# Patient Record
Sex: FEMALE | Race: WHITE | NOT HISPANIC OR LATINO | Employment: OTHER | URBAN - NONMETROPOLITAN AREA
[De-identification: names, ages, dates, MRNs, and addresses within clinical notes are randomized per-mention and may not be internally consistent; named-entity substitution may affect disease eponyms.]

---

## 2018-03-23 ENCOUNTER — OFFICE VISIT (OUTPATIENT)
Dept: FAMILY MEDICINE CLINIC | Facility: CLINIC | Age: 68
End: 2018-03-23

## 2018-03-23 ENCOUNTER — APPOINTMENT (OUTPATIENT)
Dept: LAB | Facility: HOSPITAL | Age: 68
End: 2018-03-23

## 2018-03-23 VITALS
HEIGHT: 64 IN | OXYGEN SATURATION: 96 % | HEART RATE: 104 BPM | DIASTOLIC BLOOD PRESSURE: 100 MMHG | WEIGHT: 98 LBS | BODY MASS INDEX: 16.73 KG/M2 | SYSTOLIC BLOOD PRESSURE: 160 MMHG

## 2018-03-23 DIAGNOSIS — I73.9 CLAUDICATION IN PERIPHERAL VASCULAR DISEASE (HCC): ICD-10-CM

## 2018-03-23 DIAGNOSIS — I73.9 PERIPHERAL ARTERIAL DISEASE (HCC): Primary | ICD-10-CM

## 2018-03-23 DIAGNOSIS — R64 CACHEXIA (HCC): ICD-10-CM

## 2018-03-23 DIAGNOSIS — Z13.220 ENCOUNTER FOR SCREENING FOR LIPID DISORDER: ICD-10-CM

## 2018-03-23 DIAGNOSIS — Z12.11 COLON CANCER SCREENING: ICD-10-CM

## 2018-03-23 DIAGNOSIS — Z71.6 ENCOUNTER FOR SMOKING CESSATION COUNSELING: ICD-10-CM

## 2018-03-23 DIAGNOSIS — G89.4 CHRONIC PAIN SYNDROME: ICD-10-CM

## 2018-03-23 DIAGNOSIS — F17.210 HEAVY TOBACCO SMOKER >10 CIGARETTES PER DAY: ICD-10-CM

## 2018-03-23 DIAGNOSIS — I10 ESSENTIAL HYPERTENSION: ICD-10-CM

## 2018-03-23 LAB
ALBUMIN SERPL-MCNC: 4.5 G/DL (ref 3.4–4.8)
ALBUMIN/GLOB SERPL: 1.5 G/DL (ref 1.1–1.8)
ALP SERPL-CCNC: 100 U/L (ref 38–126)
ALT SERPL W P-5'-P-CCNC: 23 U/L (ref 9–52)
ANION GAP SERPL CALCULATED.3IONS-SCNC: 13 MMOL/L (ref 5–15)
ARTICHOKE IGE QN: 72 MG/DL (ref 1–129)
AST SERPL-CCNC: 27 U/L (ref 14–36)
BILIRUB SERPL-MCNC: 0.3 MG/DL (ref 0.2–1.3)
BUN BLD-MCNC: 7 MG/DL (ref 7–21)
BUN/CREAT SERPL: 10.8 (ref 7–25)
CALCIUM SPEC-SCNC: 9.6 MG/DL (ref 8.4–10.2)
CHLORIDE SERPL-SCNC: 96 MMOL/L (ref 95–110)
CHOLEST SERPL-MCNC: 175 MG/DL (ref 0–199)
CO2 SERPL-SCNC: 27 MMOL/L (ref 22–31)
CREAT BLD-MCNC: 0.65 MG/DL (ref 0.5–1)
GFR SERPL CREATININE-BSD FRML MDRD: 91 ML/MIN/1.73 (ref 45–104)
GLOBULIN UR ELPH-MCNC: 3.1 GM/DL (ref 2.3–3.5)
GLUCOSE BLD-MCNC: 94 MG/DL (ref 60–100)
HDLC SERPL-MCNC: 63 MG/DL (ref 60–200)
LDLC/HDLC SERPL: 1.32 {RATIO} (ref 0–3.22)
POTASSIUM BLD-SCNC: 4.5 MMOL/L (ref 3.5–5.1)
PROT SERPL-MCNC: 7.6 G/DL (ref 6.3–8.6)
SODIUM BLD-SCNC: 136 MMOL/L (ref 137–145)
TRIGL SERPL-MCNC: 144 MG/DL (ref 20–199)

## 2018-03-23 PROCEDURE — 99203 OFFICE O/P NEW LOW 30 MIN: CPT | Performed by: FAMILY MEDICINE

## 2018-03-23 PROCEDURE — 80061 LIPID PANEL: CPT | Performed by: FAMILY MEDICINE

## 2018-03-23 PROCEDURE — 36415 COLL VENOUS BLD VENIPUNCTURE: CPT | Performed by: FAMILY MEDICINE

## 2018-03-23 PROCEDURE — 99406 BEHAV CHNG SMOKING 3-10 MIN: CPT | Performed by: FAMILY MEDICINE

## 2018-03-23 PROCEDURE — 80053 COMPREHEN METABOLIC PANEL: CPT | Performed by: FAMILY MEDICINE

## 2018-03-23 RX ORDER — FLUOXETINE HYDROCHLORIDE 20 MG/1
20 CAPSULE ORAL DAILY
Qty: 30 CAPSULE | Refills: 0 | Status: SHIPPED | OUTPATIENT
Start: 2018-03-23 | End: 2018-05-01 | Stop reason: SDUPTHER

## 2018-03-23 RX ORDER — CILOSTAZOL 100 MG/1
100 TABLET ORAL EVERY 12 HOURS
Qty: 60 TABLET | Refills: 0 | Status: SHIPPED | OUTPATIENT
Start: 2018-03-23 | End: 2018-04-20 | Stop reason: SDUPTHER

## 2018-03-23 RX ORDER — ATORVASTATIN CALCIUM 10 MG/1
10 TABLET, FILM COATED ORAL DAILY
Qty: 30 TABLET | Refills: 0 | Status: SHIPPED | OUTPATIENT
Start: 2018-03-23 | End: 2018-05-01 | Stop reason: SDUPTHER

## 2018-03-23 RX ORDER — NAPROXEN 500 MG/1
500 TABLET ORAL 2 TIMES DAILY WITH MEALS
Qty: 60 TABLET | Refills: 0 | Status: SHIPPED | OUTPATIENT
Start: 2018-03-23 | End: 2018-05-01 | Stop reason: SDUPTHER

## 2018-03-23 RX ORDER — AMLODIPINE BESYLATE 2.5 MG/1
2.5 TABLET ORAL 2 TIMES DAILY
Qty: 60 TABLET | Refills: 0 | Status: SHIPPED | OUTPATIENT
Start: 2018-03-23 | End: 2018-05-01 | Stop reason: SDUPTHER

## 2018-03-23 RX ORDER — GABAPENTIN 100 MG/1
100 CAPSULE ORAL 3 TIMES DAILY
Qty: 90 CAPSULE | Refills: 1 | Status: SHIPPED | OUTPATIENT
Start: 2018-03-23 | End: 2018-06-03 | Stop reason: SDUPTHER

## 2018-03-23 RX ORDER — LISINOPRIL 10 MG/1
10 TABLET ORAL DAILY
Qty: 30 TABLET | Refills: 0 | Status: SHIPPED | OUTPATIENT
Start: 2018-03-23 | End: 2018-04-04 | Stop reason: ALTCHOICE

## 2018-03-23 NOTE — PROGRESS NOTES
Subjective:     Jessie Jordan is a 67 y.o. female who presents to establish care for multiple medical issues.  She would like to talk about her leg pain & PAD today.      She has history of chronic pain from previous neck/shoulder surgeries as well as right knee pain.  She was previously in pain management and treated with Norco 7.5mg/325mg q6h PRN pain and Neurontin 100mg BID.   She has recently moved to [place] and needs a referral to a new pain clinic.  She reports she is almost out of her Norco and Neurontin and is requesting refills today.      She has history of CVA which occurred last year, she reports residual weakness of her hands and some right sided lower extremity neuropathy.    Patient reports that she underwent complete evaluation for her peripheral artery disease and was told she has almost complete calcium blockage of her bilateral femoral arteries.  She was scheduled to undergo bypass grafting but was told by her vascular surgeon that she should see someone here in [place], [place] because she was moving.   I have no record of her work up at this time.  She states they have filled out forms for medical records to be sent to our office.    She reports leg pain with minimal activity, states the pain subsides somewhat when she stops to rest.  She is on Lipitor 10mg nightly and takes a daily aspirin.    Patient also has history of HTN and was previously on lisinopril 10mg daily, she states this was discontinued at her last hospitalization and replace with Norvasc 2.5mg BID because her blood pressure was well controlled in the hospital.   She has only been taking Norvasc 2.5mg BID for blood pressure control at this time.    Preventative:  Over the past 2 weeks, have you felt down, depressed, or hopeless?Yes - diagnosis of depression, currently treated with Prozac  Over the past 2 weeks, have you felt little interest or pleasure in doing things?Yes  Clinical depression screening refused by  patient.No     On osteoporosis therapy?No     Past Medical Hx:  Past Medical History:   Diagnosis Date   • Cerebral vascular disease 2017    residual arm weakness   • Peripheral artery disease     pt states almost complete stenosis of bilateral femoral arteries   • Post-menopausal 1990       Past Surgical Hx:  Past Surgical History:   Procedure Laterality Date   • CARDIAC CATHETERIZATION  12/2017    done at outside hospital.  x2, pt reports no stenting   • CERVICAL FUSION  1985    C5-6   • ROTATOR CUFF REPAIR Right 2015       Health Maintenance:  Health Maintenance   Topic Date Due   • TDAP/TD VACCINES (1 - Tdap) 09/04/1969   • PNEUMOCOCCAL VACCINES (65+ LOW/MEDIUM RISK) (1 of 2 - PCV13) 09/04/2015   • INFLUENZA VACCINE  08/01/2017   • COLONOSCOPY  02/15/2018   • HEPATITIS C SCREENING  02/26/2018   • MEDICARE ANNUAL WELLNESS  02/26/2018   • MAMMOGRAM  02/26/2018   • ZOSTER VACCINE  02/26/2018       Current Meds:    Current Outpatient Prescriptions:   •  atorvastatin (LIPITOR) 10 MG tablet, Take 10 mg by mouth Daily., Disp: , Rfl:   •  benzonatate (TESSALON) 200 MG capsule, Take 1 capsule by mouth 3 (Three) Times a Day As Needed for Cough., Disp: 30 capsule, Rfl: 0  •  BuPROPion HCl (WELLBUTRIN PO), Take  by mouth., Disp: , Rfl:   •  FLUoxetine (PROZAC) 20 MG capsule, Take 20 mg by mouth Daily., Disp: , Rfl:   •  gabapentin (NEURONTIN) 100 MG capsule, Take 100 mg by mouth 3 (Three) Times a Day., Disp: , Rfl:   •  lisinopril (PRINIVIL,ZESTRIL) 10 MG tablet, Take 10 mg by mouth Daily., Disp: , Rfl:   •  naproxen (NAPROSYN) 500 MG tablet, Take 500 mg by mouth 2 (Two) Times a Day With Meals., Disp: , Rfl:   •  oseltamivir (TAMIFLU) 75 MG capsule, Take 1 capsule by mouth 2 (Two) Times a Day., Disp: 10 capsule, Rfl: 0  •  oxyCODONE-acetaminophen (PERCOCET) 7.5-325 MG per tablet, Take 1 tablet by mouth Every 6 (Six) Hours As Needed., Disp: , Rfl:     Allergies:  Review of patient's allergies indicates no known  "allergies.    Family Hx:  Family History   Problem Relation Age of Onset   • Lung cancer Mother    • Hypertension Mother    • Diabetes Maternal Grandmother    • Heart disease Maternal Grandmother    • Hypertension Maternal Grandfather    • Heart disease Maternal Grandfather         Social History:  Social History     Social History   • Marital status:      Spouse name: N/A   • Number of children: N/A   • Years of education: N/A     Occupational History   • Not on file.     Social History Main Topics   • Smoking status: Current Every Day Smoker     Packs/day: 1.00     Years: 50.00     Types: Cigarettes     Start date: 1968   • Smokeless tobacco: Not on file      Comment: 50+ pack years   • Alcohol use No      Comment: social, in the past   • Drug use: No   • Sexual activity: Defer     Other Topics Concern   • Not on file     Social History Narrative   • No narrative on file       Review of Systems  Review of Systems   Constitutional: Negative for activity change, appetite change, chills and fever.   HENT: Negative for congestion, sinus pressure and sore throat.    Eyes: Negative for photophobia and visual disturbance.   Respiratory: Positive for cough and wheezing. Negative for shortness of breath.    Cardiovascular: Negative for chest pain, palpitations and leg swelling.   Gastrointestinal: Negative for abdominal pain, constipation, diarrhea, nausea and vomiting.   Endocrine: Negative for polyphagia and polyuria.   Genitourinary: Negative for decreased urine volume and difficulty urinating.   Musculoskeletal: Positive for arthralgias and neck pain (chronic). Negative for myalgias.   Skin: Positive for pallor (feet). Negative for color change.   Neurological: Negative for dizziness and light-headedness.   Psychiatric/Behavioral: Negative for confusion and decreased concentration.       Objective:     /100   Pulse 104   Ht 162.6 cm (64\")   Wt 44.5 kg (98 lb)   SpO2 96%   BMI 16.82 kg/m²     Physical " Exam   Constitutional: She is oriented to person, place, and time. She appears cachectic. No distress.   HENT:   Head: Normocephalic and atraumatic.   Eyes: Conjunctivae and EOM are normal.   Neck: Normal range of motion. Neck supple.   Cardiovascular: Normal rate, regular rhythm and normal heart sounds.  Exam reveals no gallop and no friction rub.    No murmur heard.  Pulses:       Dorsalis pedis pulses are 0 on the right side, and 0 on the left side.        Posterior tibial pulses are 0 on the right side, and 0 on the left side.   Non-palpable DP/PT pulses bilaterally - pulses audible with doppler   Pulmonary/Chest: Effort normal. No respiratory distress. She has wheezes. She has no rales.   Abdominal: Soft. Bowel sounds are normal. She exhibits no distension. There is no tenderness.   Musculoskeletal: Normal range of motion. She exhibits no edema.     Vascular Status -  Her right foot exhibits abnormal right foot vasculature  (Non-palpable DP/PT pulses  - audible with doppler). Her right foot exhibits normal right foot edema. Her left foot exhibits abnormal left foot vasculature  (Non-palpable DP/PT pulses  - audible with doppler). Her left foot exhibits normal left foot edema.  Neurological: She is alert and oriented to person, place, and time.   Skin: Skin is warm and dry. No rash noted. She is not diaphoretic.   Psychiatric: She has a normal mood and affect.   Vitals reviewed.     Assessment/Plan:     Jessie was seen today for leg pain and establish care.    Diagnoses and all orders for this visit:    Peripheral arterial disease  -     atorvastatin (LIPITOR) 10 MG tablet; Take 1 tablet by mouth Daily.  -     cilostazol (PLETAL) 100 MG tablet; Take 1 tablet by mouth Every 12 (Twelve) Hours. 30 min before or 2hr after meals    Essential hypertension  -     amLODIPine (NORVASC) 2.5 MG tablet; Take 1 tablet by mouth 2 (Two) Times a Day.  -     lisinopril (PRINIVIL,ZESTRIL) 10 MG tablet; Take 1 tablet by mouth  Daily.  -     Comprehensive Metabolic Panel    Encounter for screening for lipid disorder  -     Lipid Panel    Claudication in peripheral vascular disease  -     cilostazol (PLETAL) 100 MG tablet; Take 1 tablet by mouth Every 12 (Twelve) Hours. 30 min before or 2hr after meals    Heavy tobacco smoker >10 cigarettes per day  -     Discontinue: nicotine (NICODERM CQ) 21 MG/24HR patch 1 patch; Place 1 patch on the skin Daily.  -     buPROPion (ZYBAN) 150 MG 12 hr tablet; Take 150 mg by mouth Every 12 (Twelve) Hours.  -     nicotine (NICODERM CQ) 21 MG/24HR patch; Place 1 patch on the skin Daily for 42 days. Remove at night time    Encounter for smoking cessation counseling  Comments:  5 minutes was spent in smoking cessation counseling, pt already on Zyban but not correct dosing. She would also like nicotine patches    Chronic pain syndrome  -     naproxen (NAPROSYN) 500 MG tablet; Take 1 tablet by mouth 2 (Two) Times a Day With Meals.  -     gabapentin (NEURONTIN) 100 MG capsule; Take 1 capsule by mouth 3 (Three) Times a Day. For chronic pain  -     Ambulatory Referral to Pain Management    Colon cancer screening  -     Ambulatory Referral For Screening Colonoscopy    BMI less than 19,adult  Comments:  BMI 16.82    Cachexia  Comments:  - likely due to COPD but pt has not had spirometry testing    Other orders  -     FLUoxetine (PROZAC) 20 MG capsule; Take 1 capsule by mouth Daily.      Follow-up:     Return in about 2 weeks (around 4/6/2018) for INITIAL MEDICARE WELLNESS.    Goals        Patient Stated    • Quit smoking / using tobacco (pt-stated)            Barriers: compliance with cessation medications         Other    • Pain control            Barriers: severe PAD, pt continues to smoke          Preventative:  Female Preventative: Mammogram is due, Colon cancer screening is due, last pap unknown. Recommended mammogram, pt refused.  Counseled pt that mammogram is used to screen for breast cancer.  Risks of not  undergoing imaging including but not limited to death discussed with pt.  Pt verbalized understanding and still does not wish to under go mammogram at this time.  There were no barriers to communication.     Recommended Vaccines:   Tetanus vaccine: unknown  Annual influenza vaccine: not up to date  Pneumococcal vaccine: not up to date - pt declined  Hep B vaccine: unknown   Zoster vaccine:unknown    Smoking cessation counseling was provided.  Pharmacotherapy was prescribed as ordered.  does not drink  increase water intake and have 3 meals a day    RISK SCORE: 5    Signature  Cathy Willard MD PGY3  [place] [address]  Office: [phone]    This document has been electronically signed by Cathy Willard MD on March 26, 2018 10:54 AM

## 2018-03-23 NOTE — PROGRESS NOTES
I discussed the case with the resident and I agree with their assessment and plan as outlined .  Hadley Lopez MD.

## 2018-03-26 PROBLEM — I10 ESSENTIAL HYPERTENSION: Status: ACTIVE | Noted: 2018-03-26

## 2018-03-26 PROBLEM — I73.9 PERIPHERAL ARTERIAL DISEASE: Status: ACTIVE | Noted: 2018-03-26

## 2018-03-26 PROBLEM — I67.9 CEREBROVASCULAR DISEASE: Chronic | Status: ACTIVE | Noted: 2018-03-26

## 2018-03-26 PROBLEM — F17.210 HEAVY TOBACCO SMOKER >10 CIGARETTES PER DAY: Status: ACTIVE | Noted: 2018-03-26

## 2018-03-26 PROBLEM — G89.4 CHRONIC PAIN SYNDROME: Status: ACTIVE | Noted: 2018-03-26

## 2018-03-26 RX ORDER — BUPROPION HYDROCHLORIDE 150 MG/1
150 TABLET, EXTENDED RELEASE ORAL EVERY 12 HOURS SCHEDULED
Qty: 60 TABLET | Refills: 0 | Status: SHIPPED | OUTPATIENT
Start: 2018-03-26 | End: 2018-05-01 | Stop reason: SDUPTHER

## 2018-03-26 RX ORDER — NICOTINE 21 MG/24HR
1 PATCH, TRANSDERMAL 24 HOURS TRANSDERMAL EVERY 24 HOURS
Qty: 21 PATCH | Refills: 1 | Status: SHIPPED | OUTPATIENT
Start: 2018-03-26 | End: 2018-04-04

## 2018-03-26 RX ORDER — NICOTINE 21 MG/24HR
1 PATCH, TRANSDERMAL 24 HOURS TRANSDERMAL EVERY 24 HOURS
Status: DISCONTINUED | OUTPATIENT
Start: 2018-03-26 | End: 2018-03-26

## 2018-03-26 NOTE — ASSESSMENT & PLAN NOTE
- non-palpable DP/PT pulses but audible by doppler  - based on patient history she will require bypass grafting, will await medical records as this information will need to be sent to Vascular Surgery along with her referral  - will try Pletal for her symptoms of claudication

## 2018-04-03 PROBLEM — I67.9 CEREBRAL VASCULAR DISEASE: Status: ACTIVE | Noted: 2017-01-01

## 2018-04-03 PROBLEM — I25.10 CORONARY ARTERY DISEASE INVOLVING NATIVE CORONARY ARTERY OF NATIVE HEART: Status: ACTIVE | Noted: 2017-12-06

## 2018-04-04 ENCOUNTER — OFFICE VISIT (OUTPATIENT)
Dept: FAMILY MEDICINE CLINIC | Facility: CLINIC | Age: 68
End: 2018-04-04

## 2018-04-04 VITALS
DIASTOLIC BLOOD PRESSURE: 68 MMHG | BODY MASS INDEX: 16.99 KG/M2 | OXYGEN SATURATION: 96 % | HEART RATE: 104 BPM | SYSTOLIC BLOOD PRESSURE: 142 MMHG | WEIGHT: 99.5 LBS | HEIGHT: 64 IN

## 2018-04-04 DIAGNOSIS — Z00.00 INITIAL MEDICARE ANNUAL WELLNESS VISIT: Primary | ICD-10-CM

## 2018-04-04 DIAGNOSIS — M89.9 DISORDER OF BONE: ICD-10-CM

## 2018-04-04 DIAGNOSIS — Z23 ENCOUNTER FOR IMMUNIZATION: ICD-10-CM

## 2018-04-04 DIAGNOSIS — Z12.2 ENCOUNTER FOR SCREENING FOR LUNG CANCER: ICD-10-CM

## 2018-04-04 DIAGNOSIS — Z13.820 ENCOUNTER FOR SCREENING FOR OSTEOPOROSIS: ICD-10-CM

## 2018-04-04 DIAGNOSIS — Z12.39 ENCOUNTER FOR SCREENING FOR MALIGNANT NEOPLASM OF BREAST: ICD-10-CM

## 2018-04-04 DIAGNOSIS — F17.210 HEAVY TOBACCO SMOKER >10 CIGARETTES PER DAY: ICD-10-CM

## 2018-04-04 PROCEDURE — G0402 INITIAL PREVENTIVE EXAM: HCPCS | Performed by: FAMILY MEDICINE

## 2018-04-04 RX ORDER — HYDROCODONE BITARTRATE AND ACETAMINOPHEN 7.5; 325 MG/1; MG/1
1 TABLET ORAL EVERY 8 HOURS PRN
COMMUNITY
End: 2018-12-21 | Stop reason: HOSPADM

## 2018-04-04 NOTE — PROGRESS NOTES
QUICK REFERENCE INFORMATION:  The ABCs of the Annual Wellness Visit    Initial Medicare Wellness Visit    HEALTH RISK ASSESSMENT    1950    Recent Hospitalizations:  Recently treated at the following:  Other: [place] - chest pain and CVA 12/2017.      Current Medical Providers:  Patient Care Team:  Cathy Willard MD as PCP - General (Family Medicine)  Ana Maria Quinn MD as Resident (Family Medicine)  Oz Harrell Jr., MD as Resident (Family Medicine)  Max Cunningham MD (Family Medicine)  Mony Miller MD as Resident (Family Medicine)  Santos Moran MD as Resident (Family Medicine)        Smoking Status:  History   Smoking Status   • Current Every Day Smoker   • Packs/day: 1.00   • Years: 50.00   • Types: Cigarettes   • Start date: 1968   Smokeless Tobacco   • Not on file     Comment: 50+ pack years       Alcohol Consumption:  History   Alcohol Use No     Comment: social, in the past       Depression Screen:   PHQ-2/PHQ-9 Depression Screening 4/5/2018   Little interest or pleasure in doing things 2   Feeling down, depressed, or hopeless 1   Trouble falling or staying asleep, or sleeping too much 1   Feeling tired or having little energy 3   Poor appetite or overeating 1   Feeling bad about yourself - or that you are a failure or have let yourself or your family down 1   Trouble concentrating on things, such as reading the newspaper or watching television 1   Moving or speaking so slowly that other people could have noticed. Or the opposite - being so fidgety or restless that you have been moving around a lot more than usual 2   Thoughts that you would be better off dead, or of hurting yourself in some way 0   Total Score 12   If you checked off any problems, how difficult have these problems made it for you to do your work, take care of things at home, or get along with other people? Somewhat difficult       Health Habits and Functional and Cognitive Screening:  Functional &  Cognitive Status 4/5/2018   Do you have difficulty preparing food and eating? No   Do you have difficulty bathing yourself, getting dressed or grooming yourself? No   Do you have difficulty using the toilet? No   Do you have difficulty moving around from place to place? No   Do you have trouble with steps or getting out of a bed or a chair? No   Do you need help using the phone?  No   Do you need help with transportation? No   Do you need help shopping? Yes   Do you need help preparing meals?  Yes   Do you need help with housework?  No   Do you need help with laundry? Yes   Do you need help taking your medications? No   Do you need help managing money? No   Do you ever drive or ride in a car without wearing a seat belt? No   Have you felt unusual stress, anger or loneliness in the last month? No   Who do you live with? Other   If you need help, do you have trouble finding someone available to you? No   Have you been bothered in the last four weeks by sexual problems? No   Do you have difficulty concentrating, remembering or making decisions? No           Does the patient have evidence of cognitive impairment? No    Asiprin use counseling: Not taking daily ASA      Recent Lab Results:    Lab Results   Component Value Date    CHOL 175 03/23/2018    TRIG 144 03/23/2018    HDL 63 03/23/2018    LDL 72 03/23/2018     Lab Results   Component Value Date    GLUCOSE 94 03/23/2018    BUN 7 03/23/2018    CREATININE 0.65 03/23/2018    EGFRIFNONA 91 03/23/2018    BCR 10.8 03/23/2018    K 4.5 03/23/2018    CO2 27.0 03/23/2018    CALCIUM 9.6 03/23/2018    ALBUMIN 4.50 03/23/2018    LABIL2 1.5 03/23/2018    AST 27 03/23/2018    ALT 23 03/23/2018     Visual Acuity:  No exam data present    Age-appropriate Screening Schedule:  Refer to the list below for future screening recommendations based on patient's age, sex and/or medical conditions. Orders for these recommended tests are listed in the plan section. The patient has been  provided with a written plan.    Health Maintenance   Topic Date Due   • TDAP/TD VACCINES (1 - Tdap) 09/04/1969   • PNEUMOCOCCAL VACCINES (65+ LOW/MEDIUM RISK) (1 of 2 - PCV13) 09/04/2015   • COLONOSCOPY  02/15/2018   • ZOSTER VACCINE  02/26/2018   • MAMMOGRAM  03/23/2020   • INFLUENZA VACCINE  Addressed        Subjective   History of Present Illness    Jessie Jordan is a 67 y.o. female who presents for an Annual Wellness Visit.    The following portions of the patient's history were reviewed and updated as appropriate: allergies, current medications, past family history, past medical history, past social history, past surgical history and problem list.    Outpatient Medications Prior to Visit   Medication Sig Dispense Refill   • amLODIPine (NORVASC) 2.5 MG tablet Take 1 tablet by mouth 2 (Two) Times a Day. 60 tablet 0   • atorvastatin (LIPITOR) 10 MG tablet Take 1 tablet by mouth Daily. 30 tablet 0   • buPROPion (ZYBAN) 150 MG 12 hr tablet Take 150 mg by mouth Every 12 (Twelve) Hours. 60 tablet 0   • cilostazol (PLETAL) 100 MG tablet Take 1 tablet by mouth Every 12 (Twelve) Hours. 30 min before or 2hr after meals 60 tablet 0   • FLUoxetine (PROZAC) 20 MG capsule Take 1 capsule by mouth Daily. 30 capsule 0   • gabapentin (NEURONTIN) 100 MG capsule Take 1 capsule by mouth 3 (Three) Times a Day. For chronic pain 90 capsule 1   • naproxen (NAPROSYN) 500 MG tablet Take 1 tablet by mouth 2 (Two) Times a Day With Meals. 60 tablet 0   • benzonatate (TESSALON) 200 MG capsule Take 1 capsule by mouth 3 (Three) Times a Day As Needed for Cough. 30 capsule 0   • lisinopril (PRINIVIL,ZESTRIL) 10 MG tablet Take 1 tablet by mouth Daily. 30 tablet 0   • nicotine (NICODERM CQ) 21 MG/24HR patch Place 1 patch on the skin Daily for 42 days. Remove at night time 21 patch 1   • oxyCODONE-acetaminophen (PERCOCET) 7.5-325 MG per tablet Take 1 tablet by mouth Every 6 (Six) Hours As Needed.       No facility-administered medications prior  "to visit.        Patient Active Problem List   Diagnosis   • Peripheral arterial disease   • Essential hypertension   • Cerebrovascular disease   • Heavy tobacco smoker >10 cigarettes per day   • Chronic pain syndrome   • BMI less than 19,adult   • Cerebral vascular disease   • Coronary artery disease involving native coronary artery of native heart       Advance Care Planning:  has NO advance directive - information provided to the patient today    Identification of Risk Factors:  Risk factors include: weight , inactivity and tobacco use.    Review of Systems    Compared to one year ago, the patient feels her physical health is the same.  Compared to one year ago, the patient feels her mental health is the same.    Objective     Physical Exam    Vitals:    04/04/18 1529   BP: 142/68   BP Location: Left arm   Patient Position: Sitting   Cuff Size: Adult   Pulse: 104   SpO2: 96%   Weight: 45.1 kg (99 lb 8 oz)   Height: 162.6 cm (64\")   PainSc:   6   PainLoc: Leg       Body mass index is 17.08 kg/m².  Discussed the patient's BMI with her. BMI is below normal parameters. Follow-up plan includes:  treating the underlying disease process.    Assessment/Plan   Patient Self-Management and Personalized Health Advice  The patient has been provided with information about: tobacco cessation and designing advance directives and preventive services including:   · Advance directive, Bone densitometry screening, Colorectal cancer screening, colonoscopy referral placed, Fall Risk assessment done, Pneumococcal vaccine , Screening mammography, referral placed, Smoking cessation counseling completed.    Visit Diagnoses:    ICD-10-CM ICD-9-CM   1. Initial Medicare annual wellness visit Z00.00 V70.0   2. Heavy tobacco smoker >10 cigarettes per day F17.210 305.1   3. Encounter for screening for malignant neoplasm of breast Z12.31 V76.10   4. Encounter for screening for lung cancer Z12.2 V76.0   5. Encounter for screening for osteoporosis " Z13.820 V82.81   6. Disorder of bone  M89.9 733.90   7. Encounter for immunization  Z23 V03.89   8. BMI less than 19,adult Z68.1 V85.0       Orders Placed This Encounter   Procedures   • CT chest low dose wo     50 pack year history     Standing Status:   Future     Standing Expiration Date:   4/4/2019   • Mammo Screening Digital Tomosynthesis Bilateral With CAD     Standing Status:   Future     Standing Expiration Date:   4/4/2019     Order Specific Question:   Reason for Exam:     Answer:   screening for breast cancer   • DEXA Bone Density Axial     Order Specific Question:   Reason for Exam:     Answer:   screening for osteoporosis   • Pneumococcal Conjugate Vaccine 13-Valent All       Outpatient Encounter Prescriptions as of 4/4/2018   Medication Sig Dispense Refill   • amLODIPine (NORVASC) 2.5 MG tablet Take 1 tablet by mouth 2 (Two) Times a Day. 60 tablet 0   • atorvastatin (LIPITOR) 10 MG tablet Take 1 tablet by mouth Daily. 30 tablet 0   • buPROPion (ZYBAN) 150 MG 12 hr tablet Take 150 mg by mouth Every 12 (Twelve) Hours. 60 tablet 0   • cilostazol (PLETAL) 100 MG tablet Take 1 tablet by mouth Every 12 (Twelve) Hours. 30 min before or 2hr after meals 60 tablet 0   • FLUoxetine (PROZAC) 20 MG capsule Take 1 capsule by mouth Daily. 30 capsule 0   • gabapentin (NEURONTIN) 100 MG capsule Take 1 capsule by mouth 3 (Three) Times a Day. For chronic pain 90 capsule 1   • HYDROcodone-acetaminophen (NORCO) 7.5-325 MG per tablet Take 1 tablet by mouth Every 6 (Six) Hours As Needed for Moderate Pain .     • naproxen (NAPROSYN) 500 MG tablet Take 1 tablet by mouth 2 (Two) Times a Day With Meals. 60 tablet 0   • [DISCONTINUED] benzonatate (TESSALON) 200 MG capsule Take 1 capsule by mouth 3 (Three) Times a Day As Needed for Cough. 30 capsule 0   • [DISCONTINUED] lisinopril (PRINIVIL,ZESTRIL) 10 MG tablet Take 1 tablet by mouth Daily. 30 tablet 0   • [DISCONTINUED] nicotine (NICODERM CQ) 21 MG/24HR patch Place 1 patch on  the skin Daily for 42 days. Remove at night time 21 patch 1   • [DISCONTINUED] oxyCODONE-acetaminophen (PERCOCET) 7.5-325 MG per tablet Take 1 tablet by mouth Every 6 (Six) Hours As Needed.       No facility-administered encounter medications on file as of 4/4/2018.        Reviewed use of high risk medication in the elderly: yes  Reviewed for potential of harmful drug interactions in the elderly: yes    Goals        Patient Stated    • Quit smoking / using tobacco (pt-stated)            Barriers: compliance with cessation medications         Other    • Pain control            Barriers: severe PAD, pt continues to smoke          Follow Up:  Follow up in 1 year for Subsequent Medicare Wellness Exam     An After Visit Summary and PPPS with all of these plans were given to the patient.  Please refer to scanned documents for details of visit.            Signature  Cathy Willard MD PGY3  [place] [address]  Office: [phone]    This document has been electronically signed by Cathy Willard MD on April 5, 2018 11:01 AM

## 2018-04-04 NOTE — PROGRESS NOTES
I have reviewed the notes, assessments, and/or procedures performed by Cathy Willard MD , I concur with her/his documentation and assessment and plan for Jessie Jordan.        This document has been electronically signed by Fernanda Funes MD on April 4, 2018 4:20 PM

## 2018-04-10 ENCOUNTER — APPOINTMENT (OUTPATIENT)
Dept: CT IMAGING | Facility: HOSPITAL | Age: 68
End: 2018-04-10
Attending: FAMILY MEDICINE

## 2018-04-11 ENCOUNTER — HOSPITAL ENCOUNTER (OUTPATIENT)
Dept: CT IMAGING | Facility: HOSPITAL | Age: 68
Discharge: HOME | End: 2018-04-11
Attending: FAMILY MEDICINE | Admitting: FAMILY MEDICINE

## 2018-04-11 DIAGNOSIS — F17.210 HEAVY TOBACCO SMOKER >10 CIGARETTES PER DAY: ICD-10-CM

## 2018-04-11 PROCEDURE — G0297 LDCT FOR LUNG CA SCREEN: HCPCS

## 2018-04-20 DIAGNOSIS — I73.9 PERIPHERAL ARTERIAL DISEASE (HCC): ICD-10-CM

## 2018-04-20 DIAGNOSIS — I73.9 CLAUDICATION IN PERIPHERAL VASCULAR DISEASE (HCC): ICD-10-CM

## 2018-04-20 RX ORDER — CILOSTAZOL 100 MG/1
TABLET ORAL
Qty: 60 TABLET | Refills: 0 | Status: SHIPPED | OUTPATIENT
Start: 2018-04-20 | End: 2018-05-01 | Stop reason: SDUPTHER

## 2018-04-30 ENCOUNTER — TELEPHONE (OUTPATIENT)
Dept: ONCOLOGY | Facility: HOSPITAL | Age: 68
End: 2018-04-30

## 2018-04-30 NOTE — TELEPHONE ENCOUNTER
Spoke with the patient's daughter in law. Reviewed the appointment date and time. She verbalized understanding.

## 2018-05-01 ENCOUNTER — OFFICE VISIT (OUTPATIENT)
Dept: FAMILY MEDICINE CLINIC | Facility: CLINIC | Age: 68
End: 2018-05-01

## 2018-05-01 VITALS
HEIGHT: 64 IN | SYSTOLIC BLOOD PRESSURE: 126 MMHG | WEIGHT: 98 LBS | HEART RATE: 95 BPM | OXYGEN SATURATION: 99 % | DIASTOLIC BLOOD PRESSURE: 80 MMHG | BODY MASS INDEX: 16.73 KG/M2

## 2018-05-01 DIAGNOSIS — F17.210 HEAVY TOBACCO SMOKER >10 CIGARETTES PER DAY: ICD-10-CM

## 2018-05-01 DIAGNOSIS — I10 ESSENTIAL HYPERTENSION: ICD-10-CM

## 2018-05-01 DIAGNOSIS — I73.9 CLAUDICATION IN PERIPHERAL VASCULAR DISEASE (HCC): ICD-10-CM

## 2018-05-01 DIAGNOSIS — I73.9 PERIPHERAL ARTERIAL DISEASE (HCC): ICD-10-CM

## 2018-05-01 DIAGNOSIS — G89.4 CHRONIC PAIN SYNDROME: ICD-10-CM

## 2018-05-01 DIAGNOSIS — H61.23 BILATERAL IMPACTED CERUMEN: Primary | ICD-10-CM

## 2018-05-01 PROCEDURE — 99213 OFFICE O/P EST LOW 20 MIN: CPT | Performed by: FAMILY MEDICINE

## 2018-05-01 PROCEDURE — 69210 REMOVE IMPACTED EAR WAX UNI: CPT | Performed by: FAMILY MEDICINE

## 2018-05-01 RX ORDER — CILOSTAZOL 100 MG/1
100 TABLET ORAL 2 TIMES DAILY
Qty: 60 TABLET | Refills: 2 | Status: SHIPPED | OUTPATIENT
Start: 2018-05-01 | End: 2018-07-24 | Stop reason: SDUPTHER

## 2018-05-01 RX ORDER — ATORVASTATIN CALCIUM 10 MG/1
10 TABLET, FILM COATED ORAL DAILY
Qty: 30 TABLET | Refills: 0 | Status: SHIPPED | OUTPATIENT
Start: 2018-05-01 | End: 2018-05-27 | Stop reason: SDUPTHER

## 2018-05-01 RX ORDER — BUPROPION HYDROCHLORIDE 150 MG/1
150 TABLET, EXTENDED RELEASE ORAL EVERY 12 HOURS SCHEDULED
Qty: 60 TABLET | Refills: 0 | Status: SHIPPED | OUTPATIENT
Start: 2018-05-01 | End: 2018-05-27 | Stop reason: SDUPTHER

## 2018-05-01 RX ORDER — ATORVASTATIN CALCIUM 10 MG/1
10 TABLET, FILM COATED ORAL DAILY
Qty: 30 TABLET | Refills: 0 | Status: CANCELLED | OUTPATIENT
Start: 2018-05-01

## 2018-05-01 RX ORDER — FLUOXETINE HYDROCHLORIDE 20 MG/1
20 CAPSULE ORAL DAILY
Qty: 30 CAPSULE | Refills: 0 | Status: CANCELLED | OUTPATIENT
Start: 2018-05-01

## 2018-05-01 RX ORDER — AMLODIPINE BESYLATE 2.5 MG/1
2.5 TABLET ORAL 2 TIMES DAILY
Qty: 60 TABLET | Refills: 0 | Status: SHIPPED | OUTPATIENT
Start: 2018-05-01 | End: 2018-06-08 | Stop reason: SDUPTHER

## 2018-05-01 RX ORDER — NAPROXEN 500 MG/1
500 TABLET ORAL 2 TIMES DAILY WITH MEALS
Qty: 60 TABLET | Refills: 0 | Status: SHIPPED | OUTPATIENT
Start: 2018-05-01 | End: 2018-06-08 | Stop reason: SDUPTHER

## 2018-05-01 RX ORDER — FLUOXETINE HYDROCHLORIDE 20 MG/1
20 CAPSULE ORAL DAILY
Qty: 30 CAPSULE | Refills: 0 | Status: SHIPPED | OUTPATIENT
Start: 2018-05-01 | End: 2018-06-08 | Stop reason: SDUPTHER

## 2018-05-01 RX ORDER — AMLODIPINE BESYLATE 2.5 MG/1
2.5 TABLET ORAL 2 TIMES DAILY
Qty: 60 TABLET | Refills: 0 | Status: CANCELLED | OUTPATIENT
Start: 2018-05-01

## 2018-05-01 RX ORDER — LISINOPRIL 10 MG/1
10 TABLET ORAL DAILY
Qty: 30 TABLET | Refills: 0 | Status: SHIPPED | OUTPATIENT
Start: 2018-05-01 | End: 2018-06-04 | Stop reason: SDUPTHER

## 2018-05-01 NOTE — PROGRESS NOTES
Subjective:     Jessie Jordan is a 67 y.o. female who presents for follow up of peripheral vascular disease and for cerumen removal.    Bilateral Cerumen Impaction:  Noted during medicare wellness exam, patient reports decreased hearing, she has used ear drops to soften the wax and requests irrigation today.    Peripheral Vascular Disease:     Patient states she has almost complete calcium blockage of her bilateral femoral arteries, work up for this was done at outside facility.  Records have been requested but still have not been received.  She states she was told she needed to undergo bypass grafting but was told by her vascular surgeon that she should see someone here in [place], [place] because she was moving.  She continues to have leg pain with minimal activity; that pain subsides somewhat when she stops to rest.  She is now on pletal for symptoms.  She is on Lipitor 10mg nightly and takes a daily aspirin.    Preventative:  Over the past 2 weeks, have you felt down, depressed, or hopeless?Yes - diagnosis of depression, currently treated with Prozac  Over the past 2 weeks, have you felt little interest or pleasure in doing things?Yes  Clinical depression screening refused by patient.No     On osteoporosis therapy?No     Past Medical Hx:  Past Medical History:   Diagnosis Date   • Cerebral vascular disease 2017    residual arm weakness   • Coronary artery disease involving native coronary artery of native heart 12/06/2017    seen on Cardiac Cath - Left main 50-70% stenosis   • Peripheral artery disease     pt states almost complete stenosis of bilateral femoral arteries   • Post-menopausal 1990       Past Surgical Hx:  Past Surgical History:   Procedure Laterality Date   • CARDIAC CATHETERIZATION  12/06/2017    Done at [facility] - Left Main 50-70% Stenosis - no stenting done, recommendation was urgent CABG.  EF 50-60%   • CERVICAL FUSION  1985    C5-6   • ROTATOR CUFF REPAIR Left  06/30/2011    done in [place]       Health Maintenance:  Health Maintenance   Topic Date Due   • TDAP/TD VACCINES (1 - Tdap) 09/04/1969   • COLONOSCOPY  02/15/2018   • ZOSTER VACCINE  02/26/2018   • INFLUENZA VACCINE  08/01/2018   • MEDICARE ANNUAL WELLNESS  04/04/2019   • PNEUMOCOCCAL VACCINES (65+ LOW/MEDIUM RISK) (2 of 2 - PPSV23) 04/05/2019   • LUNG CANCER SCREENING  04/11/2019   • MAMMOGRAM  04/25/2020   • HEPATITIS C SCREENING  Completed       Current Meds:    Current Outpatient Prescriptions:   •  amLODIPine (NORVASC) 2.5 MG tablet, Take 1 tablet by mouth 2 (Two) Times a Day., Disp: 60 tablet, Rfl: 0  •  atorvastatin (LIPITOR) 10 MG tablet, Take 1 tablet by mouth Daily., Disp: 30 tablet, Rfl: 0  •  buPROPion (ZYBAN) 150 MG 12 hr tablet, Take 150 mg by mouth Every 12 (Twelve) Hours., Disp: 60 tablet, Rfl: 0  •  cilostazol (PLETAL) 100 MG tablet, TAKE 1 TABLET BY MOUTH EVERY 12 HOURS FOR 30 MIN BEFORE OR 2HR AFTER MEALS, Disp: 60 tablet, Rfl: 0  •  diclofenac (VOLTAREN) 1 % gel gel, APPLY TOPICALLY TO RIGHT KNEE AND LEFT SHOULDER TWICE DAILY, Disp: , Rfl: 1  •  FLUoxetine (PROZAC) 20 MG capsule, Take 1 capsule by mouth Daily., Disp: 30 capsule, Rfl: 0  •  gabapentin (NEURONTIN) 100 MG capsule, Take 1 capsule by mouth 3 (Three) Times a Day. For chronic pain, Disp: 90 capsule, Rfl: 1  •  HYDROcodone-acetaminophen (NORCO) 7.5-325 MG per tablet, Take 1 tablet by mouth Every 6 (Six) Hours As Needed for Moderate Pain ., Disp: , Rfl:   •  naproxen (NAPROSYN) 500 MG tablet, Take 1 tablet by mouth 2 (Two) Times a Day With Meals., Disp: 60 tablet, Rfl: 0    Allergies:  Review of patient's allergies indicates no known allergies.    Family Hx:  Family History   Problem Relation Age of Onset   • Lung cancer Mother    • Hypertension Mother    • Diabetes Maternal Grandmother    • Heart disease Maternal Grandmother    • Hypertension Maternal Grandfather    • Heart disease Maternal Grandfather         Social History:  Social History  "    Social History   • Marital status:      Spouse name: N/A   • Number of children: N/A   • Years of education: N/A     Occupational History   • Not on file.     Social History Main Topics   • Smoking status: Current Every Day Smoker     Packs/day: 1.00     Years: 50.00     Types: Cigarettes     Start date: 1968   • Smokeless tobacco: Not on file      Comment: 50+ pack years   • Alcohol use No      Comment: social, in the past   • Drug use: No   • Sexual activity: Defer     Other Topics Concern   • Not on file     Social History Narrative   • No narrative on file       Review of Systems  Review of Systems   Constitutional: Negative for activity change, appetite change, chills and fever.   HENT: Positive for ear pain. Negative for congestion, sinus pressure and sore throat.    Eyes: Negative for photophobia and visual disturbance.   Respiratory: Negative for shortness of breath and stridor.    Cardiovascular: Negative for chest pain, palpitations and leg swelling.   Gastrointestinal: Negative for abdominal pain, constipation, diarrhea, nausea and vomiting.   Endocrine: Negative for polyphagia and polyuria.   Genitourinary: Negative for decreased urine volume and difficulty urinating.   Musculoskeletal: Positive for arthralgias and neck pain (chronic). Negative for myalgias.   Skin: Positive for pallor (feet). Negative for color change.   Neurological: Negative for dizziness and light-headedness.   Psychiatric/Behavioral: Negative for confusion and decreased concentration.       Objective:     /80   Pulse 95   Ht 162.6 cm (64\")   Wt 44.5 kg (98 lb)   SpO2 99%   BMI 16.82 kg/m²     Physical Exam   Constitutional: She is oriented to person, place, and time. She appears cachectic. No distress.   HENT:   Head: Normocephalic and atraumatic.   Bilateral cerumen impaction   Eyes: Conjunctivae and EOM are normal.   Neck: Normal range of motion. Neck supple.   Cardiovascular: Normal rate, regular rhythm and " normal heart sounds.  Exam reveals no gallop and no friction rub.    No murmur heard.  Pulses:       Dorsalis pedis pulses are 0 on the right side, and 0 on the left side.        Posterior tibial pulses are 0 on the right side, and 0 on the left side.   Non-palpable DP/PT pulses bilaterally - pulses audible with doppler   Pulmonary/Chest: Effort normal. No respiratory distress. She has no rales.   Abdominal: Soft. Bowel sounds are normal. She exhibits no distension. There is no tenderness.   Musculoskeletal: Normal range of motion. She exhibits no edema.     Vascular Status -  Her right foot exhibits abnormal foot vasculature  (Non-palpable DP/PT pulses  - audible with doppler). Her right foot exhibits no edema. Her left foot exhibits abnormal foot vasculature  (Non-palpable DP/PT pulses  - audible with doppler). Her left foot exhibits no edema.  Neurological: She is alert and oriented to person, place, and time.   Skin: Skin is warm and dry. No rash noted. She is not diaphoretic.   Psychiatric: She has a normal mood and affect.   Vitals reviewed.     Ear Cerumen Removal Instrumentation  Date/Time: 5/1/2018 11:00 AM  Performed by: ADAM GONZALEZ  Authorized by: ADAM GONZALEZ   Consent: Verbal consent obtained. Written consent not obtained.  Risks and benefits: risks, benefits and alternatives were discussed  Consent given by: patient  Patient understanding: patient states understanding of the procedure being performed  Patient identity confirmed: verbally with patient    Anesthesia:  Local Anesthetic: none  Location details: left ear and right ear  Patient tolerance: Patient tolerated the procedure well with no immediate complications  Comments: Complete removal accomplished on right; unable to completely disimpact left ear canal - will give debrox & repeat in 1 week.  Procedure type: irrigation   Sedation:  Patient sedated: no        Assessment/Plan:     Jessie was seen today for cerumen impaction  and results.    Diagnoses and all orders for this visit:    Bilateral impacted cerumen  -     carbamide peroxide (DEBROX) 6.5 % otic solution; Administer 5 drops into the left ear 2 (Two) Times a Day for 4 days.  -     Ear Cerumen Removal    Essential hypertension  -     amLODIPine (NORVASC) 2.5 MG tablet; Take 1 tablet by mouth 2 (Two) Times a Day.    Peripheral arterial disease  -     atorvastatin (LIPITOR) 10 MG tablet; Take 1 tablet by mouth Daily.  -     cilostazol (PLETAL) 100 MG tablet; Take 1 tablet by mouth 2 (Two) Times a Day.  -     Doppler Ankle Brachial Index Multi Level CAR    Heavy tobacco smoker >10 cigarettes per day  -     buPROPion (ZYBAN) 150 MG 12 hr tablet; Take 150 mg by mouth Every 12 (Twelve) Hours.    Claudication in peripheral vascular disease  -     cilostazol (PLETAL) 100 MG tablet; Take 1 tablet by mouth 2 (Two) Times a Day.  -     Doppler Ankle Brachial Index Multi Level CAR    Chronic pain syndrome  -     naproxen (NAPROSYN) 500 MG tablet; Take 1 tablet by mouth 2 (Two) Times a Day With Meals.    Other orders  -     FLUoxetine (PROZAC) 20 MG capsule; Take 1 capsule by mouth Daily.      Follow-up:     Return in about 7 days (around 5/8/2018).    Goals        Patient Stated    • Quit smoking / using tobacco (pt-stated)            Barriers: compliance with cessation medications         Other    • Pain control            Barriers: severe PAD, pt continues to smoke          Preventative:  Female Preventative: Mammogram is up to date - patient to go back for spot views of right breast 5/4/18.  Colon cancer screening is due - patient was referred to GI for screening colonoscopy.      Recommended Vaccines:   Pneumococcal vaccine: given 4/5/18      Smoking cessation counseling was provided.  Pharmacotherapy was prescribed as ordered.  does not drink  increase water intake and have 3 meals a day     Patient's Body mass index is 16.82 kg/m². BMI is below normal parameters. Follow-up plan  includes:  treating the underlying disease process.      RISK SCORE: 5    Signature  Cathy Willard MD PGY3  [place] [address]  Office: [phone]    This document has been electronically signed by Cathy Willard MD on May 1, 2018 10:39 AM

## 2018-05-01 NOTE — PROGRESS NOTES
I have reviewed the notes, assessments, and/or procedures performed by Cathy Willard MD , I concur with her/his documentation and assessment and plan for Jessie Jordan.        This document has been electronically signed by Fernanda Funes MD on May 1, 2018 11:40 AM

## 2018-05-08 ENCOUNTER — OFFICE VISIT (OUTPATIENT)
Dept: FAMILY MEDICINE CLINIC | Facility: CLINIC | Age: 68
End: 2018-05-08

## 2018-05-08 VITALS
HEIGHT: 64 IN | SYSTOLIC BLOOD PRESSURE: 128 MMHG | BODY MASS INDEX: 17.09 KG/M2 | OXYGEN SATURATION: 98 % | WEIGHT: 100.1 LBS | DIASTOLIC BLOOD PRESSURE: 72 MMHG | HEART RATE: 71 BPM

## 2018-05-08 DIAGNOSIS — J30.89 ALLERGIC RHINITIS DUE TO OTHER ALLERGIC TRIGGER, UNSPECIFIED CHRONICITY, UNSPECIFIED SEASONALITY: Primary | ICD-10-CM

## 2018-05-08 DIAGNOSIS — I73.9 PERIPHERAL ARTERIAL DISEASE (HCC): ICD-10-CM

## 2018-05-08 DIAGNOSIS — R68.89 ABNORMAL ANKLE BRACHIAL INDEX (ABI): ICD-10-CM

## 2018-05-08 DIAGNOSIS — M81.0 AGE-RELATED OSTEOPOROSIS WITHOUT CURRENT PATHOLOGICAL FRACTURE: Primary | ICD-10-CM

## 2018-05-08 DIAGNOSIS — H61.21 IMPACTED CERUMEN OF RIGHT EAR: ICD-10-CM

## 2018-05-08 PROCEDURE — 99213 OFFICE O/P EST LOW 20 MIN: CPT | Performed by: FAMILY MEDICINE

## 2018-05-08 PROCEDURE — 69210 REMOVE IMPACTED EAR WAX UNI: CPT | Performed by: FAMILY MEDICINE

## 2018-05-08 RX ORDER — CETIRIZINE HYDROCHLORIDE 10 MG/1
10 TABLET ORAL DAILY
Qty: 30 TABLET | Refills: 0 | Status: SHIPPED | OUTPATIENT
Start: 2018-05-08 | End: 2018-06-04 | Stop reason: SDUPTHER

## 2018-05-08 RX ORDER — FLUTICASONE PROPIONATE 50 MCG
2 SPRAY, SUSPENSION (ML) NASAL DAILY
Qty: 1 BOTTLE | Refills: 0 | Status: SHIPPED | OUTPATIENT
Start: 2018-05-08 | End: 2018-06-03 | Stop reason: SDUPTHER

## 2018-05-08 NOTE — PROGRESS NOTES
Subjective:     Jessie Jordan is a 67 y.o. female who presents for follow up of peripheral vascular disease and for cerumen removal.    Cerumen Impaction Left Ear:  Noted during previous exam, initial attempt to remove impaction via irrigation was unsuccessful, she was sent home with debrox for 1 week and asked to return for cerumen removal.     Peripheral Vascular Disease:     Patient underwent MONTY last Friday as her records from previous work up at outside facility are still unavailable. She continues to have leg pain with minimal activity; that pain subsides somewhat when she stops to rest.  She is now on pletal for symptoms.  She is on Lipitor 10mg nightly and takes a daily aspirin.    5/4/18 MONTY results:  Interpretation Summary     · Right Conclusion: The right MONTY is critically reduced.  · Left Conclusion: The left MONTY is moderate to severely reduced.  · VPRs obtained     Lower Arterial Doppler Pressures      Right Pressure Left Pressure   Brachial 161 mmHg       165 mmHg         High Thigh           Low Thigh 80 mmHg       108 mmHg         Popliteal 70 mmHg       87 mmHg         DP 49 mmHg       77 mmHg         PT 52 mmHg       73 mmHg         Peroneal           Great Toe           2nd Digit           3rd Digit           4th Digit           5th Digit              Lower Arterial Doppler Ratios      Right Ratio Left Ratio   Calf           MONTY 0.32        0.47          TBI           Post Ex MONTY          Preventative:  Over the past 2 weeks, have you felt down, depressed, or hopeless?Yes - diagnosis of depression, currently treated with Prozac  Over the past 2 weeks, have you felt little interest or pleasure in doing things?Yes  Clinical depression screening refused by patient.No     On osteoporosis therapy?No     Past Medical Hx:  Past Medical History:   Diagnosis Date   • Cerebral vascular disease 2017    residual arm weakness   • Coronary artery disease involving native coronary artery of native heart  12/06/2017    seen on Cardiac Cath - Left main 50-70% stenosis   • Peripheral artery disease     pt states almost complete stenosis of bilateral femoral arteries   • Post-menopausal 1990       Past Surgical Hx:  Past Surgical History:   Procedure Laterality Date   • CARDIAC CATHETERIZATION  12/06/2017    Done at [facility] - Left Main 50-70% Stenosis - no stenting done, recommendation was urgent CABG.  EF 50-60%   • CERVICAL FUSION  1985    C5-6   • ROTATOR CUFF REPAIR Left 06/30/2011    done in [place]       Health Maintenance:  Health Maintenance   Topic Date Due   • TDAP/TD VACCINES (1 - Tdap) 09/04/1969   • COLONOSCOPY  02/15/2018   • ZOSTER VACCINE  02/26/2018   • INFLUENZA VACCINE  08/01/2018   • MEDICARE ANNUAL WELLNESS  04/04/2019   • PNEUMOCOCCAL VACCINES (65+ LOW/MEDIUM RISK) (2 of 2 - PPSV23) 04/05/2019   • LUNG CANCER SCREENING  04/11/2019   • MAMMOGRAM  04/25/2020   • HEPATITIS C SCREENING  Completed       Current Meds:    Current Outpatient Prescriptions:   •  amLODIPine (NORVASC) 2.5 MG tablet, Take 1 tablet by mouth 2 (Two) Times a Day., Disp: 60 tablet, Rfl: 0  •  atorvastatin (LIPITOR) 10 MG tablet, Take 1 tablet by mouth Daily., Disp: 30 tablet, Rfl: 0  •  buPROPion (ZYBAN) 150 MG 12 hr tablet, Take 150 mg by mouth Every 12 (Twelve) Hours., Disp: 60 tablet, Rfl: 0  •  cilostazol (PLETAL) 100 MG tablet, Take 1 tablet by mouth 2 (Two) Times a Day., Disp: 60 tablet, Rfl: 2  •  diclofenac (VOLTAREN) 1 % gel gel, APPLY TOPICALLY TO RIGHT KNEE AND LEFT SHOULDER TWICE DAILY, Disp: , Rfl: 1  •  FLUoxetine (PROZAC) 20 MG capsule, Take 1 capsule by mouth Daily., Disp: 30 capsule, Rfl: 0  •  gabapentin (NEURONTIN) 100 MG capsule, Take 1 capsule by mouth 3 (Three) Times a Day. For chronic pain, Disp: 90 capsule, Rfl: 1  •  HYDROcodone-acetaminophen (NORCO) 7.5-325 MG per tablet, Take 1 tablet by mouth Every 6 (Six) Hours As Needed for Moderate Pain ., Disp: , Rfl:   •  lisinopril (PRINIVIL,ZESTRIL)  10 MG tablet, TAKE 1 TABLET BY MOUTH DAILY., Disp: 30 tablet, Rfl: 0  •  naproxen (NAPROSYN) 500 MG tablet, Take 1 tablet by mouth 2 (Two) Times a Day With Meals., Disp: 60 tablet, Rfl: 0    Allergies:  Review of patient's allergies indicates no known allergies.    Family Hx:  Family History   Problem Relation Age of Onset   • Lung cancer Mother    • Hypertension Mother    • Diabetes Maternal Grandmother    • Heart disease Maternal Grandmother    • Hypertension Maternal Grandfather    • Heart disease Maternal Grandfather         Social History:  Social History     Social History   • Marital status:      Spouse name: N/A   • Number of children: N/A   • Years of education: N/A     Occupational History   • Not on file.     Social History Main Topics   • Smoking status: Current Every Day Smoker     Packs/day: 1.00     Years: 50.00     Types: Cigarettes     Start date: 1968   • Smokeless tobacco: Never Used      Comment: 50+ pack years   • Alcohol use No      Comment: social, in the past   • Drug use: No   • Sexual activity: Defer     Other Topics Concern   • Not on file     Social History Narrative   • No narrative on file       Review of Systems  Review of Systems   Constitutional: Negative for activity change, appetite change, chills and fever.   HENT: Positive for ear pain and hearing loss. Negative for congestion, sinus pressure and sore throat.    Eyes: Negative for photophobia and visual disturbance.   Respiratory: Negative for shortness of breath and stridor.    Cardiovascular: Negative for chest pain, palpitations and leg swelling.   Gastrointestinal: Negative for abdominal pain, constipation, diarrhea, nausea and vomiting.   Endocrine: Negative for polyphagia and polyuria.   Genitourinary: Negative for decreased urine volume and difficulty urinating.   Musculoskeletal: Positive for arthralgias and neck pain (chronic). Negative for myalgias.   Skin: Positive for pallor (feet). Negative for color change.  "  Neurological: Negative for dizziness and light-headedness.   Psychiatric/Behavioral: Negative for confusion and decreased concentration.       Objective:     /72 (BP Location: Left arm, Patient Position: Sitting)   Pulse 71   Ht 162.6 cm (64\")   Wt 45.4 kg (100 lb 1.6 oz)   SpO2 98%   BMI 17.18 kg/m²     Physical Exam   Constitutional: She is oriented to person, place, and time. She appears cachectic. No distress.   HENT:   Head: Normocephalic and atraumatic.   Right Ear: A middle ear effusion (serous) is present.   left cerumen impaction   Eyes: Conjunctivae and EOM are normal.   Neck: Normal range of motion. Neck supple.   Cardiovascular: Normal rate, regular rhythm and normal heart sounds.  Exam reveals no gallop and no friction rub.    No murmur heard.  Pulses:       Dorsalis pedis pulses are 0 on the right side, and 0 on the left side.        Posterior tibial pulses are 0 on the right side, and 0 on the left side.   Non-palpable DP/PT pulses bilaterally - pulses audible with doppler   Pulmonary/Chest: Effort normal. No respiratory distress. She has no rales.   Abdominal: Soft. Bowel sounds are normal. She exhibits no distension. There is no tenderness.   Musculoskeletal: Normal range of motion. She exhibits no edema.     Vascular Status -  Her right foot exhibits abnormal foot vasculature  (Non-palpable DP/PT pulses  - audible with doppler). Her right foot exhibits no edema. Her left foot exhibits abnormal foot vasculature  (Non-palpable DP/PT pulses  - audible with doppler). Her left foot exhibits no edema.  Neurological: She is alert and oriented to person, place, and time.   Skin: Skin is warm and dry. No rash noted. She is not diaphoretic.   Psychiatric: She has a normal mood and affect.   Vitals reviewed.     Ear Cerumen Removal Instrumentation  Date/Time: 5/8/2018 9:00 AM  Performed by: ADAM GONZALEZ  Authorized by: ADAM GONZALEZ   Consent: Verbal consent obtained. Written " consent not obtained.  Risks and benefits: risks, benefits and alternatives were discussed  Consent given by: patient  Patient understanding: patient states understanding of the procedure being performed  Procedure consent: procedure consent matches procedure scheduled  Patient identity confirmed: verbally with patient    Anesthesia:  Local Anesthetic: none  Location details: right ear  Patient tolerance: Patient tolerated the procedure well with no immediate complications  Comments: Visualization after irrigation and instrumentation revealed no trauma to the ear canal, TM intact.  Procedure type: irrigation (And curettage)      Sedation:  Patient sedated: no        Assessment/Plan:     Jessie was seen today for cerumen impaction.    Diagnoses and all orders for this visit:    Allergic rhinitis due to other allergic trigger, unspecified chronicity, unspecified seasonality  -     fluticasone (FLONASE) 50 MCG/ACT nasal spray; 2 sprays into each nostril Daily.  -     cetirizine (zyrTEC) 10 MG tablet; Take 1 tablet by mouth Daily.    Impacted cerumen of right ear  -     Ear Cerumen Removal    Peripheral arterial disease  -     Ambulatory Referral to Cardiothoracic Surgery    Abnormal ankle brachial index (MONTY)  -     Ambulatory Referral to Cardiothoracic Surgery      Follow-up:     Return in about 1 month (around 6/8/2018).    Goals        Patient Stated    • Quit smoking / using tobacco (pt-stated)            Barriers: compliance with cessation medications         Other    • Pain control            Barriers: severe PAD, pt continues to smoke          Preventative:  Female Preventative: Mammogram is up to date.  Colon cancer screening is due - patient was referred to GI for screening colonoscopy.      Recommended Vaccines:   Pneumococcal vaccine: given 4/5/18      Smoking cessation counseling was provided.  Pharmacotherapy was prescribed as ordered.  does not drink  increase water intake and have 3 meals a day      Patient's Body mass index is 17.18 kg/m². BMI is below normal parameters. Follow-up plan includes:  treating the underlying disease process.      RISK SCORE: 5    Signature  Cathy Willard MD PGY3  [place] [address]  Office: [phone]    This document has been electronically signed by Cathy Willard MD on May 8, 2018 8:51 AM

## 2018-05-08 NOTE — PROGRESS NOTES
I discussed the case with the resident and I agree with their assessment and plan as outlined by Dr. Willard .  Hadley Lopez MD.

## 2018-05-12 LAB
BH CV LOWER ARTERIAL LEFT ABI RATIO: 0.47
BH CV LOWER ARTERIAL LEFT DORSALIS PEDIS SYS MAX: 77 MMHG
BH CV LOWER ARTERIAL LEFT LOW THIGH SYS MAX: 108 MMHG
BH CV LOWER ARTERIAL LEFT POPLITEAL SYS MAX: 87 MMHG
BH CV LOWER ARTERIAL LEFT POST TIBIAL SYS MAX: 73 MMHG
BH CV LOWER ARTERIAL RIGHT ABI RATIO: 0.32
BH CV LOWER ARTERIAL RIGHT DORSALIS PEDIS SYS MAX: 49 MMHG
BH CV LOWER ARTERIAL RIGHT LOW THIGH SYS MAX: 80 MMHG
BH CV LOWER ARTERIAL RIGHT POPLITEAL SYS MAX: 70 MMHG
BH CV LOWER ARTERIAL RIGHT POST TIBIAL SYS MAX: 52 MMHG
UPPER ARTERIAL LEFT ARM BRACHIAL SYS MAX: 165 MMHG
UPPER ARTERIAL RIGHT ARM BRACHIAL SYS MAX: 161 MMHG

## 2018-05-27 DIAGNOSIS — F17.210 HEAVY TOBACCO SMOKER >10 CIGARETTES PER DAY: ICD-10-CM

## 2018-05-27 DIAGNOSIS — I73.9 PERIPHERAL ARTERIAL DISEASE (HCC): ICD-10-CM

## 2018-05-29 RX ORDER — BUPROPION HYDROCHLORIDE 150 MG/1
TABLET, EXTENDED RELEASE ORAL
Qty: 60 TABLET | Refills: 0 | Status: SHIPPED | OUTPATIENT
Start: 2018-05-29 | End: 2018-06-27 | Stop reason: SDUPTHER

## 2018-05-29 RX ORDER — ATORVASTATIN CALCIUM 10 MG/1
TABLET, FILM COATED ORAL
Qty: 30 TABLET | Refills: 0 | Status: SHIPPED | OUTPATIENT
Start: 2018-05-29 | End: 2018-06-27 | Stop reason: SDUPTHER

## 2018-06-03 DIAGNOSIS — J30.89 ALLERGIC RHINITIS DUE TO OTHER ALLERGIC TRIGGER, UNSPECIFIED CHRONICITY, UNSPECIFIED SEASONALITY: ICD-10-CM

## 2018-06-03 DIAGNOSIS — G89.4 CHRONIC PAIN SYNDROME: ICD-10-CM

## 2018-06-04 DIAGNOSIS — I10 ESSENTIAL HYPERTENSION: ICD-10-CM

## 2018-06-04 DIAGNOSIS — J30.89 ALLERGIC RHINITIS DUE TO OTHER ALLERGIC TRIGGER, UNSPECIFIED CHRONICITY, UNSPECIFIED SEASONALITY: ICD-10-CM

## 2018-06-04 RX ORDER — CETIRIZINE HYDROCHLORIDE 10 MG/1
TABLET ORAL
Qty: 30 TABLET | Refills: 0 | Status: SHIPPED | OUTPATIENT
Start: 2018-06-04 | End: 2018-06-08 | Stop reason: SDUPTHER

## 2018-06-04 RX ORDER — LISINOPRIL 10 MG/1
TABLET ORAL
Qty: 30 TABLET | Refills: 0 | Status: SHIPPED | OUTPATIENT
Start: 2018-06-04 | End: 2018-07-24 | Stop reason: SDUPTHER

## 2018-06-05 RX ORDER — FLUTICASONE PROPIONATE 50 MCG
SPRAY, SUSPENSION (ML) NASAL
Qty: 16 ML | Refills: 0 | Status: SHIPPED | OUTPATIENT
Start: 2018-06-05 | End: 2018-07-24 | Stop reason: SDUPTHER

## 2018-06-05 RX ORDER — GABAPENTIN 100 MG/1
CAPSULE ORAL
Qty: 90 CAPSULE | Refills: 2 | Status: SHIPPED | OUTPATIENT
Start: 2018-06-05 | End: 2018-06-08 | Stop reason: SDUPTHER

## 2018-06-06 ENCOUNTER — OFFICE VISIT (OUTPATIENT)
Dept: CARDIAC SURGERY | Facility: CLINIC | Age: 68
End: 2018-06-06

## 2018-06-06 ENCOUNTER — LAB (OUTPATIENT)
Dept: LAB | Facility: HOSPITAL | Age: 68
End: 2018-06-06

## 2018-06-06 VITALS
HEART RATE: 102 BPM | TEMPERATURE: 97 F | HEIGHT: 64 IN | SYSTOLIC BLOOD PRESSURE: 130 MMHG | WEIGHT: 102.3 LBS | OXYGEN SATURATION: 97 % | BODY MASS INDEX: 17.46 KG/M2 | DIASTOLIC BLOOD PRESSURE: 72 MMHG

## 2018-06-06 DIAGNOSIS — R09.89 CAROTID BRUIT, UNSPECIFIED LATERALITY: Primary | ICD-10-CM

## 2018-06-06 DIAGNOSIS — I67.9 CEREBRAL VASCULAR DISEASE: ICD-10-CM

## 2018-06-06 DIAGNOSIS — R09.89 DECREASED PULSES IN FEET: ICD-10-CM

## 2018-06-06 DIAGNOSIS — I73.9 PVD (PERIPHERAL VASCULAR DISEASE) (HCC): ICD-10-CM

## 2018-06-06 DIAGNOSIS — R09.89 CAROTID BRUIT, UNSPECIFIED LATERALITY: ICD-10-CM

## 2018-06-06 DIAGNOSIS — I10 ESSENTIAL HYPERTENSION: ICD-10-CM

## 2018-06-06 LAB
ALBUMIN SERPL-MCNC: 4.6 G/DL (ref 3.4–4.8)
ALBUMIN/GLOB SERPL: 1.6 G/DL (ref 1.1–1.8)
ALP SERPL-CCNC: 85 U/L (ref 38–126)
ALT SERPL W P-5'-P-CCNC: 27 U/L (ref 9–52)
ANION GAP SERPL CALCULATED.3IONS-SCNC: 9 MMOL/L (ref 5–15)
AST SERPL-CCNC: 21 U/L (ref 14–36)
BASOPHILS # BLD AUTO: 0.05 10*3/MM3 (ref 0–0.2)
BASOPHILS NFR BLD AUTO: 0.4 % (ref 0–2)
BILIRUB SERPL-MCNC: 0.2 MG/DL (ref 0.2–1.3)
BUN BLD-MCNC: 7 MG/DL (ref 7–21)
BUN/CREAT SERPL: 12.5 (ref 7–25)
CALCIUM SPEC-SCNC: 9.4 MG/DL (ref 8.4–10.2)
CHLORIDE SERPL-SCNC: 104 MMOL/L (ref 95–110)
CO2 SERPL-SCNC: 25 MMOL/L (ref 22–31)
CREAT BLD-MCNC: 0.56 MG/DL (ref 0.5–1)
DEPRECATED RDW RBC AUTO: 47.1 FL (ref 36.4–46.3)
EOSINOPHIL # BLD AUTO: 0.17 10*3/MM3 (ref 0–0.7)
EOSINOPHIL NFR BLD AUTO: 1.5 % (ref 0–7)
ERYTHROCYTE [DISTWIDTH] IN BLOOD BY AUTOMATED COUNT: 15.3 % (ref 11.5–14.5)
GFR SERPL CREATININE-BSD FRML MDRD: 108 ML/MIN/1.73 (ref 45–104)
GLOBULIN UR ELPH-MCNC: 2.9 GM/DL (ref 2.3–3.5)
GLUCOSE BLD-MCNC: 102 MG/DL (ref 60–100)
HCT VFR BLD AUTO: 33.1 % (ref 35–45)
HGB BLD-MCNC: 11.2 G/DL (ref 12–15.5)
IMM GRANULOCYTES # BLD: 0.02 10*3/MM3 (ref 0–0.02)
IMM GRANULOCYTES NFR BLD: 0.2 % (ref 0–0.5)
LYMPHOCYTES # BLD AUTO: 2.32 10*3/MM3 (ref 0.6–4.2)
LYMPHOCYTES NFR BLD AUTO: 20.8 % (ref 10–50)
MCH RBC QN AUTO: 28.3 PG (ref 26.5–34)
MCHC RBC AUTO-ENTMCNC: 33.8 G/DL (ref 31.4–36)
MCV RBC AUTO: 83.6 FL (ref 80–98)
MONOCYTES # BLD AUTO: 1.05 10*3/MM3 (ref 0–0.9)
MONOCYTES NFR BLD AUTO: 9.4 % (ref 0–12)
NEUTROPHILS # BLD AUTO: 7.55 10*3/MM3 (ref 2–8.6)
NEUTROPHILS NFR BLD AUTO: 67.7 % (ref 37–80)
PLATELET # BLD AUTO: 398 10*3/MM3 (ref 150–450)
PMV BLD AUTO: 9.6 FL (ref 8–12)
POTASSIUM BLD-SCNC: 4.6 MMOL/L (ref 3.5–5.1)
PROT SERPL-MCNC: 7.5 G/DL (ref 6.3–8.6)
RBC # BLD AUTO: 3.96 10*6/MM3 (ref 3.77–5.16)
SODIUM BLD-SCNC: 138 MMOL/L (ref 137–145)
WBC NRBC COR # BLD: 11.16 10*3/MM3 (ref 3.2–9.8)

## 2018-06-06 PROCEDURE — 85025 COMPLETE CBC W/AUTO DIFF WBC: CPT

## 2018-06-06 PROCEDURE — 80053 COMPREHEN METABOLIC PANEL: CPT

## 2018-06-06 PROCEDURE — 99201 PR OFFICE OUTPATIENT NEW 10 MINUTES: CPT | Performed by: THORACIC SURGERY (CARDIOTHORACIC VASCULAR SURGERY)

## 2018-06-06 PROCEDURE — 36415 COLL VENOUS BLD VENIPUNCTURE: CPT

## 2018-06-06 NOTE — PROGRESS NOTES
Jessie Jordan  1950            67 y.o.      6/6/2018    Chief Complaint   Patient presents with   • Peripheral Vascular Disease   • Leg Pain   PCP ADAM GONZALEZ MD  67 year old woman referred for diminished arterial perfusion both lower extremities,more symptomatic right.MONTY findings will be recorded vascular studies.      HPI  RISK FACTORS:  Adult lifetime cigarette smoker pck/day  Dyslipidemia  HTN  FHx CAD    PAST HISTORY:   Previous stroke with right sided weakness  Significant coronary artery disease    Current Outpatient Prescriptions:   •  amLODIPine (NORVASC) 2.5 MG tablet, Take 1 tablet by mouth 2 (Two) Times a Day., Disp: 60 tablet, Rfl: 0  •  atorvastatin (LIPITOR) 10 MG tablet, TAKE 1 TABLET BY MOUTH EVERY DAY, Disp: 30 tablet, Rfl: 0  •  buPROPion SR (WELLBUTRIN SR) 150 MG 12 hr tablet, TAKE 1 TABLET BY MOUTH EVERY 12 HOURS EVERY DAY, Disp: 60 tablet, Rfl: 0  •  cetirizine (zyrTEC) 10 MG tablet, TAKE 1 TABLET BY MOUTH EVERY DAY, Disp: 30 tablet, Rfl: 0  •  cilostazol (PLETAL) 100 MG tablet, Take 1 tablet by mouth 2 (Two) Times a Day., Disp: 60 tablet, Rfl: 2  •  diclofenac (VOLTAREN) 1 % gel gel, APPLY TOPICALLY TO RIGHT KNEE AND LEFT SHOULDER TWICE DAILY, Disp: , Rfl: 1  •  FLUoxetine (PROZAC) 20 MG capsule, Take 1 capsule by mouth Daily., Disp: 30 capsule, Rfl: 0  •  fluticasone (FLONASE) 50 MCG/ACT nasal spray, ADMINISTER 2 SPRAYS INTO EACH NOSTRIL DAILY, Disp: 16 mL, Rfl: 0  •  gabapentin (NEURONTIN) 100 MG capsule, TAKE ONE CAPSULE BY MOUTH 3 TIMES A DAY AS NEEDED FOR CHRONIC PAIN, Disp: 90 capsule, Rfl: 2  •  HYDROcodone-acetaminophen (NORCO) 7.5-325 MG per tablet, Take 1 tablet by mouth Every 6 (Six) Hours As Needed for Moderate Pain ., Disp: , Rfl:   •  lisinopril (PRINIVIL,ZESTRIL) 10 MG tablet, TAKE 1 TABLET BY MOUTH EVERY DAY, Disp: 30 tablet, Rfl: 0  •  naproxen (NAPROSYN) 500 MG tablet, Take 1 tablet by mouth 2 (Two) Times a Day With Meals., Disp: 60 tablet, Rfl: 0  •   "calcium-vitamin D (OSCAL 500/200 D-3) 500-200 MG-UNIT per tablet, Take 1 tablet by mouth Daily., Disp: 30 tablet, Rfl: 11    The following portions of the patient's history were reviewed and updated as appropriate: allergies, current medications, past family history, past medical history, past social history, past surgical history and problem list.        Past Surgical History:   Procedure Laterality Date   • CARDIAC CATHETERIZATION  12/06/2017    Done at [facility] - Left Main 50-70% Stenosis - no stenting done, recommendation was urgent CABG.  EF 50-60%   • CERVICAL FUSION  1985    C5-6   • ROTATOR CUFF REPAIR Left 06/30/2011    done in [place]           Physical Exam  /72 (BP Location: Left arm)   Pulse 102   Temp 97 °F (36.1 °C) (Oral)   Ht 162.6 cm (64\")   Wt 46.4 kg (102 lb 4.8 oz)   SpO2 97%   BMI 17.56 kg/m²     HEENT:No masses soft bruits over carotid arteries    CHEST: Clear to auscultation    HEART: Regular rate and rhythm    ABDOMEN: Nontender    EXTREMITIES: Ulcers at ankles are absent bilaterally femoral pulses are markedly diminished    VASCULAR LAB STUDIES:MONTY (5-4-18)     Right 0.32        Left 0.47  WAVE FORMS                                       CF Biphasic          Biphasic                                                              POP  Biphasic          Biphasic                                                                PT   Monophasic    Biphasic                                                                DP  Monophasic    Biphasic    Severely diminished arterial perfusion right lower extremity at iliac as well as infrapopliteal level.  Severely diminished arterial perfusion left lower extremity at the iliac level    CAROTID DUP;ANTONIO SCAN (6-6-18)  RIGHT 0-49%    LEFT 0-49%           VERTEBRAL FLOW                  Antegrade          Antegrade          RADIOLOGY:      Carotid bruit, unspecified laterality [R09.89]    IMPRESSION:  1.  Severely diminished arterial " perfusion right lower extremity at the aortoiliac level as well as infrapopliteal level  2.  Severely diminished arterial perfusion left lower extremity likely at the aortoiliac level  3.  Carotid duplex scan bilaterally minimal carotid stenosis, antegrade flow both vertebral arteries (History of stroke, RIGHT  Hemiparesis very little residual.  4.  History cardiac catheterization significant left main lesion untreated       LABORATORY   eGFR 108                               CBC  H/H 11.2/33.1          Assessment/Plan  Jessie was seen today for peripheral vascular disease and leg pain.    Diagnoses and all orders for this visit:    Carotid bruit, unspecified laterality  -     Duplex Carotid Ultrasound CAR; Future  -     Comprehensive Metabolic Panel; Future    Essential hypertension  -     CBC & Differential; Future  -     Comprehensive Metabolic Panel; Future    Decreased pulses in feet  -     Comprehensive Metabolic Panel; Future  -     CT Angio Abdominal Aorta Bilateral Iliofem Runoff With & Without Contrast; Future    PVD (peripheral vascular disease)  -     Comprehensive Metabolic Panel; Future  -     CT Angio Abdominal Aorta Bilateral Iliofem Runoff With & Without Contrast; Future    Cerebral vascular disease        CTA ABDOMINAL AORTA/RUNOFF 6-20-18 rETURN WHEN THOSE FILMS AVAILABLE 6-20-18          Return 6-20-18 cta AORTA/RUNOFF RETURN, for Next scheduled follow up.            Jack L. Hamman, MD  6/6/2018

## 2018-06-08 ENCOUNTER — OFFICE VISIT (OUTPATIENT)
Dept: FAMILY MEDICINE CLINIC | Facility: CLINIC | Age: 68
End: 2018-06-08

## 2018-06-08 VITALS
SYSTOLIC BLOOD PRESSURE: 120 MMHG | OXYGEN SATURATION: 97 % | WEIGHT: 101 LBS | BODY MASS INDEX: 17.24 KG/M2 | DIASTOLIC BLOOD PRESSURE: 80 MMHG | HEART RATE: 79 BPM | HEIGHT: 64 IN

## 2018-06-08 DIAGNOSIS — F17.210 CIGARETTE NICOTINE DEPENDENCE WITHOUT COMPLICATION: ICD-10-CM

## 2018-06-08 DIAGNOSIS — J30.89 ALLERGIC RHINITIS DUE TO OTHER ALLERGIC TRIGGER, UNSPECIFIED CHRONICITY, UNSPECIFIED SEASONALITY: ICD-10-CM

## 2018-06-08 DIAGNOSIS — I10 ESSENTIAL HYPERTENSION: Primary | ICD-10-CM

## 2018-06-08 DIAGNOSIS — G89.4 CHRONIC PAIN SYNDROME: ICD-10-CM

## 2018-06-08 PROCEDURE — 99213 OFFICE O/P EST LOW 20 MIN: CPT | Performed by: FAMILY MEDICINE

## 2018-06-08 RX ORDER — NAPROXEN 500 MG/1
500 TABLET ORAL 2 TIMES DAILY WITH MEALS
Qty: 60 TABLET | Refills: 3 | Status: SHIPPED | OUTPATIENT
Start: 2018-06-08 | End: 2018-08-16 | Stop reason: SDUPTHER

## 2018-06-08 RX ORDER — CETIRIZINE HYDROCHLORIDE 10 MG/1
10 TABLET ORAL DAILY
Qty: 30 TABLET | Refills: 3 | Status: SHIPPED | OUTPATIENT
Start: 2018-06-08 | End: 2018-08-16 | Stop reason: SDUPTHER

## 2018-06-08 RX ORDER — AMLODIPINE BESYLATE 2.5 MG/1
2.5 TABLET ORAL 2 TIMES DAILY
Qty: 60 TABLET | Refills: 3 | Status: SHIPPED | OUTPATIENT
Start: 2018-06-08 | End: 2018-09-26 | Stop reason: SDUPTHER

## 2018-06-08 RX ORDER — GABAPENTIN 100 MG/1
100 CAPSULE ORAL 3 TIMES DAILY
Qty: 90 CAPSULE | Refills: 2 | Status: SHIPPED | OUTPATIENT
Start: 2018-06-08 | End: 2018-08-16 | Stop reason: SDUPTHER

## 2018-06-08 RX ORDER — FLUOXETINE HYDROCHLORIDE 20 MG/1
20 CAPSULE ORAL DAILY
Qty: 30 CAPSULE | Refills: 3 | Status: SHIPPED | OUTPATIENT
Start: 2018-06-08 | End: 2018-08-16 | Stop reason: SDUPTHER

## 2018-06-08 NOTE — PROGRESS NOTES
Subjective:     Jessie Jordan is a 67 y.o. female who presents to follow up for HTN, Chronic pain syndrome, PVD and smoking cessation.    Chronic pain: history of chronic pain from previous neck/shoulder surgeries as well as right knee pain.  Currently awaiting an appointment with pain management.  Pain is somewhat controlled by Neurontin 100mg BID, she is needing a new prescription today.       PVD:  Patient has severely diminished blood flow at the level of the aortoiliac vessels, she has been evaluated by vascular surgery and is scheduled for a CTA ABD Aorta with run off 6/20/18 to determine treatment course. She will be seeing Dr. Hamman immediately after her imaging study. She continues to take pletal and lipitor.  Patient reports extreme leg pain with minimal ambulation.   She has been referred to Cardiology for CAD.     HTN:   Well controlled on Lisinopril and Norvasc.  Denies lower extremity edema.  She has had hypertension for many years.     Tobacco use:  Patient continues to smoke, states she is smoking between 3-5 cigarettes per day now, she does not feel that the buproprion decreases her cravings however she is smoking less than she was 1 month ago.  She states her insurance would not cover the nicotine patches.    Preventative:  Over the past 2 weeks, have you felt down, depressed, or hopeless?Yes - diagnosis of depression, currently treated with Prozac  Over the past 2 weeks, have you felt little interest or pleasure in doing things?Yes  Clinical depression screening refused by patient.No     On osteoporosis therapy?No     Past Medical Hx:  Past Medical History:   Diagnosis Date   • Cerebral vascular disease 2017    residual arm weakness   • Coronary artery disease involving native coronary artery of native heart 12/06/2017    seen on Cardiac Cath - Left main 50-70% stenosis   • Peripheral artery disease     pt states almost complete stenosis of bilateral femoral arteries   • Post-menopausal  1990       Past Surgical Hx:  Past Surgical History:   Procedure Laterality Date   • CARDIAC CATHETERIZATION  12/06/2017    Done at [facility] - Left Main 50-70% Stenosis - no stenting done, recommendation was urgent CABG.  EF 50-60%   • CERVICAL FUSION  1985    C5-6   • ROTATOR CUFF REPAIR Left 06/30/2011    done in [place]       Health Maintenance:  Health Maintenance   Topic Date Due   • TDAP/TD VACCINES (1 - Tdap) 09/04/1969   • ZOSTER VACCINE (1 of 2) 09/04/2000   • COLONOSCOPY  02/15/2018   • INFLUENZA VACCINE  08/01/2018   • MEDICARE ANNUAL WELLNESS  04/04/2019   • PNEUMOCOCCAL VACCINES (65+ LOW/MEDIUM RISK) (2 of 2 - PPSV23) 04/05/2019   • LUNG CANCER SCREENING  04/11/2019   • MAMMOGRAM  04/25/2020   • DXA SCAN  04/25/2020   • HEPATITIS C SCREENING  Completed       Current Meds:    Current Outpatient Prescriptions:   •  amLODIPine (NORVASC) 2.5 MG tablet, Take 1 tablet by mouth 2 (Two) Times a Day., Disp: 60 tablet, Rfl: 0  •  atorvastatin (LIPITOR) 10 MG tablet, TAKE 1 TABLET BY MOUTH EVERY DAY, Disp: 30 tablet, Rfl: 0  •  buPROPion SR (WELLBUTRIN SR) 150 MG 12 hr tablet, TAKE 1 TABLET BY MOUTH EVERY 12 HOURS EVERY DAY, Disp: 60 tablet, Rfl: 0  •  calcium-vitamin D (OSCAL 500/200 D-3) 500-200 MG-UNIT per tablet, Take 1 tablet by mouth Daily., Disp: 30 tablet, Rfl: 11  •  cetirizine (zyrTEC) 10 MG tablet, TAKE 1 TABLET BY MOUTH EVERY DAY, Disp: 30 tablet, Rfl: 0  •  cilostazol (PLETAL) 100 MG tablet, Take 1 tablet by mouth 2 (Two) Times a Day., Disp: 60 tablet, Rfl: 2  •  diclofenac (VOLTAREN) 1 % gel gel, APPLY TOPICALLY TO RIGHT KNEE AND LEFT SHOULDER TWICE DAILY, Disp: , Rfl: 1  •  FLUoxetine (PROZAC) 20 MG capsule, Take 1 capsule by mouth Daily., Disp: 30 capsule, Rfl: 0  •  fluticasone (FLONASE) 50 MCG/ACT nasal spray, ADMINISTER 2 SPRAYS INTO EACH NOSTRIL DAILY, Disp: 16 mL, Rfl: 0  •  gabapentin (NEURONTIN) 100 MG capsule, TAKE ONE CAPSULE BY MOUTH 3 TIMES A DAY AS NEEDED FOR CHRONIC PAIN,  Disp: 90 capsule, Rfl: 2  •  HYDROcodone-acetaminophen (NORCO) 7.5-325 MG per tablet, Take 1 tablet by mouth Every 6 (Six) Hours As Needed for Moderate Pain ., Disp: , Rfl:   •  lisinopril (PRINIVIL,ZESTRIL) 10 MG tablet, TAKE 1 TABLET BY MOUTH EVERY DAY, Disp: 30 tablet, Rfl: 0  •  naproxen (NAPROSYN) 500 MG tablet, Take 1 tablet by mouth 2 (Two) Times a Day With Meals., Disp: 60 tablet, Rfl: 0    Allergies:  Patient has no known allergies.    Family Hx:  Family History   Problem Relation Age of Onset   • Lung cancer Mother    • Hypertension Mother    • Diabetes Maternal Grandmother    • Heart disease Maternal Grandmother    • Hypertension Maternal Grandfather    • Heart disease Maternal Grandfather         Social History:  Social History     Social History   • Marital status:      Spouse name: N/A   • Number of children: N/A   • Years of education: N/A     Occupational History   • Not on file.     Social History Main Topics   • Smoking status: Current Every Day Smoker     Packs/day: 1.00     Years: 50.00     Types: Cigarettes     Start date: 1968   • Smokeless tobacco: Never Used      Comment: 50+ pack years   • Alcohol use No      Comment: social, in the past   • Drug use: No   • Sexual activity: Defer     Other Topics Concern   • Not on file     Social History Narrative   • No narrative on file       Review of Systems  Review of Systems   Constitutional: Negative for activity change, appetite change, chills and fever.   HENT: Negative for congestion, sinus pressure and sore throat.    Eyes: Negative for photophobia and visual disturbance.   Respiratory: Negative for cough, shortness of breath and wheezing.    Cardiovascular: Negative for chest pain, palpitations and leg swelling.   Gastrointestinal: Negative for abdominal pain, constipation, diarrhea, nausea and vomiting.   Endocrine: Negative for polyphagia and polyuria.   Genitourinary: Negative for decreased urine volume and difficulty urinating.  "  Musculoskeletal: Positive for arthralgias and neck pain (chronic). Negative for myalgias.   Skin: Positive for pallor (feet). Negative for color change.   Neurological: Negative for dizziness and light-headedness.   Psychiatric/Behavioral: Negative for confusion and decreased concentration.       Objective:     /80   Pulse 79   Ht 162.6 cm (64\")   Wt 45.8 kg (101 lb)   SpO2 97%   BMI 17.34 kg/m²     Physical Exam   Constitutional: She is oriented to person, place, and time. She appears cachectic. No distress.   HENT:   Head: Normocephalic and atraumatic.   Eyes: Conjunctivae and EOM are normal.   Neck: Normal range of motion. Neck supple.   Cardiovascular: Normal rate, regular rhythm and normal heart sounds.  Exam reveals no gallop and no friction rub.    No murmur heard.  Pulses:       Dorsalis pedis pulses are 0 on the right side, and 0 on the left side.        Posterior tibial pulses are 0 on the right side, and 0 on the left side.   Non-palpable DP/PT pulses bilaterally - pulses audible with doppler   Pulmonary/Chest: Effort normal. No respiratory distress. She has no wheezes. She has no rales.   Abdominal: Soft. Bowel sounds are normal. She exhibits no distension. There is no tenderness.   Musculoskeletal: Normal range of motion. She exhibits no edema.     Vascular Status -  Her right foot exhibits abnormal foot vasculature  (Non-palpable DP/PT pulses  - audible with doppler). Her right foot exhibits no edema. Her left foot exhibits abnormal foot vasculature  (Non-palpable DP/PT pulses  - audible with doppler). Her left foot exhibits no edema.  Neurological: She is alert and oriented to person, place, and time.   Skin: Skin is warm and dry. No rash noted. She is not diaphoretic.   Psychiatric: She has a normal mood and affect. Her behavior is normal.   Vitals reviewed.     Assessment/Plan:     Diagnoses and all orders for this visit:    Essential hypertension  -     amLODIPine (NORVASC) 2.5 MG " tablet; Take 1 tablet by mouth 2 (Two) Times a Day.    Allergic rhinitis due to other allergic trigger, unspecified chronicity, unspecified seasonality  -     cetirizine (zyrTEC) 10 MG tablet; Take 1 tablet by mouth Daily.    Chronic pain syndrome  -     gabapentin (NEURONTIN) 100 MG capsule; Take 1 capsule by mouth 3 (Three) Times a Day.  -     naproxen (NAPROSYN) 500 MG tablet; Take 1 tablet by mouth 2 (Two) Times a Day With Meals.    BMI less than 19,adult    Cigarette nicotine dependence without complication    Other orders  -     FLUoxetine (PROZAC) 20 MG capsule; Take 1 capsule by mouth Daily.      Follow-up:     Return in about 3 months (around 9/8/2018) for Recheck with Julianna Gloria MD to [facility].    Goals        Patient Stated    • Quit smoking / using tobacco (pt-stated)            Barriers: compliance with cessation medications         Other    • Pain control            Barriers: severe PAD, pt continues to smoke          Preventative:  Female Preventative: Mammogram is due, Colon cancer screening is due, last pap unknown. Recommended mammogram, pt refused.  Counseled pt that mammogram is used to screen for breast cancer.  Risks of not undergoing imaging including but not limited to death discussed with pt.  Pt verbalized understanding and still does not wish to under go mammogram at this time.  There were no barriers to communication.     Recommended Vaccines: none    Smoking cessation counseling was provided.  Pharmacotherapy was prescribed as ordered.  does not drink  increase water intake and have 3 meals a day    Patient's Body mass index is 17.34 kg/m². BMI is below normal parameters. Recommendations include: treating the underlying disease process.      RISK SCORE: 5    Signature  Cathy Willard MD PGY3  [place] [address]  Office: [phone]    This document has been electronically signed by Cathy  MD Sudheer on June 8, 2018 2:26 PM

## 2018-06-20 ENCOUNTER — HOSPITAL ENCOUNTER (OUTPATIENT)
Dept: CT IMAGING | Facility: HOSPITAL | Age: 68
Discharge: HOME | End: 2018-06-20
Admitting: THORACIC SURGERY (CARDIOTHORACIC VASCULAR SURGERY)

## 2018-06-20 ENCOUNTER — OFFICE VISIT (OUTPATIENT)
Dept: CARDIAC SURGERY | Facility: CLINIC | Age: 68
End: 2018-06-20

## 2018-06-20 VITALS
DIASTOLIC BLOOD PRESSURE: 75 MMHG | BODY MASS INDEX: 17.42 KG/M2 | HEIGHT: 64 IN | WEIGHT: 102 LBS | SYSTOLIC BLOOD PRESSURE: 130 MMHG | HEART RATE: 83 BPM | TEMPERATURE: 97.6 F | OXYGEN SATURATION: 96 %

## 2018-06-20 DIAGNOSIS — I73.9 PERIPHERAL ARTERIAL DISEASE (HCC): ICD-10-CM

## 2018-06-20 DIAGNOSIS — I73.9 PVD (PERIPHERAL VASCULAR DISEASE) (HCC): ICD-10-CM

## 2018-06-20 DIAGNOSIS — R09.89 DECREASED PULSES IN FEET: Primary | ICD-10-CM

## 2018-06-20 DIAGNOSIS — R09.89 DECREASED PULSES IN FEET: ICD-10-CM

## 2018-06-20 PROCEDURE — 0 IOPAMIDOL PER 1 ML: Performed by: THORACIC SURGERY (CARDIOTHORACIC VASCULAR SURGERY)

## 2018-06-20 PROCEDURE — 99212 OFFICE O/P EST SF 10 MIN: CPT | Performed by: THORACIC SURGERY (CARDIOTHORACIC VASCULAR SURGERY)

## 2018-06-20 PROCEDURE — 75635 CT ANGIO ABDOMINAL ARTERIES: CPT

## 2018-06-20 RX ADMIN — IOPAMIDOL 95 ML: 755 INJECTION, SOLUTION INTRAVENOUS at 09:16

## 2018-06-21 NOTE — PROGRESS NOTES
"Jessie Jordan  1950            67 y.o.      6/20/2018    Chief Complaint   Patient presents with   • Leg Pain     CT results   • Peripheral Vascular Disease     Chief Complaint   Patient presents with   • Peripheral Vascular Disease   • Leg Pain   PCP ADAM GONZALEZ MD  67 year old woman referred for diminished arterial perfusion both lower extremities,more symptomatic right.MONTY findings will be recorded under \"vascular studies.\"        HPI  RISK FACTORS:  Adult lifetime cigarette smoker pck/daygnifi  Dyslipidemia  HTN  FHx CAD     PAST HISTORY:   Previous stroke with right sided weakness  Significant (prhaps significant) coronary disease    6-20-18    HPI  A PERHAPS MORE KNOWLEDGEABLE RELATIVE ACCOMPANIED PATIENT TODAY. Cardiac Cath. [facility]. \"left main lesion\" Cardiac cath [facility] reported to show only \"spasm\" and no significant left main coronary artery disease.    We have requested report and disc of cardiac catheterization [facility].  ROS       Current Outpatient Prescriptions:   •  amLODIPine (NORVASC) 2.5 MG tablet, Take 1 tablet by mouth 2 (Two) Times a Day., Disp: 60 tablet, Rfl: 3  •  atorvastatin (LIPITOR) 10 MG tablet, TAKE 1 TABLET BY MOUTH EVERY DAY, Disp: 30 tablet, Rfl: 0  •  buPROPion SR (WELLBUTRIN SR) 150 MG 12 hr tablet, TAKE 1 TABLET BY MOUTH EVERY 12 HOURS EVERY DAY, Disp: 60 tablet, Rfl: 0  •  calcium-vitamin D (OSCAL 500/200 D-3) 500-200 MG-UNIT per tablet, Take 1 tablet by mouth Daily., Disp: 30 tablet, Rfl: 11  •  cetirizine (zyrTEC) 10 MG tablet, Take 1 tablet by mouth Daily., Disp: 30 tablet, Rfl: 3  •  cilostazol (PLETAL) 100 MG tablet, Take 1 tablet by mouth 2 (Two) Times a Day., Disp: 60 tablet, Rfl: 2  •  diclofenac (VOLTAREN) 1 % gel gel, APPLY TOPICALLY TO RIGHT KNEE AND LEFT SHOULDER TWICE DAILY, Disp: , Rfl: 1  •  FLUoxetine (PROZAC) 20 MG capsule, Take 1 capsule by mouth Daily., Disp: 30 capsule, Rfl: 3  •  " "fluticasone (FLONASE) 50 MCG/ACT nasal spray, ADMINISTER 2 SPRAYS INTO EACH NOSTRIL DAILY, Disp: 16 mL, Rfl: 0  •  gabapentin (NEURONTIN) 100 MG capsule, Take 1 capsule by mouth 3 (Three) Times a Day., Disp: 90 capsule, Rfl: 2  •  HYDROcodone-acetaminophen (NORCO) 7.5-325 MG per tablet, Take 1 tablet by mouth Every 6 (Six) Hours As Needed for Moderate Pain ., Disp: , Rfl:   •  lisinopril (PRINIVIL,ZESTRIL) 10 MG tablet, TAKE 1 TABLET BY MOUTH EVERY DAY, Disp: 30 tablet, Rfl: 0  •  naproxen (NAPROSYN) 500 MG tablet, Take 1 tablet by mouth 2 (Two) Times a Day With Meals., Disp: 60 tablet, Rfl: 3    The following portions of the patient's history were reviewed and updated as appropriate: allergies, current medications, past family history, past medical history, past social history, past surgical history and problem list.        Past Surgical History:   Procedure Laterality Date   • CARDIAC CATHETERIZATION  12/06/2017    Done at [facility] - Left Main 50-70% Stenosis - no stenting done, recommendation was urgent CABG.  EF 50-60%   • CERVICAL FUSION  1985    C5-6   • ROTATOR CUFF REPAIR Left 06/30/2011    done in [place]           Physical Exam  /75 (BP Location: Left arm)   Pulse 83   Temp 97.6 °F (36.4 °C) (Oral)   Ht 162.6 cm (64\")   Wt 46.3 kg (102 lb)   SpO2 96%   BMI 17.51 kg/m²     HEENT:    CHEST:    HEART:    ABDOMEN:    EXTREMITIES:Ankle Pulses not palpable in either lower extremity.  Markedly diminished right femoral pulse    VASCULAR LAB STUDIES:    RADIOLOGY:CTA  IMPRESSION:  1.  Irregularity of the ascending colon with two segments which  appear to be circumferentially narrowed concerning for colonic  neoplasm. Recommend direct visualization. A double contrast  barium enema could also be helpful for further evaluation.  2.  Dilated, thin-walled, 7.6 cm, septated, fluid-filled  structure, exerting mass effect on the right side of the urinary  bladder. No gas bubbles seen within this " structure. This  structure may communicate with bowel, however evaluation is  limited without intraluminal intestinal contrast. Other  differential possibilities include a mucocele (although a normal  appendix is visualized), dilated distal small bowel loops, right  ovarian cystic neoplasm or a septated abnormal bladder  diverticulum. Further evaluation with a dedicated  abdominal/pelvic CT with oral and IV contrast may be helpful.  3.  There is an enhancing focus in the dome of the liver right  hepatic lobe, likely a flash filling hemangioma.  4.  Left main renal artery appears severely stenotic at the  origin due to atherosclerotic calcification. A smaller left  accessory renal artery appears normal.  5.  Severe stenosis left common iliac artery.  6.  Severe stenosis versus complete occlusion of the right common  iliac artery.  7.  Severe stenosis left internal iliac artery.  8.  Severe stenosis versus complete occlusion of the right  external and internal iliac arteries.  9.  Severe stenosis of the right common iliac artery.  10.  Severe stenosis of the right superficial femoral artery.  11.  Complete occlusion of the left superficial femoral artery.            Decreased pulses in feet [R09.89]    IMPRESSION:  Severe aortoiliac occlusive arterial disease more significant right than left  Complete occlusion left superficial femoral artery  Severe stenosis of the right superficial femoral artery  Severe stenosis left main renal artery  Possible neoplastic stenosis ascending colon                  Assessment/Plan  Jessie was seen today for leg pain and peripheral vascular disease.    Diagnoses and all orders for this visit:    Decreased pulses in feet    Peripheral arterial disease     1.   1. Appointment Dr. Rosa 6-26-18 10:00 a.m. Consider colonoscopy ascending colonic lesion  2. Disc, report cardiac cath  requested from [facility]  3. Return to vascular surgery 3 weeks      Cardiac Cath [facility]  "[facility] (12-8-17)   Patent coronary arteries History of left man \"spasm\"  Report to be \"scanned in\"        Return Dr. Way 6-26-18, for , Vascular surgery 3 weeks, Next scheduled follow up.            Jack L. Hamman, MD  6/21/2018  "

## 2018-06-26 ENCOUNTER — OFFICE VISIT (OUTPATIENT)
Dept: GASTROENTEROLOGY | Facility: CLINIC | Age: 68
End: 2018-06-26

## 2018-06-26 VITALS
WEIGHT: 102.8 LBS | HEART RATE: 88 BPM | SYSTOLIC BLOOD PRESSURE: 142 MMHG | HEIGHT: 64 IN | DIASTOLIC BLOOD PRESSURE: 70 MMHG | OXYGEN SATURATION: 97 % | BODY MASS INDEX: 17.55 KG/M2

## 2018-06-26 DIAGNOSIS — R93.89 ABNORMAL FINDING ON RADIOLOGY EXAM: Primary | ICD-10-CM

## 2018-06-26 PROCEDURE — 99202 OFFICE O/P NEW SF 15 MIN: CPT | Performed by: INTERNAL MEDICINE

## 2018-06-26 RX ORDER — DEXTROSE AND SODIUM CHLORIDE 5; .45 G/100ML; G/100ML
30 INJECTION, SOLUTION INTRAVENOUS CONTINUOUS PRN
Status: CANCELLED | OUTPATIENT
Start: 2018-06-29

## 2018-06-26 RX ORDER — SODIUM, POTASSIUM,MAG SULFATES 17.5-3.13G
1 SOLUTION, RECONSTITUTED, ORAL ORAL EVERY 12 HOURS
Qty: 1 BOTTLE | Refills: 0 | Status: SHIPPED | OUTPATIENT
Start: 2018-06-26 | End: 2018-08-08

## 2018-06-26 NOTE — PROGRESS NOTES
[facility]      Chief Complaint:   Chief Complaint   Patient presents with   • Possible Colon Lesion       Subjective     HPI:   Patient for evaluation for abnormal CT scan.  Patient has a lesion possibly in the ascending colon.  Patient's last colonoscopy was many years ago in [place].  Patient does have some changes in bowel habits with some diarrhea.  Patient denies seeing any blood in the stool.  Patient denies any abdominal pain.    Plan; schedule patient for colonoscopy to be done this week to evaluate for possible colon cancer.    Past Medical History:   Past Medical History:   Diagnosis Date   • Cerebral vascular disease 2017    residual arm weakness   • Coronary artery disease involving native coronary artery of native heart 12/06/2017    seen on Cardiac Cath - Left main 50-70% stenosis   • Peripheral artery disease     pt states almost complete stenosis of bilateral femoral arteries   • Post-menopausal 1990       Family History:  Family History   Problem Relation Age of Onset   • Lung cancer Mother    • Hypertension Mother    • Diabetes Maternal Grandmother    • Heart disease Maternal Grandmother    • Hypertension Maternal Grandfather    • Heart disease Maternal Grandfather    • Heart disease Other    • Hypertension Other    • Cancer Other        Social History:   reports that she has been smoking Cigarettes.  She has smoked for the past 48.00 years. She has never used smokeless tobacco. She reports that she drinks alcohol. She reports that she does not use drugs.    Medications:   Prior to Admission medications    Medication Sig Start Date End Date Taking? Authorizing Provider   amLODIPine (NORVASC) 2.5 MG tablet Take 1 tablet by mouth 2 (Two) Times a Day. 6/8/18  Yes Cathy Willard MD   atorvastatin (LIPITOR) 10 MG tablet TAKE 1 TABLET BY MOUTH EVERY DAY 5/29/18  Yes Cathy Willard MD   buPROPion SR (WELLBUTRIN SR) 150 MG 12 hr tablet TAKE 1 TABLET BY MOUTH EVERY  12 HOURS EVERY DAY 5/29/18  Yes Cathy Willard MD   calcium-vitamin D (OSCAL 500/200 D-3) 500-200 MG-UNIT per tablet Take 1 tablet by mouth Daily. 5/8/18 5/8/19 Yes Cathy Willard MD   cetirizine (zyrTEC) 10 MG tablet Take 1 tablet by mouth Daily. 6/8/18  Yes Cathy Willard MD   cilostazol (PLETAL) 100 MG tablet Take 1 tablet by mouth 2 (Two) Times a Day. 5/1/18  Yes Cathy Willard MD   diclofenac (VOLTAREN) 1 % gel gel APPLY TOPICALLY TO RIGHT KNEE AND LEFT SHOULDER TWICE DAILY 4/23/18  Yes Historical Provider, MD   FLUoxetine (PROZAC) 20 MG capsule Take 1 capsule by mouth Daily. 6/8/18  Yes Cathy Willard MD   fluticasone (FLONASE) 50 MCG/ACT nasal spray ADMINISTER 2 SPRAYS INTO EACH NOSTRIL DAILY 6/5/18  Yes Cathy Willard MD   gabapentin (NEURONTIN) 100 MG capsule Take 1 capsule by mouth 3 (Three) Times a Day. 6/8/18  Yes Cathy Willard MD   HYDROcodone-acetaminophen (NORCO) 7.5-325 MG per tablet Take 1 tablet by mouth Every 6 (Six) Hours As Needed for Moderate Pain .   Yes Historical Provider, MD   lisinopril (PRINIVIL,ZESTRIL) 10 MG tablet TAKE 1 TABLET BY MOUTH EVERY DAY 6/4/18  Yes Cathy Willard MD   naproxen (NAPROSYN) 500 MG tablet Take 1 tablet by mouth 2 (Two) Times a Day With Meals. 6/8/18  Yes Cathy Willard MD   sodium-potassium-magnesium sulfates (SUPREP) 17.5-3.13-1.6 GM/180ML solution oral solution Take 1 bottle by mouth Every 12 (Twelve) Hours. 6/26/18   Oz Way MD       Allergies:  Patient has no known allergies.    ROS:    Review of Systems   Constitutional: Negative for activity change, appetite change, chills, diaphoresis, fatigue, fever and unexpected weight change.   HENT: Negative for sore throat and trouble swallowing.    Respiratory: Negative for shortness of breath.    Gastrointestinal: Positive for diarrhea. Negative for abdominal distention, abdominal pain, anal bleeding, blood in stool, constipation, nausea,  "rectal pain and vomiting.   Musculoskeletal: Negative for arthralgias.   Skin: Negative for pallor.   Neurological: Negative for light-headedness.     Objective     Blood pressure 142/70, pulse 88, height 162.6 cm (64\"), weight 46.6 kg (102 lb 12.8 oz), SpO2 97 %.    Physical Exam   Constitutional: She is oriented to person, place, and time. She appears well-developed and well-nourished. No distress.   HENT:   Head: Normocephalic and atraumatic.   Cardiovascular: Normal rate, regular rhythm, normal heart sounds and intact distal pulses.  Exam reveals no gallop and no friction rub.    No murmur heard.  Pulmonary/Chest: Breath sounds normal. No respiratory distress. She has no wheezes. She has no rales. She exhibits no tenderness.   Abdominal: Soft. Bowel sounds are normal. She exhibits no distension and no mass. There is no tenderness. There is no rebound and no guarding. No hernia.   Musculoskeletal: Normal range of motion. She exhibits no edema.   Neurological: She is alert and oriented to person, place, and time.   Skin: Skin is warm and dry. No rash noted. She is not diaphoretic. No erythema. No pallor.   Psychiatric: She has a normal mood and affect. Her behavior is normal. Judgment and thought content normal.        Assessment/Plan   Jessie was seen today for possible colon lesion.    Diagnoses and all orders for this visit:    Abnormal finding on radiology exam  -     Case Request; Standing  -     dextrose 5 % and sodium chloride 0.45 % infusion; Infuse 30 mL/hr into a venous catheter Continuous As Needed (Start Prior to Procedure).  -     Case Request    Other orders  -     Instruct patient to be NPO.  -     Implement Anesthesia Orders Day of Procedure; Standing  -     Obtain Informed Consent; Standing  -     POC Glucose Once; Standing  -     sodium-potassium-magnesium sulfates (SUPREP) 17.5-3.13-1.6 GM/180ML solution oral solution; Take 1 bottle by mouth Every 12 (Twelve) Hours.        COLONOSCOPY (N/A)     " Diagnosis Plan   1. Abnormal finding on radiology exam  Case Request    dextrose 5 % and sodium chloride 0.45 % infusion    Case Request       Anticipated Surgical Procedure:  Orders Placed This Encounter   Procedures   • Instruct patient to be NPO.       The risks, benefits, and alternatives of this procedure have been discussed with the patient or the responsible party- the patient understands and agrees to proceed.

## 2018-06-26 NOTE — PATIENT INSTRUCTIONS
MyPlate from [employer]  The general, healthful diet is based on the 2010 Dietary Guidelines for Americans. The amount of food you need to eat from each food group depends on your age, sex, and level of physical activity and can be individualized by a dietitian. Go to ChooseMyPlate.gov for more information.  What do I need to know about the MyPlate plan?  · Enjoy your food, but eat less.  · Avoid oversized portions.  ? ½ of your plate should include fruits and vegetables.  ? ¼ of your plate should be grains.  ? ¼ of your plate should be protein.  Grains  · Make at least half of your grains whole grains.  · For a 2,000 calorie daily food plan, eat 6 oz every day.  · 1 oz is about 1 slice bread, 1 cup cereal, or ½ cup cooked rice, cereal, or pasta.  Vegetables  · Make half your plate fruits and vegetables.  · For a 2,000 calorie daily food plan, eat 2½ cups every day.  · 1 cup is about 1 cup raw or cooked vegetables or vegetable juice or 2 cups raw leafy greens.  Fruits  · Make half your plate fruits and vegetables.  · For a 2,000 calorie daily food plan, eat 2 cups every day.  · 1 cup is about 1 cup fruit or 100% fruit juice or ½ cup dried fruit.  Protein  · For a 2,000 calorie daily food plan, eat 5½ oz every day.  · 1 oz is about 1 oz meat, poultry, or fish, ¼ cup cooked beans, 1 egg, 1 Tbsp peanut butter, or ½ oz nuts or seeds.  Dairy  · Switch to fat-free or low-fat (1%) milk.  · For a 2,000 calorie daily food plan, eat 3 cups every day.  · 1 cup is about 1 cup milk or yogurt or soy milk (soy beverage), 1½ oz natural cheese, or 2 oz processed cheese.  Fats, Oils, and Empty Calories  · Only small amounts of oils are recommended.  · Empty calories are calories from solid fats or added sugars.  · Compare sodium in foods like soup, bread, and frozen meals. Choose the foods with lower numbers.  · Drink water instead of sugary drinks.  What foods can I eat?  Grains  Whole grains such as whole wheat, quinoa, millet, and  bulgur. Bread, rolls, and pasta made from whole grains. Brown or wild rice. Hot or cold cereals made from whole grains and without added sugar.  Vegetables  All fresh vegetables, especially fresh red, dark green, or orange vegetables. Peas and beans. Low-sodium frozen or canned vegetables prepared without added salt. Low-sodium vegetable juices.  Fruits  All fresh, frozen, and dried fruits. Canned fruit packed in water or fruit juice without added sugar. Fruit juices without added sugar.  Meats and Other Protein Sources  Boiled, baked, or grilled lean meat trimmed of fat. Skinless poultry. Fresh seafood and shellfish. Canned seafood packed in water. Unsalted nuts and unsalted nut butters. Tofu. Dried beans and pea. Eggs.  Dairy  Low-fat or fat-free milk, yogurt, and cheeses.  Sweets and Desserts  Frozen desserts made from low-fat milk.  Fats and Oils  Olive, peanut, and canola oils and margarine. Salad dressing and mayonnaise made from these oils.  Other  Soups and casseroles made from allowed ingredients and without added fat or salt.  The items listed above may not be a complete list of recommended foods or beverages. Contact your dietitian for more options.  What foods are not recommended?  Grains  Sweetened, low-fiber cereals. Packaged baked goods. Snack crackers and chips. Cheese crackers, butter crackers, and biscuits. Frozen waffles, sweet breads, doughnuts, pastries, packaged baking mixes, pancakes, cakes, and cookies.  Vegetables  Regular canned or frozen vegetables or vegetables prepared with salt. Canned tomatoes. Canned tomato sauce. Fried vegetables. Vegetables in cream sauce or cheese sauce.  Fruits  Fruits packed in syrup or made with added sugar.  Meats and Other Protein Sources  Marbled or fatty meats such as ribs. Poultry with skin. Fried meats, poultry, eggs, or fish. Sausages, hot dogs, and deli meats such as pastrami, bologna, or salami.  Dairy  Whole milk, cream, cheeses made from whole milk,  sour cream. Ice cream or yogurt made from whole milk or with added sugar.  Beverages  For adults, no more than one alcoholic drink per day. Regular soft drinks or other sugary beverages. Juice drinks.  Sweets and Desserts  Sugary or fatty desserts, candy, and other sweets.  Fats and Oils  Solid shortening or partially hydrogenated oils. Solid margarine. Margarine that contains trans fats. Butter.  The items listed above may not be a complete list of foods and beverages to avoid. Contact your dietitian for more information.  This information is not intended to replace advice given to you by your health care provider. Make sure you discuss any questions you have with your health care provider.  Document Released: 01/06/2009 Document Revised: 05/25/2017 Document Reviewed: 11/26/2014  [employer] Interactive Patient Education © 2018 [employer]  BMI for Adults  Body mass index (BMI) is a number that is calculated from a person's weight and height. In most adults, the number is used to find how much of an adult's weight is made up of fat. BMI is not as accurate as a direct measure of body fat.  How is BMI calculated?  BMI is calculated by dividing weight in kilograms by height in meters squared. It can also be calculated by dividing weight in pounds by height in inches squared, then multiplying the resulting number by 703. Charts are available to help you find your BMI quickly and easily without doing this calculation.  How is BMI interpreted?  Health care professionals use BMI charts to identify whether an adult is underweight, at a normal weight, or overweight based on the following guidelines:  · Underweight: BMI less than 18.5.  · Normal weight: BMI between 18.5 and 24.9.  · Overweight: BMI between 25 and 29.9.  · Obese: BMI of 30 and above.    BMI is usually interpreted the same for males and females.  Weight includes both fat and muscle, so someone with a muscular build, such as an athlete, may have a BMI that is  higher than 24.9. In cases like these, BMI may not accurately depict body fat. To determine if excess body fat is the cause of a BMI of 25 or higher, further assessments may need to be done by a health care provider.  Why is BMI a useful tool?  BMI is used to identify a possible weight problem that may be related to a medical problem or may increase the risk for medical problems. BMI can also be used to promote changes to reach a healthy weight.  This information is not intended to replace advice given to you by your health care provider. Make sure you discuss any questions you have with your health care provider.  Document Released: 08/29/2005 Document Revised: 04/27/2017 Document Reviewed: 05/15/2015  [facility] Interactive Patient Education © 2018 [employer]

## 2018-06-27 DIAGNOSIS — F17.210 HEAVY TOBACCO SMOKER >10 CIGARETTES PER DAY: ICD-10-CM

## 2018-06-27 DIAGNOSIS — I73.9 PERIPHERAL ARTERIAL DISEASE (HCC): ICD-10-CM

## 2018-06-27 RX ORDER — BUPROPION HYDROCHLORIDE 150 MG/1
TABLET, EXTENDED RELEASE ORAL
Qty: 60 TABLET | Refills: 0 | Status: SHIPPED | OUTPATIENT
Start: 2018-06-27 | End: 2018-08-16 | Stop reason: SDUPTHER

## 2018-06-27 RX ORDER — ATORVASTATIN CALCIUM 10 MG/1
TABLET, FILM COATED ORAL
Qty: 30 TABLET | Refills: 0 | Status: SHIPPED | OUTPATIENT
Start: 2018-06-27 | End: 2018-08-16 | Stop reason: SDUPTHER

## 2018-06-29 ENCOUNTER — ANESTHESIA EVENT (OUTPATIENT)
Dept: GASTROENTEROLOGY | Facility: HOSPITAL | Age: 68
End: 2018-06-29

## 2018-06-29 ENCOUNTER — ANESTHESIA (OUTPATIENT)
Dept: GASTROENTEROLOGY | Facility: HOSPITAL | Age: 68
End: 2018-06-29

## 2018-06-29 ENCOUNTER — HOSPITAL ENCOUNTER (OUTPATIENT)
Facility: HOSPITAL | Age: 68
Setting detail: HOSPITAL OUTPATIENT SURGERY
Discharge: HOME | End: 2018-06-29
Attending: INTERNAL MEDICINE | Admitting: INTERNAL MEDICINE

## 2018-06-29 VITALS
SYSTOLIC BLOOD PRESSURE: 142 MMHG | OXYGEN SATURATION: 99 % | TEMPERATURE: 97.1 F | HEART RATE: 64 BPM | RESPIRATION RATE: 18 BRPM | BODY MASS INDEX: 17.24 KG/M2 | WEIGHT: 100.97 LBS | DIASTOLIC BLOOD PRESSURE: 72 MMHG | HEIGHT: 64 IN

## 2018-06-29 DIAGNOSIS — R93.89 ABNORMAL FINDING ON RADIOLOGY EXAM: ICD-10-CM

## 2018-06-29 PROCEDURE — 25010000002 PROPOFOL 10 MG/ML EMULSION: Performed by: NURSE ANESTHETIST, CERTIFIED REGISTERED

## 2018-06-29 PROCEDURE — 45380 COLONOSCOPY AND BIOPSY: CPT | Performed by: INTERNAL MEDICINE

## 2018-06-29 PROCEDURE — 88305 TISSUE EXAM BY PATHOLOGIST: CPT | Performed by: PATHOLOGY

## 2018-06-29 PROCEDURE — 88305 TISSUE EXAM BY PATHOLOGIST: CPT | Performed by: INTERNAL MEDICINE

## 2018-06-29 RX ORDER — LIDOCAINE HYDROCHLORIDE 10 MG/ML
INJECTION, SOLUTION INFILTRATION; PERINEURAL AS NEEDED
Status: DISCONTINUED | OUTPATIENT
Start: 2018-06-29 | End: 2018-06-29 | Stop reason: SURG

## 2018-06-29 RX ORDER — DEXTROSE AND SODIUM CHLORIDE 5; .45 G/100ML; G/100ML
30 INJECTION, SOLUTION INTRAVENOUS CONTINUOUS PRN
Status: DISCONTINUED | OUTPATIENT
Start: 2018-06-29 | End: 2018-06-29 | Stop reason: HOSPADM

## 2018-06-29 RX ORDER — PROPOFOL 10 MG/ML
VIAL (ML) INTRAVENOUS AS NEEDED
Status: DISCONTINUED | OUTPATIENT
Start: 2018-06-29 | End: 2018-06-29 | Stop reason: SURG

## 2018-06-29 RX ADMIN — PROPOFOL 50 MG: 10 INJECTION, EMULSION INTRAVENOUS at 14:55

## 2018-06-29 RX ADMIN — PROPOFOL 50 MG: 10 INJECTION, EMULSION INTRAVENOUS at 15:01

## 2018-06-29 RX ADMIN — DEXTROSE AND SODIUM CHLORIDE 30 ML/HR: 5; 450 INJECTION, SOLUTION INTRAVENOUS at 14:36

## 2018-06-29 RX ADMIN — LIDOCAINE HYDROCHLORIDE 50 MG: 10 INJECTION, SOLUTION INFILTRATION; PERINEURAL at 14:55

## 2018-06-29 RX ADMIN — DEXTROSE AND SODIUM CHLORIDE: 5; 450 INJECTION, SOLUTION INTRAVENOUS at 14:41

## 2018-06-29 NOTE — ANESTHESIA POSTPROCEDURE EVALUATION
Patient: Jessie Jordan    Procedure Summary     Date:  06/29/18 Room / Location:  [facility] ENDOSCOPY 1 / [facility] ENDOSCOPY    Anesthesia Start:  1447 Anesthesia Stop:  1508    Procedure:  COLONOSCOPY (N/A ) Diagnosis:       Abnormal finding on radiology exam      (Abnormal finding on radiology exam [R93.8])    Surgeon:  Oz Way MD Provider:  Lilliana Murphy CRNA    Anesthesia Type:  MAC ASA Status:  3          Anesthesia Type: MAC  Last vitals  BP   149/81 (06/29/18 1407)   Temp   97.5 °F (36.4 °C) (06/29/18 1407)   Pulse   74 (06/29/18 1407)   Resp   18 (06/29/18 1407)     SpO2   97 % (06/29/18 1407)     Post Anesthesia Care and Evaluation    Patient location during evaluation: bedside  Patient participation: complete - patient participated  Level of consciousness: awake  Pain score: 0  Pain management: adequate  Airway patency: patent  Anesthetic complications: No anesthetic complications  PONV Status: none  Cardiovascular status: acceptable  Respiratory status: acceptable  Hydration status: acceptable

## 2018-06-29 NOTE — ANESTHESIA PREPROCEDURE EVALUATION
Anesthesia Evaluation     NPO Solid Status: > 8 hours  NPO Liquid Status: > 6 hours           Airway   Mallampati: I  TM distance: >3 FB  Neck ROM: full  No difficulty expected  Dental    (+) edentulous    Pulmonary    Cardiovascular - normal exam    (+) hypertension poorly controlled 2 medications or greater, CAD, PVD, hyperlipidemia,       Neuro/Psych  (+) CVA residual symptoms,       ROS Comment: Right weakness  GI/Hepatic/Renal/Endo      Musculoskeletal     Abdominal  - normal exam   Substance History      OB/GYN          Other                      Anesthesia Plan    ASA 3     MAC     intravenous induction   Anesthetic plan and risks discussed with patient.

## 2018-07-02 LAB
LAB AP CASE REPORT: NORMAL
PATH REPORT.FINAL DX SPEC: NORMAL
PATH REPORT.GROSS SPEC: NORMAL

## 2018-07-10 DIAGNOSIS — I10 ESSENTIAL HYPERTENSION: ICD-10-CM

## 2018-07-10 RX ORDER — LISINOPRIL 10 MG/1
TABLET ORAL
Qty: 30 TABLET | Refills: 0 | Status: SHIPPED | OUTPATIENT
Start: 2018-07-10 | End: 2018-08-16 | Stop reason: SDUPTHER

## 2018-07-24 DIAGNOSIS — J30.89 ALLERGIC RHINITIS DUE TO OTHER ALLERGIC TRIGGER, UNSPECIFIED CHRONICITY, UNSPECIFIED SEASONALITY: ICD-10-CM

## 2018-07-24 DIAGNOSIS — I73.9 PERIPHERAL ARTERIAL DISEASE (HCC): ICD-10-CM

## 2018-07-24 DIAGNOSIS — I10 ESSENTIAL HYPERTENSION: ICD-10-CM

## 2018-07-24 DIAGNOSIS — I73.9 CLAUDICATION IN PERIPHERAL VASCULAR DISEASE (HCC): ICD-10-CM

## 2018-07-24 RX ORDER — CILOSTAZOL 100 MG/1
TABLET ORAL
Qty: 60 TABLET | Refills: 2 | Status: SHIPPED | OUTPATIENT
Start: 2018-07-24 | End: 2018-08-16 | Stop reason: SDUPTHER

## 2018-07-24 RX ORDER — FLUTICASONE PROPIONATE 50 MCG
SPRAY, SUSPENSION (ML) NASAL
Qty: 16 ML | Refills: 0 | OUTPATIENT
Start: 2018-07-24 | End: 2018-08-16 | Stop reason: SDUPTHER

## 2018-07-24 RX ORDER — LISINOPRIL 10 MG/1
TABLET ORAL
Qty: 30 TABLET | Refills: 0 | OUTPATIENT
Start: 2018-07-24 | End: 2018-07-30 | Stop reason: SDUPTHER

## 2018-07-25 DIAGNOSIS — I10 ESSENTIAL HYPERTENSION: ICD-10-CM

## 2018-07-26 RX ORDER — LISINOPRIL 10 MG/1
TABLET ORAL
Qty: 30 TABLET | Refills: 0 | OUTPATIENT
Start: 2018-07-26

## 2018-07-30 DIAGNOSIS — I10 ESSENTIAL HYPERTENSION: ICD-10-CM

## 2018-07-30 RX ORDER — LISINOPRIL 10 MG/1
10 TABLET ORAL DAILY
Qty: 30 TABLET | Refills: 0 | OUTPATIENT
Start: 2018-07-30 | End: 2018-08-08 | Stop reason: SDUPTHER

## 2018-08-08 ENCOUNTER — OFFICE VISIT (OUTPATIENT)
Dept: CARDIAC SURGERY | Facility: CLINIC | Age: 68
End: 2018-08-08

## 2018-08-08 VITALS
OXYGEN SATURATION: 98 % | DIASTOLIC BLOOD PRESSURE: 65 MMHG | WEIGHT: 102 LBS | HEIGHT: 64 IN | HEART RATE: 88 BPM | SYSTOLIC BLOOD PRESSURE: 120 MMHG | TEMPERATURE: 98.4 F | BODY MASS INDEX: 17.42 KG/M2

## 2018-08-08 DIAGNOSIS — I73.9 PVD (PERIPHERAL VASCULAR DISEASE) (HCC): Primary | ICD-10-CM

## 2018-08-08 PROCEDURE — 99213 OFFICE O/P EST LOW 20 MIN: CPT | Performed by: THORACIC SURGERY (CARDIOTHORACIC VASCULAR SURGERY)

## 2018-08-13 ENCOUNTER — APPOINTMENT (OUTPATIENT)
Dept: CT IMAGING | Facility: HOSPITAL | Age: 68
End: 2018-08-13

## 2018-08-13 NOTE — PROGRESS NOTES
Jessie Jordan  1950            67 y.o.      8/8/2018    Chief Complaint   Patient presents with   • Peripheral Vascular Disease     3 week follow up               Chief Complaint   Patient presents with   • Peripheral Vascular Disease   • Leg Pain   PCP ADAM Dave GONZALEZ seen initially 6-6-18  67 year old woman referred for diminished arterial perfusion both lower extremities,more symptomatic right.   VASCULAR LAB STUDIES:[facility] (5-4-18)     Right 0.32        Left 0.47  WAVE FORMS                                       CF Biphasic          Biphasic                                                              POP  Biphasic          Biphasic                                                                PT   Monophasic    Biphasic                                                                DP  Monophasic    Biphasic     Severely diminished arterial perfusion right lower extremity at iliac as well as infrapopliteal level.  Severely diminished arterial perfusion left lower extremity at the iliac level     CAROTID DUP;ANTONIO SCAN (6-6-18)  RIGHT 0-49%    LEFT 0-49%           VERTEBRAL FLOW                  Antegrade          Antegrade                    HPI  RISK FACTORS:  Adult lifetime cigarette smoker pck/day  Dyslipidemia  HTN  FHx CAD     PAST HISTORY:   Previous stroke with right sided weakness  Significant coronary artery disease (perhaps,  Perhaps not)     HPI     IMPRESSION:  1.  Severely diminished arterial perfusion right lower extremity at the aortoiliac level as well as infrapopliteal level  2.  Severely diminished arterial perfusion left lower extremity likely at the aortoiliac level  3.  Carotid duplex scan bilaterally minimal carotid stenosis, antegrade flow both vertebral arteries (History of stroke, RIGHT  Hemiparesis very little residual.  4.  History cardiac catheterization significant left main lesion untreated  refuted by repeat Cath [facility]        LABORATORY    "eGFR 108                               CBC  H/H 11.2/33.1    RADIOLOGY CTA    MPRESSION:  1.  Irregularity of the ascending colon with two segments which  appear to be circumferentially narrowed concerning for colonic  neoplasm. Recommend direct visualization. A double contrast  barium enema could also be helpful for further evaluation.  2.  Dilated, thin-walled, 7.6 cm, septated, fluid-filled  structure, exerting mass effect on the right side of the urinary  bladder. No gas bubbles seen within this structure. This  structure may communicate with bowel, however evaluation is  limited without intraluminal intestinal contrast. Other  differential possibilities include a mucocele (although a normal  appendix is visualized), dilated distal small bowel loops, right  ovarian cystic neoplasm or a septated abnormal bladder  diverticulum. Further evaluation with a dedicated  abdominal/pelvic CT with oral and IV contrast may be helpful.  3.  There is an enhancing focus in the dome of the liver right  hepatic lobe, likely a flash filling hemangioma.  4.  Left main renal artery appears severely stenotic at the  origin due to atherosclerotic calcification. A smaller left  accessory renal artery appears normal.  5.  Severe stenosis left common iliac artery.  6.  Severe stenosis versus complete occlusion of the right common  iliac artery.  7.  Severe stenosis left internal iliac artery.  8.  Severe stenosis versus complete occlusion of the right  external and internal iliac arteries.  9.  Severe stenosis of the right common iliac artery.  10.  Severe stenosis of the right superficial femoral artery.      Colonoscopy 6/29/18 by Dr. Way was negative for neoplasm biopsies confirmed  I further reviewed the CTA findings with Dr. Singh and he felt no further CT evaluation was needed    ROS     HPI  A PERHAPS MORE KNOWLEDGEABLE RELATIVE ACCOMPANIED PATIENT TODAY. Cardiac Cath. Olga Rg. \"left main lesion\" Cardiac cath " "[facility] reported to show only \"spasm\" and no significant left main coronary artery disease.     We have requested report and disc of cardiac catheterization [facility].      The above requested report is now available for review.                 Current Outpatient Prescriptions:   •  amLODIPine (NORVASC) 2.5 MG tablet, Take 1 tablet by mouth 2 (Two) Times a Day., Disp: 60 tablet, Rfl: 3  •  atorvastatin (LIPITOR) 10 MG tablet, TAKE 1 TABLET BY MOUTH EVERY DAY, Disp: 30 tablet, Rfl: 0  •  buPROPion SR (WELLBUTRIN SR) 150 MG 12 hr tablet, TAKE 1 TABLET BY MOUTH EVERY 12 HOURS EVERY DAY, Disp: 60 tablet, Rfl: 0  •  calcium-vitamin D (OSCAL 500/200 D-3) 500-200 MG-UNIT per tablet, Take 1 tablet by mouth Daily., Disp: 30 tablet, Rfl: 11  •  cetirizine (zyrTEC) 10 MG tablet, Take 1 tablet by mouth Daily., Disp: 30 tablet, Rfl: 3  •  cilostazol (PLETAL) 100 MG tablet, TAKE 1 TABLET BY MOUTH TWICE A DAY, Disp: 60 tablet, Rfl: 2  •  diclofenac (VOLTAREN) 1 % gel gel, APPLY TOPICALLY TO RIGHT KNEE AND LEFT SHOULDER TWICE DAILY, Disp: , Rfl: 1  •  FLUoxetine (PROZAC) 20 MG capsule, Take 1 capsule by mouth Daily., Disp: 30 capsule, Rfl: 3  •  fluticasone (FLONASE) 50 MCG/ACT nasal spray, SPRAY 2 SPRAYS INTO EACH NOSTRIL EVERY DAY, Disp: 16 mL, Rfl: 0  •  gabapentin (NEURONTIN) 100 MG capsule, Take 1 capsule by mouth 3 (Three) Times a Day., Disp: 90 capsule, Rfl: 2  •  HYDROcodone-acetaminophen (NORCO) 7.5-325 MG per tablet, Take 1 tablet by mouth Every 6 (Six) Hours As Needed for Moderate Pain ., Disp: , Rfl:   •  lisinopril (PRINIVIL,ZESTRIL) 10 MG tablet, TAKE 1 TABLET BY MOUTH EVERY DAY, Disp: 30 tablet, Rfl: 0  •  naproxen (NAPROSYN) 500 MG tablet, Take 1 tablet by mouth 2 (Two) Times a Day With Meals., Disp: 60 tablet, Rfl: 3    The following portions of the patient's history were reviewed and updated as appropriate: allergies, current medications, past family history, past " "medical history, past social history, past surgical history and problem list.        Past Surgical History:   Procedure Laterality Date   • CARDIAC CATHETERIZATION  12/06/2017    Done at [facility] - Left Main 50-70% Stenosis - no stenting done, recommendation was urgent CABG.  EF 50-60%   • CERVICAL FUSION  1985    C5-6   • COLONOSCOPY N/A 6/29/2018    Procedure: COLONOSCOPY;  Surgeon: Oz Rosa MD;  Location: [facility] ENDOSCOPY;  Service: Gastroenterology   • ROTATOR CUFF REPAIR Left 06/30/2011    done in [place]           Physical Exam  /65 (BP Location: Left arm)   Pulse 88   Temp 98.4 °F (36.9 °C) (Temporal Artery )   Ht 162.6 cm (64\")   Wt 46.3 kg (102 lb)   SpO2 98%   BMI 17.51 kg/m²     HEENT:    CHEST:    HEART:    ABDOMEN:    EXTREMITIES: Diminished arterial perfusion both lower extremities    VASCULAR LAB STUDIES:            RADIOLOGY:      PVD (peripheral vascular disease) (CMS/[facility]) [I73.9]    IMPRESSION:  IMPRESSION:  Severe aortoiliac occlusive arterial disease more significant right than left  Complete occlusion left superficial femoral artery  Severe stenosis of the right superficial femoral artery  Severe stenosis left main renal artery  Possible neoplastic stenosis ascending colon  Neoplasm colon eliminated by colonoscopy 6-29-18 Dr. Rosa                   Assessment/Plan  Jessie was seen today for peripheral vascular disease.    Diagnoses and all orders for this visit:    PVD (peripheral vascular disease) (CMS/[facility])      Refer to Dr. Ruff for review of cardiac cath, review of CTA and probable angiography            No Follow-up on file.            Jack L. Hamman, MD  8/13/2018  "

## 2018-08-16 ENCOUNTER — APPOINTMENT (OUTPATIENT)
Dept: LAB | Facility: HOSPITAL | Age: 68
End: 2018-08-16

## 2018-08-16 ENCOUNTER — TELEPHONE (OUTPATIENT)
Dept: FAMILY MEDICINE CLINIC | Facility: CLINIC | Age: 68
End: 2018-08-16

## 2018-08-16 ENCOUNTER — OFFICE VISIT (OUTPATIENT)
Dept: FAMILY MEDICINE CLINIC | Facility: CLINIC | Age: 68
End: 2018-08-16

## 2018-08-16 VITALS
OXYGEN SATURATION: 97 % | SYSTOLIC BLOOD PRESSURE: 138 MMHG | DIASTOLIC BLOOD PRESSURE: 64 MMHG | BODY MASS INDEX: 18.12 KG/M2 | HEIGHT: 64 IN | WEIGHT: 106.1 LBS | HEART RATE: 89 BPM

## 2018-08-16 DIAGNOSIS — K64.4 INTERNAL AND EXTERNAL HEMORRHOIDS WITHOUT COMPLICATION: Primary | ICD-10-CM

## 2018-08-16 DIAGNOSIS — I73.9 CLAUDICATION IN PERIPHERAL VASCULAR DISEASE (HCC): ICD-10-CM

## 2018-08-16 DIAGNOSIS — F17.210 HEAVY TOBACCO SMOKER >10 CIGARETTES PER DAY: ICD-10-CM

## 2018-08-16 DIAGNOSIS — G89.4 CHRONIC PAIN SYNDROME: ICD-10-CM

## 2018-08-16 DIAGNOSIS — I73.9 PERIPHERAL ARTERIAL DISEASE (HCC): ICD-10-CM

## 2018-08-16 DIAGNOSIS — I10 ESSENTIAL HYPERTENSION: ICD-10-CM

## 2018-08-16 DIAGNOSIS — M81.0 AGE-RELATED OSTEOPOROSIS WITHOUT CURRENT PATHOLOGICAL FRACTURE: ICD-10-CM

## 2018-08-16 DIAGNOSIS — J30.89 ALLERGIC RHINITIS DUE TO OTHER ALLERGIC TRIGGER, UNSPECIFIED CHRONICITY, UNSPECIFIED SEASONALITY: ICD-10-CM

## 2018-08-16 DIAGNOSIS — K64.8 INTERNAL AND EXTERNAL HEMORRHOIDS WITHOUT COMPLICATION: Primary | ICD-10-CM

## 2018-08-16 PROCEDURE — 99213 OFFICE O/P EST LOW 20 MIN: CPT | Performed by: STUDENT IN AN ORGANIZED HEALTH CARE EDUCATION/TRAINING PROGRAM

## 2018-08-16 PROCEDURE — G0481 DRUG TEST DEF 8-14 CLASSES: HCPCS | Performed by: STUDENT IN AN ORGANIZED HEALTH CARE EDUCATION/TRAINING PROGRAM

## 2018-08-16 PROCEDURE — 80307 DRUG TEST PRSMV CHEM ANLYZR: CPT | Performed by: STUDENT IN AN ORGANIZED HEALTH CARE EDUCATION/TRAINING PROGRAM

## 2018-08-16 RX ORDER — AMLODIPINE BESYLATE 2.5 MG/1
2.5 TABLET ORAL 2 TIMES DAILY
Qty: 60 TABLET | Refills: 3 | Status: SHIPPED | OUTPATIENT
Start: 2018-08-16 | End: 2019-02-22 | Stop reason: SDUPTHER

## 2018-08-16 RX ORDER — FLUOXETINE HYDROCHLORIDE 20 MG/1
20 CAPSULE ORAL DAILY
Qty: 30 CAPSULE | Refills: 3 | Status: SHIPPED | OUTPATIENT
Start: 2018-08-16 | End: 2018-09-24 | Stop reason: SDUPTHER

## 2018-08-16 RX ORDER — CILOSTAZOL 100 MG/1
100 TABLET ORAL 2 TIMES DAILY
Qty: 60 TABLET | Refills: 3 | Status: SHIPPED | OUTPATIENT
Start: 2018-08-16 | End: 2018-09-15

## 2018-08-16 RX ORDER — BUPROPION HYDROCHLORIDE 150 MG/1
150 TABLET, EXTENDED RELEASE ORAL EVERY 12 HOURS SCHEDULED
Qty: 60 TABLET | Refills: 3 | Status: SHIPPED | OUTPATIENT
Start: 2018-08-16 | End: 2018-12-03 | Stop reason: SDUPTHER

## 2018-08-16 RX ORDER — GABAPENTIN 100 MG/1
100 CAPSULE ORAL 3 TIMES DAILY
Qty: 90 CAPSULE | Refills: 2 | Status: SHIPPED | OUTPATIENT
Start: 2018-08-16 | End: 2018-09-10 | Stop reason: SDUPTHER

## 2018-08-16 RX ORDER — NAPROXEN 500 MG/1
500 TABLET ORAL 2 TIMES DAILY WITH MEALS
Qty: 60 TABLET | Refills: 3 | Status: SHIPPED | OUTPATIENT
Start: 2018-08-16 | End: 2018-09-28 | Stop reason: HOSPADM

## 2018-08-16 RX ORDER — LISINOPRIL 10 MG/1
10 TABLET ORAL DAILY
Qty: 30 TABLET | Refills: 3 | Status: SHIPPED | OUTPATIENT
Start: 2018-08-16 | End: 2018-10-11 | Stop reason: SDUPTHER

## 2018-08-16 RX ORDER — ATORVASTATIN CALCIUM 10 MG/1
10 TABLET, FILM COATED ORAL DAILY
Qty: 30 TABLET | Refills: 3 | Status: SHIPPED | OUTPATIENT
Start: 2018-08-16 | End: 2018-10-11 | Stop reason: SDUPTHER

## 2018-08-16 RX ORDER — GABAPENTIN 100 MG/1
100 CAPSULE ORAL 3 TIMES DAILY
Qty: 90 CAPSULE | Refills: 2 | Status: SHIPPED | OUTPATIENT
Start: 2018-08-16 | End: 2018-08-16 | Stop reason: SDUPTHER

## 2018-08-16 RX ORDER — FLUTICASONE PROPIONATE 50 MCG
1 SPRAY, SUSPENSION (ML) NASAL DAILY
Qty: 16 ML | Refills: 3 | OUTPATIENT
Start: 2018-08-16 | End: 2018-10-11 | Stop reason: SDUPTHER

## 2018-08-16 RX ORDER — CETIRIZINE HYDROCHLORIDE 10 MG/1
10 TABLET ORAL DAILY
Qty: 30 TABLET | Refills: 3 | Status: SHIPPED | OUTPATIENT
Start: 2018-08-16 | End: 2018-10-11

## 2018-08-16 NOTE — TELEPHONE ENCOUNTER
PLEASE CHANGE PRESCRIPTION PSYLLIUM 58.6 %  %.  % IS ALL THEY HAVE IN STOCK.    PLEASE CALL PHARMACY WITH ANY QUESTIONS.  THANK YOU

## 2018-08-16 NOTE — PROGRESS NOTES
ID: Jessie Jordan    CC:   Chief Complaint   Patient presents with   • GI Problem     Colonoscopy follow-up        Subjective:     HPI     Jessie Jordan is a 70 y.o. female who presents for follow up on her colonoscopy done on 6/29/18. Colonoscopy showed external and internal hemorrhoids. She denies blood in stool, straining, or blood while wiping. She states she two bowel movements a day and describes them as salt. Pt states she exercises and does not have a diet rich in fiber.      Preventative:  Over the past 2 weeks, have you felt down, depressed, or hopeless? no  Over the past 2 weeks, have you felt little interest or pleasure in doing things? no  Clinical depression screening refused by patient no    On osteoporosis therapy? no    Past Medical Hx:  Past Medical History:   Diagnosis Date   • Anxiety and depression    • Coronary artery disease involving native coronary artery of native heart 12/06/2017    seen on Cardiac Cath - Left main 50-70% stenosis, 12/27/2017 repeat cath showed coronary spasm with no stenosis   • Hyperlipidemia    • Hypertension    • Kidney cysts    • Post-menopausal 1990   • Skin cancer    • Stroke (CMS/[facility]) 2015   • Vascular disease        Past Surgical Hx:  Past Surgical History:   Procedure Laterality Date   • ARTERIOGRAM N/A 9/28/2018    Procedure: aortoiliac arteriogram possible angioplasty stent atherectomy thrombolysis bilateral iliac stents;  Surgeon: Luan Ruff MD;  Location: [facility] ANGIO INVASIVE LOCATION;  Service: Interventional Radiology   • CARDIAC CATHETERIZATION  12/06/2017    Done at [facility] - Left Main 50-70% Stenosis - no stenting done, recommendation was urgent CABG.  EF 50-60%   • CERVICAL FUSION  1985    C5-6   • COLONOSCOPY N/A 6/29/2018    Procedure: COLONOSCOPY;  Surgeon: Oz Way MD;  Location: [facility] ENDOSCOPY;  Service: Gastroenterology   • COLONOSCOPY N/A 7/6/2020    Procedure: COLONOSCOPY;  Surgeon: Oz Way  MD BRANDEE;  Location: [facility] ENDOSCOPY;  Service: Gastroenterology;  Laterality: N/A;   • ENDOSCOPY N/A 7/6/2020    Procedure: ESOPHAGOGASTRODUODENOSCOPY ASAP;  Surgeon: Oz Way MD;  Location: [facility] ENDOSCOPY;  Service: Gastroenterology;  Laterality: N/A;   • FEMORAL POPLITEAL BYPASS Right 12/19/2018    Procedure: femoral to femoral artery bypass, RIGHT FEMORAL POPLITEAL BYPASS right lower extremity arteriogram          (C-ARM# AND C-ARM TABLE);  Surgeon: Luan Ruff MD;  Location: [facility] OR;  Service: Vascular   • FINGER SURGERY Left     third finger   • ROTATOR CUFF REPAIR Left 06/30/2011    done in [place]       Health Maintenance:  Health Maintenance   Topic Date Due   • TDAP/TD VACCINES (1 - Tdap) 09/04/1961   • ZOSTER VACCINE (1 of 2) 09/04/2000   • Pneumococcal Vaccine Once at 65 Years Old  09/04/2015   • MEDICARE ANNUAL WELLNESS  04/04/2019   • DXA SCAN  04/25/2020   • MAMMOGRAM  05/04/2020   • INFLUENZA VACCINE  08/01/2020   • LIPID PANEL  06/22/2021   • LUNG CANCER SCREENING  06/29/2021   • COLONOSCOPY  07/06/2025   • HEPATITIS C SCREENING  Completed       Current Meds:    Current Outpatient Medications:   •  diclofenac (VOLTAREN) 1 % gel gel, Apply  topically to the appropriate area as directed 2 (Two) Times a Day., Disp: 30 g, Rfl: 1  •  amLODIPine (NORVASC) 2.5 MG tablet, Take 1 tablet by mouth 2 (Two) Times a Day., Disp: 180 tablet, Rfl: 0  •  atorvastatin (LIPITOR) 10 MG tablet, Take 1 tablet by mouth Daily., Disp: 90 tablet, Rfl: 1  •  carbamide peroxide (Debrox) 6.5 % otic solution, Administer 5 drops into both ears 2 (Two) Times a Day., Disp: 15 mL, Rfl: 3  •  clopidogrel (PLAVIX) 75 MG tablet, Take 1 tablet by mouth Daily., Disp: 90 tablet, Rfl: 3  •  [employer] MELATONIN 3 MG tablet, TAKE 1 TABLET BY MOUTH EVERY NIGHT AT BEDTIME FOR 90 DAYS., Disp: 90 tablet, Rfl: 0  •  ferrous sulfate 325 (65 FE) MG tablet, Take 1 tablet by mouth Daily With Breakfast., Disp: 90 tablet, Rfl: 1  •   FLUoxetine (PROZAC) 20 MG capsule, Take 1 capsule by mouth Daily., Disp: 90 capsule, Rfl: 3  •  fluticasone (FLONASE) 50 MCG/ACT nasal spray, 2 sprays by Each Nare route Daily., Disp: 3 bottle, Rfl: 3  •  folic acid (FOLVITE) 1 MG tablet, Take 1 tablet by mouth Daily., Disp: 30 tablet, Rfl: 3  •  gabapentin (NEURONTIN) 400 MG capsule, Take 1 capsule by mouth 3 (Three) Times a Day., Disp: 90 capsule, Rfl: 2  •  HYDROcodone-acetaminophen (NORCO) 7.5-325 MG per tablet, Take 1 tablet by mouth Every 6 (Six) Hours As Needed for Moderate Pain ., Disp: 30 tablet, Rfl: 0  •  lisinopril (PRINIVIL,ZESTRIL) 10 MG tablet, Take 1 tablet by mouth Daily., Disp: 90 tablet, Rfl: 3  •  naproxen (NAPROSYN) 500 MG tablet, Take 1 tablet by mouth 2 (Two) Times a Day With Meals., Disp: 30 tablet, Rfl: 2  •  OS-LORENE CALCIUM + D3 500-200 MG-UNIT tablet, Take 1 tablet by mouth Daily., Disp: , Rfl: 2  •  pantoprazole (PROTONIX) 40 MG EC tablet, Take 1 tablet by mouth Daily., Disp: 30 tablet, Rfl: 11  •  Prenatal Vit-Fe Fumarate-FA (PRENATAL VITAMIN 27-0.8) 27-0.8 MG tablet tablet, Take 1 tablet by mouth Daily., Disp: , Rfl:   •  vitamin D (ERGOCALCIFEROL) 1.25 MG (13457 UT) capsule capsule, Take 1 capsule by mouth 1 (One) Time Per Week., Disp: 5 capsule, Rfl: 3    Allergies:  Patient has no known allergies.    Family Hx:  Family History   Problem Relation Age of Onset   • Lung cancer Mother    • Hypertension Mother    • Diabetes Maternal Grandmother    • Heart disease Maternal Grandmother    • Hypertension Maternal Grandfather    • Heart disease Maternal Grandfather    • Heart disease Other    • Hypertension Other    • Cancer Other         Social History:  Social History     Socioeconomic History   • Marital status: Single     Spouse name: Not on file   • Number of children: 1   • Years of education: Not on file   • Highest education level: Not on file   Occupational History   • Occupation: Retired     Comment: Patient resides alone (daughter  resides across street).   Tobacco Use   • Smoking status: Former Smoker     Packs/day: 1.00     Years: 48.00     Pack years: 48.00     Types: Cigarettes     Last attempt to quit: 2018     Years since quittin.7   • Smokeless tobacco: Never Used   Substance and Sexual Activity   • Alcohol use: Yes     Frequency: Monthly or less     Comment: occasional beer    • Drug use: No   • Sexual activity: Defer     Comment: .       Review of Systems   Constitutional: Negative for activity change, appetite change, chills, diaphoresis, fatigue and fever.   HENT: Negative for drooling, ear discharge, ear pain, facial swelling, hearing loss, mouth sores, rhinorrhea, sinus pain, sore throat and tinnitus.    Eyes: Negative for pain, redness and itching.   Respiratory: Negative for cough, choking, chest tightness, shortness of breath and stridor.    Cardiovascular: Negative for chest pain, palpitations and leg swelling.   Gastrointestinal: Negative for abdominal distention, abdominal pain, anal bleeding, blood in stool, constipation, diarrhea, nausea and vomiting.   Endocrine: Negative for heat intolerance, polydipsia and polyphagia.   Genitourinary: Negative for dyspareunia, dysuria, flank pain, frequency, genital sores and hematuria.   Musculoskeletal: Negative for back pain, gait problem, joint swelling and myalgias.        Right leg pain.    Skin: Negative for pallor and rash.   Neurological: Negative for seizures, facial asymmetry, speech difficulty, light-headedness, numbness and headaches.   Hematological: Negative for adenopathy. Does not bruise/bleed easily.   Psychiatric/Behavioral: Negative for agitation, confusion, decreased concentration, dysphoric mood and hallucinations. The patient is not nervous/anxious and is not hyperactive.    All other systems reviewed and are negative.          Objective:     /64 (BP Location: Left arm, Patient Position: Sitting, Cuff Size: Adult)   Pulse 89   Ht 162.6 cm  "(64\")   Wt 48.1 kg (106 lb 1.6 oz)   SpO2 97%   BMI 18.21 kg/m²     Physical Exam   Constitutional: She is oriented to person, place, and time. She appears well-developed and well-nourished.   HENT:   Head: Normocephalic and atraumatic.   Right Ear: External ear normal.   Left Ear: External ear normal.   Nose: Nose normal.   Mouth/Throat: Oropharynx is clear and moist.   Eyes: Pupils are equal, round, and reactive to light. Conjunctivae and EOM are normal.   Neck: Normal range of motion. Neck supple.   Cardiovascular: Normal rate, regular rhythm, normal heart sounds and intact distal pulses.    Pulmonary/Chest: Effort normal and breath sounds normal.   Abdominal: Soft. Bowel sounds are normal.   Musculoskeletal: Normal range of motion.   Tenderness BL lower extremities.    Neurological: She is alert and oriented to person, place, and time.   Skin: Skin is warm and dry.          Assessment/Plan:     Jessie was seen today for gi problem.    Diagnoses and all orders for this visit:    Abnormal colonscopy   - impression showed internal and external hemorrhoids    - pt denies straining, blood in stool or tissue   - counseled on increasing fiber in diet   - ordered metamucil, advised once a day but if straining on defecation then can increase to 2-3 times a day     Chronic pain syndrome  -     Discontinue: gabapentin (NEURONTIN) 100 MG capsule; Take 1 capsule by mouth 3 (Three) Times a Day.  -     ToxASSURE Select 13 (MW) - Urine, Clean Catch  -     naproxen (NAPROSYN) 500 MG tablet; Take 1 tablet by mouth 2 (Two) Times a Day With Meals.   - will refill gabapentin closer to September 8th, since picked up on August 8th.   - will leave script at    - [facility] Request #[id number]    Essential hypertension  -     amLODIPine (NORVASC) 2.5 MG tablet; Take 1 tablet by mouth 2 (Two) Times a Day.  -     lisinopril (PRINIVIL,ZESTRIL) 10 MG tablet; Take 1 tablet by mouth Daily for 30 days.    Peripheral arterial disease " (CMS/[facility])  -     atorvastatin (LIPITOR) 10 MG tablet; Take 1 tablet by mouth Daily for 30 days.  -     cilostazol (PLETAL) 100 MG tablet; Take 1 tablet by mouth 2 (Two) Times a Day for 30 days.    Heavy tobacco smoker >10 cigarettes per day  -     buPROPion SR (WELLBUTRIN SR) 150 MG 12 hr tablet; Take 1 tablet by mouth Every 12 (Twelve) Hours for 30 days.   - counseled on smoking cessation      Age-related osteoporosis without current pathological fracture  -     calcium-vitamin D (OSCAL 500/200 D-3) 500-200 MG-UNIT per tablet; Take 1 tablet by mouth Daily.    Allergic rhinitis due to other allergic trigger, unspecified chronicity, unspecified seasonality  -     cetirizine (zyrTEC) 10 MG tablet; Take 1 tablet by mouth Daily.  -     fluticasone (FLONASE) 50 MCG/ACT nasal spray; 1 spray into the nostril(s) as directed by provider Daily.    Claudication in peripheral vascular disease (CMS/[facility])  -     cilostazol (PLETAL) 100 MG tablet; Take 1 tablet by mouth 2 (Two) Times a Day for 30 days.    Other orders  -     diclofenac (VOLTAREN) 1 % gel gel; Apply  topically to the appropriate area as directed 2 (Two) Times a Day.  -     FLUoxetine (PROZAC) 20 MG capsule; Take 1 capsule by mouth Daily.        Follow-up:     Follow up in three months.     Goals:   Goals     • Pain control      Barriers: severe PAD, pt continues to smoke      • Quit smoking / using tobacco (pt-stated)      Barriers: compliance with cessation medications          Barriers to goals: none     Health Maintenance   Topic Date Due   • TDAP/TD VACCINES (1 - Tdap) 09/04/1961   • ZOSTER VACCINE (1 of 2) 09/04/2000   • Pneumococcal Vaccine Once at 65 Years Old  09/04/2015   • MEDICARE ANNUAL WELLNESS  04/04/2019   • DXA SCAN  04/25/2020   • MAMMOGRAM  05/04/2020   • INFLUENZA VACCINE  08/01/2020   • LIPID PANEL  06/22/2021   • LUNG CANCER SCREENING  06/29/2021   • COLONOSCOPY  07/06/2025   • HEPATITIS C SCREENING  Completed       Tobacco: Smoking cessation  counseling was provided.  Alcohol: occasional/rare  Lifestyle: Body mass index is 18.21 kg/m². eat more fruits and vegetables, decrease soda or juice intake, increase water intake, increase physical activity, reduce screen time, reduce portion size and cut out extra servings    RISK SCORE: 4         Julianna Gloria M.D. PGY1  [facility]  [address] [place] [address]  Office: [phone]    This document has been electronically signed by Julianna Gloria MD on September 8, 2020 09:33          This document has been electronically signed by Julianna Gloria MD on September 8, 2020 09:33

## 2018-08-21 LAB — CONV REPORT SUMMARY: NORMAL

## 2018-09-07 ENCOUNTER — OFFICE VISIT (OUTPATIENT)
Dept: CARDIOLOGY | Facility: CLINIC | Age: 68
End: 2018-09-07

## 2018-09-07 VITALS
DIASTOLIC BLOOD PRESSURE: 88 MMHG | BODY MASS INDEX: 17.86 KG/M2 | HEART RATE: 79 BPM | SYSTOLIC BLOOD PRESSURE: 138 MMHG | WEIGHT: 104.6 LBS | HEIGHT: 64 IN

## 2018-09-07 DIAGNOSIS — I67.9 CEREBROVASCULAR DISEASE: Chronic | ICD-10-CM

## 2018-09-07 DIAGNOSIS — R06.02 SOB (SHORTNESS OF BREATH): ICD-10-CM

## 2018-09-07 DIAGNOSIS — R00.2 PALPITATIONS: ICD-10-CM

## 2018-09-07 DIAGNOSIS — I20.1 CORONARY ARTERY SPASM (HCC): ICD-10-CM

## 2018-09-07 DIAGNOSIS — I73.9 PVD (PERIPHERAL VASCULAR DISEASE) (HCC): Primary | ICD-10-CM

## 2018-09-07 DIAGNOSIS — G89.4 CHRONIC PAIN SYNDROME: ICD-10-CM

## 2018-09-07 DIAGNOSIS — Z72.0 NICOTINE ABUSE: ICD-10-CM

## 2018-09-07 DIAGNOSIS — I10 ESSENTIAL HYPERTENSION: ICD-10-CM

## 2018-09-07 PROCEDURE — 93000 ELECTROCARDIOGRAM COMPLETE: CPT | Performed by: INTERNAL MEDICINE

## 2018-09-07 PROCEDURE — 99203 OFFICE O/P NEW LOW 30 MIN: CPT | Performed by: INTERNAL MEDICINE

## 2018-09-07 RX ORDER — GABAPENTIN 100 MG/1
CAPSULE ORAL
Qty: 90 CAPSULE | Refills: 2 | Status: CANCELLED | OUTPATIENT
Start: 2018-09-07

## 2018-09-07 NOTE — PROGRESS NOTES
[facility] Cardiology  OFFICE NOTE    Jessie Jordan  68 y.o. female    09/07/2018  1. PVD (peripheral vascular disease) (CMS/HCC)    2. Essential hypertension    3. Cerebrovascular disease    4. Nicotine abuse    5. SOB (shortness of breath)    6. Palpitations    7. Coronary artery spasm (CMS/HCC)        Chief complaint -cardiac evaluation with history of peripheral vascular disease    History of present Illness- 68-year-old lady who was evaluated in [place] initially with a cardiac catheterization which showed 60-70% left main stenosis and repeat cardiac catheterization at [facility] in [place] showed vasospasm and there was no significant stenosis of the left main after giving vasodilators.  She has history of peripheral vascular disease with occlusion of the right SFA and left SFA and she is a chronic smoker on multiple medicines for anxiety and depression and also pain medicines.  She takes and April therapy with Pletal and aspirin.  After reviewing all the records from [facility] and also from [facility] she has normal LV systolic function and no significant coronary artery disease but has coronary vasospasm.  She needs to quit smoking as it increases the risk of vasospasm.  She is on  calcium channel blockers.        No Known Allergies      Past Medical History:   Diagnosis Date   • Cerebral vascular disease 2017    residual arm weakness   • Coronary artery disease involving native coronary artery of native heart 12/06/2017    seen on Cardiac Cath - Left main 50-70% stenosis, 12/27/2017 repeat cath showed coronary spasm with no stenosis   • Peripheral artery disease (CMS/HCC)     pt states almost complete stenosis of bilateral femoral arteries   • Post-menopausal 1990         Past Surgical History:   Procedure Laterality Date   • CARDIAC CATHETERIZATION  12/06/2017    Done at [facility] - Left Main 50-70% Stenosis - no stenting done,  recommendation was urgent CABG.  EF 50-60%   • CERVICAL FUSION  1985    C5-6   • COLONOSCOPY N/A 6/29/2018    Procedure: COLONOSCOPY;  Surgeon: Oz Way MD;  Location: [facility] ENDOSCOPY;  Service: Gastroenterology   • ROTATOR CUFF REPAIR Left 06/30/2011    done in [place]         Family History   Problem Relation Age of Onset   • Lung cancer Mother    • Hypertension Mother    • Diabetes Maternal Grandmother    • Heart disease Maternal Grandmother    • Hypertension Maternal Grandfather    • Heart disease Maternal Grandfather    • Heart disease Other    • Hypertension Other    • Cancer Other          Social History     Social History   • Marital status:      Spouse name: N/A   • Number of children: 1   • Years of education: N/A     Occupational History   • Retired      Patient resides alone (daughter resides across street).     Social History Main Topics   • Smoking status: Current Every Day Smoker     Packs/day: 4.00     Years: 48.00     Types: Cigarettes   • Smokeless tobacco: Never Used      Comment: 06/26/2018 - Patient states she utilizes 4 - 5 Cigarettes per day.  Patient states she previously utilized 1 - 1 1/2 pack of Cigarettes per day.   • Alcohol use Yes      Comment: 06/26/2018 - Patient states she consumes approximately 1 - 2 Beer per day.   • Drug use: No   • Sexual activity: Defer      Comment: .     Other Topics Concern   • Not on file     Social History Narrative   • No narrative on file         Current Outpatient Prescriptions   Medication Sig Dispense Refill   • amLODIPine (NORVASC) 2.5 MG tablet Take 1 tablet by mouth 2 (Two) Times a Day. 60 tablet 3   • atorvastatin (LIPITOR) 10 MG tablet Take 1 tablet by mouth Daily for 30 days. 30 tablet 3   • buPROPion SR (WELLBUTRIN SR) 150 MG 12 hr tablet Take 1 tablet by mouth Every 12 (Twelve) Hours for 30 days. 60 tablet 3   • calcium-vitamin D (OSCAL 500/200 D-3) 500-200 MG-UNIT per tablet Take 1 tablet by mouth Daily. 30 tablet 11   •  cetirizine (zyrTEC) 10 MG tablet Take 1 tablet by mouth Daily. 30 tablet 3   • cilostazol (PLETAL) 100 MG tablet Take 1 tablet by mouth 2 (Two) Times a Day for 30 days. 60 tablet 3   • diclofenac (VOLTAREN) 1 % gel gel Apply  topically to the appropriate area as directed 2 (Two) Times a Day. 30 g 1   • FLUoxetine (PROZAC) 20 MG capsule Take 1 capsule by mouth Daily. 30 capsule 3   • fluticasone (FLONASE) 50 MCG/ACT nasal spray 1 spray into the nostril(s) as directed by provider Daily. 16 mL 3   • gabapentin (NEURONTIN) 100 MG capsule Take 1 capsule by mouth 3 (Three) Times a Day. 90 capsule 2   • HYDROcodone-acetaminophen (NORCO) 7.5-325 MG per tablet Take 1 tablet by mouth Every 6 (Six) Hours As Needed for Moderate Pain .     • lisinopril (PRINIVIL,ZESTRIL) 10 MG tablet Take 1 tablet by mouth Daily for 30 days. 30 tablet 3   • naproxen (NAPROSYN) 500 MG tablet Take 1 tablet by mouth 2 (Two) Times a Day With Meals. 60 tablet 3   • Psyllium 100 % powder Take 7.5 g by mouth Daily for 30 days. 226 g 3     No current facility-administered medications for this visit.          Review of Systems     Constitution: Denies any fatigue, fever or chills    HENT: Denies any headache, hearing impairment,     Eyes: Denies any blurring of vision, or photophobia     Cardivascular - she has coronary artery spasm with no obstructive coronary disease normal LV systolic function by cardiac catheterization in December 2017 at [facility]    Respiratory system- COPD     Endocrine:  history of hyperlipidemia                       Musculoskeletal:   history of arthritis with musculoskeletal problems    Gastrointestinal: No nausea, vomiting, or melena    Genitourinary: No dysuria or hematuria    Neurological:   No history of seizure disorder, stroke, memory problems    Psychiatric/Behavioral:        No history of depression    Hematological- no history of easy bruising or any bleeding  "diathesis            OBJECTIVE    /88   Pulse 79   Ht 162.6 cm (64.02\")   Wt 47.4 kg (104 lb 9.6 oz)   BMI 17.95 kg/m²       Physical Exam     Constitutional: is oriented to person, place, and time.     Skin-warm and dry    Well developed and nourished in no acute distress      Head: Normocephalic and atraumatic.     Eyes: Pupils are equal    Neck: Neck supple. No bruit in the carotids    Cardiovascular: Harrison in the fifth intercostal space   Regular rate, and  Rhythm,    S1 greater than S2, no S3 or S4, no gallop     Pulmonary/Chest:   Air  Entry is equal on both sides  No wheezing or crackles,      Abdominal: Soft.  No hepatosplenomegaly, bowel sounds are present    Musculoskeletal: No kyphoscoliosis, no significant thickening of the joints    Neurological: is alert and oriented to person, place, and time.    cranial nerve are intact .   No motor or sensory deficit    Extremities-no edema, pulses are weak below the common femoral artery      Psychiatric: He has a normal mood and affect.                  His behavior is normal.             ECG 12 Lead  Date/Time: 9/7/2018 2:22 PM  Performed by: LIOR HARRISON  Authorized by: LIOR HARRISON   Comparison: not compared with previous ECG   Rhythm: sinus rhythm  Clinical impression: non-specific ECG              Lab Results   Component Value Date    WBC 11.16 (H) 06/06/2018    HGB 11.2 (L) 06/06/2018    HCT 33.1 (L) 06/06/2018    MCV 83.6 06/06/2018     06/06/2018     Lab Results   Component Value Date    GLUCOSE 102 (H) 06/06/2018    BUN 7 06/06/2018    CREATININE 0.56 06/06/2018    EGFRIFNONA 108 (H) 06/06/2018    BCR 12.5 06/06/2018    CO2 25.0 06/06/2018    CALCIUM 9.4 06/06/2018    ALBUMIN 4.60 06/06/2018    AST 21 06/06/2018    ALT 27 06/06/2018     Lab Results   Component Value Date    CHOL 175 03/23/2018     Lab Results   Component Value Date    TRIG 144 03/23/2018     Lab Results   Component Value Date    HDL 63 03/23/2018     No " components found for: LDLCALC  Lab Results   Component Value Date    LDL 72 03/23/2018                  A/P    Coronary artery spasm by cardiac catheterization at [facility] in December 2017.  She has peripheral vascular disease on Pletal.    Hypertension controlled with amlodipine and lisinopril.    Hyperlipidemia on atorvastatin.    Arthritis and neuropathy on gabapentin and diclofenac gel.    Tobacco abuse-reemphasize the need to quit tobacco abuse as it increases the risk of vasospasm and heart attack      Follow up With me as needed          This document has been electronically signed by Rojelio Palacio MD on September 7, 2018 2:19 PM       EMR Dragon/Transcription disclaimer:   Some of this note may be an electronic transcription/translation of spoken language to printed text. The electronic translation of spoken language may permit erroneous, or at times, nonsensical words or phrases to be inadvertently transcribed; Although I have reviewed the note for such errors, some may still exist.

## 2018-09-10 DIAGNOSIS — G89.4 CHRONIC PAIN SYNDROME: ICD-10-CM

## 2018-09-10 RX ORDER — GABAPENTIN 100 MG/1
100 CAPSULE ORAL 3 TIMES DAILY
Qty: 90 CAPSULE | Refills: 2 | Status: SHIPPED | OUTPATIENT
Start: 2018-09-10 | End: 2018-10-19 | Stop reason: SDUPTHER

## 2018-09-11 ENCOUNTER — OFFICE VISIT (OUTPATIENT)
Dept: GASTROENTEROLOGY | Facility: CLINIC | Age: 68
End: 2018-09-11

## 2018-09-11 VITALS
WEIGHT: 104.6 LBS | OXYGEN SATURATION: 98 % | HEART RATE: 90 BPM | SYSTOLIC BLOOD PRESSURE: 170 MMHG | BODY MASS INDEX: 17.43 KG/M2 | DIASTOLIC BLOOD PRESSURE: 84 MMHG | HEIGHT: 65 IN

## 2018-09-11 DIAGNOSIS — R93.89 ABNORMAL FINDING ON RADIOLOGY EXAM: Primary | ICD-10-CM

## 2018-09-11 PROCEDURE — 99213 OFFICE O/P EST LOW 20 MIN: CPT | Performed by: INTERNAL MEDICINE

## 2018-09-11 NOTE — PROGRESS NOTES
[facility]      Chief Complaint:   Chief Complaint   Patient presents with   • Abnormal Finding On Radiology Examination       Subjective     HPI:   Patient with an abnormal finding on her CAT scan of the abdomen showing questionable lesion in the ascending colon.  Colonoscopy shows no evidence of a lesion in the ascending colon biopsies are normal.  We'll have patient repeat this CAT scan in 4 weeks and return to the office if this is normal no further workup will be needed.  If there is something seen in the ascending colon may consider either barium enema repeat colonoscopy.    Plan; while patient follow up in 4 weeks after CT scan of the abdomen.    Past Medical History:   Past Medical History:   Diagnosis Date   • Cerebral vascular disease 2017    residual arm weakness   • Coronary artery disease involving native coronary artery of native heart 12/06/2017    seen on Cardiac Cath - Left main 50-70% stenosis, 12/27/2017 repeat cath showed coronary spasm with no stenosis   • Peripheral artery disease (CMS/HCC)     pt states almost complete stenosis of bilateral femoral arteries   • Post-menopausal 1990       Family History:  Family History   Problem Relation Age of Onset   • Lung cancer Mother    • Hypertension Mother    • Diabetes Maternal Grandmother    • Heart disease Maternal Grandmother    • Hypertension Maternal Grandfather    • Heart disease Maternal Grandfather    • Heart disease Other    • Hypertension Other    • Cancer Other        Social History:   reports that she has been smoking Cigarettes.  She has smoked for the past 48.00 years. She has never used smokeless tobacco. She reports that she drinks alcohol. She reports that she does not use drugs.    Medications:   Prior to Admission medications    Medication Sig Start Date End Date Taking? Authorizing Provider   amLODIPine (NORVASC) 2.5 MG tablet Take 1 tablet by mouth 2 (Two) Times a Day. 8/16/18  Yes Hi Abrams MD    atorvastatin (LIPITOR) 10 MG tablet Take 1 tablet by mouth Daily for 30 days. 8/16/18 9/15/18 Yes Hi Abrams MD   buPROPion SR (WELLBUTRIN SR) 150 MG 12 hr tablet Take 1 tablet by mouth Every 12 (Twelve) Hours for 30 days. 8/16/18 9/15/18 Yes Hi Abrams MD   calcium-vitamin D (OSCAL 500/200 D-3) 500-200 MG-UNIT per tablet Take 1 tablet by mouth Daily. 8/16/18 8/16/19 Yes Hi Abrams MD   cetirizine (zyrTEC) 10 MG tablet Take 1 tablet by mouth Daily. 8/16/18  Yes Hi Abrams MD   cilostazol (PLETAL) 100 MG tablet Take 1 tablet by mouth 2 (Two) Times a Day for 30 days. 8/16/18 9/15/18 Yes Hi Abrams MD   diclofenac (VOLTAREN) 1 % gel gel Apply  topically to the appropriate area as directed 2 (Two) Times a Day. 8/16/18  Yes Hi Abrams MD   FLUoxetine (PROZAC) 20 MG capsule Take 1 capsule by mouth Daily. 8/16/18  Yes Hi Abrams MD   fluticasone (FLONASE) 50 MCG/ACT nasal spray 1 spray into the nostril(s) as directed by provider Daily. 8/16/18  Yes Hi Abrams MD   gabapentin (NEURONTIN) 100 MG capsule Take 1 capsule by mouth 3 (Three) Times a Day. 9/10/18  Yes Fernanda Funes MD   HYDROcodone-acetaminophen (NORCO) 7.5-325 MG per tablet Take 1 tablet by mouth Every 6 (Six) Hours As Needed for Moderate Pain .   Yes Joe Dixon MD   lisinopril (PRINIVIL,ZESTRIL) 10 MG tablet Take 1 tablet by mouth Daily for 30 days. 8/16/18 9/15/18 Yes Hi Abrams MD   naproxen (NAPROSYN) 500 MG tablet Take 1 tablet by mouth 2 (Two) Times a Day With Meals. 8/16/18  Yes Hi Abrams MD   Psyllium 100 % powder Take 7.5 g by mouth Daily for 30 days. 8/16/18 9/15/18 Yes Hi Abrams MD       Allergies:  Patient has no known allergies.    ROS:    Review of Systems   Constitutional: Negative for activity change, appetite change, chills, diaphoresis, fatigue, fever and unexpected weight change.   HENT: Negative for sore throat and  "trouble swallowing.    Respiratory: Negative for shortness of breath.    Gastrointestinal: Negative for abdominal distention, abdominal pain, anal bleeding, blood in stool, constipation, diarrhea, nausea, rectal pain and vomiting.   Musculoskeletal: Negative for arthralgias.   Skin: Negative for pallor.   Neurological: Negative for light-headedness.     Objective     Blood pressure 170/84, pulse 90, height 163.8 cm (64.5\"), weight 47.4 kg (104 lb 9.6 oz), SpO2 98 %.    Physical Exam   Constitutional: She is oriented to person, place, and time. She appears well-developed and well-nourished. No distress.   HENT:   Head: Normocephalic and atraumatic.   Cardiovascular: Normal rate, regular rhythm, normal heart sounds and intact distal pulses.  Exam reveals no gallop and no friction rub.    No murmur heard.  Pulmonary/Chest: Breath sounds normal. No respiratory distress. She has no wheezes. She has no rales. She exhibits no tenderness.   Abdominal: Soft. Bowel sounds are normal. She exhibits no distension and no mass. There is no tenderness. There is no rebound and no guarding. No hernia.   Musculoskeletal: Normal range of motion. She exhibits no edema.   Neurological: She is alert and oriented to person, place, and time.   Skin: Skin is warm and dry. No rash noted. She is not diaphoretic. No erythema. No pallor.   Psychiatric: She has a normal mood and affect. Her behavior is normal. Judgment and thought content normal.        Assessment/Plan   Jessie was seen today for abnormal finding on radiology examination.    Diagnoses and all orders for this visit:    Abnormal finding on radiology exam  -     CT Abdomen Pelvis With Contrast; Future        * Surgery not found *     Diagnosis Plan   1. Abnormal finding on radiology exam  CT Abdomen Pelvis With Contrast       Anticipated Surgical Procedure:  Orders Placed This Encounter   Procedures   • CT Abdomen Pelvis With Contrast     Standing Status:   Future     Standing " Expiration Date:   9/11/2019     Order Specific Question:   Will Oral Contrast be needed for this procedure?     Answer:   Yes       The risks, benefits, and alternatives of this procedure have been discussed with the patient or the responsible party- the patient understands and agrees to proceed.

## 2018-09-11 NOTE — PATIENT INSTRUCTIONS

## 2018-09-13 ENCOUNTER — OFFICE VISIT (OUTPATIENT)
Dept: CARDIAC SURGERY | Facility: CLINIC | Age: 68
End: 2018-09-13

## 2018-09-13 VITALS
HEIGHT: 64 IN | HEART RATE: 85 BPM | TEMPERATURE: 99 F | BODY MASS INDEX: 19.15 KG/M2 | OXYGEN SATURATION: 99 % | WEIGHT: 112.2 LBS | DIASTOLIC BLOOD PRESSURE: 72 MMHG | SYSTOLIC BLOOD PRESSURE: 138 MMHG

## 2018-09-13 DIAGNOSIS — G89.4 CHRONIC PAIN SYNDROME: ICD-10-CM

## 2018-09-13 DIAGNOSIS — I10 ESSENTIAL HYPERTENSION: ICD-10-CM

## 2018-09-13 DIAGNOSIS — I25.10 CORONARY ARTERY DISEASE INVOLVING NATIVE CORONARY ARTERY OF NATIVE HEART WITHOUT ANGINA PECTORIS: ICD-10-CM

## 2018-09-13 DIAGNOSIS — I65.23 BILATERAL CAROTID ARTERY STENOSIS: ICD-10-CM

## 2018-09-13 DIAGNOSIS — F17.218 NICOTINE DEPENDENCE, CIGARETTES, WITH OTHER NICOTINE-INDUCED DISORDERS: ICD-10-CM

## 2018-09-13 DIAGNOSIS — I73.9 PVD (PERIPHERAL VASCULAR DISEASE) (HCC): Primary | ICD-10-CM

## 2018-09-13 PROCEDURE — 99215 OFFICE O/P EST HI 40 MIN: CPT | Performed by: THORACIC SURGERY (CARDIOTHORACIC VASCULAR SURGERY)

## 2018-09-13 PROCEDURE — 99406 BEHAV CHNG SMOKING 3-10 MIN: CPT | Performed by: THORACIC SURGERY (CARDIOTHORACIC VASCULAR SURGERY)

## 2018-09-13 RX ORDER — SODIUM CHLORIDE 9 MG/ML
100 INJECTION, SOLUTION INTRAVENOUS CONTINUOUS
Status: CANCELLED | OUTPATIENT
Start: 2018-09-28

## 2018-09-13 NOTE — PROGRESS NOTES
9/13/2018    Jessie Jordan  1950    Chief Complaint:    Chief Complaint   Patient presents with   • Peripheral Vascular Disease       HPI:      PCP:  Julianna Gloria MD  Cardiology:  Dr Palacio    68 y.o. female with HTN(stable, increased risk stroke, rupture) and Smoker(uncontrolled, increased risk cardiovascular events) ,Stroke(stable, increase risk stroke), CAD(stable, increase risk cardiovascular events), PVD(new, increase risk cardiovascular events).  smokes 1/2 PPD.  Moderate pain legs x 2 years.  Cant walk good.  Pain RIGHT thigh, LEFT shin.  Hands, RIGHT foot numbness tingling.  Stroke 2015.  Evaluated neurology, EMG normal..  No other associated signs, symptoms or modifying factors.    5/2018 MONTY:  RIGHT .32 bi/monophasic.  LEFT .47 biphasic.  6/2018 CTA Aorta runoff:  RIGHT common iliac 90%, SFA small moderate diffuse disease, tibial diffuse disease.  LEFT  Common iliac 70%, external iliac 80%, SFA occluded reconstitutes distal via profunda collaterals, diffuse tibials.    6/2018 Carotid Duplex:  JENNIFER 0-49% antegrade vert.  LICA 0-49% antegrade vert.    12/2017 Cardiac Catheterization:  Mild coronary irregularities.  Diffuse atherosclerosis.    The following portions of the patient's history were reviewed and updated as appropriate: allergies, current medications, past family history, past medical history, past social history, past surgical history and problem list.  Recent images independently reviewed.  Available laboratory values reviewed.    PMH:  Past Medical History:   Diagnosis Date   • Coronary artery disease involving native coronary artery of native heart 12/06/2017    seen on Cardiac Cath - Left main 50-70% stenosis, 12/27/2017 repeat cath showed coronary spasm with no stenosis   • Post-menopausal 1990       ALLERGIES:  No Known Allergies      MEDICATIONS:    Current Outpatient Prescriptions:   •  amLODIPine (NORVASC) 2.5 MG tablet, Take 1 tablet by mouth 2 (Two) Times a Day., Disp:  60 tablet, Rfl: 3  •  atorvastatin (LIPITOR) 10 MG tablet, Take 1 tablet by mouth Daily for 30 days., Disp: 30 tablet, Rfl: 3  •  buPROPion SR (WELLBUTRIN SR) 150 MG 12 hr tablet, Take 1 tablet by mouth Every 12 (Twelve) Hours for 30 days., Disp: 60 tablet, Rfl: 3  •  calcium-vitamin D (OSCAL 500/200 D-3) 500-200 MG-UNIT per tablet, Take 1 tablet by mouth Daily., Disp: 30 tablet, Rfl: 11  •  cilostazol (PLETAL) 100 MG tablet, Take 1 tablet by mouth 2 (Two) Times a Day for 30 days., Disp: 60 tablet, Rfl: 3  •  diclofenac (VOLTAREN) 1 % gel gel, Apply  topically to the appropriate area as directed 2 (Two) Times a Day., Disp: 30 g, Rfl: 1  •  FLUoxetine (PROZAC) 20 MG capsule, Take 1 capsule by mouth Daily., Disp: 30 capsule, Rfl: 3  •  fluticasone (FLONASE) 50 MCG/ACT nasal spray, 1 spray into the nostril(s) as directed by provider Daily., Disp: 16 mL, Rfl: 3  •  gabapentin (NEURONTIN) 100 MG capsule, Take 1 capsule by mouth 3 (Three) Times a Day., Disp: 90 capsule, Rfl: 2  •  HYDROcodone-acetaminophen (NORCO) 7.5-325 MG per tablet, Take 1 tablet by mouth Every 8 (Eight) Hours As Needed for Moderate Pain ., Disp: , Rfl:   •  lisinopril (PRINIVIL,ZESTRIL) 10 MG tablet, Take 1 tablet by mouth Daily for 30 days., Disp: 30 tablet, Rfl: 3  •  naproxen (NAPROSYN) 500 MG tablet, Take 1 tablet by mouth 2 (Two) Times a Day With Meals., Disp: 60 tablet, Rfl: 3  •  cetirizine (zyrTEC) 10 MG tablet, Take 1 tablet by mouth Daily., Disp: 30 tablet, Rfl: 3  •  Psyllium 100 % powder, Take 7.5 g by mouth Daily for 30 days., Disp: 226 g, Rfl: 3    Review of Systems   Review of Systems   Constitution: Positive for weakness and malaise/fatigue. Negative for weight loss.   Cardiovascular: Negative for chest pain, claudication and dyspnea on exertion.   Respiratory: Negative for cough and shortness of breath.    Skin: Negative for color change and poor wound healing.   Musculoskeletal: Positive for muscle cramps and myalgias.    Neurological: Negative for dizziness and numbness.       Physical Exam   Physical Exam   Constitutional: She is oriented to person, place, and time. She is active and cooperative. She does not appear ill. No distress.   HENT:   Right Ear: Hearing normal.   Left Ear: Hearing normal.   Nose: No nasal deformity. No epistaxis.   Mouth/Throat: She does not have dentures. Normal dentition.   Cardiovascular: Normal rate and regular rhythm.    No murmur heard.  Pulses:       Carotid pulses are 2+ on the right side, and 2+ on the left side.       Radial pulses are 2+ on the right side, and 2+ on the left side.        Dorsalis pedis pulses are 0 on the right side, and 0 on the left side.        Posterior tibial pulses are 0 on the right side, and 0 on the left side.   Cap refill <2sec   Pulmonary/Chest: Effort normal and breath sounds normal.   Abdominal: Soft. She exhibits no distension and no mass. There is no tenderness.   Musculoskeletal: She exhibits no deformity.   Gait normal.    Neurological: She is alert and oriented to person, place, and time. She has normal strength.   Skin: Skin is warm and dry. No cyanosis or erythema. No pallor.   No venous staining   Psychiatric: She has a normal mood and affect. Her speech is normal. Judgment and thought content normal.     Results for DEBI ORTIZ (MRN [id number]) as of 9/13/2018 15:06   Ref. Range 6/6/2018 10:18   Creatinine Latest Ref Range: 0.50 - 1.00 mg/dL 0.56   BUN Latest Ref Range: 7 - 21 mg/dL 7     ASSESSMENT:  Debi was seen today for peripheral vascular disease.    Diagnoses and all orders for this visit:    PVD (peripheral vascular disease) (CMS/[facility])  -     Case Request; Standing  -     sodium chloride 0.9 % infusion; Infuse 100 mL/hr into a venous catheter Continuous.  -     Case Request    Essential hypertension    Coronary artery disease involving native coronary artery of native heart without angina pectoris    Bilateral carotid artery  stenosis    Nicotine dependence, cigarettes, with other nicotine-induced disorders    Chronic pain syndrome    Other orders  -     Provide NPO Instructions to Patient; Future  -     Obtain informed consent; Standing  -     Clip bilateral groins; Standing    PLAN:  Detailed discussion with Jessie Jordan regarding situation, options and plans.  Severe lifestyle limiting claudication, neuropathy and functional limitation.  Noninvasive studies indicate severe peripheral vascular disease.  Requires additional urgent evaluation with arteriography to evaluate options for improving blood flow to feet, healing wounds and avoiding limb loss.    Risks, including but not limited to:  pain, infection, bleeding, renal failure (dialysis), nerve or blood vessel injury, need for emergent open operation to restore blood flow.  Benefits: relief of symptoms, reduction in risk of limb loss, improved wound healing.  Options:  Medical therapy, alternative imaging discussed.  Understands and wishes to proceed with plan.    Abdominal Aortogram, bilateral lower extremity runoff, possible balloon angioplasty, thrombolysis, atherectomy or stent.  Local/IV sedation.  SDS.  9/28/2018    Return after above studies complete  Smoking cessation advised and assistance options offered including behavioral counseling ([facility] [facility]), Nicotine replacement therapy (patches or gum), pharmacologic therapy (Chantix, Wellbutrin). patient understands that continued use of tobacco products increases risk of heart disease, stroke, cancer; counseling for 3-5min.  Recommended regular physical activity, progressive walking program.  Continue current medications as directed.    Thank you for the opportunity to participate in this patient's care.    Copy to primary care provider.    EMR Dragon/Transcription disclaimer:   Much of this encounter note is an electronic transcription/translation of spoken language to printed text. The  electronic translation of spoken language may permit erroneous, or at times, nonsensical words or phrases to be inadvertently transcribed; Although I have reviewed the note for such errors, some may still exist.

## 2018-09-24 ENCOUNTER — TELEPHONE (OUTPATIENT)
Dept: FAMILY MEDICINE CLINIC | Facility: CLINIC | Age: 68
End: 2018-09-24

## 2018-09-24 RX ORDER — FLUOXETINE HYDROCHLORIDE 20 MG/1
20 CAPSULE ORAL DAILY
Qty: 90 CAPSULE | Refills: 3 | Status: SHIPPED | OUTPATIENT
Start: 2018-09-24 | End: 2019-12-13 | Stop reason: SDUPTHER

## 2018-09-24 NOTE — TELEPHONE ENCOUNTER
PHARMACY CALLED AND REQUESTED A REFILL ON FLUOXETINE 20MG, 1 QD, REQUESTING A 90 DAY SUPPLY DUE TO INSURANCE.     THANK YOU

## 2018-09-26 DIAGNOSIS — I10 ESSENTIAL HYPERTENSION: ICD-10-CM

## 2018-09-26 RX ORDER — GABAPENTIN 100 MG/1
CAPSULE ORAL
Qty: 90 CAPSULE | Refills: 2 | OUTPATIENT
Start: 2018-09-26

## 2018-09-26 RX ORDER — AMLODIPINE BESYLATE 2.5 MG/1
TABLET ORAL
Qty: 60 TABLET | Refills: 3 | OUTPATIENT
Start: 2018-09-26 | End: 2018-09-28 | Stop reason: HOSPADM

## 2018-09-28 ENCOUNTER — APPOINTMENT (OUTPATIENT)
Dept: ULTRASOUND IMAGING | Facility: HOSPITAL | Age: 68
End: 2018-09-28

## 2018-09-28 ENCOUNTER — HOSPITAL ENCOUNTER (OUTPATIENT)
Facility: HOSPITAL | Age: 68
Setting detail: HOSPITAL OUTPATIENT SURGERY
Discharge: HOME | End: 2018-09-28
Attending: THORACIC SURGERY (CARDIOTHORACIC VASCULAR SURGERY) | Admitting: THORACIC SURGERY (CARDIOTHORACIC VASCULAR SURGERY)

## 2018-09-28 VITALS
SYSTOLIC BLOOD PRESSURE: 175 MMHG | WEIGHT: 101.63 LBS | OXYGEN SATURATION: 99 % | BODY MASS INDEX: 17.35 KG/M2 | HEART RATE: 61 BPM | DIASTOLIC BLOOD PRESSURE: 81 MMHG | RESPIRATION RATE: 20 BRPM | HEIGHT: 64 IN | TEMPERATURE: 98.8 F

## 2018-09-28 DIAGNOSIS — I73.9 PVD (PERIPHERAL VASCULAR DISEASE) (HCC): ICD-10-CM

## 2018-09-28 PROCEDURE — C1894 INTRO/SHEATH, NON-LASER: HCPCS | Performed by: THORACIC SURGERY (CARDIOTHORACIC VASCULAR SURGERY)

## 2018-09-28 PROCEDURE — C1769 GUIDE WIRE: HCPCS | Performed by: THORACIC SURGERY (CARDIOTHORACIC VASCULAR SURGERY)

## 2018-09-28 PROCEDURE — C2623 CATH, TRANSLUMIN, DRUG-COAT: HCPCS | Performed by: THORACIC SURGERY (CARDIOTHORACIC VASCULAR SURGERY)

## 2018-09-28 PROCEDURE — 75716 ARTERY X-RAYS ARMS/LEGS: CPT | Performed by: THORACIC SURGERY (CARDIOTHORACIC VASCULAR SURGERY)

## 2018-09-28 PROCEDURE — 25010000002 MIDAZOLAM PER 1 MG: Performed by: THORACIC SURGERY (CARDIOTHORACIC VASCULAR SURGERY)

## 2018-09-28 PROCEDURE — 25010000002 FENTANYL CITRATE (PF) 100 MCG/2ML SOLUTION: Performed by: THORACIC SURGERY (CARDIOTHORACIC VASCULAR SURGERY)

## 2018-09-28 PROCEDURE — C1887 CATHETER, GUIDING: HCPCS | Performed by: THORACIC SURGERY (CARDIOTHORACIC VASCULAR SURGERY)

## 2018-09-28 PROCEDURE — C1877 STENT, NON-COAT/COV W/O DEL: HCPCS | Performed by: THORACIC SURGERY (CARDIOTHORACIC VASCULAR SURGERY)

## 2018-09-28 PROCEDURE — 75625 CONTRAST EXAM ABDOMINL AORTA: CPT | Performed by: THORACIC SURGERY (CARDIOTHORACIC VASCULAR SURGERY)

## 2018-09-28 PROCEDURE — 25010000002 IOPAMIDOL 61 % SOLUTION: Performed by: THORACIC SURGERY (CARDIOTHORACIC VASCULAR SURGERY)

## 2018-09-28 PROCEDURE — C1760 CLOSURE DEV, VASC: HCPCS | Performed by: THORACIC SURGERY (CARDIOTHORACIC VASCULAR SURGERY)

## 2018-09-28 PROCEDURE — 36140 INTRO NDL ICATH UPR/LXTR ART: CPT | Performed by: THORACIC SURGERY (CARDIOTHORACIC VASCULAR SURGERY)

## 2018-09-28 PROCEDURE — C1725 CATH, TRANSLUMIN NON-LASER: HCPCS | Performed by: THORACIC SURGERY (CARDIOTHORACIC VASCULAR SURGERY)

## 2018-09-28 PROCEDURE — 76937 US GUIDE VASCULAR ACCESS: CPT

## 2018-09-28 PROCEDURE — 25010000002 HEPARIN (PORCINE) PER 1000 UNITS: Performed by: THORACIC SURGERY (CARDIOTHORACIC VASCULAR SURGERY)

## 2018-09-28 PROCEDURE — 37221 PR REVSC OPN/PRQ ILIAC ART W/STNT PLMT & ANGIOPLSTY: CPT | Performed by: THORACIC SURGERY (CARDIOTHORACIC VASCULAR SURGERY)

## 2018-09-28 DEVICE — BARD® E-LUMINEXX™ VASCULAR AND BILIARY STENT 8 MM X 80 MM (135 CM DELIVERY CATHETER)
Type: IMPLANTABLE DEVICE | Status: FUNCTIONAL
Brand: E-LUMINEXX® VASCULAR & BILIARY STENT

## 2018-09-28 RX ORDER — HEPARIN SODIUM 1000 [USP'U]/ML
INJECTION, SOLUTION INTRAVENOUS; SUBCUTANEOUS AS NEEDED
Status: DISCONTINUED | OUTPATIENT
Start: 2018-09-28 | End: 2018-09-28 | Stop reason: HOSPADM

## 2018-09-28 RX ORDER — CLOPIDOGREL BISULFATE 75 MG/1
150 TABLET ORAL ONCE
Status: COMPLETED | OUTPATIENT
Start: 2018-09-28 | End: 2018-09-28

## 2018-09-28 RX ORDER — CLOPIDOGREL BISULFATE 75 MG/1
75 TABLET ORAL DAILY
Qty: 30 TABLET | Refills: 11 | Status: SHIPPED | OUTPATIENT
Start: 2018-09-28 | End: 2019-09-17 | Stop reason: SDUPTHER

## 2018-09-28 RX ORDER — FENTANYL CITRATE 50 UG/ML
INJECTION, SOLUTION INTRAMUSCULAR; INTRAVENOUS AS NEEDED
Status: DISCONTINUED | OUTPATIENT
Start: 2018-09-28 | End: 2018-09-28 | Stop reason: HOSPADM

## 2018-09-28 RX ORDER — ACETAMINOPHEN 325 MG/1
650 TABLET ORAL EVERY 4 HOURS PRN
Status: DISCONTINUED | OUTPATIENT
Start: 2018-09-28 | End: 2018-09-28 | Stop reason: HOSPADM

## 2018-09-28 RX ORDER — MIDAZOLAM HYDROCHLORIDE 1 MG/ML
INJECTION INTRAMUSCULAR; INTRAVENOUS AS NEEDED
Status: DISCONTINUED | OUTPATIENT
Start: 2018-09-28 | End: 2018-09-28 | Stop reason: HOSPADM

## 2018-09-28 RX ORDER — BUPROPION HYDROCHLORIDE 150 MG/1
150 TABLET ORAL EVERY 12 HOURS
COMMUNITY
End: 2018-10-11 | Stop reason: ALTCHOICE

## 2018-09-28 RX ORDER — HYDROCODONE BITARTRATE AND ACETAMINOPHEN 7.5; 325 MG/1; MG/1
1 TABLET ORAL EVERY 4 HOURS PRN
Status: DISCONTINUED | OUTPATIENT
Start: 2018-09-28 | End: 2018-09-28 | Stop reason: HOSPADM

## 2018-09-28 RX ORDER — ASPIRIN 81 MG/1
81 TABLET ORAL DAILY
Qty: 150 TABLET | Refills: 2 | Status: SHIPPED | OUTPATIENT
Start: 2018-09-28 | End: 2019-09-28

## 2018-09-28 RX ORDER — ATORVASTATIN CALCIUM 10 MG/1
10 TABLET, FILM COATED ORAL DAILY
COMMUNITY
End: 2018-10-11 | Stop reason: SDUPTHER

## 2018-09-28 RX ORDER — ASPIRIN 81 MG/1
324 TABLET, CHEWABLE ORAL ONCE
Status: COMPLETED | OUTPATIENT
Start: 2018-09-28 | End: 2018-09-28

## 2018-09-28 RX ORDER — SODIUM CHLORIDE 9 MG/ML
100 INJECTION, SOLUTION INTRAVENOUS CONTINUOUS
Status: DISCONTINUED | OUTPATIENT
Start: 2018-09-28 | End: 2018-09-28 | Stop reason: HOSPADM

## 2018-09-28 RX ORDER — LISINOPRIL 10 MG/1
10 TABLET ORAL DAILY
COMMUNITY
End: 2018-12-28

## 2018-09-28 RX ADMIN — CLOPIDOGREL BISULFATE 150 MG: 75 TABLET ORAL at 11:24

## 2018-09-28 RX ADMIN — HYDROCODONE BITARTRATE AND ACETAMINOPHEN 1 TABLET: 7.5; 325 TABLET ORAL at 11:24

## 2018-09-28 RX ADMIN — SODIUM CHLORIDE 100 ML/HR: 9 INJECTION, SOLUTION INTRAVENOUS at 07:13

## 2018-09-28 RX ADMIN — ASPIRIN 81 MG 324 MG: 81 TABLET ORAL at 11:23

## 2018-10-09 ENCOUNTER — HOSPITAL ENCOUNTER (OUTPATIENT)
Dept: CT IMAGING | Facility: HOSPITAL | Age: 68
Discharge: HOME | End: 2018-10-09
Admitting: INTERNAL MEDICINE

## 2018-10-09 DIAGNOSIS — R93.89 ABNORMAL FINDING ON RADIOLOGY EXAM: ICD-10-CM

## 2018-10-09 PROCEDURE — 25010000002 IOPAMIDOL 61 % SOLUTION: Performed by: INTERNAL MEDICINE

## 2018-10-09 PROCEDURE — 74177 CT ABD & PELVIS W/CONTRAST: CPT

## 2018-10-09 RX ADMIN — IOPAMIDOL 80 ML: 612 INJECTION, SOLUTION INTRAVENOUS at 08:15

## 2018-10-11 ENCOUNTER — PREP FOR SURGERY (OUTPATIENT)
Dept: OTHER | Facility: HOSPITAL | Age: 68
End: 2018-10-11

## 2018-10-11 ENCOUNTER — OFFICE VISIT (OUTPATIENT)
Dept: FAMILY MEDICINE CLINIC | Facility: CLINIC | Age: 68
End: 2018-10-11

## 2018-10-11 ENCOUNTER — OFFICE VISIT (OUTPATIENT)
Dept: CARDIAC SURGERY | Facility: CLINIC | Age: 68
End: 2018-10-11

## 2018-10-11 VITALS
HEIGHT: 64 IN | SYSTOLIC BLOOD PRESSURE: 140 MMHG | HEART RATE: 65 BPM | DIASTOLIC BLOOD PRESSURE: 80 MMHG | BODY MASS INDEX: 17.42 KG/M2 | OXYGEN SATURATION: 98 % | WEIGHT: 102 LBS

## 2018-10-11 VITALS
WEIGHT: 103 LBS | DIASTOLIC BLOOD PRESSURE: 80 MMHG | BODY MASS INDEX: 17.58 KG/M2 | HEIGHT: 64 IN | HEART RATE: 64 BPM | SYSTOLIC BLOOD PRESSURE: 130 MMHG | OXYGEN SATURATION: 99 % | TEMPERATURE: 98 F

## 2018-10-11 DIAGNOSIS — I73.9 PVD (PERIPHERAL VASCULAR DISEASE) (HCC): Primary | ICD-10-CM

## 2018-10-11 DIAGNOSIS — F17.210 HEAVY CIGARETTE SMOKER: Primary | ICD-10-CM

## 2018-10-11 DIAGNOSIS — Z95.828 S/P INSERTION OF ILIAC ARTERY STENT: ICD-10-CM

## 2018-10-11 DIAGNOSIS — E55.9 VITAMIN D DEFICIENCY: ICD-10-CM

## 2018-10-11 DIAGNOSIS — I73.9 PERIPHERAL ARTERIAL DISEASE (HCC): ICD-10-CM

## 2018-10-11 DIAGNOSIS — F17.218 NICOTINE DEPENDENCE, CIGARETTES, WITH OTHER NICOTINE-INDUCED DISORDERS: ICD-10-CM

## 2018-10-11 DIAGNOSIS — D64.9 ANEMIA: ICD-10-CM

## 2018-10-11 DIAGNOSIS — I10 ESSENTIAL HYPERTENSION: ICD-10-CM

## 2018-10-11 DIAGNOSIS — I73.9 PAD (PERIPHERAL ARTERY DISEASE) (HCC): Primary | ICD-10-CM

## 2018-10-11 PROCEDURE — G0008 ADMIN INFLUENZA VIRUS VAC: HCPCS | Performed by: STUDENT IN AN ORGANIZED HEALTH CARE EDUCATION/TRAINING PROGRAM

## 2018-10-11 PROCEDURE — 99214 OFFICE O/P EST MOD 30 MIN: CPT | Performed by: NURSE PRACTITIONER

## 2018-10-11 PROCEDURE — 99213 OFFICE O/P EST LOW 20 MIN: CPT | Performed by: STUDENT IN AN ORGANIZED HEALTH CARE EDUCATION/TRAINING PROGRAM

## 2018-10-11 PROCEDURE — 90662 IIV NO PRSV INCREASED AG IM: CPT | Performed by: STUDENT IN AN ORGANIZED HEALTH CARE EDUCATION/TRAINING PROGRAM

## 2018-10-11 RX ORDER — LISINOPRIL 10 MG/1
TABLET ORAL
Qty: 30 TABLET | Refills: 3 | OUTPATIENT
Start: 2018-10-11 | End: 2018-10-11 | Stop reason: SDUPTHER

## 2018-10-11 RX ORDER — FLUTICASONE PROPIONATE 50 MCG
1 SPRAY, SUSPENSION (ML) NASAL DAILY
Qty: 16 ML | Refills: 6 | OUTPATIENT
Start: 2018-10-11 | End: 2020-02-10 | Stop reason: SDUPTHER

## 2018-10-11 RX ORDER — SODIUM CHLORIDE 0.9 % (FLUSH) 0.9 %
3 SYRINGE (ML) INJECTION EVERY 12 HOURS SCHEDULED
Status: CANCELLED | OUTPATIENT
Start: 2018-10-24

## 2018-10-11 RX ORDER — SODIUM CHLORIDE 0.9 % (FLUSH) 0.9 %
3-10 SYRINGE (ML) INJECTION AS NEEDED
Status: CANCELLED | OUTPATIENT
Start: 2018-10-24

## 2018-10-11 RX ORDER — ATORVASTATIN CALCIUM 10 MG/1
TABLET, FILM COATED ORAL
Qty: 30 TABLET | Refills: 3 | OUTPATIENT
Start: 2018-10-11 | End: 2018-10-11 | Stop reason: SDUPTHER

## 2018-10-11 RX ORDER — ATORVASTATIN CALCIUM 10 MG/1
10 TABLET, FILM COATED ORAL DAILY
Qty: 30 TABLET | Refills: 5 | Status: SHIPPED | OUTPATIENT
Start: 2018-10-11 | End: 2019-03-11 | Stop reason: SDUPTHER

## 2018-10-11 NOTE — PATIENT INSTRUCTIONS
"Vascular Test:  Severe disease on RIGHT  Left is moderate  Surgery:  Both sides femoral to popliteal above knee   Femoral to Femoral  Every cigarette you don\"t smoke helps      SDS   Surgery   "

## 2018-10-11 NOTE — PROGRESS NOTES
10/11/2018  CVTS:  Returns SP angiogram with PTA/stent LEFT iliac artey.    Jessie Jordan  1950    Chief Complaint:    Chief Complaint   Patient presents with   • Peripheral Vascular Disease   RLE claudication continues limiting ambulation.  LLE much improved.      HPI:      PCP:  Julianna Gloria MD  Cardiology:  Dr Palacio    68 y.o. female with HTN(stable, increased risk stroke, rupture) and Smoker(uncontrolled, increased risk cardiovascular events) ,Stroke(stable, increase risk stroke), CAD(stable, increase risk cardiovascular events), PVD(new, increase risk cardiovascular events).  smokes 1/2 PPD.  Moderate pain legs x 2 years.  Cant walk good.  Pain RIGHT thigh, LEFT shin.  Hands, RIGHT foot numbness tingling.  Stroke 2015.  Evaluated neurology, EMG normal..  No other associated signs, symptoms or modifying factors.    5/2018 MONTY:  RIGHT .32 bi/monophasic.  LEFT .47 biphasic.  6/2018 CTA Aorta runoff:  RIGHT common iliac 90%, SFA small moderate diffuse disease, tibial diffuse disease.  LEFT  Common iliac 70%, external iliac 80%, SFA occluded reconstitutes distal via profunda collaterals, diffuse tibials.  9/28/18  Agram:  PTA/Luminex Stent LEFT iliac artery:  RCIA occluded, RIIA/REIA moderate diffuse disease, RSFA occluded, RPT diffuse distal disease  10/11/18  MONTY:  RIGHT 0.50 Fem/Pop Biphasic  PT/DP Monophasic     6/2018 Carotid Duplex:  JENNIFER 0-49% antegrade vert.  LICA 0-49% antegrade vert.    12/2017 Cardiac Catheterization:  Mild coronary irregularities.  Diffuse atherosclerosis.    The following portions of the patient's history were reviewed and updated as appropriate: allergies, current medications, past family history, past medical history, past social history, past surgical history and problem list.  Recent images independently reviewed.  Available laboratory values reviewed.    PMH:  Past Medical History:   Diagnosis Date   • Coronary artery disease involving native coronary artery of  native heart 12/06/2017    seen on Cardiac Cath - Left main 50-70% stenosis, 12/27/2017 repeat cath showed coronary spasm with no stenosis   • Post-menopausal 1990       ALLERGIES:  No Known Allergies      MEDICATIONS:    Current Outpatient Prescriptions:   •  amLODIPine (NORVASC) 2.5 MG tablet, Take 1 tablet by mouth 2 (Two) Times a Day., Disp: 60 tablet, Rfl: 3  •  aspirin 81 MG EC tablet, Take 1 tablet by mouth Daily., Disp: 150 tablet, Rfl: 2  •  atorvastatin (LIPITOR) 10 MG tablet, Take 1 tablet by mouth Daily., Disp: 30 tablet, Rfl: 5  •  calcium-vitamin D (OSCAL 500/200 D-3) 500-200 MG-UNIT per tablet, Take 1 tablet by mouth Daily., Disp: 30 tablet, Rfl: 11  •  clopidogrel (PLAVIX) 75 MG tablet, Take 1 tablet by mouth Daily., Disp: 30 tablet, Rfl: 11  •  diclofenac (VOLTAREN) 1 % gel gel, Apply  topically to the appropriate area as directed 2 (Two) Times a Day., Disp: 30 g, Rfl: 1  •  FLUoxetine (PROZAC) 20 MG capsule, Take 1 capsule by mouth Daily., Disp: 90 capsule, Rfl: 3  •  fluticasone (FLONASE) 50 MCG/ACT nasal spray, 1 spray into the nostril(s) as directed by provider Daily., Disp: 16 mL, Rfl: 6  •  gabapentin (NEURONTIN) 100 MG capsule, Take 1 capsule by mouth 3 (Three) Times a Day., Disp: 90 capsule, Rfl: 2  •  HYDROcodone-acetaminophen (NORCO) 7.5-325 MG per tablet, Take 1 tablet by mouth Every 8 (Eight) Hours As Needed for Moderate Pain ., Disp: , Rfl:   •  lisinopril (PRINIVIL,ZESTRIL) 10 MG tablet, Take 10 mg by mouth Daily., Disp: , Rfl:   •  varenicline (CHANTIX STARTING MONTH PAK) 0.5 MG X 11 & 1 MG X 42 tablet, Take 0.5 mg one daily on days 1-3 and and 0.5 mg twice daily on days 4-7.Then 1 mg twice daily for a total of 12 weeks., Disp: 53 tablet, Rfl: 0   Current outpatient and discharge medications have been reconciled for the patient.  Reviewed by: HERNAN Scales      Review of Systems   Review of Systems   Constitution: Positive for weakness and malaise/fatigue. Negative for weight  loss.   HENT: Negative for hearing loss, hoarse voice and stridor.    Eyes: Negative for visual disturbance.   Cardiovascular: Positive for claudication (RLE). Negative for chest pain and dyspnea on exertion.        RLE:  N Open sores  N color changes  Ycoldness.           Y  numbness or paresthesias  Pain   RLE sore has healed    Respiratory: Negative for cough and shortness of breath.         Smokes    Skin: Positive for dry skin. Negative for color change and poor wound healing.        R groin healed    Musculoskeletal: Positive for muscle cramps and myalgias.   Gastrointestinal: Negative for anorexia, hematemesis and melena.   Genitourinary: Negative for hematuria.   Neurological: Positive for paresthesias. Negative for brief paralysis, dizziness, focal weakness and numbness.   Psychiatric/Behavioral: Negative for altered mental status.   Allergic/Immunologic: Negative for hives.       Physical Exam   Vitals:    10/11/18 1334   BP: 130/80   Pulse: 64   Temp: 98 °F (36.7 °C)   SpO2: 99%       Physical Exam   Constitutional: She is oriented to person, place, and time. She is active and cooperative. She does not appear ill. No distress.   Body mass index is 17.68 kg/m².     HENT:   Head: Normocephalic.   Right Ear: Hearing normal.   Left Ear: Hearing normal.   Nose: No nasal deformity. No epistaxis.   Mouth/Throat: Oropharynx is clear and moist. She does not have dentures. Normal dentition.   Eyes: Pupils are equal, round, and reactive to light. Conjunctivae are normal.   Neck: Neck supple. No JVD present.   Cardiovascular: Normal rate, regular rhythm and normal heart sounds.    No murmur heard.  Pulses:       Carotid pulses are 2+ on the right side, and 2+ on the left side.       Radial pulses are 2+ on the right side, and 2+ on the left side.        Femoral pulses are 1+ on the right side, and 1+ on the left side.       Popliteal pulses are 1+ on the right side, and 1+ on the left side.        Dorsalis pedis  pulses are 0 on the right side, and 1+ on the left side.        Posterior tibial pulses are 0 on the right side, and 1+ on the left side.   Cap refill <2sec  RLE:  W/d/p cap refil<3 sec no edema  Light bluish post toes  No paleness  Intact mobility and sensation        Pulmonary/Chest: Effort normal and breath sounds normal. No stridor.   Abdominal: Soft. Bowel sounds are normal. She exhibits no distension and no mass. There is no tenderness.   Musculoskeletal: She exhibits no edema, tenderness or deformity.   Gait normal.         Neurological Sensory Findings -  Altered sharp/dull right ankle/foot discrimination. Unaltered sharp/dull left ankle/foot discrimination.  Vascular Status -  Her right foot exhibits abnormal foot vasculature . Her right foot exhibits no edema. Her left foot exhibits abnormal foot vasculature . Her left foot exhibits no edema.  Skin Integrity  -  Her right foot skin is intact.Her left foot skin is intact..  Neurological: She is alert and oriented to person, place, and time. She has normal strength.   RLE needle burning   Decrease sensation to light touch.    Skin: Skin is warm and dry. Capillary refill takes less than 2 seconds. No cyanosis or erythema. No pallor.   No venous staining   Psychiatric: She has a normal mood and affect. Her speech is normal. Judgment normal.   Nursing note and vitals reviewed.      ASSESSMENT:  Jessie was seen today for peripheral vascular disease in follow up to aortogram.  Seen today with Dr Ruff.      Diagnoses and all orders for this visit:    PVD (peripheral vascular disease) (CMS/[facility])  -     Case request  Orders completed.     Nicotine dependence, cigarettes, with other nicotine-induced disorders    PLAN:  Detailed discussion with Jessie Jordan regarding situation, options and plans.  Severe lifestyle limiting claudication, neuropathy and functional limitation.   Aortogram demonstrates surgical revascularization is indicated.    Recommend  Femoral to femoral bypass with RIGHT femoral popliteal AK bypass.  Detailed discussion concerning surgical procedure including risks involved, potential for complications and hoped for benefits.  She understands and wishes to proceed.  Possibility of blood products administration discussed, she understands risk and benefits and wishes to receive if necessary.    SDS 10/18/18 at 11 am  Surgical revascularization  10/24/18.      Smoking cessation advised and assistance options offered including behavioral counseling ([facility] [employer] [facility]), Nicotine replacement therapy (patches or gum), pharmacologic therapy (Chantix prescribed by PCP). patient understands that continued use of tobacco products increases risk of heart disease, stroke, cancer; counseling for 2 min.  Recommended regular physical activity, progressive walking program.    Continue medical management:  Antiplatelet, statin, Vit D, and ACE.   Check VIt D level with preop lab. On vit replacement for deficieny  Check CBC preop, last CBC of record pt was anemic.  Thank you for the opportunity to participate in this patient's care.    Copy to primary care provider.

## 2018-10-12 RX ORDER — NAPROXEN 500 MG/1
500 TABLET ORAL 2 TIMES DAILY WITH MEALS
Qty: 180 TABLET | Refills: 2 | Status: SHIPPED | OUTPATIENT
Start: 2018-10-12 | End: 2018-10-18

## 2018-10-12 NOTE — PROGRESS NOTES
ID: Jessie Jordan    CC:   Chief Complaint   Patient presents with   • Follow-up       Subjective:     HPI     Jessie Jordan is a 68 y.o. female who presents for follow up from her left iliac stent placement. She states her LLE is doing much better. She has an appointment today with Birdie Saravia. Her aortogram demonstrates surgical revascularization is indicated. Recommending femoral to femoral bypass with right femoral popliteal AK bypass. She complains of RLE claudication limiting ambulation.     She would like to quit smoking. She is currently on wellbutrin but states its not helping her.  She states she has decreased smoking from one pack to 3-4 cigarettes.     Preventative:  Over the past 2 weeks, have you felt down, depressed, or hopeless? no  Over the past 2 weeks, have you felt little interest or pleasure in doing things? no  Clinical depression screening refused by patient no    On osteoporosis therapy? no    Past Medical Hx:  Past Medical History:   Diagnosis Date   • Coronary artery disease involving native coronary artery of native heart 12/06/2017    seen on Cardiac Cath - Left main 50-70% stenosis, 12/27/2017 repeat cath showed coronary spasm with no stenosis   • Post-menopausal 1990       Past Surgical Hx:  Past Surgical History:   Procedure Laterality Date   • ARTERIOGRAM N/A 9/28/2018    Procedure: aortoiliac arteriogram possible angioplasty stent atherectomy thrombolysis bilateral iliac stents;  Surgeon: Luan Ruff MD;  Location: [facility] ANGIO INVASIVE LOCATION;  Service: Interventional Radiology   • CARDIAC CATHETERIZATION  12/06/2017    Done at [facility] - Left Main 50-70% Stenosis - no stenting done, recommendation was urgent CABG.  EF 50-60%   • CERVICAL FUSION  1985    C5-6   • COLONOSCOPY N/A 6/29/2018    Procedure: COLONOSCOPY;  Surgeon: Oz Way MD;  Location: [facility] ENDOSCOPY;  Service: Gastroenterology   • ROTATOR CUFF REPAIR Left 06/30/2011     done in [place]       Health Maintenance:  Health Maintenance   Topic Date Due   • TDAP/TD VACCINES (1 - Tdap) 09/04/1969   • ZOSTER VACCINE (1 of 2) 09/04/2000   • MEDICARE ANNUAL WELLNESS  04/04/2019   • PNEUMOCOCCAL VACCINES (65+ LOW/MEDIUM RISK) (2 of 2 - PPSV23) 04/05/2019   • LUNG CANCER SCREENING  04/11/2019   • DXA SCAN  04/25/2020   • MAMMOGRAM  05/04/2020   • COLONOSCOPY  06/29/2023   • HEPATITIS C SCREENING  Completed   • INFLUENZA VACCINE  Addressed       Current Meds:    Current Outpatient Prescriptions:   •  amLODIPine (NORVASC) 2.5 MG tablet, Take 1 tablet by mouth 2 (Two) Times a Day., Disp: 60 tablet, Rfl: 3  •  aspirin 81 MG EC tablet, Take 1 tablet by mouth Daily., Disp: 150 tablet, Rfl: 2  •  atorvastatin (LIPITOR) 10 MG tablet, Take 1 tablet by mouth Daily., Disp: 30 tablet, Rfl: 5  •  calcium-vitamin D (OSCAL 500/200 D-3) 500-200 MG-UNIT per tablet, Take 1 tablet by mouth Daily., Disp: 30 tablet, Rfl: 11  •  clopidogrel (PLAVIX) 75 MG tablet, Take 1 tablet by mouth Daily., Disp: 30 tablet, Rfl: 11  •  diclofenac (VOLTAREN) 1 % gel gel, Apply  topically to the appropriate area as directed 2 (Two) Times a Day., Disp: 30 g, Rfl: 1  •  FLUoxetine (PROZAC) 20 MG capsule, Take 1 capsule by mouth Daily., Disp: 90 capsule, Rfl: 3  •  fluticasone (FLONASE) 50 MCG/ACT nasal spray, 1 spray into the nostril(s) as directed by provider Daily., Disp: 16 mL, Rfl: 6  •  gabapentin (NEURONTIN) 100 MG capsule, Take 1 capsule by mouth 3 (Three) Times a Day., Disp: 90 capsule, Rfl: 2  •  HYDROcodone-acetaminophen (NORCO) 7.5-325 MG per tablet, Take 1 tablet by mouth Every 8 (Eight) Hours As Needed for Moderate Pain ., Disp: , Rfl:   •  lisinopril (PRINIVIL,ZESTRIL) 10 MG tablet, Take 10 mg by mouth Daily., Disp: , Rfl:   •  varenicline (CHANTIX STARTING MONTH PAK) 0.5 MG X 11 & 1 MG X 42 tablet, Take 0.5 mg one daily on days 1-3 and and 0.5 mg twice daily on days 4-7.Then 1 mg twice daily for a total of 12 weeks., Disp:  53 tablet, Rfl: 0    Allergies:  Patient has no known allergies.    Family Hx:  Family History   Problem Relation Age of Onset   • Lung cancer Mother    • Hypertension Mother    • Diabetes Maternal Grandmother    • Heart disease Maternal Grandmother    • Hypertension Maternal Grandfather    • Heart disease Maternal Grandfather    • Heart disease Other    • Hypertension Other    • Cancer Other         Social History:  Social History     Social History   • Marital status:      Spouse name: N/A   • Number of children: 1   • Years of education: N/A     Occupational History   • Retired      Patient resides alone (daughter resides across street).     Social History Main Topics   • Smoking status: Current Every Day Smoker     Years: 48.00     Types: Cigarettes   • Smokeless tobacco: Never Used      Comment: 09/11/2018 - Patient states she utilizes 4 - 5 Cigarettes per day.  Patient states she previously utilized 1 - 1 1/2 pack of Cigarettes per day.   • Alcohol use Yes      Comment: 09/11/2018 - Patient states she consumes approximately 1 - 2 Beer per day occasionally.   • Drug use: No   • Sexual activity: Defer      Comment: .     Other Topics Concern   • Not on file     Social History Narrative   • No narrative on file       Review of Systems   Constitutional: Positive for fatigue. Negative for activity change, appetite change, chills and diaphoresis.   HENT: Negative for congestion, dental problem, drooling, ear discharge and ear pain.    Respiratory: Negative for apnea, cough, choking, chest tightness and shortness of breath.    Cardiovascular: Negative for chest pain, palpitations and leg swelling.   Gastrointestinal: Negative for abdominal distention, abdominal pain, anal bleeding and blood in stool.   Genitourinary: Negative for difficulty urinating, dyspareunia, dysuria and enuresis.   Musculoskeletal: Positive for gait problem.        Right leg claudication. Unable to ambulate well.   "  Neurological: Negative for dizziness, facial asymmetry, light-headedness and headaches.   Hematological: Negative for adenopathy. Does not bruise/bleed easily.   Psychiatric/Behavioral: Negative for agitation and behavioral problems.           Objective:     /80   Pulse 65   Ht 162.6 cm (64\")   Wt 46.3 kg (102 lb)   SpO2 98%   BMI 17.51 kg/m²     Physical Exam   Constitutional: She is oriented to person, place, and time. She appears well-developed and well-nourished.   HENT:   Head: Normocephalic and atraumatic.   Right Ear: External ear normal.   Left Ear: External ear normal.   Nose: Nose normal.   Mouth/Throat: Oropharynx is clear and moist.   Eyes: Pupils are equal, round, and reactive to light. Conjunctivae and EOM are normal.   Neck: Normal range of motion. Neck supple.   Cardiovascular: Normal rate, regular rhythm, normal heart sounds and intact distal pulses.    Pulmonary/Chest: Effort normal and breath sounds normal.   Abdominal: Soft. Bowel sounds are normal.   Musculoskeletal: Normal range of motion.   Abnormal left and right foot vasculature.    Neurological: She is alert and oriented to person, place, and time.   Skin: Skin is warm and dry. Capillary refill takes less than 2 seconds.   RLE:  W/d/p cap refil<3 sec no edema  Light bluish post toes  No paleness  Intact mobility and sensation   .   Surgical site healing well.               Assessment/Plan:     Smoking cessation   - switched from wellbutrin to Chantix   - discussed possible side effects   - gave her brochure to help quit smoking    Left femoral stent follow up   - patient doing much better   - has surgery for right bypass next month   Health Maintenance   - flu shot given       Follow-up:     Follow up in three months       Goals:   Goals     • Pain control            Barriers: severe PAD, pt continues to smoke      • Quit smoking / using tobacco (pt-stated)            Barriers: compliance with cessation medications      "     Barriers to goals: smoking     Health Maintenance   Topic Date Due   • TDAP/TD VACCINES (1 - Tdap) 09/04/1969   • ZOSTER VACCINE (1 of 2) 09/04/2000   • MEDICARE ANNUAL WELLNESS  04/04/2019   • PNEUMOCOCCAL VACCINES (65+ LOW/MEDIUM RISK) (2 of 2 - PPSV23) 04/05/2019   • LUNG CANCER SCREENING  04/11/2019   • DXA SCAN  04/25/2020   • MAMMOGRAM  05/04/2020   • COLONOSCOPY  06/29/2023   • HEPATITIS C SCREENING  Completed   • INFLUENZA VACCINE  Addressed       Tobacco: Smoking cessation counseling was provided.  Alcohol: does not drink  Lifestyle: Patient's Body mass index is 17.51 kg/m². BMI is within normal parameters. No follow-up required.   eat more fruits and vegetables, decrease soda or juice intake, increase water intake, increase physical activity and reduce screen time    RISK SCORE: 4         Julianna Gloria M.D. PGY1  [facility]  [address] [place] [address]  Office: [phone]    This document has been electronically signed by Julianna Gloria MD on October 12, 2018 8:44 AM          This document has been electronically signed by Julianna Gloria MD on October 12, 2018 8:33 AM

## 2018-10-18 ENCOUNTER — APPOINTMENT (OUTPATIENT)
Dept: PREADMISSION TESTING | Facility: HOSPITAL | Age: 68
End: 2018-10-18

## 2018-10-18 VITALS
HEART RATE: 84 BPM | OXYGEN SATURATION: 97 % | SYSTOLIC BLOOD PRESSURE: 112 MMHG | BODY MASS INDEX: 17.75 KG/M2 | WEIGHT: 104 LBS | DIASTOLIC BLOOD PRESSURE: 54 MMHG | HEIGHT: 64 IN | RESPIRATION RATE: 14 BRPM

## 2018-10-18 DIAGNOSIS — E55.9 VITAMIN D DEFICIENCY: ICD-10-CM

## 2018-10-18 DIAGNOSIS — D64.9 ANEMIA: ICD-10-CM

## 2018-10-18 DIAGNOSIS — I73.9 PAD (PERIPHERAL ARTERY DISEASE) (HCC): ICD-10-CM

## 2018-10-18 LAB
25(OH)D3 SERPL-MCNC: 25.7 NG/ML (ref 30–100)
ABO GROUP BLD: NORMAL
ALBUMIN SERPL-MCNC: 4 G/DL (ref 3.4–4.8)
ALBUMIN/GLOB SERPL: 1.6 G/DL (ref 1.1–1.8)
ALP SERPL-CCNC: 81 U/L (ref 38–126)
ALT SERPL W P-5'-P-CCNC: 31 U/L (ref 9–52)
ANION GAP SERPL CALCULATED.3IONS-SCNC: 12 MMOL/L (ref 5–15)
AST SERPL-CCNC: 20 U/L (ref 14–36)
BILIRUB SERPL-MCNC: 0.2 MG/DL (ref 0.2–1.3)
BLD GP AB SCN SERPL QL: NEGATIVE
BUN BLD-MCNC: 10 MG/DL (ref 7–21)
BUN/CREAT SERPL: 13.7 (ref 7–25)
CALCIUM SPEC-SCNC: 9.7 MG/DL (ref 8.4–10.2)
CHLORIDE SERPL-SCNC: 95 MMOL/L (ref 95–110)
CO2 SERPL-SCNC: 26 MMOL/L (ref 22–31)
CREAT BLD-MCNC: 0.73 MG/DL (ref 0.5–1)
DEPRECATED RDW RBC AUTO: 46.6 FL (ref 36.4–46.3)
ERYTHROCYTE [DISTWIDTH] IN BLOOD BY AUTOMATED COUNT: 15.2 % (ref 11.5–14.5)
GFR SERPL CREATININE-BSD FRML MDRD: 79 ML/MIN/1.73 (ref 45–104)
GLOBULIN UR ELPH-MCNC: 2.5 GM/DL (ref 2.3–3.5)
GLUCOSE BLD-MCNC: 124 MG/DL (ref 60–100)
HCT VFR BLD AUTO: 23.9 % (ref 35–45)
HGB BLD-MCNC: 8.2 G/DL (ref 12–15.5)
Lab: NORMAL
MCH RBC QN AUTO: 28.6 PG (ref 26.5–34)
MCHC RBC AUTO-ENTMCNC: 34.3 G/DL (ref 31.4–36)
MCV RBC AUTO: 83.3 FL (ref 80–98)
PLATELET # BLD AUTO: 478 10*3/MM3 (ref 150–450)
PMV BLD AUTO: 8.8 FL (ref 8–12)
POTASSIUM BLD-SCNC: 4.3 MMOL/L (ref 3.5–5.1)
PROT SERPL-MCNC: 6.5 G/DL (ref 6.3–8.6)
RBC # BLD AUTO: 2.87 10*6/MM3 (ref 3.77–5.16)
RH BLD: POSITIVE
SODIUM BLD-SCNC: 133 MMOL/L (ref 137–145)
T&S EXPIRATION DATE: NORMAL
WBC NRBC COR # BLD: 11.02 10*3/MM3 (ref 3.2–9.8)

## 2018-10-18 PROCEDURE — 86850 RBC ANTIBODY SCREEN: CPT | Performed by: NURSE PRACTITIONER

## 2018-10-18 PROCEDURE — 86901 BLOOD TYPING SEROLOGIC RH(D): CPT | Performed by: NURSE PRACTITIONER

## 2018-10-18 PROCEDURE — 80053 COMPREHEN METABOLIC PANEL: CPT | Performed by: NURSE PRACTITIONER

## 2018-10-18 PROCEDURE — 86900 BLOOD TYPING SEROLOGIC ABO: CPT | Performed by: NURSE PRACTITIONER

## 2018-10-18 PROCEDURE — 85027 COMPLETE CBC AUTOMATED: CPT | Performed by: NURSE PRACTITIONER

## 2018-10-18 PROCEDURE — 82306 VITAMIN D 25 HYDROXY: CPT | Performed by: NURSE PRACTITIONER

## 2018-10-18 PROCEDURE — 36415 COLL VENOUS BLD VENIPUNCTURE: CPT

## 2018-10-18 RX ORDER — SODIUM CHLORIDE, SODIUM GLUCONATE, SODIUM ACETATE, POTASSIUM CHLORIDE, AND MAGNESIUM CHLORIDE 526; 502; 368; 37; 30 MG/100ML; MG/100ML; MG/100ML; MG/100ML; MG/100ML
1000 INJECTION, SOLUTION INTRAVENOUS CONTINUOUS
Status: CANCELLED | OUTPATIENT
Start: 2018-10-24

## 2018-10-18 RX ORDER — CILOSTAZOL 100 MG/1
100 TABLET ORAL 2 TIMES DAILY
COMMUNITY
End: 2018-12-13

## 2018-10-18 NOTE — DISCHARGE INSTRUCTIONS
[facility]  Pre-op Information and Guidelines    You will be called after 2 p.m. the day before your surgery (Friday for Monday surgery) and notified of your time for arrival and approximate surgery time.  If you have not received a call by 4P.M., please contact Same Day Surgery at [phone] of if outside [facility] call [phone].    Please Follow these Important Safety Guidelines:    • The morning of your procedure, take only the medications listed below with   A sip of water:__AMLODIPINE, FLUOXETINE, FLONASE,________________       __GABAPENTIN, NORCO_____________________________    • DO NOT eat or drink anything after 12:00 midnight the night before surgery  Specific instructions concerning drinking clear liquids will be discussed during  the pre-surgery instruction call the day before your surgery.    • If you take a blood thinner (ex. Plavix, Coumadin, aspirin), ask your doctor when to stop it before surgery  STOP DATE: _________________    • Only 2 visitors are allowed in patient rooms at a time  Your visitors will be asked to wait in the lobby until the admission process is complete with the exception of a parent with a child and patients in need of special assistance.    • YOU CANNOT DRIVE YOURSELF HOME  You must be accompanied by someone who will be responsible for driving you home after surgery and for your care at home.    • DO NOT chew gum, use breath mints, hard candy, or smoke the day of surgery  • DO NOT drink alcohol for at least 24 hours before your surgery  • DO NOT wear any jewelry and remove all body piercing before coming to the hospital  • DO NOT wear make-up to the hospital  • If you are having surgery on an extremity (arm/leg/foot) remove nail polish/artificial nails on the surgical side  • Clothing, glasses, contacts, dentures, and hairpieces must be removed before surgery  • Bathe the night before or the morning of your surgery and do not use  powders/lotions on skin.

## 2018-10-19 ENCOUNTER — TELEPHONE (OUTPATIENT)
Dept: FAMILY MEDICINE CLINIC | Facility: CLINIC | Age: 68
End: 2018-10-19

## 2018-10-19 DIAGNOSIS — G89.4 CHRONIC PAIN SYNDROME: ICD-10-CM

## 2018-10-19 RX ORDER — GABAPENTIN 100 MG/1
100 CAPSULE ORAL 3 TIMES DAILY
Qty: 90 CAPSULE | Refills: 2 | Status: SHIPPED | OUTPATIENT
Start: 2018-10-19 | End: 2019-02-21 | Stop reason: SDUPTHER

## 2018-10-19 NOTE — TELEPHONE ENCOUNTER
Will leave script in the front for her to  later today. Will drop script at 1 pm. Can you please call her and let her know to come pick it up after 1pm today.

## 2018-10-19 NOTE — TELEPHONE ENCOUNTER
PT CALLED AND SAID SHE NEEDED A SCRIPT FOR GABAPENTIN. SHE SAID SHE WAS TOLD TO CALL AND ASK FOR THE REFILL.     THANK YOU

## 2018-10-24 NOTE — PROGRESS NOTES
I have reviewed the notes, assessments, and/or procedures performed by the resident, I concur with her/his documentation and assessment and plan for Jessie Jordan.      This document has been electronically signed by Edwin Patel MD on October 24, 2018 2:21 PM

## 2018-11-06 ENCOUNTER — OFFICE VISIT (OUTPATIENT)
Dept: GASTROENTEROLOGY | Facility: CLINIC | Age: 68
End: 2018-11-06

## 2018-11-06 VITALS
BODY MASS INDEX: 17.34 KG/M2 | OXYGEN SATURATION: 98 % | HEIGHT: 64 IN | SYSTOLIC BLOOD PRESSURE: 122 MMHG | WEIGHT: 101.6 LBS | HEART RATE: 111 BPM | DIASTOLIC BLOOD PRESSURE: 68 MMHG

## 2018-11-06 DIAGNOSIS — R93.3 ABNORMAL CT SCAN, COLON: Primary | ICD-10-CM

## 2018-11-06 PROCEDURE — 99212 OFFICE O/P EST SF 10 MIN: CPT | Performed by: NURSE PRACTITIONER

## 2018-11-06 NOTE — PROGRESS NOTES
Chief Complaint   Patient presents with   • colon lesion       Subjective    Jessie Jordan is a 68 y.o. female. she is here today for follow-up.    History of Present Illness  68-year-old female presents to discuss repeat CT scan.  Patient had abnormal CT showing questionable lesion in her descending colon.  She underwent colonoscopy per Dr. Way which showed no evidence of any lesion in her colon and the biopsies were normal.  Repeat CT scan was completed 10/9/18 which noted no abnormality within the colon and no inflammatory changes around the cecum specifically.  Bilateral kidney noted some renal cortical scarring, and stable left kidney mid zone lower pole simple renal cortical cysts.  Otherwise unremarkable CT of abdomen and pelvis.  Patient denies any abdominal pain, nausea or vomiting.  She reports her bowel habits are regular with no melena or hematochezia.  Plan; follow-up in GI office as needed.       The following portions of the patient's history were reviewed and updated as appropriate:   Past Medical History:   Diagnosis Date   • Anxiety and depression    • Coronary artery disease involving native coronary artery of native heart 12/06/2017    seen on Cardiac Cath - Left main 50-70% stenosis, 12/27/2017 repeat cath showed coronary spasm with no stenosis   • Hypertension    • Kidney cysts    • Post-menopausal 1990   • Stroke (CMS/HCC) 2015   • Vascular disease      Past Surgical History:   Procedure Laterality Date   • ARTERIOGRAM N/A 9/28/2018    Procedure: aortoiliac arteriogram possible angioplasty stent atherectomy thrombolysis bilateral iliac stents;  Surgeon: Luan Ruff MD;  Location: [facility] INVASIVE LOCATION;  Service: Interventional Radiology   • CARDIAC CATHETERIZATION  12/06/2017    Done at [facility], [place], operated by [facility] - Left Main 50-70% Stenosis - no stenting done, recommendation was urgent CABG.  EF 50-60%   • CERVICAL FUSION  1985    C5-6   • COLONOSCOPY N/A 6/29/2018     Procedure: COLONOSCOPY;  Surgeon: Oz Way MD;  Location: [facility] ENDOSCOPY;  Service: Gastroenterology   • FINGER SURGERY Left     third finger   • ROTATOR CUFF REPAIR Left 2011    done in [place]     Family History   Problem Relation Age of Onset   • Lung cancer Mother    • Hypertension Mother    • Diabetes Maternal Grandmother    • Heart disease Maternal Grandmother    • Hypertension Maternal Grandfather    • Heart disease Maternal Grandfather    • Heart disease Other    • Hypertension Other    • Cancer Other      OB History      Para Term  AB Living    2 2 1     1    SAB TAB Ectopic Molar Multiple Live Births                       Current Outpatient Prescriptions   Medication Sig Dispense Refill   • amLODIPine (NORVASC) 2.5 MG tablet Take 1 tablet by mouth 2 (Two) Times a Day. 60 tablet 3   • aspirin 81 MG EC tablet Take 1 tablet by mouth Daily. 150 tablet 2   • atorvastatin (LIPITOR) 10 MG tablet Take 1 tablet by mouth Daily. 30 tablet 5   • calcium-vitamin D (OSCAL 500/200 D-3) 500-200 MG-UNIT per tablet Take 1 tablet by mouth Daily. 30 tablet 11   • cilostazol (PLETAL) 100 MG tablet Take 100 mg by mouth 2 (Two) Times a Day.     • clopidogrel (PLAVIX) 75 MG tablet Take 1 tablet by mouth Daily. 30 tablet 11   • diclofenac (VOLTAREN) 1 % gel gel Apply  topically to the appropriate area as directed 2 (Two) Times a Day. 30 g 1   • FLUoxetine (PROZAC) 20 MG capsule Take 1 capsule by mouth Daily. 90 capsule 3   • fluticasone (FLONASE) 50 MCG/ACT nasal spray 1 spray into the nostril(s) as directed by provider Daily. 16 mL 6   • gabapentin (NEURONTIN) 100 MG capsule Take 1 capsule by mouth 3 (Three) Times a Day. 90 capsule 2   • HYDROcodone-acetaminophen (NORCO) 7.5-325 MG per tablet Take 1 tablet by mouth Every 8 (Eight) Hours As Needed for Moderate Pain .     • lisinopril (PRINIVIL,ZESTRIL) 10 MG tablet Take 10 mg by mouth Daily.     • varenicline (CHANTIX STARTING MONTH ) 0.5 MG X 11 & 1  "MG X 42 tablet Take 0.5 mg one daily on days 1-3 and and 0.5 mg twice daily on days 4-7.Then 1 mg twice daily for a total of 12 weeks. 53 tablet 0     No current facility-administered medications for this visit.      No Known Allergies  Social History     Social History   • Marital status:    • Number of children: 1     Occupational History   • Retired      Patient resides alone (daughter resides across street).     Social History Main Topics   • Smoking status: Current Every Day Smoker     Packs/day: 1.00     Years: 48.00     Types: Cigarettes   • Smokeless tobacco: Never Used      Comment: down to 1-2 cigarettes daily   • Alcohol use Yes      Comment: 09/11/2018 - Patient states she consumes approximately 1 - 2 Beer per day occasionally.   • Drug use: No   • Sexual activity: Defer      Comment: .     Other Topics Concern   • Not on file       Review of Systems  Review of Systems   Constitutional: Negative for activity change, appetite change, chills, diaphoresis, fatigue, fever and unexpected weight change.   HENT: Negative for sore throat and trouble swallowing.    Respiratory: Negative for shortness of breath.    Gastrointestinal: Negative for abdominal distention, abdominal pain, anal bleeding, blood in stool, constipation, diarrhea, nausea, rectal pain and vomiting.   Musculoskeletal: Negative for arthralgias.   Skin: Negative for pallor.   Neurological: Negative for light-headedness.        /68 (BP Location: Left arm)   Pulse 111   Ht 162.6 cm (64\")   Wt 46.1 kg (101 lb 9.6 oz)   SpO2 98%   BMI 17.44 kg/m²     Objective    Physical Exam   Constitutional: She is oriented to person, place, and time. She appears well-developed and well-nourished. She is cooperative. No distress.   HENT:   Head: Normocephalic and atraumatic.   Neck: Normal range of motion. Neck supple. No thyromegaly present.   Cardiovascular: Normal rate, regular rhythm and normal heart sounds.    Pulmonary/Chest: " Effort normal and breath sounds normal. She has no wheezes. She has no rhonchi. She has no rales.   Abdominal: Soft. Normal appearance and bowel sounds are normal. She exhibits no distension. There is no hepatosplenomegaly. There is no tenderness. There is no rigidity and no guarding. No hernia.   Lymphadenopathy:     She has no cervical adenopathy.   Neurological: She is alert and oriented to person, place, and time.   Skin: Skin is warm, dry and intact. No rash noted. No pallor.   Psychiatric: She has a normal mood and affect. Her speech is normal.     Appointment on 10/18/2018   Component Date Value Ref Range Status   • WBC 10/18/2018 11.02* 3.20 - 9.80 10*3/mm3 Final   • RBC 10/18/2018 2.87* 3.77 - 5.16 10*6/mm3 Final   • Hemoglobin 10/18/2018 8.2* 12.0 - 15.5 g/dL Final   • Hematocrit 10/18/2018 23.9* 35.0 - 45.0 % Final   • MCV 10/18/2018 83.3  80.0 - 98.0 fL Final   • MCH 10/18/2018 28.6  26.5 - 34.0 pg Final   • MCHC 10/18/2018 34.3  31.4 - 36.0 g/dL Final   • RDW 10/18/2018 15.2* 11.5 - 14.5 % Final   • RDW-SD 10/18/2018 46.6* 36.4 - 46.3 fl Final   • MPV 10/18/2018 8.8  8.0 - 12.0 fL Final   • Platelets 10/18/2018 478* 150 - 450 10*3/mm3 Final   • Glucose 10/18/2018 124* 60 - 100 mg/dL Final   • BUN 10/18/2018 10  7 - 21 mg/dL Final   • Creatinine 10/18/2018 0.73  0.50 - 1.00 mg/dL Final   • Sodium 10/18/2018 133* 137 - 145 mmol/L Final   • Potassium 10/18/2018 4.3  3.5 - 5.1 mmol/L Final   • Chloride 10/18/2018 95  95 - 110 mmol/L Final   • CO2 10/18/2018 26.0  22.0 - 31.0 mmol/L Final   • Calcium 10/18/2018 9.7  8.4 - 10.2 mg/dL Final   • Total Protein 10/18/2018 6.5  6.3 - 8.6 g/dL Final   • Albumin 10/18/2018 4.00  3.40 - 4.80 g/dL Final   • ALT (SGPT) 10/18/2018 31  9 - 52 U/L Final   • AST (SGOT) 10/18/2018 20  14 - 36 U/L Final   • Alkaline Phosphatase 10/18/2018 81  38 - 126 U/L Final   • Total Bilirubin 10/18/2018 0.2  0.2 - 1.3 mg/dL Final   • eGFR Non  Amer 10/18/2018 79  45 - 104  mL/min/1.73 Final   • Globulin 10/18/2018 2.5  2.3 - 3.5 gm/dL Final   • A/G Ratio 10/18/2018 1.6  1.1 - 1.8 g/dL Final   • BUN/Creatinine Ratio 10/18/2018 13.7  7.0 - 25.0 Final   • Anion Gap 10/18/2018 12.0  5.0 - 15.0 mmol/L Final   • 25 Hydroxy, Vitamin D 10/18/2018 25.7* 30.0 - 100.0 ng/ml Final   • ABO Type 10/18/2018 O   Final   • RH type 10/18/2018 Positive   Final   • Antibody Screen 10/18/2018 Negative   Final   • T&S Expiration Date 10/18/2018 10/21/2018 11:59:59 PM   Final   • Previous History 10/18/2018 No record on File   Final     Assessment/Plan      1. Abnormal CT scan, colon    .       Orders placed during this encounter include:  No orders of the defined types were placed in this encounter.      * Surgery not found *    Review and/or summary of lab tests, radiology, procedures, medications. Review and summary of old records and obtaining of history. The risks and benefits of my recommendations, as well as other treatment options were discussed with the patient today. Questions were answered.    No orders of the defined types were placed in this encounter.      Follow-up: Return if symptoms worsen or fail to improve.          This document has been electronically signed by HERNAN Phillips on November 6, 2018 4:48 PM             Results for orders placed or performed in visit on 10/18/18   PREVIOUS HISTORY   Result Value Ref Range    Previous History No record on File    Vitamin D 25 Hydroxy   Result Value Ref Range    25 Hydroxy, Vitamin D 25.7 (L) 30.0 - 100.0 ng/ml   CBC (No Diff)   Result Value Ref Range    WBC 11.02 (H) 3.20 - 9.80 10*3/mm3    RBC 2.87 (L) 3.77 - 5.16 10*6/mm3    Hemoglobin 8.2 (L) 12.0 - 15.5 g/dL    Hematocrit 23.9 (L) 35.0 - 45.0 %    MCV 83.3 80.0 - 98.0 fL    MCH 28.6 26.5 - 34.0 pg    MCHC 34.3 31.4 - 36.0 g/dL    RDW 15.2 (H) 11.5 - 14.5 %    RDW-SD 46.6 (H) 36.4 - 46.3 fl    MPV 8.8 8.0 - 12.0 fL    Platelets 478 (H) 150 - 450 10*3/mm3   Type and screen   Result  Value Ref Range    ABO Type O     RH type Positive     Antibody Screen Negative     T&S Expiration Date 10/21/2018 11:59:59 PM    Comprehensive Metabolic Panel   Result Value Ref Range    Glucose 124 (H) 60 - 100 mg/dL    BUN 10 7 - 21 mg/dL    Creatinine 0.73 0.50 - 1.00 mg/dL    Sodium 133 (L) 137 - 145 mmol/L    Potassium 4.3 3.5 - 5.1 mmol/L    Chloride 95 95 - 110 mmol/L    CO2 26.0 22.0 - 31.0 mmol/L    Calcium 9.7 8.4 - 10.2 mg/dL    Total Protein 6.5 6.3 - 8.6 g/dL    Albumin 4.00 3.40 - 4.80 g/dL    ALT (SGPT) 31 9 - 52 U/L    AST (SGOT) 20 14 - 36 U/L    Alkaline Phosphatase 81 38 - 126 U/L    Total Bilirubin 0.2 0.2 - 1.3 mg/dL    eGFR Non  Amer 79 45 - 104 mL/min/1.73    Globulin 2.5 2.3 - 3.5 gm/dL    A/G Ratio 1.6 1.1 - 1.8 g/dL    BUN/Creatinine Ratio 13.7 7.0 - 25.0    Anion Gap 12.0 5.0 - 15.0 mmol/L   Results for orders placed or performed during the hospital encounter of 10/11/18   Doppler Ankle Brachial Index Single Level CAR   Result Value Ref Range    RIGHT DORSALIS PEDIS SYS MAX 53 mmHg    RIGHT POST TIBIAL SYS MAX 77 mmHg    RIGHT MONTY RATIO 0.5     LEFT DORSALIS PEDIS SYS MAX 86 mmHg    LEFT POST TIBIAL SYS  mmHg    LEFT MONTY RATIO 0.8     Upper arterial right arm brachial sys max 155 mmHg    Upper arterial left arm brachial sys max 155 mmHg   Results for orders placed or performed in visit on 08/16/18   ToxASSURE Select 13 (MW) - Urine, Clean Catch   Result Value Ref Range    Report Summary FINAL    Results for orders placed or performed during the hospital encounter of 06/29/18   Tissue Pathology Exam   Result Value Ref Range    Case Report       Surgical Pathology Report                         Case: [id number]                                  Authorizing Provider:  Oz Way MD         Collected:           06/29/2018 03:08 PM          Ordering Location:     [facility]             Received:            07/01/2018 04:42 PM                                 [place]  [facility]                                                     Pathologist:           Alberto De Santiago MD                                                            Specimen:    Large Intestine, Right / Ascending Colon                                                   Final Diagnosis       ASCENDING COLON, BIOPSY:  NO SIGNIFICANT HISTOLOGIC ABNORMALITY.      Gross Description       Two tan fragments measure 0.7 x 0.5 x 0.2 cm together.  Totally submitted.     Results for orders placed or performed in visit on 06/06/18   CBC Auto Differential   Result Value Ref Range    WBC 11.16 (H) 3.20 - 9.80 10*3/mm3    RBC 3.96 3.77 - 5.16 10*6/mm3    Hemoglobin 11.2 (L) 12.0 - 15.5 g/dL    Hematocrit 33.1 (L) 35.0 - 45.0 %    MCV 83.6 80.0 - 98.0 fL    MCH 28.3 26.5 - 34.0 pg    MCHC 33.8 31.4 - 36.0 g/dL    RDW 15.3 (H) 11.5 - 14.5 %    RDW-SD 47.1 (H) 36.4 - 46.3 fl    MPV 9.6 8.0 - 12.0 fL    Platelets 398 150 - 450 10*3/mm3    Neutrophil % 67.7 37.0 - 80.0 %    Lymphocyte % 20.8 10.0 - 50.0 %    Monocyte % 9.4 0.0 - 12.0 %    Eosinophil % 1.5 0.0 - 7.0 %    Basophil % 0.4 0.0 - 2.0 %    Immature Grans % 0.2 0.0 - 0.5 %    Neutrophils, Absolute 7.55 2.00 - 8.60 10*3/mm3    Lymphocytes, Absolute 2.32 0.60 - 4.20 10*3/mm3    Monocytes, Absolute 1.05 (H) 0.00 - 0.90 10*3/mm3    Eosinophils, Absolute 0.17 0.00 - 0.70 10*3/mm3    Basophils, Absolute 0.05 0.00 - 0.20 10*3/mm3    Immature Grans, Absolute 0.02 0.00 - 0.02 10*3/mm3   Comprehensive Metabolic Panel   Result Value Ref Range    Glucose 102 (H) 60 - 100 mg/dL    BUN 7 7 - 21 mg/dL    Creatinine 0.56 0.50 - 1.00 mg/dL    Sodium 138 137 - 145 mmol/L    Potassium 4.6 3.5 - 5.1 mmol/L    Chloride 104 95 - 110 mmol/L    CO2 25.0 22.0 - 31.0 mmol/L    Calcium 9.4 8.4 - 10.2 mg/dL    Total Protein 7.5 6.3 - 8.6 g/dL    Albumin 4.60 3.40 - 4.80 g/dL    ALT (SGPT) 27 9 - 52 U/L    AST (SGOT) 21 14 - 36 U/L    Alkaline Phosphatase 85 38 - 126 U/L    Total Bilirubin 0.2 0.2 - 1.3  mg/dL    eGFR Non  Amer 108 (H) 45 - 104 mL/min/1.73    Globulin 2.9 2.3 - 3.5 gm/dL    A/G Ratio 1.6 1.1 - 1.8 g/dL    BUN/Creatinine Ratio 12.5 7.0 - 25.0    Anion Gap 9.0 5.0 - 15.0 mmol/L   Results for orders placed or performed during the hospital encounter of 06/06/18   Duplex Carotid Ultrasound CAR   Result Value Ref Range    Prox CCA PSV 74.5 cm/sec    Prox CCA EDV 15 cm/sec    Dist CCA PSV 71.1 cm/sec    Dist CCA EDV 15 cm/sec    Prox ECA .2 cm/sec    Prox ECA EDV 18.9 cm/sec    Prox ICA PSV 65.6 cm/sec    Prox ICA EDV 23.8 cm/sec    Mid ICA PSV 80 cm/sec    Mid ICA EDV 23.8 cm/sec    Dist ICA PSV 93.2 cm/sec    Dist ICA EDV 31.5 cm/sec    Vertebral A PSV 16.9 cm/sec    Vertebral A EDV 2.5 cm/sec    ICA/CCA ratio 1.3     Prox CCA .1 cm/sec    Prox CCA EDV 27.3 cm/sec    Dist CCA PSV 66.4 cm/sec    Dist CCA EDV 21 cm/sec    Prox ECA .4 cm/sec    Prox ECA EDV 30.8 cm/sec    Prox ICA PSV 87.7 cm/sec    Prox ICA EDV 26 cm/sec    Mid ICA PSV 97.9 cm/sec    Mid ICA EDV 29.3 cm/sec    Dist ICA PSV 77.2 cm/sec    Dist ICA EDV 30.6 cm/sec    Vertebral A PSV 59 cm/sec    Vertebral A EDV 20.6 cm/sec    ICA/CCA ratio 1.5     Diastolic ICA/CCA Ratio 2.1     ICA/CCA diastolic ratio 1.4      *Note: Due to a large number of results and/or encounters for the requested time period, some results have not been displayed. A complete set of results can be found in Results Review.

## 2018-11-06 NOTE — PATIENT INSTRUCTIONS

## 2018-11-12 ENCOUNTER — TELEPHONE (OUTPATIENT)
Dept: FAMILY MEDICINE CLINIC | Facility: CLINIC | Age: 68
End: 2018-11-12

## 2018-11-27 ENCOUNTER — PREP FOR SURGERY (OUTPATIENT)
Dept: OTHER | Facility: HOSPITAL | Age: 68
End: 2018-11-27

## 2018-11-27 DIAGNOSIS — I73.9 PVD (PERIPHERAL VASCULAR DISEASE) (HCC): Primary | ICD-10-CM

## 2018-11-27 RX ORDER — SODIUM CHLORIDE 9 MG/ML
100 INJECTION, SOLUTION INTRAVENOUS CONTINUOUS
Status: CANCELLED | OUTPATIENT
Start: 2018-12-19

## 2018-11-27 RX ORDER — BUPIVACAINE HCL/0.9 % NACL/PF 0.1 %
2 PLASTIC BAG, INJECTION (ML) EPIDURAL ONCE
Status: CANCELLED | OUTPATIENT
Start: 2018-12-19 | End: 2018-11-27

## 2018-11-27 RX ORDER — SODIUM CHLORIDE 0.9 % (FLUSH) 0.9 %
3 SYRINGE (ML) INJECTION EVERY 12 HOURS SCHEDULED
Status: CANCELLED | OUTPATIENT
Start: 2018-12-19

## 2018-11-27 RX ORDER — SODIUM CHLORIDE 0.9 % (FLUSH) 0.9 %
3-10 SYRINGE (ML) INJECTION AS NEEDED
Status: CANCELLED | OUTPATIENT
Start: 2018-12-19

## 2018-12-03 DIAGNOSIS — F17.210 HEAVY TOBACCO SMOKER >10 CIGARETTES PER DAY: ICD-10-CM

## 2018-12-03 RX ORDER — BUPROPION HYDROCHLORIDE 150 MG/1
TABLET, EXTENDED RELEASE ORAL
Qty: 60 TABLET | Refills: 3 | Status: SHIPPED | OUTPATIENT
Start: 2018-12-03 | End: 2018-12-13

## 2018-12-13 ENCOUNTER — APPOINTMENT (OUTPATIENT)
Dept: PREADMISSION TESTING | Facility: HOSPITAL | Age: 68
End: 2018-12-13

## 2018-12-13 VITALS
DIASTOLIC BLOOD PRESSURE: 68 MMHG | HEIGHT: 64 IN | OXYGEN SATURATION: 97 % | WEIGHT: 102 LBS | BODY MASS INDEX: 17.42 KG/M2 | RESPIRATION RATE: 20 BRPM | SYSTOLIC BLOOD PRESSURE: 120 MMHG | HEART RATE: 77 BPM

## 2018-12-13 DIAGNOSIS — I10 ESSENTIAL HYPERTENSION: ICD-10-CM

## 2018-12-13 DIAGNOSIS — I73.9 PVD (PERIPHERAL VASCULAR DISEASE) (HCC): ICD-10-CM

## 2018-12-13 LAB
ABO GROUP BLD: NORMAL
BLD GP AB SCN SERPL QL: NEGATIVE
DEPRECATED RDW RBC AUTO: 42.2 FL (ref 36.4–46.3)
ERYTHROCYTE [DISTWIDTH] IN BLOOD BY AUTOMATED COUNT: 15.5 % (ref 11.5–14.5)
HCT VFR BLD AUTO: 28.1 % (ref 35–45)
HGB BLD-MCNC: 9.1 G/DL (ref 12–15.5)
Lab: NORMAL
MCH RBC QN AUTO: 23.8 PG (ref 26.5–34)
MCHC RBC AUTO-ENTMCNC: 32.4 G/DL (ref 31.4–36)
MCV RBC AUTO: 73.6 FL (ref 80–98)
PLATELET # BLD AUTO: 518 10*3/MM3 (ref 150–450)
PMV BLD AUTO: 8.8 FL (ref 8–12)
RBC # BLD AUTO: 3.82 10*6/MM3 (ref 3.77–5.16)
RH BLD: POSITIVE
T&S EXPIRATION DATE: NORMAL
WBC NRBC COR # BLD: 9.55 10*3/MM3 (ref 3.2–9.8)

## 2018-12-13 PROCEDURE — 36415 COLL VENOUS BLD VENIPUNCTURE: CPT

## 2018-12-13 PROCEDURE — 86901 BLOOD TYPING SEROLOGIC RH(D): CPT | Performed by: THORACIC SURGERY (CARDIOTHORACIC VASCULAR SURGERY)

## 2018-12-13 PROCEDURE — 86850 RBC ANTIBODY SCREEN: CPT | Performed by: THORACIC SURGERY (CARDIOTHORACIC VASCULAR SURGERY)

## 2018-12-13 PROCEDURE — 86900 BLOOD TYPING SEROLOGIC ABO: CPT | Performed by: THORACIC SURGERY (CARDIOTHORACIC VASCULAR SURGERY)

## 2018-12-13 PROCEDURE — 85027 COMPLETE CBC AUTOMATED: CPT | Performed by: ANESTHESIOLOGY

## 2018-12-13 RX ORDER — LISINOPRIL 10 MG/1
TABLET ORAL
Qty: 30 TABLET | Refills: 3 | Status: SHIPPED | OUTPATIENT
Start: 2018-12-13 | End: 2019-05-03 | Stop reason: SDUPTHER

## 2018-12-13 NOTE — DISCHARGE INSTRUCTIONS
[facility]  Pre-op Information and Guidelines    You will be called after 2 p.m. the day before your surgery (Friday for Monday surgery) and notified of your time for arrival and approximate surgery time.  If you have not received a call by 4P.M., please contact Same Day Surgery at [phone] of if outside [facility] call [phone].    Please Follow these Important Safety Guidelines:    • The morning of your procedure, take only the medications listed below with   A sip of water:_____________________________________________       ______________________________________________    • DO NOT eat or drink anything after 12:00 midnight the night before surgery  Specific instructions concerning drinking clear liquids will be discussed during  the pre-surgery instruction call the day before your surgery.    • If you take a blood thinner (ex. Plavix, Coumadin, aspirin), ask your doctor when to stop it before surgery  STOP DATE: _________________    • Only 2 visitors are allowed in patient rooms at a time  Your visitors will be asked to wait in the lobby until the admission process is complete with the exception of a parent with a child and patients in need of special assistance.    • YOU CANNOT DRIVE YOURSELF HOME  You must be accompanied by someone who will be responsible for driving you home after surgery and for your care at home.    • DO NOT chew gum, use breath mints, hard candy, or smoke the day of surgery  • DO NOT drink alcohol for at least 24 hours before your surgery  • DO NOT wear any jewelry and remove all body piercing before coming to the hospital  • DO NOT wear make-up to the hospital  • If you are having surgery on an extremity (arm/leg/foot) remove nail polish/artificial nails on the surgical side  • Clothing, glasses, contacts, dentures, and hairpieces must be removed before surgery  • Bathe the night before or the morning of your surgery and do not use powders/lotions on  skin.

## 2018-12-13 NOTE — PAT
Chlorhexidine scrub given with instruction sheet.  Instruction reviewed in PAT, understanding verbalized.    Patient stated she was instructed to stopping her plavix prior to surgery by surgeon.

## 2018-12-19 ENCOUNTER — ANESTHESIA (OUTPATIENT)
Dept: PERIOP | Facility: HOSPITAL | Age: 68
End: 2018-12-19

## 2018-12-19 ENCOUNTER — ANESTHESIA EVENT (OUTPATIENT)
Dept: PERIOP | Facility: HOSPITAL | Age: 68
End: 2018-12-19

## 2018-12-19 ENCOUNTER — APPOINTMENT (OUTPATIENT)
Dept: GENERAL RADIOLOGY | Facility: HOSPITAL | Age: 68
End: 2018-12-19

## 2018-12-19 ENCOUNTER — HOSPITAL ENCOUNTER (INPATIENT)
Facility: HOSPITAL | Age: 68
LOS: 2 days | Discharge: HOME | End: 2018-12-21
Attending: THORACIC SURGERY (CARDIOTHORACIC VASCULAR SURGERY) | Admitting: THORACIC SURGERY (CARDIOTHORACIC VASCULAR SURGERY)

## 2018-12-19 DIAGNOSIS — Z48.812 SURGICAL AFTERCARE, CIRCULATORY SYSTEM: Primary | ICD-10-CM

## 2018-12-19 DIAGNOSIS — I73.9 PVD (PERIPHERAL VASCULAR DISEASE) (HCC): ICD-10-CM

## 2018-12-19 LAB
ABO GROUP BLD: NORMAL
BLD GP AB SCN SERPL QL: NEGATIVE
Lab: NORMAL
RH BLD: POSITIVE
T&S EXPIRATION DATE: NORMAL

## 2018-12-19 PROCEDURE — 35661 BPG FEMORAL-FEMORAL: CPT | Performed by: THORACIC SURGERY (CARDIOTHORACIC VASCULAR SURGERY)

## 2018-12-19 PROCEDURE — 25010000002 FENTANYL CITRATE (PF) 100 MCG/2ML SOLUTION: Performed by: NURSE ANESTHETIST, CERTIFIED REGISTERED

## 2018-12-19 PROCEDURE — 25010000002 HEPARIN (PORCINE) PER 1000 UNITS: Performed by: NURSE ANESTHETIST, CERTIFIED REGISTERED

## 2018-12-19 PROCEDURE — C1768 GRAFT, VASCULAR: HCPCS | Performed by: THORACIC SURGERY (CARDIOTHORACIC VASCULAR SURGERY)

## 2018-12-19 PROCEDURE — 041K0JL BYPASS RIGHT FEMORAL ARTERY TO POPLITEAL ARTERY WITH SYNTHETIC SUBSTITUTE, OPEN APPROACH: ICD-10-PCS | Performed by: THORACIC SURGERY (CARDIOTHORACIC VASCULAR SURGERY)

## 2018-12-19 PROCEDURE — 25010000002 HYDROMORPHONE PER 4 MG: Performed by: THORACIC SURGERY (CARDIOTHORACIC VASCULAR SURGERY)

## 2018-12-19 PROCEDURE — 88304 TISSUE EXAM BY PATHOLOGIST: CPT | Performed by: THORACIC SURGERY (CARDIOTHORACIC VASCULAR SURGERY)

## 2018-12-19 PROCEDURE — 25010000002 HEPARIN (PORCINE) PER 1000 UNITS: Performed by: THORACIC SURGERY (CARDIOTHORACIC VASCULAR SURGERY)

## 2018-12-19 PROCEDURE — 88304 TISSUE EXAM BY PATHOLOGIST: CPT | Performed by: PATHOLOGY

## 2018-12-19 PROCEDURE — 94799 UNLISTED PULMONARY SVC/PX: CPT

## 2018-12-19 PROCEDURE — 86923 COMPATIBILITY TEST ELECTRIC: CPT

## 2018-12-19 PROCEDURE — 25010000002 PROTAMINE SULFATE PER 10 MG: Performed by: NURSE ANESTHETIST, CERTIFIED REGISTERED

## 2018-12-19 PROCEDURE — 76000 FLUOROSCOPY <1 HR PHYS/QHP: CPT

## 2018-12-19 PROCEDURE — 041L0JH BYPASS LEFT FEMORAL ARTERY TO RIGHT FEMORAL ARTERY WITH SYNTHETIC SUBSTITUTE, OPEN APPROACH: ICD-10-PCS | Performed by: THORACIC SURGERY (CARDIOTHORACIC VASCULAR SURGERY)

## 2018-12-19 PROCEDURE — 25010000002 IOPAMIDOL 61 % SOLUTION: Performed by: THORACIC SURGERY (CARDIOTHORACIC VASCULAR SURGERY)

## 2018-12-19 PROCEDURE — 35656 BPG FEMORAL-POPLITEAL: CPT | Performed by: THORACIC SURGERY (CARDIOTHORACIC VASCULAR SURGERY)

## 2018-12-19 PROCEDURE — C1894 INTRO/SHEATH, NON-LASER: HCPCS | Performed by: THORACIC SURGERY (CARDIOTHORACIC VASCULAR SURGERY)

## 2018-12-19 PROCEDURE — 86850 RBC ANTIBODY SCREEN: CPT | Performed by: THORACIC SURGERY (CARDIOTHORACIC VASCULAR SURGERY)

## 2018-12-19 PROCEDURE — A4648 IMPLANTABLE TISSUE MARKER: HCPCS | Performed by: THORACIC SURGERY (CARDIOTHORACIC VASCULAR SURGERY)

## 2018-12-19 PROCEDURE — 25010000002 PROPOFOL 10 MG/ML EMULSION: Performed by: NURSE ANESTHETIST, CERTIFIED REGISTERED

## 2018-12-19 PROCEDURE — 04UK0JZ SUPPLEMENT RIGHT FEMORAL ARTERY WITH SYNTHETIC SUBSTITUTE, OPEN APPROACH: ICD-10-PCS | Performed by: THORACIC SURGERY (CARDIOTHORACIC VASCULAR SURGERY)

## 2018-12-19 PROCEDURE — 86901 BLOOD TYPING SEROLOGIC RH(D): CPT | Performed by: THORACIC SURGERY (CARDIOTHORACIC VASCULAR SURGERY)

## 2018-12-19 PROCEDURE — 94640 AIRWAY INHALATION TREATMENT: CPT

## 2018-12-19 PROCEDURE — 04CK0ZZ EXTIRPATION OF MATTER FROM RIGHT FEMORAL ARTERY, OPEN APPROACH: ICD-10-PCS | Performed by: THORACIC SURGERY (CARDIOTHORACIC VASCULAR SURGERY)

## 2018-12-19 PROCEDURE — 25010000002 HYDROMORPHONE 1 MG/ML SOLUTION: Performed by: NURSE ANESTHETIST, CERTIFIED REGISTERED

## 2018-12-19 PROCEDURE — 04UL0JZ SUPPLEMENT LEFT FEMORAL ARTERY WITH SYNTHETIC SUBSTITUTE, OPEN APPROACH: ICD-10-PCS | Performed by: THORACIC SURGERY (CARDIOTHORACIC VASCULAR SURGERY)

## 2018-12-19 PROCEDURE — 04CL0ZZ EXTIRPATION OF MATTER FROM LEFT FEMORAL ARTERY, OPEN APPROACH: ICD-10-PCS | Performed by: THORACIC SURGERY (CARDIOTHORACIC VASCULAR SURGERY)

## 2018-12-19 PROCEDURE — 25010000002 ONDANSETRON PER 1 MG: Performed by: NURSE ANESTHETIST, CERTIFIED REGISTERED

## 2018-12-19 PROCEDURE — 25010000002 KETOROLAC TROMETHAMINE PER 15 MG: Performed by: THORACIC SURGERY (CARDIOTHORACIC VASCULAR SURGERY)

## 2018-12-19 PROCEDURE — 86900 BLOOD TYPING SEROLOGIC ABO: CPT | Performed by: THORACIC SURGERY (CARDIOTHORACIC VASCULAR SURGERY)

## 2018-12-19 DEVICE — IMPLANTABLE DEVICE: Type: IMPLANTABLE DEVICE | Site: GROIN | Status: FUNCTIONAL

## 2018-12-19 DEVICE — DISTAFLO® MINI-CUFF BYPASS GRAFT WITH FLEX SMALL BEADING, 6 MM X 80 CM
Type: IMPLANTABLE DEVICE | Site: LEG | Status: FUNCTIONAL
Brand: DISTAFLO® BYPASS GRAFTS

## 2018-12-19 DEVICE — DISK-SHAPED STYLE, SILICONE (1 PER STERILE PKG)
Type: IMPLANTABLE DEVICE | Site: GROIN | Status: FUNCTIONAL
Brand: SCANLAN® RADIOMARK® GRAFT MARKERS

## 2018-12-19 RX ORDER — LIDOCAINE HYDROCHLORIDE 20 MG/ML
INJECTION, SOLUTION INFILTRATION; PERINEURAL AS NEEDED
Status: DISCONTINUED | OUTPATIENT
Start: 2018-12-19 | End: 2018-12-19 | Stop reason: SURG

## 2018-12-19 RX ORDER — ACETAMINOPHEN 500 MG
1000 TABLET ORAL EVERY 6 HOURS SCHEDULED
Status: DISCONTINUED | OUTPATIENT
Start: 2018-12-19 | End: 2018-12-21 | Stop reason: HOSPADM

## 2018-12-19 RX ORDER — BUPIVACAINE HCL/0.9 % NACL/PF 0.1 %
2 PLASTIC BAG, INJECTION (ML) EPIDURAL ONCE
Status: COMPLETED | OUTPATIENT
Start: 2018-12-19 | End: 2018-12-19

## 2018-12-19 RX ORDER — 0.9 % SODIUM CHLORIDE 0.9 %
VIAL (ML) INJECTION AS NEEDED
Status: DISCONTINUED | OUTPATIENT
Start: 2018-12-19 | End: 2018-12-21 | Stop reason: HOSPADM

## 2018-12-19 RX ORDER — BUPIVACAINE HCL/0.9 % NACL/PF 0.1 %
2 PLASTIC BAG, INJECTION (ML) EPIDURAL EVERY 8 HOURS
Status: COMPLETED | OUTPATIENT
Start: 2018-12-19 | End: 2018-12-20

## 2018-12-19 RX ORDER — ONDANSETRON 2 MG/ML
4 INJECTION INTRAMUSCULAR; INTRAVENOUS EVERY 6 HOURS PRN
Status: DISCONTINUED | OUTPATIENT
Start: 2018-12-19 | End: 2018-12-21 | Stop reason: HOSPADM

## 2018-12-19 RX ORDER — SODIUM CHLORIDE 9 MG/ML
100 INJECTION, SOLUTION INTRAVENOUS CONTINUOUS
Status: DISCONTINUED | OUTPATIENT
Start: 2018-12-19 | End: 2018-12-19 | Stop reason: HOSPADM

## 2018-12-19 RX ORDER — DIPHENHYDRAMINE HYDROCHLORIDE 50 MG/ML
12.5 INJECTION INTRAMUSCULAR; INTRAVENOUS
Status: DISCONTINUED | OUTPATIENT
Start: 2018-12-19 | End: 2018-12-19 | Stop reason: HOSPADM

## 2018-12-19 RX ORDER — SODIUM CHLORIDE 0.9 % (FLUSH) 0.9 %
3 SYRINGE (ML) INJECTION EVERY 12 HOURS SCHEDULED
Status: DISCONTINUED | OUTPATIENT
Start: 2018-12-19 | End: 2018-12-19 | Stop reason: HOSPADM

## 2018-12-19 RX ORDER — OXYCODONE HYDROCHLORIDE 5 MG/1
5 TABLET ORAL EVERY 4 HOURS PRN
Status: DISCONTINUED | OUTPATIENT
Start: 2018-12-19 | End: 2018-12-21 | Stop reason: HOSPADM

## 2018-12-19 RX ORDER — ALBUTEROL SULFATE 2.5 MG/3ML
2.5 SOLUTION RESPIRATORY (INHALATION)
Status: DISCONTINUED | OUTPATIENT
Start: 2018-12-19 | End: 2018-12-21 | Stop reason: HOSPADM

## 2018-12-19 RX ORDER — ONDANSETRON 4 MG/1
4 TABLET, ORALLY DISINTEGRATING ORAL EVERY 6 HOURS PRN
Status: DISCONTINUED | OUTPATIENT
Start: 2018-12-19 | End: 2018-12-21 | Stop reason: HOSPADM

## 2018-12-19 RX ORDER — ROCURONIUM BROMIDE 10 MG/ML
INJECTION, SOLUTION INTRAVENOUS AS NEEDED
Status: DISCONTINUED | OUTPATIENT
Start: 2018-12-19 | End: 2018-12-19 | Stop reason: SURG

## 2018-12-19 RX ORDER — FENTANYL CITRATE 50 UG/ML
INJECTION, SOLUTION INTRAMUSCULAR; INTRAVENOUS AS NEEDED
Status: DISCONTINUED | OUTPATIENT
Start: 2018-12-19 | End: 2018-12-19 | Stop reason: SURG

## 2018-12-19 RX ORDER — PROTAMINE SULFATE 10 MG/ML
INJECTION, SOLUTION INTRAVENOUS AS NEEDED
Status: DISCONTINUED | OUTPATIENT
Start: 2018-12-19 | End: 2018-12-19 | Stop reason: SURG

## 2018-12-19 RX ORDER — ALBUTEROL SULFATE 2.5 MG/3ML
2.5 SOLUTION RESPIRATORY (INHALATION) ONCE
Status: COMPLETED | OUTPATIENT
Start: 2018-12-19 | End: 2018-12-19

## 2018-12-19 RX ORDER — KETOROLAC TROMETHAMINE 15 MG/ML
15 INJECTION, SOLUTION INTRAMUSCULAR; INTRAVENOUS ONCE
Status: COMPLETED | OUTPATIENT
Start: 2018-12-19 | End: 2018-12-19

## 2018-12-19 RX ORDER — SODIUM CHLORIDE, SODIUM GLUCONATE, SODIUM ACETATE, POTASSIUM CHLORIDE, AND MAGNESIUM CHLORIDE 526; 502; 368; 37; 30 MG/100ML; MG/100ML; MG/100ML; MG/100ML; MG/100ML
INJECTION, SOLUTION INTRAVENOUS CONTINUOUS PRN
Status: DISCONTINUED | OUTPATIENT
Start: 2018-12-19 | End: 2018-12-19 | Stop reason: SURG

## 2018-12-19 RX ORDER — EPHEDRINE SULFATE 50 MG/ML
INJECTION, SOLUTION INTRAVENOUS AS NEEDED
Status: DISCONTINUED | OUTPATIENT
Start: 2018-12-19 | End: 2018-12-19 | Stop reason: SURG

## 2018-12-19 RX ORDER — MAGNESIUM HYDROXIDE 1200 MG/15ML
LIQUID ORAL AS NEEDED
Status: DISCONTINUED | OUTPATIENT
Start: 2018-12-19 | End: 2018-12-21 | Stop reason: HOSPADM

## 2018-12-19 RX ORDER — FAMOTIDINE 20 MG/1
20 TABLET, FILM COATED ORAL EVERY 12 HOURS SCHEDULED
Status: DISCONTINUED | OUTPATIENT
Start: 2018-12-19 | End: 2018-12-21 | Stop reason: HOSPADM

## 2018-12-19 RX ORDER — ONDANSETRON 4 MG/1
4 TABLET, FILM COATED ORAL EVERY 6 HOURS PRN
Status: DISCONTINUED | OUTPATIENT
Start: 2018-12-19 | End: 2018-12-21 | Stop reason: HOSPADM

## 2018-12-19 RX ORDER — HEPARIN SODIUM 1000 [USP'U]/ML
INJECTION, SOLUTION INTRAVENOUS; SUBCUTANEOUS AS NEEDED
Status: DISCONTINUED | OUTPATIENT
Start: 2018-12-19 | End: 2018-12-19 | Stop reason: SURG

## 2018-12-19 RX ORDER — SENNA AND DOCUSATE SODIUM 50; 8.6 MG/1; MG/1
2 TABLET, FILM COATED ORAL 2 TIMES DAILY PRN
Status: DISCONTINUED | OUTPATIENT
Start: 2018-12-19 | End: 2018-12-21 | Stop reason: HOSPADM

## 2018-12-19 RX ORDER — LABETALOL HYDROCHLORIDE 5 MG/ML
5 INJECTION, SOLUTION INTRAVENOUS
Status: DISCONTINUED | OUTPATIENT
Start: 2018-12-19 | End: 2018-12-19 | Stop reason: HOSPADM

## 2018-12-19 RX ORDER — SODIUM CHLORIDE 9 MG/ML
75 INJECTION, SOLUTION INTRAVENOUS CONTINUOUS
Status: DISCONTINUED | OUTPATIENT
Start: 2018-12-19 | End: 2018-12-21 | Stop reason: HOSPADM

## 2018-12-19 RX ORDER — NALOXONE HCL 0.4 MG/ML
0.2 VIAL (ML) INJECTION AS NEEDED
Status: DISCONTINUED | OUTPATIENT
Start: 2018-12-19 | End: 2018-12-19 | Stop reason: HOSPADM

## 2018-12-19 RX ORDER — HYDROMORPHONE HCL 110MG/55ML
0.5 PATIENT CONTROLLED ANALGESIA SYRINGE INTRAVENOUS
Status: DISCONTINUED | OUTPATIENT
Start: 2018-12-19 | End: 2018-12-21 | Stop reason: HOSPADM

## 2018-12-19 RX ORDER — PROPOFOL 10 MG/ML
VIAL (ML) INTRAVENOUS AS NEEDED
Status: DISCONTINUED | OUTPATIENT
Start: 2018-12-19 | End: 2018-12-19 | Stop reason: SURG

## 2018-12-19 RX ORDER — ONDANSETRON 2 MG/ML
INJECTION INTRAMUSCULAR; INTRAVENOUS AS NEEDED
Status: DISCONTINUED | OUTPATIENT
Start: 2018-12-19 | End: 2018-12-19 | Stop reason: SURG

## 2018-12-19 RX ORDER — FLUMAZENIL 0.1 MG/ML
0.2 INJECTION INTRAVENOUS AS NEEDED
Status: DISCONTINUED | OUTPATIENT
Start: 2018-12-19 | End: 2018-12-19 | Stop reason: HOSPADM

## 2018-12-19 RX ORDER — ACETAMINOPHEN 650 MG/1
650 SUPPOSITORY RECTAL ONCE AS NEEDED
Status: DISCONTINUED | OUTPATIENT
Start: 2018-12-19 | End: 2018-12-19 | Stop reason: HOSPADM

## 2018-12-19 RX ORDER — EPHEDRINE SULFATE 50 MG/ML
5 INJECTION, SOLUTION INTRAVENOUS ONCE AS NEEDED
Status: DISCONTINUED | OUTPATIENT
Start: 2018-12-19 | End: 2018-12-19 | Stop reason: HOSPADM

## 2018-12-19 RX ORDER — ACETAMINOPHEN 325 MG/1
650 TABLET ORAL ONCE AS NEEDED
Status: DISCONTINUED | OUTPATIENT
Start: 2018-12-19 | End: 2018-12-19 | Stop reason: HOSPADM

## 2018-12-19 RX ORDER — BISACODYL 10 MG
10 SUPPOSITORY, RECTAL RECTAL DAILY PRN
Status: DISCONTINUED | OUTPATIENT
Start: 2018-12-19 | End: 2018-12-21 | Stop reason: HOSPADM

## 2018-12-19 RX ORDER — ONDANSETRON 2 MG/ML
4 INJECTION INTRAMUSCULAR; INTRAVENOUS ONCE AS NEEDED
Status: DISCONTINUED | OUTPATIENT
Start: 2018-12-19 | End: 2018-12-19 | Stop reason: HOSPADM

## 2018-12-19 RX ORDER — HEPARIN SODIUM 5000 [USP'U]/ML
INJECTION, SOLUTION INTRAVENOUS; SUBCUTANEOUS AS NEEDED
Status: DISCONTINUED | OUTPATIENT
Start: 2018-12-19 | End: 2018-12-21 | Stop reason: HOSPADM

## 2018-12-19 RX ORDER — SODIUM CHLORIDE 0.9 % (FLUSH) 0.9 %
3-10 SYRINGE (ML) INJECTION AS NEEDED
Status: DISCONTINUED | OUTPATIENT
Start: 2018-12-19 | End: 2018-12-19 | Stop reason: HOSPADM

## 2018-12-19 RX ORDER — SODIUM CHLORIDE 9 MG/ML
INJECTION, SOLUTION INTRAVENOUS AS NEEDED
Status: DISCONTINUED | OUTPATIENT
Start: 2018-12-19 | End: 2018-12-21 | Stop reason: HOSPADM

## 2018-12-19 RX ORDER — FAMOTIDINE 10 MG/ML
20 INJECTION, SOLUTION INTRAVENOUS EVERY 12 HOURS SCHEDULED
Status: DISCONTINUED | OUTPATIENT
Start: 2018-12-19 | End: 2018-12-21 | Stop reason: HOSPADM

## 2018-12-19 RX ADMIN — HYDROMORPHONE HYDROCHLORIDE 0.5 MG: 1 INJECTION, SOLUTION INTRAMUSCULAR; INTRAVENOUS; SUBCUTANEOUS at 13:30

## 2018-12-19 RX ADMIN — PROPOFOL 50 MG: 10 INJECTION, EMULSION INTRAVENOUS at 07:30

## 2018-12-19 RX ADMIN — ROCURONIUM BROMIDE 40 MG: 10 INJECTION INTRAVENOUS at 07:23

## 2018-12-19 RX ADMIN — FENTANYL CITRATE 25 MCG: 50 INJECTION, SOLUTION INTRAMUSCULAR; INTRAVENOUS at 09:55

## 2018-12-19 RX ADMIN — HYDROMORPHONE HYDROCHLORIDE 0.5 MG: 1 INJECTION, SOLUTION INTRAMUSCULAR; INTRAVENOUS; SUBCUTANEOUS at 13:09

## 2018-12-19 RX ADMIN — HEPARIN SODIUM 2000 UNITS: 1000 INJECTION, SOLUTION INTRAVENOUS; SUBCUTANEOUS at 11:09

## 2018-12-19 RX ADMIN — NITROGLYCERIN 0.5 INCH: 20 OINTMENT TOPICAL at 15:18

## 2018-12-19 RX ADMIN — PROTAMINE SULFATE 20 MG: 10 INJECTION, SOLUTION INTRAVENOUS at 11:42

## 2018-12-19 RX ADMIN — SODIUM CHLORIDE, SODIUM GLUCONATE, SODIUM ACETATE, POTASSIUM CHLORIDE, AND MAGNESIUM CHLORIDE: 526; 502; 368; 37; 30 INJECTION, SOLUTION INTRAVENOUS at 07:32

## 2018-12-19 RX ADMIN — ALBUTEROL SULFATE 2.5 MG: 2.5 SOLUTION RESPIRATORY (INHALATION) at 06:39

## 2018-12-19 RX ADMIN — SODIUM CHLORIDE 75 ML/HR: 900 INJECTION, SOLUTION INTRAVENOUS at 16:00

## 2018-12-19 RX ADMIN — SODIUM CHLORIDE, SODIUM GLUCONATE, SODIUM ACETATE, POTASSIUM CHLORIDE, AND MAGNESIUM CHLORIDE: 526; 502; 368; 37; 30 INJECTION, SOLUTION INTRAVENOUS at 09:32

## 2018-12-19 RX ADMIN — HYDROMORPHONE HYDROCHLORIDE 0.5 MG: 2 INJECTION INTRAMUSCULAR; INTRAVENOUS; SUBCUTANEOUS at 20:52

## 2018-12-19 RX ADMIN — Medication 2 G: at 07:44

## 2018-12-19 RX ADMIN — FENTANYL CITRATE 50 MCG: 50 INJECTION, SOLUTION INTRAMUSCULAR; INTRAVENOUS at 07:30

## 2018-12-19 RX ADMIN — SODIUM CHLORIDE, SODIUM GLUCONATE, SODIUM ACETATE, POTASSIUM CHLORIDE, AND MAGNESIUM CHLORIDE: 526; 502; 368; 37; 30 INJECTION, SOLUTION INTRAVENOUS at 11:34

## 2018-12-19 RX ADMIN — ROCURONIUM BROMIDE 20 MG: 10 INJECTION INTRAVENOUS at 10:45

## 2018-12-19 RX ADMIN — ROCURONIUM BROMIDE 10 MG: 10 INJECTION INTRAVENOUS at 08:30

## 2018-12-19 RX ADMIN — HYDROMORPHONE HYDROCHLORIDE 0.5 MG: 1 INJECTION, SOLUTION INTRAMUSCULAR; INTRAVENOUS; SUBCUTANEOUS at 14:36

## 2018-12-19 RX ADMIN — EPHEDRINE SULFATE 5 MG: 50 INJECTION INTRAVENOUS at 11:45

## 2018-12-19 RX ADMIN — ACETAMINOPHEN 1000 MG: 500 TABLET ORAL at 18:50

## 2018-12-19 RX ADMIN — PROPOFOL 100 MG: 10 INJECTION, EMULSION INTRAVENOUS at 07:23

## 2018-12-19 RX ADMIN — Medication 2 G: at 16:13

## 2018-12-19 RX ADMIN — NITROGLYCERIN 0.5 INCH: 20 OINTMENT TOPICAL at 13:27

## 2018-12-19 RX ADMIN — LIDOCAINE HYDROCHLORIDE 40 MG: 20 INJECTION, SOLUTION INFILTRATION; PERINEURAL at 07:23

## 2018-12-19 RX ADMIN — SODIUM CHLORIDE: 900 INJECTION, SOLUTION INTRAVENOUS at 07:13

## 2018-12-19 RX ADMIN — KETOROLAC TROMETHAMINE 15 MG: 15 INJECTION, SOLUTION INTRAMUSCULAR; INTRAVENOUS at 13:27

## 2018-12-19 RX ADMIN — NITROGLYCERIN 0.5 INCH: 20 OINTMENT TOPICAL at 20:39

## 2018-12-19 RX ADMIN — PROTAMINE SULFATE 20 MG: 10 INJECTION, SOLUTION INTRAVENOUS at 12:12

## 2018-12-19 RX ADMIN — FENTANYL CITRATE 50 MCG: 50 INJECTION, SOLUTION INTRAMUSCULAR; INTRAVENOUS at 07:23

## 2018-12-19 RX ADMIN — HEPARIN SODIUM 6000 UNITS: 1000 INJECTION, SOLUTION INTRAVENOUS; SUBCUTANEOUS at 08:17

## 2018-12-19 RX ADMIN — FENTANYL CITRATE 25 MCG: 50 INJECTION, SOLUTION INTRAMUSCULAR; INTRAVENOUS at 08:30

## 2018-12-19 RX ADMIN — ROCURONIUM BROMIDE 20 MG: 10 INJECTION INTRAVENOUS at 09:55

## 2018-12-19 RX ADMIN — HYDROMORPHONE HYDROCHLORIDE 0.5 MG: 2 INJECTION INTRAMUSCULAR; INTRAVENOUS; SUBCUTANEOUS at 16:31

## 2018-12-19 RX ADMIN — FENTANYL CITRATE 50 MCG: 50 INJECTION, SOLUTION INTRAMUSCULAR; INTRAVENOUS at 12:30

## 2018-12-19 RX ADMIN — ONDANSETRON 4 MG: 2 INJECTION INTRAMUSCULAR; INTRAVENOUS at 12:30

## 2018-12-19 RX ADMIN — ALBUTEROL SULFATE 2.5 MG: 2.5 SOLUTION RESPIRATORY (INHALATION) at 22:33

## 2018-12-19 NOTE — ANESTHESIA PREPROCEDURE EVALUATION
Anesthesia Evaluation     no history of anesthetic complications:  NPO Solid Status: > 8 hours  NPO Liquid Status: > 2 hours           Airway   Mallampati: I  TM distance: >3 FB  Neck ROM: full  No difficulty expected  Dental    (+) edentulous    Pulmonary    (+) a smoker Current Smoked day of surgery, shortness of breath, decreased breath sounds, wheezes,     ROS comment: FINDINGS: Frontal and lateral views of the chest are obtained.      Devices: None     Lungs/Pleura: No consolidation, pleural effusion, or  pneumothorax.     Cardiomediastinal Structures: The heart size and mediastinal  contours are within limits of normal. The trachea is midline.     Skeletal Structures: No acute findings. There is mild  levocurvature and mild degenerative changes within the spine. No  free air beneath the diaphragm.     IMPRESSION:  No acute pulmonary or pleural findings.     Electronically signed by:  Prince Mcallister MD  2/15/2018 6:04 PM  PE comment: Albuterol treatment ordered pre-op.  Cardiovascular - normal exam    ECG reviewed  Rhythm: regular  Rate: normal    (+) hypertension poorly controlled 2 medications or greater, CAD, murmur, PVD, hyperlipidemia,  carotid artery disease carotid bilateral  (-) carotid bruits    ROS comment: ECG 12 Lead  Date/Time: 9/7/2018 2:22 PM  Performed by: LIOR HARRISON  Authorized by: LIOR HARRISON   Comparison: not compared with previous ECG   Rhythm: sinus rhythm  Clinical impression: non-specific ECG    Neuro/Psych  (+) CVA residual symptoms, psychiatric history Anxiety and Depression,       ROS Comment: Right weakness  GI/Hepatic/Renal/Endo      ROS Comment: HGB 9.1 HCT 28.1    Musculoskeletal     Abdominal  - normal exam   Substance History      OB/GYN          Other   (+) arthritis                       Anesthesia Plan    ASA 4     general   (Discussed central line if necessary,arterial line,additional peripheral IV,post op ventilation and ICU if necessary  and patient,  daughter understand possible complications,risks and agrees.)  intravenous induction   Anesthetic plan, all risks, benefits, and alternatives have been provided, discussed and informed consent has been obtained with: patient and child.

## 2018-12-19 NOTE — H&P
Jessie Jordan  1950     Chief Complaint:        Chief Complaint   Patient presents with   • Peripheral Vascular Disease         HPI:       PCP:  Julianna Gloria MD  Cardiology:  Dr Palacio     68 y.o. female with HTN(stable, increased risk stroke, rupture) and Smoker(uncontrolled, increased risk cardiovascular events) ,Stroke(stable, increase risk stroke), CAD(stable, increase risk cardiovascular events), PVD(new, increase risk cardiovascular events).  smokes 1/2 PPD.  Moderate pain legs x 2 years.  Cant walk good.  Pain RIGHT thigh, LEFT shin.  Hands, RIGHT foot numbness tingling.  Stroke 2015.  Evaluated neurology, EMG normal..  No other associated signs, symptoms or modifying factors.     5/2018 MONTY:  RIGHT .32 bi/monophasic.  LEFT .47 biphasic.  6/2018 CTA Aorta runoff:  RIGHT common iliac 90%, SFA small moderate diffuse disease, tibial diffuse disease.  LEFT  Common iliac 70%, external iliac 80%, SFA occluded reconstitutes distal via profunda collaterals, diffuse tibials.     6/2018 Carotid Duplex:  JENNIFER 0-49% antegrade vert.  LICA 0-49% antegrade vert.     12/2017 Cardiac Catheterization:  Mild coronary irregularities.  Diffuse atherosclerosis.     The following portions of the patient's history were reviewed and updated as appropriate: allergies, current medications, past family history, past medical history, past social history, past surgical history and problem list.  Recent images independently reviewed.  Available laboratory values reviewed.     PMH:       Past Medical History:   Diagnosis Date   • Coronary artery disease involving native coronary artery of native heart 12/06/2017     seen on Cardiac Cath - Left main 50-70% stenosis, 12/27/2017 repeat cath showed coronary spasm with no stenosis   • Post-menopausal 1990         ALLERGIES:  No Known Allergies        MEDICATIONS:     Current Outpatient Prescriptions:   •  amLODIPine (NORVASC) 2.5 MG tablet, Take 1 tablet by mouth 2 (Two) Times a  Day., Disp: 60 tablet, Rfl: 3  •  atorvastatin (LIPITOR) 10 MG tablet, Take 1 tablet by mouth Daily for 30 days., Disp: 30 tablet, Rfl: 3  •  buPROPion SR (WELLBUTRIN SR) 150 MG 12 hr tablet, Take 1 tablet by mouth Every 12 (Twelve) Hours for 30 days., Disp: 60 tablet, Rfl: 3  •  calcium-vitamin D (OSCAL 500/200 D-3) 500-200 MG-UNIT per tablet, Take 1 tablet by mouth Daily., Disp: 30 tablet, Rfl: 11  •  cilostazol (PLETAL) 100 MG tablet, Take 1 tablet by mouth 2 (Two) Times a Day for 30 days., Disp: 60 tablet, Rfl: 3  •  diclofenac (VOLTAREN) 1 % gel gel, Apply  topically to the appropriate area as directed 2 (Two) Times a Day., Disp: 30 g, Rfl: 1  •  FLUoxetine (PROZAC) 20 MG capsule, Take 1 capsule by mouth Daily., Disp: 30 capsule, Rfl: 3  •  fluticasone (FLONASE) 50 MCG/ACT nasal spray, 1 spray into the nostril(s) as directed by provider Daily., Disp: 16 mL, Rfl: 3  •  gabapentin (NEURONTIN) 100 MG capsule, Take 1 capsule by mouth 3 (Three) Times a Day., Disp: 90 capsule, Rfl: 2  •  HYDROcodone-acetaminophen (NORCO) 7.5-325 MG per tablet, Take 1 tablet by mouth Every 8 (Eight) Hours As Needed for Moderate Pain ., Disp: , Rfl:   •  lisinopril (PRINIVIL,ZESTRIL) 10 MG tablet, Take 1 tablet by mouth Daily for 30 days., Disp: 30 tablet, Rfl: 3  •  naproxen (NAPROSYN) 500 MG tablet, Take 1 tablet by mouth 2 (Two) Times a Day With Meals., Disp: 60 tablet, Rfl: 3  •  cetirizine (zyrTEC) 10 MG tablet, Take 1 tablet by mouth Daily., Disp: 30 tablet, Rfl: 3  •  Psyllium 100 % powder, Take 7.5 g by mouth Daily for 30 days., Disp: 226 g, Rfl: 3     Review of Systems   Review of Systems   Constitution: Positive for weakness and malaise/fatigue. Negative for weight loss.   Cardiovascular: Negative for chest pain, claudication and dyspnea on exertion.   Respiratory: Negative for cough and shortness of breath.    Skin: Negative for color change and poor wound healing.   Musculoskeletal: Positive for muscle cramps and myalgias.    Neurological: Negative for dizziness and numbness.         Physical Exam   Physical Exam   Constitutional: She is oriented to person, place, and time. She is active and cooperative. She does not appear ill. No distress.   HENT:   Right Ear: Hearing normal.   Left Ear: Hearing normal.   Nose: No nasal deformity. No epistaxis.   Mouth/Throat: She does not have dentures. Normal dentition.   Cardiovascular: Normal rate and regular rhythm.    No murmur heard.  Pulses:       Carotid pulses are 2+ on the right side, and 2+ on the left side.       Radial pulses are 2+ on the right side, and 2+ on the left side.        Dorsalis pedis pulses are 0 on the right side, and 0 on the left side.        Posterior tibial pulses are 0 on the right side, and 0 on the left side.   Cap refill <2sec   Pulmonary/Chest: Effort normal and breath sounds normal.   Abdominal: Soft. She exhibits no distension and no mass. There is no tenderness.   Musculoskeletal: She exhibits no deformity.   Gait normal.    Neurological: She is alert and oriented to person, place, and time. She has normal strength.   Skin: Skin is warm and dry. No cyanosis or erythema. No pallor.   No venous staining   Psychiatric: She has a normal mood and affect. Her speech is normal. Judgment and thought content normal.      Results for DEBI ORTIZ (MRN [id number]) as of 9/13/2018 15:06   Ref. Range 6/6/2018 10:18   Creatinine Latest Ref Range: 0.50 - 1.00 mg/dL 0.56   BUN Latest Ref Range: 7 - 21 mg/dL 7      ASSESSMENT:  Debi was seen today for peripheral vascular disease.     Diagnoses and all orders for this visit:     PVD (peripheral vascular disease) (CMS/[facility])  -     Case Request; Standing  -     sodium chloride 0.9 % infusion; Infuse 100 mL/hr into a venous catheter Continuous.  -     Case Request     Essential hypertension     Coronary artery disease involving native coronary artery of native heart without angina pectoris     Bilateral carotid  artery stenosis     Nicotine dependence, cigarettes, with other nicotine-induced disorders     Chronic pain syndrome     Other orders  -     Provide NPO Instructions to Patient; Future  -     Obtain informed consent; Standing  -     Clip bilateral groins; Standing     PLAN:  Detailed discussion with Jessie Jordan regarding situation, options and plans.  Severe lifestyle limiting claudication, neuropathy and functional limitation.  Noninvasive studies indicate severe peripheral vascular disease.  Requires additional urgent evaluation with arteriography to evaluate options for improving blood flow to feet, healing wounds and avoiding limb loss.     Risks, including but not limited to:  pain, infection, bleeding, renal failure (dialysis), nerve or blood vessel injury, need for emergent open operation to restore blood flow.  Benefits: relief of symptoms, reduction in risk of limb loss, improved wound healing.  Options:  Medical therapy, alternative imaging discussed.  Understands and wishes to proceed with plan.     Fem fem bypass, fem pop bypass SDS GEN 12/19/2018     Return after above studies complete  Smoking cessation advised and assistance options offered including behavioral counseling ([facility] [employer]), Nicotine replacement therapy (patches or gum), pharmacologic therapy (Chantix, Wellbutrin). patient understands that continued use of tobacco products increases risk of heart disease, stroke, cancer; counseling for 3-5min.  Recommended regular physical activity, progressive walking program.  Continue current medications as directed.     Thank you for the opportunity to participate in this patient's care.     Copy to primary care provider.     EMR Dragon/Transcription disclaimer:   Much of this encounter note is an electronic transcription/translation of spoken language to printed text. The electronic translation of spoken language may permit erroneous, or at times, nonsensical words  or phrases to be inadvertently transcribed; Although I have reviewed the note for such errors, some may still exist.

## 2018-12-19 NOTE — ANESTHESIA POSTPROCEDURE EVALUATION
Patient: Jessie Jordan    Procedure Summary     Date:  12/19/18 Room / Location:  [facility] OR 05 / [facility] OR    Anesthesia Start:  0717 Anesthesia Stop:  1257    Procedure:  femoral to femoral artery bypass, RIGHT FEMORAL POPLITEAL BYPASS right lower extremity arteriogram          (C-ARM# AND C-ARM TABLE) (Right Thigh) Diagnosis:       PVD (peripheral vascular disease) (CMS/HCC)      (PVD (peripheral vascular disease) (CMS/HCC) [I73.9])    Surgeon:  Luan Ruff MD Provider:  Baudilio Broderick MD    Anesthesia Type:  general ASA Status:  4          Anesthesia Type: general  Last vitals  BP   150/67 (12/19/18 0603)   Temp   98.8 °F (37.1 °C) (12/19/18 0603)   Pulse   69 (12/19/18 0642)   Resp   16 (12/19/18 0603)     SpO2   100 % (12/19/18 0642)     Post Anesthesia Care and Evaluation    Patient location during evaluation: PACU  Patient participation: complete - patient participated  Level of consciousness: awake and alert  Pain management: adequate  Airway patency: patent  Anesthetic complications: No anesthetic complications    Cardiovascular status: acceptable  Respiratory status: acceptable  Hydration status: acceptable

## 2018-12-19 NOTE — ANESTHESIA PROCEDURE NOTES
Peripheral IV    Patient location during procedure: OR  Line placed for Fluids/Medication Admin.  Performed By   CRNA: Anjana Cortez CRNA  Preanesthetic Checklist  Completed: patient identified, site marked, surgical consent, pre-op evaluation, timeout performed, IV checked, risks and benefits discussed and monitors and equipment checked  Peripheral IV Prep   Patient position: supine   Prep: ChloraPrep  Peripheral IV Procedure   Laterality:left  Location:  Hand  Catheter size: 18 G         Post Assessment   Dressing Type: tape and transparent.    IV Dressing/Site: clean, dry and intact

## 2018-12-19 NOTE — ANESTHESIA PROCEDURE NOTES
Arterial Line      Patient location during procedure: OR   Line placed for hemodynamic monitoring.  Performed By   CRNA: Anjana Cortez CRNA  Preanesthetic Checklist  Completed: patient identified, site marked, surgical consent, pre-op evaluation, timeout performed, IV checked, risks and benefits discussed and monitors and equipment checked  Arterial Line Prep   Sterile Tech: cap, gloves, mask, sterile barriers and gown  Prep: ChloraPrep  Patient monitoring: blood pressure monitoring, continuous pulse oximetry and EKG  Arterial Line Procedure   Laterality:right  Location:  radial artery  Catheter size: 20 G   Guidance: ultrasound guided  Number of attempts: 1  Successful placement: yes          Post Assessment   Dressing Type: biopatch applied, occlusive dressing applied and secured with tape.   Complications no  Circ/Move/Sens Assessment: normal.   Patient Tolerance: patient tolerated the procedure well with no apparent complications

## 2018-12-19 NOTE — OP NOTE
OPERATIVE NOTE  Jessie Jordan  1950 12/19/2018    PREOP DIAGNOSES:  PVD (peripheral vascular disease) (CMS/[facility]) [I73.9]    POSTOP DIAGNOSES:  PVD (peripheral vascular disease) [I73.9]         PROCEDURE:   LEFT to RIGHT femoral to femoral artery bypass (6mm PTFE)  RIGHT FEMORAL POPLITEAL BYPASS (ABOVE KNEE, 6mm  PTFE graft)  RIGHT lower extremity arteriogram  LEFT common femoral endarterectomy  RIGHT common femoral endarterectomy     SURGEON: CASEY Ruff MD FACS RPVI    ASSISTANT: Jamshid Armendariz CFA    ANESTHESIA: General ET     ESTIMATED BLOOD LOSS: 100 ml     COMPLICATIONS: None    DESCRIPTION OF OPERATION: Patient taken to the operating room placed in supine position. General endotacheal anesthesia was induced. Radial arterial line was placed by Anesthesia. Patient prepped and draped in sterile fashion.    Oblique incision was then made in the LEFT groin, dissection carried down to the common femoral artery and its branches which were isolated.  Artery severely scarred in.. Oblique incision was then made in the RIGHT groin, dissection carried down to the common femoral artery and its branches which were isolated. 6mm PTFE graft was tunneled inverted C fashion subcutaneously between groin incisions using gore tunneler. 8000 units heparin given to maintain ACT ~300. The LEFT end of the PTFE graft was fashioned for anastomosis. Profunda and SFA were isolated with profunda clamps, proximal common femoral artery was isolated with profunda Clamp, opened with 11 blade was severe obstructive plaque. Common femoral endarterectomy was performed with good backbleeding from the profunda. End of the PTFE graft was fashioned and anastomosed end-to-side to the common femoral artery using CV 6 [place]-Rj suture. Usual deairing techniques were employed. Good pulse distal to the anastomosis.The RIGHT end of the PTFE graft was fashioned for anastomosis. Profunda and SFA were isolated with profunda clamps, proximal  common femoral artery was isolated with profunda Clamp, opened with 11 blade was severe obstructive plaque. Common femoral endarterectomy was performed with good backbleeding from the profunda.. End of the PTFE graft was fashioned and anastomosed end-to-side to the common femoral artery using CV 6 Sher[place]-Rj suture.    Incision made in the distal thigh, dissection carried down to the above-knee popliteal artery which was 5mm in diameter and soft distally.  6 mm PTFE graft was tunneled subsartorially from the knee to the groin incision using GORE tunneler.  Proximal end of the PTFE graft was fashioned and anastomosed end-to-side to the common femoral artery using CV 6 Sherwood-Rj suture. RIGHT proximal anastamosis performed in a technically side to side fashion with distal fem-fem and proximal fem pop grafts sewn end to side fashion from respective heel, then sewing the edges of each together with goretex suture. The distal end of the PTFE graft was fashioned for anastomosis. Popliteal artery was isolated with Edmund clamps, opened with 11 blade. Leg was straightened, minimal slack in the graft. Distal anastomosis performed end-to-side using CV 6 Sherwood-Rj suture.  Left lower extremity arteriogram was performed through the graft demonstrating widely patent distal anastomosis, good three-vessel runoff in the tibial vessels.       Usual deairing techniques were employed. Good pulse distal to the anastomosis. Good biphasic signal in the RIGHT posterior tibial artery and LEFT biphasic signal in the dorsalis pedis artery. 40 mg protamine was given with return of ACT to baseline. Hemostasis was obtained. Snow was used. Incisions were closed in layers of 3-0 PDS, 3-0 PDS, 4-0 Monocryl in the skin, dermabond tape.   Patient tolerated procedure well, transferred to PACU stable condition.               This document has been electronically signed by Luan Ruff MD on December 19, 2018 12:51 PM

## 2018-12-19 NOTE — ANESTHESIA PROCEDURE NOTES
ANESTHESIA INTUBATION  Urgency: elective    Airway not difficult    General Information and Staff    Patient location during procedure: OR  CRNA: Anjana Cortez CRNA    Indications and Patient Condition  Indications for airway management: airway protection    Preoxygenated: yes  Mask difficulty assessment: 2 - vent by mask + OA or adjuvant +/- NMBA    Final Airway Details  Final airway type: endotracheal airway      Successful airway: ETT  Cuffed: yes   Successful intubation technique: direct laryngoscopy  Facilitating devices/methods: intubating stylet  Blade: Erica  Blade size: 3  ETT size (mm): 7.0  Cormack-Lehane Classification: grade I - full view of glottis  Placement verified by: chest auscultation and capnometry   Measured from: lips  ETT to lips (cm): 20  Number of attempts at approach: 1

## 2018-12-20 LAB
ANION GAP SERPL CALCULATED.3IONS-SCNC: 5 MMOL/L (ref 5–15)
BASOPHILS # BLD AUTO: 0.02 10*3/MM3 (ref 0–0.2)
BASOPHILS NFR BLD AUTO: 0.2 % (ref 0–2)
BUN BLD-MCNC: 8 MG/DL (ref 7–21)
BUN/CREAT SERPL: 12.1 (ref 7–25)
CALCIUM SPEC-SCNC: 7.8 MG/DL (ref 8.4–10.2)
CHLORIDE SERPL-SCNC: 105 MMOL/L (ref 95–110)
CO2 SERPL-SCNC: 25 MMOL/L (ref 22–31)
CREAT BLD-MCNC: 0.66 MG/DL (ref 0.5–1)
DEPRECATED RDW RBC AUTO: 41.7 FL (ref 36.4–46.3)
EOSINOPHIL # BLD AUTO: 0.04 10*3/MM3 (ref 0–0.7)
EOSINOPHIL NFR BLD AUTO: 0.3 % (ref 0–7)
ERYTHROCYTE [DISTWIDTH] IN BLOOD BY AUTOMATED COUNT: 15.5 % (ref 11.5–14.5)
GFR SERPL CREATININE-BSD FRML MDRD: 89 ML/MIN/1.73 (ref 45–104)
GLUCOSE BLD-MCNC: 99 MG/DL (ref 60–100)
HCT VFR BLD AUTO: 16.9 % (ref 35–45)
HCT VFR BLD AUTO: 28 % (ref 35–45)
HGB BLD-MCNC: 5.5 G/DL (ref 12–15.5)
HGB BLD-MCNC: 9.4 G/DL (ref 12–15.5)
HYPOCHROMIA BLD QL: NORMAL
IMM GRANULOCYTES # BLD: 0.03 10*3/MM3 (ref 0–0.02)
IMM GRANULOCYTES NFR BLD: 0.3 % (ref 0–0.5)
LAB AP CASE REPORT: NORMAL
LYMPHOCYTES # BLD AUTO: 1.3 10*3/MM3 (ref 0.6–4.2)
LYMPHOCYTES NFR BLD AUTO: 11.4 % (ref 10–50)
MCH RBC QN AUTO: 23.6 PG (ref 26.5–34)
MCHC RBC AUTO-ENTMCNC: 32.5 G/DL (ref 31.4–36)
MCV RBC AUTO: 72.5 FL (ref 80–98)
MONOCYTES # BLD AUTO: 0.87 10*3/MM3 (ref 0–0.9)
MONOCYTES NFR BLD AUTO: 7.6 % (ref 0–12)
NEUTROPHILS # BLD AUTO: 9.18 10*3/MM3 (ref 2–8.6)
NEUTROPHILS NFR BLD AUTO: 80.2 % (ref 37–80)
PATH REPORT.FINAL DX SPEC: NORMAL
PATH REPORT.GROSS SPEC: NORMAL
PLAT MORPH BLD: NORMAL
PLATELET # BLD AUTO: 266 10*3/MM3 (ref 150–450)
PMV BLD AUTO: 8.6 FL (ref 8–12)
POIKILOCYTOSIS BLD QL SMEAR: NORMAL
POTASSIUM BLD-SCNC: 3.8 MMOL/L (ref 3.5–5.1)
RBC # BLD AUTO: 2.33 10*6/MM3 (ref 3.77–5.16)
SODIUM BLD-SCNC: 135 MMOL/L (ref 137–145)
STOMATOCYTES BLD QL SMEAR: NORMAL
TARGETS BLD QL SMEAR: NORMAL
WBC MORPH BLD: NORMAL
WBC NRBC COR # BLD: 11.44 10*3/MM3 (ref 3.2–9.8)
WHOLE BLOOD HOLD SPECIMEN: NORMAL

## 2018-12-20 PROCEDURE — 94799 UNLISTED PULMONARY SVC/PX: CPT

## 2018-12-20 PROCEDURE — 85007 BL SMEAR W/DIFF WBC COUNT: CPT | Performed by: THORACIC SURGERY (CARDIOTHORACIC VASCULAR SURGERY)

## 2018-12-20 PROCEDURE — 80048 BASIC METABOLIC PNL TOTAL CA: CPT | Performed by: THORACIC SURGERY (CARDIOTHORACIC VASCULAR SURGERY)

## 2018-12-20 PROCEDURE — 85014 HEMATOCRIT: CPT | Performed by: THORACIC SURGERY (CARDIOTHORACIC VASCULAR SURGERY)

## 2018-12-20 PROCEDURE — 94760 N-INVAS EAR/PLS OXIMETRY 1: CPT

## 2018-12-20 PROCEDURE — 25010000002 HYDROMORPHONE PER 4 MG: Performed by: THORACIC SURGERY (CARDIOTHORACIC VASCULAR SURGERY)

## 2018-12-20 PROCEDURE — 86900 BLOOD TYPING SEROLOGIC ABO: CPT

## 2018-12-20 PROCEDURE — 36430 TRANSFUSION BLD/BLD COMPNT: CPT

## 2018-12-20 PROCEDURE — 85018 HEMOGLOBIN: CPT | Performed by: THORACIC SURGERY (CARDIOTHORACIC VASCULAR SURGERY)

## 2018-12-20 PROCEDURE — 99024 POSTOP FOLLOW-UP VISIT: CPT | Performed by: NURSE PRACTITIONER

## 2018-12-20 PROCEDURE — 85025 COMPLETE CBC W/AUTO DIFF WBC: CPT | Performed by: THORACIC SURGERY (CARDIOTHORACIC VASCULAR SURGERY)

## 2018-12-20 PROCEDURE — 25010000002 CEFAZOLIN PER 500 MG: Performed by: THORACIC SURGERY (CARDIOTHORACIC VASCULAR SURGERY)

## 2018-12-20 PROCEDURE — P9016 RBC LEUKOCYTES REDUCED: HCPCS

## 2018-12-20 RX ORDER — FLUOXETINE HYDROCHLORIDE 20 MG/1
20 CAPSULE ORAL DAILY
Status: DISCONTINUED | OUTPATIENT
Start: 2018-12-20 | End: 2018-12-21 | Stop reason: HOSPADM

## 2018-12-20 RX ORDER — NICOTINE 21 MG/24HR
1 PATCH, TRANSDERMAL 24 HOURS TRANSDERMAL
Status: DISCONTINUED | OUTPATIENT
Start: 2018-12-20 | End: 2018-12-21 | Stop reason: HOSPADM

## 2018-12-20 RX ORDER — SODIUM CHLORIDE 9 MG/ML
INJECTION, SOLUTION INTRAVENOUS
Status: COMPLETED
Start: 2018-12-20 | End: 2018-12-20

## 2018-12-20 RX ORDER — GABAPENTIN 100 MG/1
100 CAPSULE ORAL 3 TIMES DAILY
Status: DISCONTINUED | OUTPATIENT
Start: 2018-12-20 | End: 2018-12-21 | Stop reason: HOSPADM

## 2018-12-20 RX ORDER — ATORVASTATIN CALCIUM 10 MG/1
10 TABLET, FILM COATED ORAL NIGHTLY
Status: DISCONTINUED | OUTPATIENT
Start: 2018-12-20 | End: 2018-12-21 | Stop reason: HOSPADM

## 2018-12-20 RX ORDER — LISINOPRIL 10 MG/1
10 TABLET ORAL DAILY
Status: DISCONTINUED | OUTPATIENT
Start: 2018-12-20 | End: 2018-12-21 | Stop reason: HOSPADM

## 2018-12-20 RX ORDER — CLOPIDOGREL BISULFATE 75 MG/1
75 TABLET ORAL DAILY
Status: CANCELLED | OUTPATIENT
Start: 2018-12-20

## 2018-12-20 RX ORDER — ASPIRIN 81 MG/1
81 TABLET ORAL DAILY
Status: CANCELLED | OUTPATIENT
Start: 2018-12-20 | End: 2019-09-28

## 2018-12-20 RX ORDER — BUPROPION HYDROCHLORIDE 150 MG/1
150 TABLET, EXTENDED RELEASE ORAL EVERY 12 HOURS SCHEDULED
Status: DISCONTINUED | OUTPATIENT
Start: 2018-12-20 | End: 2018-12-21 | Stop reason: HOSPADM

## 2018-12-20 RX ADMIN — BUPROPION HYDROCHLORIDE 150 MG: 150 TABLET, FILM COATED, EXTENDED RELEASE ORAL at 11:10

## 2018-12-20 RX ADMIN — BUPROPION HYDROCHLORIDE 150 MG: 150 TABLET, FILM COATED, EXTENDED RELEASE ORAL at 20:12

## 2018-12-20 RX ADMIN — ACETAMINOPHEN 1000 MG: 500 TABLET ORAL at 23:16

## 2018-12-20 RX ADMIN — SODIUM CHLORIDE 75 ML/HR: 900 INJECTION, SOLUTION INTRAVENOUS at 07:39

## 2018-12-20 RX ADMIN — NITROGLYCERIN 0.5 INCH: 20 OINTMENT TOPICAL at 09:03

## 2018-12-20 RX ADMIN — HYDROMORPHONE HYDROCHLORIDE 0.5 MG: 2 INJECTION INTRAMUSCULAR; INTRAVENOUS; SUBCUTANEOUS at 17:25

## 2018-12-20 RX ADMIN — FAMOTIDINE 20 MG: 10 INJECTION, SOLUTION INTRAVENOUS at 09:03

## 2018-12-20 RX ADMIN — ACETAMINOPHEN 1000 MG: 500 TABLET ORAL at 06:15

## 2018-12-20 RX ADMIN — ALBUTEROL SULFATE 2.5 MG: 2.5 SOLUTION RESPIRATORY (INHALATION) at 16:10

## 2018-12-20 RX ADMIN — ALBUTEROL SULFATE 2.5 MG: 2.5 SOLUTION RESPIRATORY (INHALATION) at 23:44

## 2018-12-20 RX ADMIN — NITROGLYCERIN 0.5 INCH: 20 OINTMENT TOPICAL at 00:19

## 2018-12-20 RX ADMIN — Medication 2 G: at 00:19

## 2018-12-20 RX ADMIN — ACETAMINOPHEN 1000 MG: 500 TABLET ORAL at 11:10

## 2018-12-20 RX ADMIN — FAMOTIDINE 20 MG: 10 INJECTION, SOLUTION INTRAVENOUS at 20:12

## 2018-12-20 RX ADMIN — OXYCODONE HYDROCHLORIDE 5 MG: 5 TABLET ORAL at 23:16

## 2018-12-20 RX ADMIN — ALBUTEROL SULFATE 2.5 MG: 2.5 SOLUTION RESPIRATORY (INHALATION) at 08:20

## 2018-12-20 RX ADMIN — NICOTINE 1 PATCH: 21 PATCH TRANSDERMAL at 20:45

## 2018-12-20 RX ADMIN — ATORVASTATIN CALCIUM 10 MG: 10 TABLET, FILM COATED ORAL at 20:12

## 2018-12-20 RX ADMIN — HYDROMORPHONE HYDROCHLORIDE 0.5 MG: 2 INJECTION INTRAMUSCULAR; INTRAVENOUS; SUBCUTANEOUS at 00:18

## 2018-12-20 RX ADMIN — GABAPENTIN 100 MG: 100 CAPSULE ORAL at 15:13

## 2018-12-20 RX ADMIN — NITROGLYCERIN 0.5 INCH: 20 OINTMENT TOPICAL at 14:23

## 2018-12-20 RX ADMIN — NITROGLYCERIN 0.5 INCH: 20 OINTMENT TOPICAL at 20:12

## 2018-12-20 RX ADMIN — GABAPENTIN 100 MG: 100 CAPSULE ORAL at 11:10

## 2018-12-20 RX ADMIN — FLUOXETINE HYDROCHLORIDE 20 MG: 20 CAPSULE ORAL at 11:10

## 2018-12-20 RX ADMIN — ACETAMINOPHEN 1000 MG: 500 TABLET ORAL at 18:10

## 2018-12-20 RX ADMIN — LISINOPRIL 10 MG: 10 TABLET ORAL at 11:10

## 2018-12-20 RX ADMIN — GABAPENTIN 100 MG: 100 CAPSULE ORAL at 20:12

## 2018-12-20 RX ADMIN — HYDROMORPHONE HYDROCHLORIDE 0.5 MG: 2 INJECTION INTRAMUSCULAR; INTRAVENOUS; SUBCUTANEOUS at 07:52

## 2018-12-20 NOTE — CONSULTS
Adult Nutrition  Assessment    Patient Name:  Jessie Jordan  YOB: 1950  MRN: [id number]  Admit Date:  12/19/2018    Assessment Date:  12/20/2018    Comments:  Pt is post op surgery for PVD.  She has a BMI of 17.54 based on reported ht and wt.  This is 85% of her IBW. She denies any hx of wt loss and reports a good appetite pta. She does have a hx of Tobacco abuse.  Poor acceptance of commercial supplements so RD will add tami milk to all trays and monitor.     Reason for Assessment     Row Name 12/20/18 1403          Reason for Assessment    Reason For Assessment  per organizational policy     Diagnosis  other (see comments) PVD     Identified At Risk by Screening Criteria  BMI         Nutrition/Diet History     Row Name 12/20/18 1403          Nutrition/Diet History    Typical Food/Fluid Intake  Pt reprots taht she is hungry right now.  She ate a good breakfaste.  She denies any hx of wt loss.  Her wt flucuates--she may lose a little but she gains it back.  No Gi distress.  Does not like the commercial supplements but will drink milk           Labs/Tests/Procedures/Meds     Row Name 12/20/18 1404          Labs/Procedures/Meds    Lab Results Reviewed  reviewed, pertinent     Lab Results Comments  H/H 5.5/16.9        Diagnostic Tests/Procedures    Diagnostic Test/Procedure Reviewed  reviewed, pertinent     Diagnostic Test/Procedures Comments  L&R Fem Pop; R&L Endarterectomy; R arteriogram; PRBC        Medications    Pertinent Medications Reviewed  reviewed, pertinent         Physical Findings     Row Name 12/20/18 1406 12/20/18 140       Physical Findings    Overall Physical Appearance  loss of subcutaneous fat;loss of muscle mass  underweight        Estimated/Assessed Needs     Row Name 12/20/18 1406          Calculation Measurements    Weight Used For Calculations  46.3 kg (102 lb)        Estimated/Assessed Needs    Additional Documentation  Additional Requirements (Group);Fluid Requirements  (Group);[place]-St. Jeor Equation (Group);Protein Requirements (Group);Calorie Requirements (Group);KCAL/KG (Group)        Calorie Requirements    Measured Resting Energy Expenditure (MREE)  1400 Kcal/day        KCAL/KG    14 Kcal/Kg (kcal)  647.74     15 Kcal/Kg (kcal)  694     18 Kcal/Kg (kcal)  832.81     20 Kcal/Kg (kcal)  925.34     25 Kcal/Kg (kcal)  1156.68     30 Kcal/Kg (kcal)  1388.01     35 Kcal/Kg (kcal)  1619.34     40 Kcal/Kg (kcal)  1850.68     45 Kcal/Kg (kcal)  2082.02     50 Kcal/Kg (kcal)  2313.35        [place]-St. Jeor Equation    RMR ([place]-St. Jeor Equation)  953.86        Protein Requirements    Est Protein Requirement Amount (gms/kg)  1.4 gm protein     Estimated Protein Requirements (gms/day)  64.77        Fluid Requirements    Estimated Fluid Requirements (mL/day)  1500     Estimated Fluid Requirement Method  other (see comments)     RDA Method (mL)  1500     Nery-Randall Method (over 20 kg)  2425.34         Nutrition Prescription Ordered     Row Name 12/20/18 1407          Nutrition Prescription PO    Current PO Diet  Regular     Fluid Consistency  Thin     Common Modifiers  Consistent Carbohydrate         Evaluation of Received Nutrient/Fluid Intake     Row Name 12/20/18 1407 12/20/18 1406       Calculation Measurements    Weight Used For Calculations  --  46.3 kg (102 lb)       PO Evaluation    Number of Days PO Intake Evaluated  Insufficient Data  --    Number of Meals  1  --    % PO Intake  good per Staff this am  --        Evaluation of Prescribed Nutrient/Fluid Intake     Row Name 12/20/18 1406          Calculation Measurements    Weight Used For Calculations  46.3 kg (102 lb)             Electronically signed by:  Lamar Morrell RD  12/20/18 2:16 PM

## 2018-12-20 NOTE — PLAN OF CARE
Problem: Patient Care Overview  Goal: Plan of Care Review  Outcome: Ongoing (interventions implemented as appropriate)   12/20/18 1412   Coping/Psychosocial   Plan of Care Reviewed With caregiver;patient   Plan of Care Review   Progress no change   OTHER   Outcome Summary New Assessment. Pt with a hx of PVD and is now Post op surgery. BMI is 17.54 based upon her UBW of 102# which indicates underweight. No hx of wt loss per pt. RD will add tami milk to all trays and monitor.        Problem: Skin Injury Risk (Adult)  Intervention: Promote/Optimize Nutrition   12/20/18 1412   Nutrition Interventions   Oral Nutrition Promotion calorie dense foods provided;calorie dense liquids provided;nutritional therapy counseling provided

## 2018-12-20 NOTE — PROGRESS NOTES
"  Cardiothoracic - Vascular Surgery Daily Note        LOS: 1 day   Patient Care Team:  Julianna Gloria MD as PCP - General (Family Medicine)  Silva Arana as PCP - Claims Attributed  Ana Maria Quinn MD as Resident (Family Medicine)  Oz Harrell Jr., MD as Resident ([facility])  Max Cunningham MD ([facility])  Mony Miller MD as Resident (Family Medicine)  Kofi Lagos MD as Resident (Family Medicine)    POD#1 LEFT to RIGHT femoral to femoral artery bypass (6mm PTFE)  RIGHT FEMORAL POPLITEAL BYPASS (ABOVE KNEE, 6mm PTFE graft)    Chief Complaint: PVD      Subjective     The following portions of the patient's history were reviewed and updated as appropriate: allergies, current medications, past family history, past medical history, past social history, past surgical history and problem list.     Subjective:  Symptoms:  Stable.    Diet:  Adequate intake.    Activity level: Impaired due to weakness.    Pain:  She complains of pain that is mild.  Pain is well controlled and requiring pain medication.          Objective     Vital Signs  Temp:  [97.4 °F (36.3 °C)-99.9 °F (37.7 °C)] 98.3 °F (36.8 °C)  Heart Rate:  [61-94] 73  Resp:  [14-22] 15  BP: ()/(50-72) 119/58  Arterial Line BP: (102-145)/(48-72) 126/57  Body mass index is 20.16 kg/m².    Intake/Output Summary (Last 24 hours) at 12/20/2018 1014  Last data filed at 12/20/2018 0945  Gross per 24 hour   Intake 2343 ml   Output 2350 ml   Net -7 ml     I/O this shift:  In: 327 [Blood:327]  Out: 100 [Urine:100]    Wt Readings from Last 3 Encounters:   12/20/18 50.8 kg (111 lb 15.9 oz)   12/13/18 46.3 kg (102 lb)   11/06/18 46.1 kg (101 lb 9.6 oz)         Physical Exam   Objective:  General Appearance:  Comfortable.    Vital signs: (most recent): Blood pressure 119/58, pulse 73, temperature 98.3 °F (36.8 °C), resp. rate 15, height 158.8 cm (62.5\"), weight 50.8 kg (111 lb 15.9 oz), SpO2 99 %.  Vital signs are normal.  No " fever.    Output: Producing urine and no stool output.    HEENT: Normal HEENT exam.    Lungs:  Normal effort and normal respiratory rate.  Breath sounds clear to auscultation.    Heart: Normal rate.  Regular rhythm.    Abdomen: Abdomen is soft.  Bowel sounds are normal.     Extremities: Normal range of motion.  There is dependent edema (RLE).    Pulses: (Palpable Pulses)    Neurological: Patient is alert and oriented to person, place and time.    Skin:  Warm and dry.  ((B) Groin: Prineo Intact  RLE [employer]: Prineo Intact)              Results Review:    Lab Results   Component Value Date    WBC 11.44 (H) 12/20/2018    HGB 5.5 (C) 12/20/2018    HCT 16.9 (L) 12/20/2018    MCV 72.5 (L) 12/20/2018     12/20/2018     Lab Results   Component Value Date    GLUCOSE 99 12/20/2018    BUN 8 12/20/2018    CREATININE 0.66 12/20/2018    EGFRIFNONA 89 12/20/2018    BCR 12.1 12/20/2018    K 3.8 12/20/2018    CO2 25.0 12/20/2018    CALCIUM 7.8 (L) 12/20/2018    ALBUMIN 4.00 10/18/2018    AST 20 10/18/2018    ALT 31 10/18/2018       Calcium Calcium   Date/Time Value Ref Range Status   12/20/2018 0410 7.8 (L) 8.4 - 10.2 mg/dL Final      Magnesium No results found for: MG     Imaging Results (last 24 hours)     Procedure Component Value Units Date/Time    FL C Arm During Surgery [[id number]] Resulted:  12/20/18 0807     Updated:  12/20/18 0807                                acetaminophen 1,000 mg Oral Q6H   albuterol 2.5 mg Nebulization Q8H - RT   famotidine 20 mg Intravenous Q12H   Or      famotidine 20 mg Oral Q12H   nitroglycerin 0.5 inch Topical Q12H   nitroglycerin 0.5 inch Topical Q12H       niCARdipine 5-15 mg/hr    Sodium chloride 75 mL/hr Last Rate: 75 mL/hr (12/20/18 0739)             ASSESSMENT/PLAN       PVD (peripheral vascular disease) (CMS/HCC)      Assessment & Plan    Stable Post Op Fem-Fem, RIGHT AK Fem-Pop: Progressing well    PVD: ASA, PLAVIX, STATIN    Acute Blood Loss Anemia: Transfuse 2 units PRBC    Rehab:  OOB later today    Resp: Wean 02. Incentive    Pain: Tylenol, OxyIR    HTN: Restart Home Lisinopril    Disp: Transfer 3W later today          This document has been electronically signed by HERNAN Brock on December 20, 2018 10:14 AM

## 2018-12-21 VITALS
BODY MASS INDEX: 19.92 KG/M2 | DIASTOLIC BLOOD PRESSURE: 69 MMHG | HEART RATE: 72 BPM | RESPIRATION RATE: 12 BRPM | SYSTOLIC BLOOD PRESSURE: 146 MMHG | TEMPERATURE: 98.2 F | WEIGHT: 112.43 LBS | HEIGHT: 63 IN | OXYGEN SATURATION: 98 %

## 2018-12-21 LAB
ABO + RH BLD: NORMAL
ABO + RH BLD: NORMAL
BH BB BLOOD EXPIRATION DATE: NORMAL
BH BB BLOOD EXPIRATION DATE: NORMAL
BH BB BLOOD TYPE BARCODE: 5100
BH BB BLOOD TYPE BARCODE: 5100
BH BB DISPENSE STATUS: NORMAL
BH BB DISPENSE STATUS: NORMAL
BH BB PRODUCT CODE: NORMAL
BH BB PRODUCT CODE: NORMAL
BH BB UNIT NUMBER: NORMAL
BH BB UNIT NUMBER: NORMAL
UNIT  ABO: NORMAL
UNIT  ABO: NORMAL
UNIT  RH: NORMAL
UNIT  RH: NORMAL

## 2018-12-21 PROCEDURE — 99024 POSTOP FOLLOW-UP VISIT: CPT | Performed by: NURSE PRACTITIONER

## 2018-12-21 PROCEDURE — 94799 UNLISTED PULMONARY SVC/PX: CPT

## 2018-12-21 RX ORDER — PRENATAL VIT/IRON FUM/FOLIC AC 27MG-0.8MG
1 TABLET ORAL DAILY
Status: DISCONTINUED | OUTPATIENT
Start: 2018-12-21 | End: 2018-12-21 | Stop reason: HOSPADM

## 2018-12-21 RX ORDER — SENNA AND DOCUSATE SODIUM 50; 8.6 MG/1; MG/1
2 TABLET, FILM COATED ORAL 2 TIMES DAILY PRN
Start: 2018-12-21 | End: 2019-01-07

## 2018-12-21 RX ORDER — OXYCODONE HYDROCHLORIDE AND ACETAMINOPHEN 5; 325 MG/1; MG/1
1-2 TABLET ORAL EVERY 4 HOURS PRN
Qty: 36 TABLET | Refills: 0 | Status: SHIPPED | OUTPATIENT
Start: 2018-12-21 | End: 2018-12-28 | Stop reason: SDUPTHER

## 2018-12-21 RX ORDER — PRENATAL VIT/IRON FUM/FOLIC AC 27MG-0.8MG
1 TABLET ORAL DAILY
Start: 2018-12-21 | End: 2021-06-04 | Stop reason: SDUPTHER

## 2018-12-21 RX ORDER — POLYETHYLENE GLYCOL 3350 17 G/17G
17 POWDER, FOR SOLUTION ORAL DAILY
Status: DISCONTINUED | OUTPATIENT
Start: 2018-12-21 | End: 2018-12-21 | Stop reason: HOSPADM

## 2018-12-21 RX ADMIN — POLYETHYLENE GLYCOL 3350 17 G: 17 POWDER, FOR SOLUTION ORAL at 12:05

## 2018-12-21 RX ADMIN — ALBUTEROL SULFATE 2.5 MG: 2.5 SOLUTION RESPIRATORY (INHALATION) at 08:00

## 2018-12-21 RX ADMIN — BUPROPION HYDROCHLORIDE 150 MG: 150 TABLET, FILM COATED, EXTENDED RELEASE ORAL at 08:40

## 2018-12-21 RX ADMIN — OXYCODONE HYDROCHLORIDE 5 MG: 5 TABLET ORAL at 03:56

## 2018-12-21 RX ADMIN — GABAPENTIN 100 MG: 100 CAPSULE ORAL at 08:40

## 2018-12-21 RX ADMIN — PRENATAL VIT W/ FE FUMARATE-FA TAB 27-0.8 MG 1 TABLET: 27-0.8 TAB at 12:19

## 2018-12-21 RX ADMIN — NITROGLYCERIN 0.5 INCH: 20 OINTMENT TOPICAL at 08:41

## 2018-12-21 RX ADMIN — LISINOPRIL 10 MG: 10 TABLET ORAL at 08:40

## 2018-12-21 RX ADMIN — FAMOTIDINE 20 MG: 20 TABLET ORAL at 08:39

## 2018-12-21 RX ADMIN — NITROGLYCERIN 0.5 INCH: 20 OINTMENT TOPICAL at 01:01

## 2018-12-21 RX ADMIN — ACETAMINOPHEN 1000 MG: 500 TABLET ORAL at 12:06

## 2018-12-21 RX ADMIN — OXYCODONE HYDROCHLORIDE 5 MG: 5 TABLET ORAL at 08:39

## 2018-12-21 RX ADMIN — ACETAMINOPHEN 1000 MG: 500 TABLET ORAL at 05:06

## 2018-12-21 RX ADMIN — FLUOXETINE HYDROCHLORIDE 20 MG: 20 CAPSULE ORAL at 08:40

## 2018-12-21 NOTE — PROGRESS NOTES
"  Cardiothoracic - Vascular Surgery Daily Note        LOS: 2 days   Patient Care Team:  Julianna Gloria MD as PCP - General (Family Medicine)  Silva Arana as PCP - Claims Attributed  Ana Maria Quinn MD as Resident (Family Medicine)  Oz Harrell Jr., MD as Resident ([facility])  Max Cunningham MD ([facility])  Mony Miller MD as Resident (Family Medicine)  Kofi Lagos MD as Resident (Family Medicine)    POD#2 LEFT to RIGHT femoral to femoral artery bypass (6mm PTFE)  RIGHT FEMORAL POPLITEAL BYPASS (ABOVE KNEE, 6mm PTFE graft)    Chief Complaint: PVD      Subjective     The following portions of the patient's history were reviewed and updated as appropriate: allergies, current medications, past family history, past medical history, past social history, past surgical history and problem list.     Subjective:  Symptoms:  Stable.    Diet:  Adequate intake.    Activity level: Impaired due to weakness.    Pain:  She complains of pain that is mild.  Pain is well controlled and requiring pain medication.          Objective     Vital Signs  Temp:  [97 °F (36.1 °C)-99 °F (37.2 °C)] 98.2 °F (36.8 °C)  Heart Rate:  [62-90] 72  Resp:  [11-23] 12  BP: (119-162)/(58-87) 146/69  Arterial Line BP: (126-155)/(57-87) 151/78  Body mass index is 20.24 kg/m².    Intake/Output Summary (Last 24 hours) at 12/21/2018 0932  Last data filed at 12/21/2018 0900  Gross per 24 hour   Intake 1100 ml   Output 3550 ml   Net -2450 ml     I/O this shift:  In: 240 [P.O.:240]  Out: 800 [Urine:800]    Wt Readings from Last 3 Encounters:   12/21/18 51 kg (112 lb 7 oz)   12/13/18 46.3 kg (102 lb)   11/06/18 46.1 kg (101 lb 9.6 oz)         Physical Exam   Objective:  General Appearance:  Comfortable.    Vital signs: (most recent): Blood pressure 146/69, pulse 72, temperature 98.2 °F (36.8 °C), temperature source Oral, resp. rate 12, height 158.8 cm (62.5\"), weight 51 kg (112 lb 7 oz), SpO2 98 %.  Vital signs " are normal.  No fever.    Output: Producing urine and no stool output.    HEENT: Normal HEENT exam.    Lungs:  Normal effort and normal respiratory rate.  Breath sounds clear to auscultation.    Heart: Normal rate.  Regular rhythm.    Abdomen: Abdomen is soft.  Bowel sounds are normal.     Extremities: Normal range of motion.  There is dependent edema (RLE).    Pulses: Distal pulses are intact.  (Triphasic PT/DP noted of the left leg with doppler)    Neurological: Patient is alert and oriented to person, place and time.  GCS score is 15.    Skin:  Warm and dry.  ((B) Groin: Prineo Intact  RLE AK Inc: Prineo Intact)              Results Review:    Lab Results   Component Value Date    WBC 11.44 (H) 12/20/2018    HGB 9.4 (L) 12/20/2018    HCT 28.0 (L) 12/20/2018    MCV 72.5 (L) 12/20/2018     12/20/2018     Lab Results   Component Value Date    GLUCOSE 99 12/20/2018    BUN 8 12/20/2018    CREATININE 0.66 12/20/2018    EGFRIFNONA 89 12/20/2018    BCR 12.1 12/20/2018    K 3.8 12/20/2018    CO2 25.0 12/20/2018    CALCIUM 7.8 (L) 12/20/2018    ALBUMIN 4.00 10/18/2018    AST 20 10/18/2018    ALT 31 10/18/2018       Calcium Calcium   Date/Time Value Ref Range Status   12/20/2018 0410 7.8 (L) 8.4 - 10.2 mg/dL Final      Magnesium No results found for: MG     Imaging Results (last 24 hours)     ** No results found for the last 24 hours. **                                  acetaminophen 1,000 mg Oral Q6H   albuterol 2.5 mg Nebulization Q8H - RT   atorvastatin 10 mg Oral Nightly   buPROPion  mg Oral Q12H   famotidine 20 mg Intravenous Q12H   Or      famotidine 20 mg Oral Q12H   FLUoxetine 20 mg Oral Daily   gabapentin 100 mg Oral TID   lisinopril 10 mg Oral Daily   nicotine 1 patch Transdermal Q24H   nitroglycerin 0.5 inch Topical Q12H   nitroglycerin 0.5 inch Topical Q12H       niCARdipine 5-15 mg/hr    Sodium chloride 75 mL/hr Last Rate: 75 mL/hr (12/20/18 0739)             ASSESSMENT/PLAN       PVD (peripheral  vascular disease) (CMS/HCC)      Assessment & Plan    Stable Post Op Fem-Fem, RIGHT AK Fem-Pop: Progressing well, return of bodily functions.  Passing flatus.      PVD: ASA, PLAVIX, STATIN    Acute Blood Loss Anemia: 9.4/28, post transfusion.  Prenatal for iron replacement.      Rehab: ambulating with assistance. Up in chair for all meals, appears well this morning.      Resp: Currently on room air, using IS.      Pain: Tylenol, OxyIR    HTN: Controlled home dose lisinopril.      Disp: Stable for DC home today            This document has been electronically signed by HERNAN Grullon on December 21, 2018 9:32 AM

## 2018-12-21 NOTE — PLAN OF CARE
Problem: Patient Care Overview  Goal: Plan of Care Review  Outcome: Ongoing (interventions implemented as appropriate)   12/21/18 0433   Coping/Psychosocial   Plan of Care Reviewed With patient   Plan of Care Review   Progress improving   OTHER   Outcome Summary pt tolerating treatment at this time. pt has transfer order, no beds upstairs at this time. pt pain level being controlled. pt pain level increases with activity. encouraging pt increase movement and perform ald's independently.     Goal: Individualization and Mutuality  Outcome: Ongoing (interventions implemented as appropriate)    Goal: Discharge Needs Assessment  Outcome: Ongoing (interventions implemented as appropriate)    Goal: Interprofessional Rounds/Family Conf  Outcome: Ongoing (interventions implemented as appropriate)      Problem: Skin Injury Risk (Adult)  Goal: Skin Health and Integrity  Outcome: Ongoing (interventions implemented as appropriate)      Problem: Fall Risk (Adult)  Goal: Identify Related Risk Factors and Signs and Symptoms  Outcome: Ongoing (interventions implemented as appropriate)    Goal: Absence of Fall  Outcome: Ongoing (interventions implemented as appropriate)

## 2018-12-21 NOTE — DISCHARGE SUMMARY
CVTS DISCHARGE SUMMARY  Date of Admission: 12/19/2018  5:34 AM  Date of Discharge:  12/21/2018    Admission Diagnosis:   PVD (peripheral vascular disease) (CMS/[facility]) [I73.9]    Discharge Diagnosis:   Post-Op Diagnosis Codes:     * PVD (peripheral vascular disease) (CMS/[facility]) [I73.9]    Consults:   Consults     No orders found from 11/20/2018 to 12/20/2018.          Procedures Performed  Procedure(s):  femoral to femoral artery bypass, RIGHT FEMORAL POPLITEAL BYPASS right lower extremity arteriogram          (C-ARM# AND C-ARM TABLE)       Discharge Medications     Discharge Medications      New Medications      Instructions Start Date   oxyCODONE-acetaminophen 5-325 MG per tablet  Commonly known as:  PERCOCET   1-2 tablets, Oral, Every 4 Hours PRN      prenatal vitamin 27-0.8 27-0.8 MG tablet tablet   1 tablet, Oral, Daily      sennosides-docusate sodium 8.6-50 MG tablet  Commonly known as:  SENOKOT-S   2 tablets, Oral, 2 Times Daily PRN         Continue These Medications      Instructions Start Date   amLODIPine 2.5 MG tablet  Commonly known as:  NORVASC   2.5 mg, Oral, 2 Times Daily      aspirin 81 MG EC tablet   81 mg, Oral, Daily      atorvastatin 10 MG tablet  Commonly known as:  LIPITOR   10 mg, Oral, Daily      calcium-vitamin D 500-200 MG-UNIT per tablet  Commonly known as:  OSCAL 500/200 D-3   1 tablet, Oral, Daily      clopidogrel 75 MG tablet  Commonly known as:  PLAVIX   75 mg, Oral, Daily      diclofenac 1 % gel gel  Commonly known as:  VOLTAREN   Topical, 2 Times Daily      FLUoxetine 20 MG capsule  Commonly known as:  PROZAC   20 mg, Oral, Daily      fluticasone 50 MCG/ACT nasal spray  Commonly known as:  FLONASE   1 spray, Nasal, Daily      gabapentin 100 MG capsule  Commonly known as:  NEURONTIN   100 mg, Oral, 3 Times Daily      lisinopril 10 MG tablet  Commonly known as:  PRINIVIL,ZESTRIL   10 mg, Oral, Daily      lisinopril 10 MG tablet  Commonly known as:  PRINIVIL,ZESTRIL   TAKE 1 TABLET BY MOUTH  EVERY DAY      varenicline 0.5 MG X 11 & 1 MG X 42 tablet  Commonly known as:  CHANTIX STARTING MONTH JAMES   Take 0.5 mg one daily on days 1-3 and and 0.5 mg twice daily on days 4-7.Then 1 mg twice daily for a total of 12 weeks.         Stop These Medications    HYDROcodone-acetaminophen 7.5-325 MG per tablet  Commonly known as:  NORCO          Risks and benefits of narcotic medication discussed with patient.Weaning post surgery discussed.  Abdullahi reviewed. Patient wishes to accept Prescription for Percocet #36 written per RENAE BECERRA.     Discharge Diet:   Diet Instructions     Diet:      Diet Texture / Consistency:  Regular    Common Modifiers:   Cardiac  Consistent Carbohydrate             Discharge Disposition: Home with home health transition visit.      History of Present Illness/Hospital Course:   68 y.o. female with HTN(stable, increased risk stroke, rupture) and Smoker(uncontrolled, increased risk cardiovascular events) ,Stroke(stable, increase risk stroke), CAD(stable, increase risk cardiovascular events), PVD(new, increase risk cardiovascular events).  smokes 1/2 PPD.  Moderate pain legs x 2 years.  Cant walk good.  Pain RIGHT thigh, LEFT shin.  Hands, RIGHT foot numbness tingling.  Stroke 2015.  Evaluated neurology, EMG normal..  No other associated signs, symptoms or modifying factors.  Underwent elective surgery 12/19 Left-Right Fem-Fem (PTFE), Right Fem-Pop (PTFE), Left & Right CFA endarterectomy.  Patient was transferred to PACU in stable condition from OR and moved to ICU.  POD1 patient was anemic and received 2 units PBRC's, Follow up H&H stable.  Up in chair POD 1 attempted transfer to [facility] but had to stay in ICU due to bed availability.  POD2 patient has triphasic PT/DP of the right extremity and doppler pulses on the Left.  Patient suitable for D/C with transition visit , stable return of bodily functions.  Discharged home on Percocet, patient has home supply of norco and will not take in  "conjunction with percocet.       5/2018 MONTY:  RIGHT .32 bi/monophasic.  LEFT .47 biphasic.  6/2018 CTA Aorta runoff:  RIGHT common iliac 90%, SFA small moderate diffuse disease, tibial diffuse disease.  LEFT  Common iliac 70%, external iliac 80%, SFA occluded reconstitutes distal via profunda collaterals, diffuse tibials.     6/2018 Carotid Duplex:  JENNIFER 0-49% antegrade vert.  LICA 0-49% antegrade vert.     12/2017 Cardiac Catheterization:  Mild coronary irregularities.  Diffuse atherosclerosis.        Vital Signs  Temp:  [97 °F (36.1 °C)-99 °F (37.2 °C)] 98.2 °F (36.8 °C)  Heart Rate:  [62-90] 72  Resp:  [11-23] 12  BP: (120-162)/(58-87) 146/69  Arterial Line BP: (132-155)/(62-87) 151/78      12/19/18  1500 12/20/18  0500 12/21/18  0514   Weight: 50.3 kg (110 lb 14.3 oz) 50.8 kg (111 lb 15.9 oz) 51 kg (112 lb 7 oz)       Pertinent Test Results:  Lab Results   Component Value Date    WBC 11.44 (H) 12/20/2018    HGB 9.4 (L) 12/20/2018    HCT 28.0 (L) 12/20/2018    MCV 72.5 (L) 12/20/2018     12/20/2018     Lab Results   Component Value Date    GLUCOSE 99 12/20/2018    BUN 8 12/20/2018    CREATININE 0.66 12/20/2018    EGFRIFNONA 89 12/20/2018    BCR 12.1 12/20/2018    K 3.8 12/20/2018    CO2 25.0 12/20/2018    CALCIUM 7.8 (L) 12/20/2018    ALBUMIN 4.00 10/18/2018    AST 20 10/18/2018    ALT 31 10/18/2018         Physical Exam   Objective:  General Appearance:  Comfortable.    Vital signs: (most recent): Blood pressure 146/69, pulse 72, temperature 98.2 °F (36.8 °C), temperature source Oral, resp. rate 12, height 158.8 cm (62.5\"), weight 51 kg (112 lb 7 oz), SpO2 98 %.  Vital signs are normal.  No fever.    Output: Producing urine and no stool output/passing flatus  HEENT: Normal HEENT exam.    Lungs:  Normal effort and normal respiratory rate.  Breath sounds clear to auscultation.    Heart: Normal rate.  Regular rhythm.    Abdomen: Abdomen is soft.  Bowel sounds are normal.     Extremities: Normal range of " motion.  There is dependent edema (RLE).    Pulses: Distal pulses are intact.  (Triphasic PT/DP noted of the left leg with doppler)    Neurological: Patient is alert and oriented to person, place and time.  GCS score is 15.    Skin:  Warm and dry.  ((B) Groin: Prineo Intact  RLE [employer]: Prineo Intact)           Additional Instructions for the Follow-ups that You Need to Schedule     Ambulatory Referral to Home Health   As directed      Face to Face Visit Date:  12/21/2018    Follow-up Provider for Plan of Care?:  I will be treating the patient on an ongoing basis.  Please send me the Plan of Care for signature.    Follow-up Provider:  AMELIA HANLEY [[id number]]    Reason/Clinical Findings:  Post Op Fem-Fem-Pop    Describe mobility limitations that make leaving home difficult:  restrictions    Nursing/Therapeutic Services Requested:  Other (transition visit)    Frequency:  1 Week 1         Discharge Follow-up with Specified Provider: Evangelina BECERRA; 1 Week   As directed      To:  Evangelina BECERRA    Follow Up:  1 Week    Follow Up Details:  12/28/18 Friday @ 11:00         Notify Physician or Go To The ED For the Following Conditions   As directed      If signs and symptoms of ischemia should occur including but not limited to pale/blue discoloration of limb, increasing pain with ambulation or at rest, or a non-healing wound. Patient is to notify Heart and Vascular center for immediate evaluation.  Or if signs or symptoms of infection at incision sites arise, including purulent drainage, redness and warmth at incision site.    Order Comments:  If signs and symptoms of ischemia should occur including but not limited to pale/blue discoloration of limb, increasing pain with ambulation or at rest, or a non-healing wound. Patient is to notify Heart and Vascular center for immediate evaluation. Or if signs or symptoms of infection at incision sites arise, including purulent drainage, redness and warmth at incision  site.                Discharge Instructions:   Discharge instructions include no heavy lifting anything greater than 10lbs for approximately 4 weeks.  No sex or driving for 2-4 weeks. Printed information given to the patient with advancement of activities weekly.  Risks and benefits of narcotic medications and weaning postoperatively have been discussed. Clean operative site with antibacterial soap/water, pat dry. Keep open to air unless draining, then may apply dry dressing.  No ointments or creams unless prescribed by provider. Signs and symptoms of infection including drainage from operative site, redness, swelling, with associated fever and/or chills notify Heart and Vascular center immediately for wound check. If signs and symptoms of ischemia should occur including but not limited to pale/blue discoloration of limb, increasing pain with ambulation or at rest, or a non-healing wound. Patient is to notify Heart and Vascular center for immediate evaluation.  Patient verbalizes understanding of discharge instructions, all questions are answered, follow up appointments have been made, they are discharged home in stable condition.       Follow-up Appointments  Evangelina BECERRA 12/28/28 @ 11:00    Test Results Pending at Discharge  None           Time: Discharge 45 min

## 2018-12-21 NOTE — PLAN OF CARE
Problem: Patient Care Overview  Goal: Discharge Needs Assessment  Outcome: Ongoing (interventions implemented as appropriate)    Goal: Interprofessional Rounds/Family Conf  Outcome: Ongoing (interventions implemented as appropriate)      Problem: Skin Injury Risk (Adult)  Goal: Identify Related Risk Factors and Signs and Symptoms  Outcome: Ongoing (interventions implemented as appropriate)    Goal: Skin Health and Integrity  Outcome: Ongoing (interventions implemented as appropriate)      Problem: Fall Risk (Adult)  Goal: Identify Related Risk Factors and Signs and Symptoms  Outcome: Ongoing (interventions implemented as appropriate)    Goal: Absence of Fall  Outcome: Ongoing (interventions implemented as appropriate)      Problem: Tissue Perfusion, Ineffective Peripheral (Adult)  Goal: Identify Related Risk Factors and Signs and Symptoms  Outcome: Outcome(s) achieved Date Met: 12/20/18    Goal: Adequate Tissue Perfusion  Outcome: Outcome(s) achieved Date Met: 12/20/18      Problem: Surgery Nonspecified (Adult)  Goal: Signs and Symptoms of Listed Potential Problems Will be Absent, Minimized or Managed (Surgery Nonspecified)  Outcome: Outcome(s) achieved Date Met: 12/20/18    Goal: Anesthesia/Sedation Recovery  Outcome: Outcome(s) achieved Date Met: 12/20/18

## 2018-12-21 NOTE — DISCHARGE PLACEMENT REQUEST
"JordanCollette pimentelnick Baker (68 y.o. Female)     Date of Birth Social Security Number Address Home Phone MRN    1950  [address] [place], [place] [address] [id number] [phone] [id number]    Orthodoxy Marital Status          [place]        Admission Date Admission Type Admitting Provider Attending Provider Department, Room/Bed    12/19/18 Elective Luan Ruff MD [place], Thomas Mark, MD [facility] CRITICAL CARE, 03/A    Discharge Date Discharge Disposition Discharge Destination         Home or Self Care              Attending Provider:  Luan Ruff MD    Allergies:  No Known Allergies    Isolation:  None   Infection:  None   Code Status:  CPR    Ht:  158.8 cm (62.5\")   Wt:  51 kg (112 lb 7 oz)    Admission Cmt:  None   Principal Problem:  PVD (peripheral vascular disease) (CMS/[facility]) [I73.9]                 Active Insurance as of 12/19/2018     Primary Coverage     Payor Plan Insurance Group Employer/Plan Group    MEDICARE MEDICARE A & B      Payor Plan Address Payor Plan Phone Number Payor Plan Fax Number Effective Dates    [address] [phone]  9/1/2015 - None Entered    [facility] [address]       Subscriber Name Subscriber Birth Date Member ID       DEBI JORDAN GEOVANI 1950 [id number]           Secondary Coverage     Payor Plan Insurance Group Employer/Plan Group    AARP MED SUPP AARP HEALTH CARE OPTIONS PLAN F     Payor Plan Address Payor Plan Phone Number Payor Plan Fax Number Effective Dates    [facility] [phone]  1/1/2018 - None Entered    [address]       [facility] [address]       Subscriber Name Subscriber Birth Date Member ID       DEBI JORDAN GEOVANI 1950 [id number]           Tertiary Coverage     Payor Plan Insurance Group Employer/Plan Group    [place]      Payor Plan Address Payor Plan Phone Number Payor Plan Fax Number Effective Dates    [address] [phone]  10/9/2018 - None Entered    [place] " [address]       Subscriber Name Subscriber Birth Date Member ID       JESSIE JORDAN 1950 [id number]                 Emergency Contacts      (Rel.) Home Phone Work Phone Mobile Phone    Jo-Ann Kaur (Daughter) [phone] -- [phone]    Judie Kaur (Relative) [phone] -- [phone]               History & Physical      Luan Ruff MD at 12/19/2018  7:08 AM          Jessie Jordan  1950     Chief Complaint:        Chief Complaint   Patient presents with   • Peripheral Vascular Disease         HPI:       PCP:  Julianna Gloria MD  Cardiology:  Dr Palacio     68 y.o. female with HTN(stable, increased risk stroke, rupture) and Smoker(uncontrolled, increased risk cardiovascular events) ,Stroke(stable, increase risk stroke), CAD(stable, increase risk cardiovascular events), PVD(new, increase risk cardiovascular events).  smokes 1/2 PPD.  Moderate pain legs x 2 years.  Cant walk good.  Pain RIGHT thigh, LEFT shin.  Hands, RIGHT foot numbness tingling.  Stroke 2015.  Evaluated neurology, EMG normal..  No other associated signs, symptoms or modifying factors.     5/2018 MONTY:  RIGHT .32 bi/monophasic.  LEFT .47 biphasic.  6/2018 CTA Aorta runoff:  RIGHT common iliac 90%, SFA small moderate diffuse disease, tibial diffuse disease.  LEFT  Common iliac 70%, external iliac 80%, SFA occluded reconstitutes distal via profunda collaterals, diffuse tibials.     6/2018 Carotid Duplex:  JENNIFER 0-49% antegrade vert.  LICA 0-49% antegrade vert.     12/2017 Cardiac Catheterization:  Mild coronary irregularities.  Diffuse atherosclerosis.     The following portions of the patient's history were reviewed and updated as appropriate: allergies, current medications, past family history, past medical history, past social history, past surgical history and problem list.  Recent images independently reviewed.  Available laboratory values reviewed.     PMH:       Past Medical History:    Diagnosis Date   • Coronary artery disease involving native coronary artery of native heart 12/06/2017     seen on Cardiac Cath - Left main 50-70% stenosis, 12/27/2017 repeat cath showed coronary spasm with no stenosis   • Post-menopausal 1990         ALLERGIES:  No Known Allergies        MEDICATIONS:     Current Outpatient Prescriptions:   •  amLODIPine (NORVASC) 2.5 MG tablet, Take 1 tablet by mouth 2 (Two) Times a Day., Disp: 60 tablet, Rfl: 3  •  atorvastatin (LIPITOR) 10 MG tablet, Take 1 tablet by mouth Daily for 30 days., Disp: 30 tablet, Rfl: 3  •  buPROPion SR (WELLBUTRIN SR) 150 MG 12 hr tablet, Take 1 tablet by mouth Every 12 (Twelve) Hours for 30 days., Disp: 60 tablet, Rfl: 3  •  calcium-vitamin D (OSCAL 500/200 D-3) 500-200 MG-UNIT per tablet, Take 1 tablet by mouth Daily., Disp: 30 tablet, Rfl: 11  •  cilostazol (PLETAL) 100 MG tablet, Take 1 tablet by mouth 2 (Two) Times a Day for 30 days., Disp: 60 tablet, Rfl: 3  •  diclofenac (VOLTAREN) 1 % gel gel, Apply  topically to the appropriate area as directed 2 (Two) Times a Day., Disp: 30 g, Rfl: 1  •  FLUoxetine (PROZAC) 20 MG capsule, Take 1 capsule by mouth Daily., Disp: 30 capsule, Rfl: 3  •  fluticasone (FLONASE) 50 MCG/ACT nasal spray, 1 spray into the nostril(s) as directed by provider Daily., Disp: 16 mL, Rfl: 3  •  gabapentin (NEURONTIN) 100 MG capsule, Take 1 capsule by mouth 3 (Three) Times a Day., Disp: 90 capsule, Rfl: 2  •  HYDROcodone-acetaminophen (NORCO) 7.5-325 MG per tablet, Take 1 tablet by mouth Every 8 (Eight) Hours As Needed for Moderate Pain ., Disp: , Rfl:   •  lisinopril (PRINIVIL,ZESTRIL) 10 MG tablet, Take 1 tablet by mouth Daily for 30 days., Disp: 30 tablet, Rfl: 3  •  naproxen (NAPROSYN) 500 MG tablet, Take 1 tablet by mouth 2 (Two) Times a Day With Meals., Disp: 60 tablet, Rfl: 3  •  cetirizine (zyrTEC) 10 MG tablet, Take 1 tablet by mouth Daily., Disp: 30 tablet, Rfl: 3  •  Psyllium 100 % powder, Take 7.5 g by mouth  Daily for 30 days., Disp: 226 g, Rfl: 3     Review of Systems   Review of Systems   Constitution: Positive for weakness and malaise/fatigue. Negative for weight loss.   Cardiovascular: Negative for chest pain, claudication and dyspnea on exertion.   Respiratory: Negative for cough and shortness of breath.    Skin: Negative for color change and poor wound healing.   Musculoskeletal: Positive for muscle cramps and myalgias.   Neurological: Negative for dizziness and numbness.         Physical Exam   Physical Exam   Constitutional: She is oriented to person, place, and time. She is active and cooperative. She does not appear ill. No distress.   HENT:   Right Ear: Hearing normal.   Left Ear: Hearing normal.   Nose: No nasal deformity. No epistaxis.   Mouth/Throat: She does not have dentures. Normal dentition.   Cardiovascular: Normal rate and regular rhythm.    No murmur heard.  Pulses:       Carotid pulses are 2+ on the right side, and 2+ on the left side.       Radial pulses are 2+ on the right side, and 2+ on the left side.        Dorsalis pedis pulses are 0 on the right side, and 0 on the left side.        Posterior tibial pulses are 0 on the right side, and 0 on the left side.   Cap refill <2sec   Pulmonary/Chest: Effort normal and breath sounds normal.   Abdominal: Soft. She exhibits no distension and no mass. There is no tenderness.   Musculoskeletal: She exhibits no deformity.   Gait normal.    Neurological: She is alert and oriented to person, place, and time. She has normal strength.   Skin: Skin is warm and dry. No cyanosis or erythema. No pallor.   No venous staining   Psychiatric: She has a normal mood and affect. Her speech is normal. Judgment and thought content normal.      Results for ORTIZ DEBI ANN (MRN [id number]) as of 9/13/2018 15:06   Ref. Range 6/6/2018 10:18   Creatinine Latest Ref Range: 0.50 - 1.00 mg/dL 0.56   BUN Latest Ref Range: 7 - 21 mg/dL 7      ASSESSMENT:  Debi was seen  today for peripheral vascular disease.     Diagnoses and all orders for this visit:     PVD (peripheral vascular disease) (CMS/[facility])  -     Case Request; Standing  -     sodium chloride 0.9 % infusion; Infuse 100 mL/hr into a venous catheter Continuous.  -     Case Request     Essential hypertension     Coronary artery disease involving native coronary artery of native heart without angina pectoris     Bilateral carotid artery stenosis     Nicotine dependence, cigarettes, with other nicotine-induced disorders     Chronic pain syndrome     Other orders  -     Provide NPO Instructions to Patient; Future  -     Obtain informed consent; Standing  -     Clip bilateral groins; Standing     PLAN:  Detailed discussion with Jessie Jordan regarding situation, options and plans.  Severe lifestyle limiting claudication, neuropathy and functional limitation.  Noninvasive studies indicate severe peripheral vascular disease.  Requires additional urgent evaluation with arteriography to evaluate options for improving blood flow to feet, healing wounds and avoiding limb loss.     Risks, including but not limited to:  pain, infection, bleeding, renal failure (dialysis), nerve or blood vessel injury, need for emergent open operation to restore blood flow.  Benefits: relief of symptoms, reduction in risk of limb loss, improved wound healing.  Options:  Medical therapy, alternative imaging discussed.  Understands and wishes to proceed with plan.     Fem fem bypass, fem pop bypass [facility] GEN 12/19/2018     Return after above studies complete  Smoking cessation advised and assistance options offered including behavioral counseling ([facility]essation [facility]), Nicotine replacement therapy (patches or gum), pharmacologic therapy (Chantix, Wellbutrin). patient understands that continued use of tobacco products increases risk of heart disease, stroke, cancer; counseling for 3-5min.  Recommended regular physical activity,  progressive walking program.  Continue current medications as directed.     Thank you for the opportunity to participate in this patient's care.     Copy to primary care provider.     EMR Dragon/Transcription disclaimer:   Much of this encounter note is an electronic transcription/translation of spoken language to printed text. The electronic translation of spoken language may permit erroneous, or at times, nonsensical words or phrases to be inadvertently transcribed; Although I have reviewed the note for such errors, some may still exist.            Electronically signed by Luan Ruff MD at 12/19/2018  7:08 AM     Miscellaneous DME [[id number]] (Order [id number])   Order   Date: 12/21/2018 Department: [facility] CRITICAL CARE Ordering/Authorizing: Mikel Bradley APRN   Lab and Collection     Miscellaneous DME (Order: [id number]) - 12/21/2018   Order History   Outpatient   Date/Time Action Taken User Additional Information   12/21/18 1010 Sign Mikel Bradley APRN    Order Details     Frequency Duration Priority Order Class   None None Routine External   Start Date/Time     Start Date   12/21/18   Order Questions     Question Answer Comment   Type of DME Bedside Commode    Length of Need (99 Months = Lifetime) 99 Months = Lifetime    Note:  Custom values to enter length of need for DME          Source Order Set / Preference List     Preference List   [facility] FACILITY PROCEDURES [[id number]]   Clinical Indications      ICD-10-CM ICD-9-CM   Surgical aftercare, circulatory system  - Primary     Z48.812 V58.73   PVD (peripheral vascular disease) (CMS/[facility])     I73.9 443.9   Reprint Order Requisition     MISCELLANEOUS DME (Order #[id number]) on 12/21/18   Encounter-Level Cardiology Documents:     There are no encounter-level cardiology documents.      Encounter     View Encounter               Order Provider Info         Office phone Pager E-mail   Ordering User Mikel Bradley APRN [phone] --  --   Authorizing Provider Mikel Bradley APRN [phone] -- --   Attending Provider Luan Ruff [phone] -- --   Linked Charges     No charges linked to this procedure   Order Transmittal Tracking     MISCELLANEOUS DME (Order #[id number]) on 12/21/18   Authorized by:  HERNAN Grullon  (NPI: [id number])       Lab Component SmartPhrase Guide     MISCELLANEOUS DME (Order #[id number]) on 12/21/18       Please notify Vicky Gallardo RN, CM if any problems. Patient would like delivered to her home. Discharged planned today.    [phone]

## 2018-12-21 NOTE — PLAN OF CARE
Problem: Patient Care Overview  Goal: Individualization and Mutuality  Outcome: Outcome(s) achieved Date Met: 12/21/18    Goal: Discharge Needs Assessment  Outcome: Outcome(s) achieved Date Met: 12/21/18    Goal: Interprofessional Rounds/Family Conf  Outcome: Outcome(s) achieved Date Met: 12/21/18      Problem: Skin Injury Risk (Adult)  Goal: Identify Related Risk Factors and Signs and Symptoms  Outcome: Outcome(s) achieved Date Met: 12/21/18    Goal: Skin Health and Integrity  Outcome: Outcome(s) achieved Date Met: 12/21/18      Problem: Fall Risk (Adult)  Goal: Identify Related Risk Factors and Signs and Symptoms  Outcome: Outcome(s) achieved Date Met: 12/21/18    Goal: Absence of Fall  Outcome: Outcome(s) achieved Date Met: 12/21/18

## 2018-12-22 ENCOUNTER — READMISSION MANAGEMENT (OUTPATIENT)
Dept: CALL CENTER | Facility: HOSPITAL | Age: 68
End: 2018-12-22

## 2018-12-22 NOTE — OUTREACH NOTE
Prep Survey      Responses   Facility patient discharged from?  [place]   Is patient eligible?  Yes   Discharge diagnosis  PVD (peripheral vascular diseasefemoral to femoral artery bypass, RIGHT FEMORAL POPLITEAL BYPASS right lower extremity arteriogram             Does the patient have one of the following disease processes/diagnoses(primary or secondary)?  General Surgery   Does the patient have Home health ordered?  Yes   What is the Home health agency?   [facility] for Transition visit    Is there a DME ordered?  Yes   What DME was ordered?  [facility] per [facility]    Prep survey completed?  Yes          Shila Coats RN

## 2018-12-26 ENCOUNTER — READMISSION MANAGEMENT (OUTPATIENT)
Dept: CALL CENTER | Facility: HOSPITAL | Age: 68
End: 2018-12-26

## 2018-12-28 ENCOUNTER — OFFICE VISIT (OUTPATIENT)
Dept: CARDIAC SURGERY | Facility: CLINIC | Age: 68
End: 2018-12-28

## 2018-12-28 VITALS
SYSTOLIC BLOOD PRESSURE: 104 MMHG | HEIGHT: 64 IN | TEMPERATURE: 98.3 F | HEART RATE: 65 BPM | BODY MASS INDEX: 18.03 KG/M2 | OXYGEN SATURATION: 98 % | DIASTOLIC BLOOD PRESSURE: 70 MMHG | WEIGHT: 105.6 LBS

## 2018-12-28 DIAGNOSIS — Z09 FOLLOW-UP SURGERY CARE: Primary | ICD-10-CM

## 2018-12-28 DIAGNOSIS — G89.18 POST-OPERATIVE PAIN: ICD-10-CM

## 2018-12-28 DIAGNOSIS — I73.9 PVD (PERIPHERAL VASCULAR DISEASE) (HCC): ICD-10-CM

## 2018-12-28 PROCEDURE — 99024 POSTOP FOLLOW-UP VISIT: CPT | Performed by: NURSE PRACTITIONER

## 2018-12-28 RX ORDER — OXYCODONE HYDROCHLORIDE AND ACETAMINOPHEN 5; 325 MG/1; MG/1
1-2 TABLET ORAL EVERY 4 HOURS PRN
Qty: 36 TABLET | Refills: 0 | Status: SHIPPED | OUTPATIENT
Start: 2018-12-28 | End: 2019-02-06

## 2018-12-29 ENCOUNTER — READMISSION MANAGEMENT (OUTPATIENT)
Dept: CALL CENTER | Facility: HOSPITAL | Age: 68
End: 2018-12-29

## 2018-12-29 NOTE — OUTREACH NOTE
General Surgery Week 1 Survey      Responses   Facility patient discharged from?  [place]   Does the patient have one of the following disease processes/diagnoses(primary or secondary)?  General Surgery   Is there a successful TCM telephone encounter documented?  No   Week 1 attempt successful?  Yes   Call start time  1521   Call end time  1523   Discharge diagnosis  PVD (peripheral vascular diseasefemoral to femoral artery bypass, RIGHT FEMORAL POPLITEAL BYPASS right lower extremity arteriogram             Is patient permission given to speak with other caregiver?  No   Meds reviewed with patient/caregiver?  Yes   Is the patient having any side effects they believe may be caused by any medication additions or changes?  No   Does the patient have all medications related to this admission filled (includes all antibiotics, pain medications, etc.)  Yes   Is the patient taking all medications as directed (includes completed medication regime)?  Yes   Does the patient have a follow up appointment scheduled with their surgeon?  Yes   Has the patient kept scheduled appointments due by today?  Yes   What is the Home health agency?   [facility] for Transition visit    Has home health visited the patient within 72 hours of discharge?  Yes   What DME was ordered?  [facility] per [facility]    Psychosocial issues?  No   Did the patient receive a copy of their discharge instructions?  Yes   Nursing interventions  Reviewed instructions with patient   What is the patient's perception of their health status since discharge?  Improving   Nursing interventions  Nurse provided patient education   Is the patient /caregiver able to teach back basic post-op care?  Practice 'cough and deep breath', Drive as instructed by MD in discharge instructions, Take showers only when approved by MD-sponge bathe until then, No tub bath, swimming, or hot tub until instructed by MD, Keep incision areas clean,dry and protected   Is the patient/caregiver able to  teach back signs and symptoms of incisional infection?  Increased redness, swelling or pain at the incisonal site, Increased drainage or bleeding, Incisional warmth, Pus or odor from incision, Fever   Is the patient/caregiver able to teach back steps to recovery at home?  Set small, achievable goals for return to baseline health, Rest and rebuild strength, gradually increase activity   Is the patient/caregiver able to teach back the hierarchy of who to call/visit for symptoms/problems? PCP, Specialist, Home health nurse, Urgent Care, ED, 911  Yes   Week 1 call completed?  Yes          Sejal Lin RN

## 2019-01-02 NOTE — PROGRESS NOTES
Subjective   Patient ID: Jessie Jordan is a 68 y.o. female is here today for follow-up.    Chief Complaint:    Chief Complaint   Patient presents with   • Peripheral Vascular Disease     RIGHT fem-pop, 12/19/2018       The following portions of the patient's history were reviewed and updated as appropriate: allergies, current medications, past family history, past medical history, past social history, past surgical history and problem list.  Recent images independently reviewed.  Available laboratory values reviewed.    PCP:  Julianna Gloria MD  Cardiology:  Dr Palacio    68 y.o. female with HTN(stable, increased risk stroke, rupture) and Smoker(uncontrolled, increased risk cardiovascular events) ,Stroke(stable, increase risk stroke), CAD(stable, increase risk cardiovascular events), PVD(new, increase risk cardiovascular events).  smokes 1/2 PPD.  Moderate pain legs x 2 years.  Cant walk good.  Pain RIGHT thigh, LEFT shin.  Hands, RIGHT foot numbness tingling.  Stroke 2015.  Evaluated neurology, EMG normal..  No other associated signs, symptoms or modifying factors.    5/2018 MONTY:  RIGHT .32 bi/monophasic.  LEFT .47 biphasic.  6/2018 CTA Aorta runoff:  RIGHT common iliac 90%, SFA small moderate diffuse disease, tibial diffuse disease.  LEFT  Common iliac 70%, external iliac 80%, SFA occluded reconstitutes distal via profunda collaterals, diffuse tibials.  9/28/18  Agram:  PTA/Luminex Stent LEFT iliac artery:  RCIA occluded, RIIA/REIA moderate diffuse disease, RSFA occluded, RPT diffuse distal disease  10/11/18  MONTY:  RIGHT 0.50 Fem/Pop Biphasic  PT/DP Monophasic   12/19/18: L->R Fem-Fem RIGHT Fem-Pop (PTFE)    6/2018 Carotid Duplex:  JENNIFER 0-49% antegrade vert.  LICA 0-49% antegrade vert.    12/2017 Cardiac Catheterization:  Mild coronary irregularities.  Diffuse atherosclerosis.        Past Medical History:   Diagnosis Date   • Anxiety and depression    • Coronary artery disease involving native coronary  artery of native heart 2017    seen on Cardiac Cath - Left main 50-70% stenosis, 2017 repeat cath showed coronary spasm with no stenosis   • Hyperlipidemia    • Hypertension    • Kidney cysts    • Post-menopausal    • Skin cancer    • Stroke (CMS/HCC)    • Vascular disease      Past Surgical History:   Procedure Laterality Date   • ARTERIOGRAM N/A 2018    Procedure: aortoiliac arteriogram possible angioplasty stent atherectomy thrombolysis bilateral iliac stents;  Surgeon: Luan Ruff MD;  Location: [facility] ANGIO INVASIVE LOCATION;  Service: Interventional Radiology   • CARDIAC CATHETERIZATION  2017    Done at [facility], [place] - Left Main 50-70% Stenosis - no stenting done, recommendation was urgent CABG.  EF 50-60%   • CERVICAL FUSION      C5-6   • COLONOSCOPY N/A 2018    Procedure: COLONOSCOPY;  Surgeon: Oz Way MD;  Location: [facility] ENDOSCOPY;  Service: Gastroenterology   • FEMORAL POPLITEAL BYPASS Right 2018    Procedure: femoral to femoral artery bypass, RIGHT FEMORAL POPLITEAL BYPASS right lower extremity arteriogram          (C-ARM# AND C-ARM TABLE);  Surgeon: Luan Ruff MD;  Location: [facility] OR;  Service: Vascular   • FINGER SURGERY Left     third finger   • ROTATOR CUFF REPAIR Left 2011    done in [place]     Family History   Problem Relation Age of Onset   • Lung cancer Mother    • Hypertension Mother    • Diabetes Maternal Grandmother    • Heart disease Maternal Grandmother    • Hypertension Maternal Grandfather    • Heart disease Maternal Grandfather    • Heart disease Other    • Hypertension Other    • Cancer Other      Social History     Tobacco Use   • Smoking status: Former Smoker     Packs/day: 1.00     Years: 48.00     Pack years: 48.00     Types: Cigarettes     Last attempt to quit: 2018     Years since quittin.0   • Smokeless tobacco: Never Used   Substance Use Topics   • Alcohol use: Yes      Frequency: Monthly or less     Comment: socially   • Drug use: No       ALLERGIES:   Patient has no known allergies.    MEDICATIONS:  Current outpatient and discharge medications have been reconciled for the patient.  Reviewed by: HERNAN rBock      Current Outpatient Medications:   •  amLODIPine (NORVASC) 2.5 MG tablet, Take 1 tablet by mouth 2 (Two) Times a Day., Disp: 60 tablet, Rfl: 3  •  aspirin 81 MG EC tablet, Take 1 tablet by mouth Daily., Disp: 150 tablet, Rfl: 2  •  atorvastatin (LIPITOR) 10 MG tablet, Take 1 tablet by mouth Daily., Disp: 30 tablet, Rfl: 5  •  calcium-vitamin D (OSCAL 500/200 D-3) 500-200 MG-UNIT per tablet, Take 1 tablet by mouth Daily., Disp: 30 tablet, Rfl: 11  •  clopidogrel (PLAVIX) 75 MG tablet, Take 1 tablet by mouth Daily., Disp: 30 tablet, Rfl: 11  •  diclofenac (VOLTAREN) 1 % gel gel, Apply  topically to the appropriate area as directed 2 (Two) Times a Day., Disp: 30 g, Rfl: 1  •  FLUoxetine (PROZAC) 20 MG capsule, Take 1 capsule by mouth Daily., Disp: 90 capsule, Rfl: 3  •  fluticasone (FLONASE) 50 MCG/ACT nasal spray, 1 spray into the nostril(s) as directed by provider Daily., Disp: 16 mL, Rfl: 6  •  gabapentin (NEURONTIN) 100 MG capsule, Take 1 capsule by mouth 3 (Three) Times a Day., Disp: 90 capsule, Rfl: 2  •  lisinopril (PRINIVIL,ZESTRIL) 10 MG tablet, TAKE 1 TABLET BY MOUTH EVERY DAY, Disp: 30 tablet, Rfl: 3  •  oxyCODONE-acetaminophen (PERCOCET) 5-325 MG per tablet, Take 1-2 tablets by mouth Every 4 (Four) Hours As Needed for Moderate Pain  (Surgical Pain)., Disp: 36 tablet, Rfl: 0  •  Prenatal Vit-Fe Fumarate-FA (PRENATAL VITAMIN 27-0.8) 27-0.8 MG tablet tablet, Take 1 tablet by mouth Daily., Disp: , Rfl:   •  sennosides-docusate sodium (SENOKOT-S) 8.6-50 MG tablet, Take 2 tablets by mouth 2 (Two) Times a Day As Needed for Constipation., Disp: , Rfl:   •  varenicline (CHANTIX STARTING MONTH PAK) 0.5 MG X 11 & 1 MG X 42 tablet, Take 0.5 mg one daily on days  1-3 and and 0.5 mg twice daily on days 4-7.Then 1 mg twice daily for a total of 12 weeks., Disp: 53 tablet, Rfl: 0    Review of Systems   Constitution: Negative for fever and weakness.   Cardiovascular: Positive for leg swelling (MILD/RIGHT).   Respiratory: Negative for shortness of breath.    Hematologic/Lymphatic: Does not bruise/bleed easily.   Skin: Negative for poor wound healing.   Gastrointestinal: Negative for change in bowel habit and constipation.   Genitourinary: Negative for dysuria.   Neurological: Negative for light-headedness.   Psychiatric/Behavioral: Negative for altered mental status.        Objective       Vitals:    12/28/18 1114   BP: 104/70   Pulse: 65   Temp: 98.3 °F (36.8 °C)   SpO2: 98%      Body mass index is 18.13 kg/m².  Physical Exam   Constitutional: She is oriented to person, place, and time. She appears well-developed.   HENT:   Head: Atraumatic.   Eyes: EOM are normal.   Neck: Neck supple.   Cardiovascular: Normal rate.   Pulses:       Dorsalis pedis pulses are 2+ on the right side, and 2+ on the left side.        Posterior tibial pulses are 2+ on the right side, and 2+ on the left side.   Pulmonary/Chest: Effort normal and breath sounds normal.   Abdominal: Soft. Bowel sounds are normal.   Musculoskeletal: Normal range of motion. She exhibits edema (MILD/RIGHT).   Using Walker   Neurological: She is alert and oriented to person, place, and time.   Skin: Skin is warm.   Psychiatric: She has a normal mood and affect. Thought content normal.   Vitals reviewed.          Assessment/Plan   Independent Review of Radiographic Studies:    Detailed discussion regarding risks, benefits, and treatment plan. Images independently reviewed. Patient understands, agrees, and wishes to proceed with plan.     1. Follow-up surgery care  Stable Post Op : Progressing well  Continue restrictions  May drive once off narcotics  Signs and symptoms of infection including drainage from operative site, redness,  swelling, with associated fever and/or chills notify Heart and Vascular center immediately for wound check.  Clean operative site with antibacterial soap/water, pat dry. Keep open to air unless draining, then may apply dry dressing.  No ointments or creams unless prescribed by provider.   Return 1 week- Wound Check    2. PVD (peripheral vascular disease) (CMS/HCC)  Good distal blood flow to feet  Medical Management: ASA,PLAVIX,STATIN   If signs and symptoms of ischemia should occur including but not limited to pale/blue discoloration of limb, increasing pain with ambulation or at rest, or a non-healing wound. Patient is to notify Heart and Vascular center for immediate evaluation.   Return 6 weeks - MONTY, Graft duplex  - Doppler Ankle Brachial Index Single Level CAR; Future  - Duplex Lower Extremity Art / Grafts - Right CAR; Future    3. Post-operative pain  Elevate RIGHT leg higher than hip  Reviewed risks, benefits, and habit forming potential and weaning from narcotic medication. Patient understands and wishes to receive prescription.  Prescription written for Percocet #36 for post-surgical pain after Abdullahi placed on file.   - oxyCODONE-acetaminophen (PERCOCET) 5-325 MG per tablet; Take 1-2 tablets by mouth Every 4 (Four) Hours As Needed for Moderate Pain  (Surgical Pain).  Dispense: 36 tablet; Refill: 0            This document has been electronically signed by HERNAN Brock on January 2, 2019 11:58 AM

## 2019-01-04 ENCOUNTER — PATIENT OUTREACH (OUTPATIENT)
Dept: CASE MANAGEMENT | Facility: OTHER | Age: 69
End: 2019-01-04

## 2019-01-04 ENCOUNTER — EPISODE CHANGES (OUTPATIENT)
Dept: CASE MANAGEMENT | Facility: OTHER | Age: 69
End: 2019-01-04

## 2019-01-04 NOTE — OUTREACH NOTE
RN-Care Advisor Unsuccessful Outreach Attempt    UTR#:  1    Purpose of Call: Call Center Return    Day/Time this Attempt: Friday / am    Next Attempt Schedule: 3-5 days / pm

## 2019-01-07 ENCOUNTER — OFFICE VISIT (OUTPATIENT)
Dept: CARDIAC SURGERY | Facility: CLINIC | Age: 69
End: 2019-01-07

## 2019-01-07 VITALS
TEMPERATURE: 97.5 F | SYSTOLIC BLOOD PRESSURE: 95 MMHG | OXYGEN SATURATION: 90 % | DIASTOLIC BLOOD PRESSURE: 50 MMHG | HEIGHT: 64 IN | HEART RATE: 68 BPM | BODY MASS INDEX: 17.42 KG/M2 | WEIGHT: 102 LBS

## 2019-01-07 DIAGNOSIS — I10 ESSENTIAL HYPERTENSION: ICD-10-CM

## 2019-01-07 DIAGNOSIS — Z09 FOLLOW-UP SURGERY CARE: Primary | ICD-10-CM

## 2019-01-07 DIAGNOSIS — I73.9 PVD (PERIPHERAL VASCULAR DISEASE) (HCC): ICD-10-CM

## 2019-01-07 PROCEDURE — 99024 POSTOP FOLLOW-UP VISIT: CPT | Performed by: NURSE PRACTITIONER

## 2019-01-07 NOTE — PATIENT INSTRUCTIONS
No drive for 2-4 weeks or if still taking pain medication.  Wash incisions daily with soap and pat dry.  Do not scrub incision sites.  We will follow up in five weeks for MONTY.  Continue aspirin, plavix, lisinopril, and atorvastatin for prevention of vascular disease.  Great job with your smoking cessation!  Continue to monitor incision site for signs of infection and call heart and vascular center if this were to occur.

## 2019-01-08 NOTE — PROGRESS NOTES
CVTS Office Progress Note     Subjective   Patient ID: Jessie Jordan is a 68 y.o. female is here today for follow-up for PVD.    Chief Complaint:    Chief Complaint   Patient presents with   • Wound Check     (12/19/18) Right fem-pop   • Peripheral Vascular Disease       PCP:  Julianna Gloria MD  Cardiology:  Dr. Palacio     History of Present Illness  Ms. Jordan is a pleasant 68 year old female with a history of HTN, PVD, CAD, carotid stenosis, and former smoker.  Prior to intervention patient has had moderate leg pain for approximately 2 years and recently has been experiencing lifestyle limiting claudication.  Previous CVA in 2015 with no overt residual deficits, she has right leg neuropathy evaluated by neurology with normal EMG.  Previous stenting to ARON  Underwent LEFT to RIGHT femoral to femoral artery bypass and RIGHT FEMORAL POPLITEAL BYPASS December 2019.  She follows today for post operative week two follow up.       Carotid Studies  6/2018 Carotid Duplex:  JENNIFER 0-49% antegrade vert.  LICA 0-49% antegrade vert.    Peripheral Vascular Studies  5/2018 MONTY:  RIGHT .32 bi/monophasic.  LEFT .47 biphasic.  6/2018 CTA Aorta runoff:  RIGHT common iliac 90%, SFA small moderate diffuse disease, tibial diffuse disease.  LEFT  Common iliac 70%, external iliac 80%, SFA occluded reconstitutes distal via profunda collaterals, diffuse tibials.  10/11/18  MONTY:  RIGHT 0.50 Fem/Pop Biphasic  PT/DP Monophasic      Interventions  9/28/18  Agram:  PTA/Luminex Stent LEFT iliac artery:  RCIA occluded, RIIA/REIA moderate diffuse disease, RSFA occluded, RPT diffuse distal disease  12/19/18 LEFT to RIGHT femoral to femoral artery bypass and RIGHT FEMORAL POPLITEAL BYPASS (PTFE)    Cardiology  12/2017 Cardiac Catheterization:  Mild coronary irregularities.  Diffuse atherosclerosis.         The following portions of the patient's history were reviewed and updated as appropriate: allergies, current medications, past family  history, past medical history, past social history, past surgical history and problem list.  Recent images independently reviewed.  Available laboratory values reviewed.    PCP:  Julianna Gloria MD  Cardiology:  Dr. Palacio    Past Medical History:   Diagnosis Date   • Anxiety and depression    • Coronary artery disease involving native coronary artery of native heart 12/06/2017    seen on Cardiac Cath - Left main 50-70% stenosis, 12/27/2017 repeat cath showed coronary spasm with no stenosis   • Hyperlipidemia    • Hypertension    • Kidney cysts    • Post-menopausal 1990   • Skin cancer    • Stroke (CMS/[facility]) 2015   • Vascular disease      Past Surgical History:   Procedure Laterality Date   • ARTERIOGRAM N/A 9/28/2018    Procedure: aortoiliac arteriogram possible angioplasty stent atherectomy thrombolysis bilateral iliac stents;  Surgeon: Luan Ruff MD;  Location: [facility] ANGIO INVASIVE LOCATION;  Service: Interventional Radiology   • CARDIAC CATHETERIZATION  12/06/2017    Done at [facility] - Left Main 50-70% Stenosis - no stenting done, recommendation was urgent CABG.  EF 50-60%   • CERVICAL FUSION  1985    C5-6   • COLONOSCOPY N/A 6/29/2018    Procedure: COLONOSCOPY;  Surgeon: Oz Way MD;  Location: [facility] ENDOSCOPY;  Service: Gastroenterology   • FEMORAL POPLITEAL BYPASS Right 12/19/2018    Procedure: femoral to femoral artery bypass, RIGHT FEMORAL POPLITEAL BYPASS right lower extremity arteriogram          (C-ARM# AND C-ARM TABLE);  Surgeon: Luan Ruff MD;  Location: [facility] OR;  Service: Vascular   • FINGER SURGERY Left     third finger   • ROTATOR CUFF REPAIR Left 06/30/2011    done in [place]     Family History   Problem Relation Age of Onset   • Lung cancer Mother    • Hypertension Mother    • Diabetes Maternal Grandmother    • Heart disease Maternal Grandmother    • Hypertension Maternal Grandfather    • Heart disease Maternal Grandfather    • Heart disease  Other    • Hypertension Other    • Cancer Other      Social History     Tobacco Use   • Smoking status: Former Smoker     Packs/day: 1.00     Years: 48.00     Pack years: 48.00     Types: Cigarettes     Last attempt to quit: 2018     Years since quittin.0   • Smokeless tobacco: Never Used   Substance Use Topics   • Alcohol use: Yes     Frequency: Monthly or less     Comment: socially   • Drug use: No       ALLERGIES:   Patient has no known allergies.    MEDICATIONS:      Current Outpatient Medications:   •  amLODIPine (NORVASC) 2.5 MG tablet, Take 1 tablet by mouth 2 (Two) Times a Day., Disp: 60 tablet, Rfl: 3  •  aspirin 81 MG EC tablet, Take 1 tablet by mouth Daily., Disp: 150 tablet, Rfl: 2  •  atorvastatin (LIPITOR) 10 MG tablet, Take 1 tablet by mouth Daily., Disp: 30 tablet, Rfl: 5  •  calcium-vitamin D (OSCAL 500/200 D-3) 500-200 MG-UNIT per tablet, Take 1 tablet by mouth Daily., Disp: 30 tablet, Rfl: 11  •  clopidogrel (PLAVIX) 75 MG tablet, Take 1 tablet by mouth Daily., Disp: 30 tablet, Rfl: 11  •  diclofenac (VOLTAREN) 1 % gel gel, Apply  topically to the appropriate area as directed 2 (Two) Times a Day., Disp: 30 g, Rfl: 1  •  FLUoxetine (PROZAC) 20 MG capsule, Take 1 capsule by mouth Daily., Disp: 90 capsule, Rfl: 3  •  fluticasone (FLONASE) 50 MCG/ACT nasal spray, 1 spray into the nostril(s) as directed by provider Daily., Disp: 16 mL, Rfl: 6  •  gabapentin (NEURONTIN) 100 MG capsule, Take 1 capsule by mouth 3 (Three) Times a Day., Disp: 90 capsule, Rfl: 2  •  lisinopril (PRINIVIL,ZESTRIL) 10 MG tablet, TAKE 1 TABLET BY MOUTH EVERY DAY, Disp: 30 tablet, Rfl: 3  •  oxyCODONE-acetaminophen (PERCOCET) 5-325 MG per tablet, Take 1-2 tablets by mouth Every 4 (Four) Hours As Needed for Moderate Pain  (Surgical Pain)., Disp: 36 tablet, Rfl: 0  •  Prenatal Vit-Fe Fumarate-FA (PRENATAL VITAMIN 27-0.8) 27-0.8 MG tablet tablet, Take 1 tablet by mouth Daily., Disp: , Rfl:   •  varenicline (CHANTIX  STARTING MONTH PAK) 0.5 MG X 11 & 1 MG X 42 tablet, Take 0.5 mg one daily on days 1-3 and and 0.5 mg twice daily on days 4-7.Then 1 mg twice daily for a total of 12 weeks., Disp: 53 tablet, Rfl: 0    Review of Systems   Constitution: Positive for weakness. Negative for decreased appetite, diaphoresis, fever and malaise/fatigue.   HENT: Negative for hearing loss, hoarse voice, nosebleeds and sore throat.    Eyes: Negative for blurred vision, double vision and visual disturbance.   Cardiovascular: Negative for chest pain, claudication, dyspnea on exertion and leg swelling.   Respiratory: Negative for cough, shortness of breath, sputum production and wheezing.    Endocrine: Positive for cold intolerance. Negative for polyuria.   Hematologic/Lymphatic: Negative for bleeding problem. Bruises/bleeds easily.   Skin: Positive for unusual hair distribution. Negative for color change, nail changes and poor wound healing.        Bilateral groin and right above knee incision CDI   Musculoskeletal: Positive for arthritis, back pain and joint pain. Negative for joint swelling.   Gastrointestinal: Negative for abdominal pain, hematemesis, melena, nausea and vomiting.   Genitourinary: Negative for flank pain, hematuria and urgency.   Neurological: Negative for brief paralysis, disturbances in coordination, focal weakness, light-headedness, numbness and paresthesias.        No amaurosis fugax, TIA, or CVA.     Psychiatric/Behavioral: Positive for depression. Negative for hallucinations and suicidal ideas.   Allergic/Immunologic: Negative for hives and persistent infections.     Vitals:    01/07/19 1353   BP: 95/50   Pulse: 68   Temp: 97.5 °F (36.4 °C)   SpO2: 90%      Objective   Temp:  [97.5 °F (36.4 °C)] 97.5 °F (36.4 °C)  Heart Rate:  [68] 68  BP: (95)/(50) 95/50  Body mass index is 17.51 kg/m².  Physical Exam   Constitutional: She is oriented to person, place, and time. She appears well-developed and well-nourished.   HENT:    Head: Normocephalic and atraumatic.   Mouth/Throat: Oropharynx is clear and moist.   Eyes: Conjunctivae and EOM are normal. Pupils are equal, round, and reactive to light.   Neck: Normal range of motion. Neck supple. No JVD present.   Cardiovascular: Normal rate, regular rhythm, normal heart sounds and intact distal pulses.   Biphasic PT/DP bilateral signals doppler   Pulmonary/Chest: Effort normal and breath sounds normal. She has no wheezes.   Abdominal: Soft. Bowel sounds are normal. She exhibits no distension. There is no tenderness.   Genitourinary:   Genitourinary Comments: deferred   Musculoskeletal: Normal range of motion. She exhibits edema (trace BLE ).   Lymphadenopathy:     She has no cervical adenopathy.   Neurological: She is alert and oriented to person, place, and time. No cranial nerve deficit.   Skin: Skin is warm and dry. Capillary refill takes less than 2 seconds. No erythema.   BIlateral groin incision CDI  Right above knee incision CDI   Psychiatric: She has a normal mood and affect. Her behavior is normal. Judgment normal.   Nursing note and vitals reviewed.        Assessment & Plan     Peripheral Vascular Disease  Post surgical revascularization.  Biphasic distals signals bilaterally.  Medical management of PVD with statin, DAPT, ACE.  Will follow up in office in 5 weeks for MONTY and ultrasound for graft surveillance.  Reports she is no longer smoking with use of chantix.    Surgical After Care  Continue with weight lifting restrictions nothing greater than 10lbs within 4 weeks of surgery date.  May drive as long as not taking narcotics.  She may resume chronic pain management regimen.  Encourage ambulation with elevation of the extremities at rest.  Surgical incisions may be washed daily with mild soap and water, do not scrub incisions.  No submersion of incisions within 4 weeks of surgery date.  Continue to monitor site for redness, drainage, and fever: report to CVTS if these signs and  symptoms were to occur.    Lifestyle Modifications  Recommended lifestyle modifications to reduce the progression of vascular disease include increasing moderate activity to 150 minutes per week, avoidance of smoking, optimizing blood pressure control, strict control of diabetes, and management of hyperlipidemia.  Choose a diet that emphasizes intake of vegetables, fruits, and whole grains; includes low-fat dairy products, poultry, fish, legumes, nontropical vegetable oils, and nuts; and limits intake of sweets, sugar-sweetened beverages, and red meats.  Limit alcohol intake to one drink per day in females and 2 drinks per day in men.          Detailed discussion regarding risks, benefits, and treatment plan. Images independently reviewed. Patient understands, agrees, and wishes to proceed with plan.

## 2019-01-09 ENCOUNTER — PATIENT OUTREACH (OUTPATIENT)
Dept: CASE MANAGEMENT | Facility: OTHER | Age: 69
End: 2019-01-09

## 2019-01-09 NOTE — OUTREACH NOTE
Outreach to patient at contact number provided.  Call answered by daughter who states patient is doing fine.  Daughter declines care advising on behalf of patient.

## 2019-02-06 ENCOUNTER — OFFICE VISIT (OUTPATIENT)
Dept: CARDIAC SURGERY | Facility: CLINIC | Age: 69
End: 2019-02-06

## 2019-02-06 VITALS
TEMPERATURE: 98.4 F | BODY MASS INDEX: 16.73 KG/M2 | HEART RATE: 73 BPM | DIASTOLIC BLOOD PRESSURE: 72 MMHG | SYSTOLIC BLOOD PRESSURE: 130 MMHG | OXYGEN SATURATION: 98 % | WEIGHT: 98 LBS | HEIGHT: 64 IN

## 2019-02-06 DIAGNOSIS — F17.210 HEAVY TOBACCO SMOKER >10 CIGARETTES PER DAY: ICD-10-CM

## 2019-02-06 DIAGNOSIS — I73.9 PVD (PERIPHERAL VASCULAR DISEASE) (HCC): ICD-10-CM

## 2019-02-06 DIAGNOSIS — R09.89 BRUIT OF RIGHT CAROTID ARTERY: ICD-10-CM

## 2019-02-06 DIAGNOSIS — Z09 FOLLOW-UP SURGERY CARE: ICD-10-CM

## 2019-02-06 DIAGNOSIS — I73.9 PVD (PERIPHERAL VASCULAR DISEASE) WITH CLAUDICATION (HCC): Primary | ICD-10-CM

## 2019-02-06 PROCEDURE — 99024 POSTOP FOLLOW-UP VISIT: CPT | Performed by: NURSE PRACTITIONER

## 2019-02-06 RX ORDER — HYDROCODONE BITARTRATE AND ACETAMINOPHEN 7.5; 325 MG/1; MG/1
1 TABLET ORAL EVERY 6 HOURS PRN
COMMUNITY
End: 2020-02-10 | Stop reason: SDUPTHER

## 2019-02-06 NOTE — PATIENT INSTRUCTIONS
The results of your vascular studies of your right leg shows mild disease with good  circulation, in the left leg shows moderatedisease with fair circulation.  Follow up in 3 months for MONTY, Graft surveillance, and carotid ultrasound.  Recommended lifestyle modifications to reduce the progression of vascular disease include increasing moderate activity to 150 minutes per week, avoidance of smoking, optimizing blood pressure control, strict control of diabetes, and management of hyperlipidemia.  Choose a diet that emphasizes intake of vegetables, fruits, and whole grains; includes low-fat dairy products, poultry, fish, legumes, nontropical vegetable oils, and nuts; and limits intake of sweets, sugar-sweetened beverages, and red meats.  Limit alcohol intake to one drink per day in females and 2 drinks per day in men.  If signs and symptoms of ischemia should occur including but not limited to pale/blue discoloration of limb, increasing pain with ambulation or at rest, or a non-healing wound. Patient is to notify Heart and Vascular center for immediate evaluation.

## 2019-02-09 DIAGNOSIS — I10 ESSENTIAL HYPERTENSION: ICD-10-CM

## 2019-02-12 NOTE — PROGRESS NOTES
CVTS Office Progress Note     Subjective   Patient ID: Jessie Jordan is a 68 y.o. female is here today for follow-up for PVD.    Chief Complaint:    Chief Complaint   Patient presents with   • Peripheral Vascular Disease     follow up        PCP:  Julianna Gloria MD  Cardiology:  Dr. Palacio     History of Present Illness  Ms. Jordan is a pleasant 68 year old female with a history of HTN, PVD, CAD, carotid stenosis, and former smoker.  Prior to intervention patient has had moderate leg pain for approximately 2 years and recently has been experiencing lifestyle limiting claudication.  Previous CVA in 2015 with no overt residual deficits, she has right leg neuropathy evaluated by neurology with normal EMG.  Previous stenting to ARON  Underwent LEFT to RIGHT femoral to femoral artery bypass and RIGHT FEMORAL POPLITEAL BYPASS December 2019.  She follows today for post operative 6 week follow up.         Carotid Studies  6/2018 Carotid Duplex:  JENNIFER 0-49% antegrade vert.  LICA 0-49% antegrade vert.    Peripheral Vascular Studies  5/2018 MONTY:  RIGHT .32 bi/monophasic.  LEFT .47 biphasic.  6/2018 CTA Aorta runoff:  RIGHT common iliac 90%, SFA small moderate diffuse disease, tibial diffuse disease.  LEFT  Common iliac 70%, external iliac 80%, SFA occluded reconstitutes distal via profunda collaterals, diffuse tibials.  10/11/18  MONTY:  RIGHT 0.50 Fem/Pop Biphasic  PT/DP Monophasic      Interventions  9/28/18  Agram:  PTA/Luminex Stent LEFT iliac artery:  RCIA occluded, RIIA/REIA moderate diffuse disease, RSFA occluded, RPT diffuse distal disease  12/19/18 LEFT to RIGHT femoral to femoral artery bypass and RIGHT FEMORAL POPLITEAL BYPASS (PTFE)    Cardiology  12/2017 Cardiac Catheterization:  Mild coronary irregularities.  Diffuse atherosclerosis.         The following portions of the patient's history were reviewed and updated as appropriate: allergies, current medications, past family history, past medical history,  past social history, past surgical history and problem list.  Recent images independently reviewed.  Available laboratory values reviewed.    PCP:  Julianna Gloria MD  Cardiology:  Dr. Palacio    Past Medical History:   Diagnosis Date   • Anxiety and depression    • Coronary artery disease involving native coronary artery of native heart 12/06/2017    seen on Cardiac Cath - Left main 50-70% stenosis, 12/27/2017 repeat cath showed coronary spasm with no stenosis   • Hyperlipidemia    • Hypertension    • Kidney cysts    • Post-menopausal 1990   • Skin cancer    • Stroke (CMS/HCC) 2015   • Vascular disease      Past Surgical History:   Procedure Laterality Date   • ARTERIOGRAM N/A 9/28/2018    Procedure: aortoiliac arteriogram possible angioplasty stent atherectomy thrombolysis bilateral iliac stents;  Surgeon: Luan Ruff MD;  Location: [facility] ANGIO INVASIVE LOCATION;  Service: Interventional Radiology   • CARDIAC CATHETERIZATION  12/06/2017    Done at [facility] - Left Main 50-70% Stenosis - no stenting done, recommendation was urgent CABG.  EF 50-60%   • CERVICAL FUSION  1985    C5-6   • COLONOSCOPY N/A 6/29/2018    Procedure: COLONOSCOPY;  Surgeon: Oz Way MD;  Location: [facility] ENDOSCOPY;  Service: Gastroenterology   • FEMORAL POPLITEAL BYPASS Right 12/19/2018    Procedure: femoral to femoral artery bypass, RIGHT FEMORAL POPLITEAL BYPASS right lower extremity arteriogram          (C-ARM# AND C-ARM TABLE);  Surgeon: Luan Ruff MD;  Location: [facility] OR;  Service: Vascular   • FINGER SURGERY Left     third finger   • ROTATOR CUFF REPAIR Left 06/30/2011    done in [place]     Family History   Problem Relation Age of Onset   • Lung cancer Mother    • Hypertension Mother    • Diabetes Maternal Grandmother    • Heart disease Maternal Grandmother    • Hypertension Maternal Grandfather    • Heart disease Maternal Grandfather    • Heart disease Other    • Hypertension Other    •  Cancer Other      Social History     Tobacco Use   • Smoking status: Former Smoker     Packs/day: 1.00     Years: 48.00     Pack years: 48.00     Types: Cigarettes     Last attempt to quit: 2018     Years since quittin.1   • Smokeless tobacco: Never Used   Substance Use Topics   • Alcohol use: Yes     Frequency: Monthly or less     Comment: socially   • Drug use: No       ALLERGIES:   Patient has no known allergies.    MEDICATIONS:      Current Outpatient Medications:   •  amLODIPine (NORVASC) 2.5 MG tablet, Take 1 tablet by mouth 2 (Two) Times a Day., Disp: 60 tablet, Rfl: 3  •  aspirin 81 MG EC tablet, Take 1 tablet by mouth Daily., Disp: 150 tablet, Rfl: 2  •  atorvastatin (LIPITOR) 10 MG tablet, Take 1 tablet by mouth Daily., Disp: 30 tablet, Rfl: 5  •  calcium-vitamin D (OSCAL 500/200 D-3) 500-200 MG-UNIT per tablet, Take 1 tablet by mouth Daily., Disp: 30 tablet, Rfl: 11  •  clopidogrel (PLAVIX) 75 MG tablet, Take 1 tablet by mouth Daily., Disp: 30 tablet, Rfl: 11  •  diclofenac (VOLTAREN) 1 % gel gel, Apply  topically to the appropriate area as directed 2 (Two) Times a Day., Disp: 30 g, Rfl: 1  •  FLUoxetine (PROZAC) 20 MG capsule, Take 1 capsule by mouth Daily., Disp: 90 capsule, Rfl: 3  •  fluticasone (FLONASE) 50 MCG/ACT nasal spray, 1 spray into the nostril(s) as directed by provider Daily., Disp: 16 mL, Rfl: 6  •  gabapentin (NEURONTIN) 100 MG capsule, Take 1 capsule by mouth 3 (Three) Times a Day., Disp: 90 capsule, Rfl: 2  •  HYDROcodone-acetaminophen (NORCO) 7.5-325 MG per tablet, Take 1 tablet by mouth Every 6 (Six) Hours As Needed for Moderate Pain ., Disp: , Rfl:   •  lisinopril (PRINIVIL,ZESTRIL) 10 MG tablet, TAKE 1 TABLET BY MOUTH EVERY DAY, Disp: 30 tablet, Rfl: 3  •  Prenatal Vit-Fe Fumarate-FA (PRENATAL VITAMIN 27-0.8) 27-0.8 MG tablet tablet, Take 1 tablet by mouth Daily., Disp: , Rfl:   •  varenicline (CHANTIX STARTING MONTH ) 0.5 MG X 11 & 1 MG X 42 tablet, Take 0.5 mg one  daily on days 1-3 and and 0.5 mg twice daily on days 4-7.Then 1 mg twice daily for a total of 12 weeks., Disp: 53 tablet, Rfl: 0    Review of Systems   Constitution: Positive for weakness. Negative for decreased appetite, diaphoresis, fever and malaise/fatigue.   HENT: Negative for hearing loss, hoarse voice, nosebleeds and sore throat.    Eyes: Negative for blurred vision, double vision and visual disturbance.   Cardiovascular: Negative for chest pain, claudication, dyspnea on exertion and leg swelling.   Respiratory: Negative for cough, shortness of breath, sputum production and wheezing.    Endocrine: Positive for cold intolerance. Negative for polyuria.   Hematologic/Lymphatic: Negative for bleeding problem. Bruises/bleeds easily.   Skin: Positive for unusual hair distribution. Negative for color change, nail changes and poor wound healing.        Bilateral groin and right above knee incision CDI   Musculoskeletal: Positive for arthritis, back pain and joint pain. Negative for joint swelling.   Gastrointestinal: Negative for abdominal pain, hematemesis, melena, nausea and vomiting.   Genitourinary: Negative for flank pain, hematuria and urgency.   Neurological: Negative for brief paralysis, disturbances in coordination, focal weakness, light-headedness, numbness and paresthesias.        No amaurosis fugax, TIA, or CVA.     Psychiatric/Behavioral: Positive for depression. Negative for hallucinations and suicidal ideas.   Allergic/Immunologic: Negative for hives and persistent infections.     Vitals:    02/06/19 1031   BP: 130/72   Pulse: 73   Temp: 98.4 °F (36.9 °C)   SpO2: 98%      Objective      Body mass index is 16.82 kg/m².  Physical Exam   Constitutional: She is oriented to person, place, and time. She appears well-developed and well-nourished.   HENT:   Head: Normocephalic and atraumatic.   Mouth/Throat: Oropharynx is clear and moist.   Eyes: Conjunctivae and EOM are normal. Pupils are equal, round, and  reactive to light.   Neck: Normal range of motion. Neck supple. No JVD present.   Cardiovascular: Normal rate, regular rhythm, normal heart sounds and intact distal pulses.   Biphasic PT/DP bilateral signals doppler   Pulmonary/Chest: Effort normal and breath sounds normal. She has no wheezes.   Abdominal: Soft. Bowel sounds are normal. She exhibits no distension. There is no tenderness.   Genitourinary:   Genitourinary Comments: deferred   Musculoskeletal: Normal range of motion. She exhibits edema (trace BLE ).   Lymphadenopathy:     She has no cervical adenopathy.   Neurological: She is alert and oriented to person, place, and time. No cranial nerve deficit.   Skin: Skin is warm and dry. Capillary refill takes less than 2 seconds. No erythema.   BIlateral groin incision CDI  Right above knee incision CDI   Psychiatric: She has a normal mood and affect. Her behavior is normal. Judgment normal.   Nursing note and vitals reviewed.        Assessment & Plan     Peripheral Vascular Disease  Post surgical revascularization.  Biphasic distals signals bilaterally.  Medical management of PVD with statin, DAPT, ACE.  Will follow up in office in 3 months for MONTY and ultrasound for graft surveillance.  Reports she is no longer smoking with use of chantix.    Depression  Some progressive weight loss over the past 30 days.  She reports poor appetite and general disinterest in food.  She reports depression.  I have made appointment with her PCP for 2/14/19.    Surgical After Care  Continue with weight lifting restrictions nothing greater than 10lbs within 4 weeks of surgery date.  May drive as long as not taking narcotics.  She may resume chronic pain management regimen.  Encourage ambulation with elevation of the extremities at rest.  Surgical incisions may be washed daily with mild soap and water, do not scrub incisions.  No submersion of incisions within 4 weeks of surgery date.  Continue to monitor site for redness,  drainage, and fever: report to CVTS if these signs and symptoms were to occur.    Lifestyle Modifications  Recommended lifestyle modifications to reduce the progression of vascular disease include increasing moderate activity to 150 minutes per week, avoidance of smoking, optimizing blood pressure control, strict control of diabetes, and management of hyperlipidemia.  Choose a diet that emphasizes intake of vegetables, fruits, and whole grains; includes low-fat dairy products, poultry, fish, legumes, nontropical vegetable oils, and nuts; and limits intake of sweets, sugar-sweetened beverages, and red meats.  Limit alcohol intake to one drink per day in females and 2 drinks per day in men.          Detailed discussion regarding risks, benefits, and treatment plan. Images independently reviewed. Patient understands, agrees, and wishes to proceed with plan.

## 2019-02-19 ENCOUNTER — OFFICE VISIT (OUTPATIENT)
Dept: FAMILY MEDICINE CLINIC | Facility: CLINIC | Age: 69
End: 2019-02-19

## 2019-02-19 ENCOUNTER — LAB (OUTPATIENT)
Dept: LAB | Facility: HOSPITAL | Age: 69
End: 2019-02-19

## 2019-02-19 VITALS
BODY MASS INDEX: 17.07 KG/M2 | HEIGHT: 64 IN | SYSTOLIC BLOOD PRESSURE: 130 MMHG | WEIGHT: 100 LBS | HEART RATE: 58 BPM | DIASTOLIC BLOOD PRESSURE: 78 MMHG | OXYGEN SATURATION: 97 %

## 2019-02-19 DIAGNOSIS — R63.4 WEIGHT LOSS: Primary | ICD-10-CM

## 2019-02-19 DIAGNOSIS — G89.4 CHRONIC PAIN SYNDROME: ICD-10-CM

## 2019-02-19 DIAGNOSIS — F51.01 PRIMARY INSOMNIA: ICD-10-CM

## 2019-02-19 DIAGNOSIS — I73.9 PVD (PERIPHERAL VASCULAR DISEASE) (HCC): ICD-10-CM

## 2019-02-19 DIAGNOSIS — R63.4 WEIGHT LOSS: ICD-10-CM

## 2019-02-19 DIAGNOSIS — Z51.81 MEDICATION MONITORING ENCOUNTER: ICD-10-CM

## 2019-02-19 LAB
ALBUMIN SERPL-MCNC: 4.8 G/DL (ref 3.4–4.8)
ALBUMIN/GLOB SERPL: 1.8 G/DL (ref 1.1–1.8)
ALP SERPL-CCNC: 73 U/L (ref 38–126)
ALT SERPL W P-5'-P-CCNC: 20 U/L (ref 9–52)
AMPHET+METHAMPHET UR QL: NEGATIVE
ANION GAP SERPL CALCULATED.3IONS-SCNC: 8 MMOL/L (ref 5–15)
AST SERPL-CCNC: 37 U/L (ref 14–36)
BARBITURATES UR QL SCN: NEGATIVE
BASOPHILS # BLD AUTO: 0.07 10*3/MM3 (ref 0–0.2)
BASOPHILS NFR BLD AUTO: 0.8 % (ref 0–1.5)
BENZODIAZ UR QL SCN: NEGATIVE
BILIRUB SERPL-MCNC: 0.3 MG/DL (ref 0.2–1.3)
BUN BLD-MCNC: 12 MG/DL (ref 7–21)
BUN/CREAT SERPL: 19.7 (ref 7–25)
CALCIUM SPEC-SCNC: 10.3 MG/DL (ref 8.4–10.2)
CANNABINOIDS SERPL QL: NEGATIVE
CHLORIDE SERPL-SCNC: 98 MMOL/L (ref 95–110)
CO2 SERPL-SCNC: 27 MMOL/L (ref 22–31)
COCAINE UR QL: NEGATIVE
CREAT BLD-MCNC: 0.61 MG/DL (ref 0.5–1)
DEPRECATED RDW RBC AUTO: 53.6 FL (ref 37–54)
EOSINOPHIL # BLD AUTO: 0.05 10*3/MM3 (ref 0–0.4)
EOSINOPHIL NFR BLD AUTO: 0.6 % (ref 0.3–6.2)
ERYTHROCYTE [DISTWIDTH] IN BLOOD BY AUTOMATED COUNT: 18.6 % (ref 12.3–15.4)
GFR SERPL CREATININE-BSD FRML MDRD: 98 ML/MIN/1.73 (ref 45–104)
GLOBULIN UR ELPH-MCNC: 2.7 GM/DL (ref 2.3–3.5)
GLUCOSE BLD-MCNC: 96 MG/DL (ref 60–100)
HCT VFR BLD AUTO: 30.2 % (ref 34–46.6)
HGB BLD-MCNC: 9.6 G/DL (ref 12–15.9)
IMM GRANULOCYTES # BLD AUTO: 0.03 10*3/MM3 (ref 0–0.05)
IMM GRANULOCYTES NFR BLD AUTO: 0.4 % (ref 0–0.5)
LYMPHOCYTES # BLD AUTO: 1.98 10*3/MM3 (ref 0.7–3.1)
LYMPHOCYTES NFR BLD AUTO: 23.7 % (ref 19.6–45.3)
MCH RBC QN AUTO: 25.1 PG (ref 26.6–33)
MCHC RBC AUTO-ENTMCNC: 31.8 G/DL (ref 31.5–35.7)
MCV RBC AUTO: 79.1 FL (ref 79–97)
METHADONE UR QL SCN: NEGATIVE
MONOCYTES # BLD AUTO: 0.87 10*3/MM3 (ref 0.1–0.9)
MONOCYTES NFR BLD AUTO: 10.4 % (ref 5–12)
NEUTROPHILS # BLD AUTO: 5.35 10*3/MM3 (ref 1.4–7)
NEUTROPHILS NFR BLD AUTO: 64.1 % (ref 42.7–76)
NRBC BLD AUTO-RTO: 0 /100 WBC (ref 0–0)
OPIATES UR QL: POSITIVE
OXYCODONE UR QL SCN: NEGATIVE
PLATELET # BLD AUTO: 387 10*3/MM3 (ref 140–450)
PMV BLD AUTO: 9.9 FL (ref 6–12)
POTASSIUM BLD-SCNC: 4.2 MMOL/L (ref 3.5–5.1)
PROT SERPL-MCNC: 7.5 G/DL (ref 6.3–8.6)
RBC # BLD AUTO: 3.82 10*6/MM3 (ref 3.77–5.28)
SODIUM BLD-SCNC: 133 MMOL/L (ref 137–145)
TSH SERPL DL<=0.05 MIU/L-ACNC: 1.59 MIU/ML (ref 0.46–4.68)
WBC NRBC COR # BLD: 8.35 10*3/MM3 (ref 3.4–10.8)

## 2019-02-19 PROCEDURE — 80307 DRUG TEST PRSMV CHEM ANLYZR: CPT | Performed by: FAMILY MEDICINE

## 2019-02-19 PROCEDURE — 84443 ASSAY THYROID STIM HORMONE: CPT

## 2019-02-19 PROCEDURE — 99213 OFFICE O/P EST LOW 20 MIN: CPT | Performed by: FAMILY MEDICINE

## 2019-02-19 PROCEDURE — 85025 COMPLETE CBC W/AUTO DIFF WBC: CPT | Performed by: FAMILY MEDICINE

## 2019-02-19 PROCEDURE — 36415 COLL VENOUS BLD VENIPUNCTURE: CPT | Performed by: FAMILY MEDICINE

## 2019-02-19 PROCEDURE — 80053 COMPREHEN METABOLIC PANEL: CPT

## 2019-02-19 RX ORDER — NAPROXEN 500 MG/1
500 TABLET ORAL 2 TIMES DAILY WITH MEALS
Refills: 2 | COMMUNITY
Start: 2019-01-06 | End: 2019-12-13 | Stop reason: SDUPTHER

## 2019-02-19 RX ORDER — VARENICLINE TARTRATE 1 MG/1
1 TABLET, FILM COATED ORAL 2 TIMES DAILY
Refills: 2 | COMMUNITY
Start: 2019-01-05 | End: 2019-07-01

## 2019-02-19 RX ORDER — BUPROPION HYDROCHLORIDE 150 MG/1
150 TABLET, EXTENDED RELEASE ORAL EVERY 12 HOURS
Refills: 3 | COMMUNITY
Start: 2019-01-28 | End: 2019-07-01

## 2019-02-19 NOTE — PROGRESS NOTES
"ID: Jessie Jordan    CC:   Chief Complaint   Patient presents with   • Weight Loss   • Depression       Subjective:     HPI     Jessie Jordan is a 68 y.o. female who presents for weight loss, depression and change in sense of taste.   Patient has decreased appetite for the past 1 month.  Food has an abnormal taste \"just dont taste right.\"  Patient started chantix in December.  Patient has cough which is productive occasionally but no blood.  Occassional night sweats, infrequent.  Increased weakness of her arms and hands.  Patient had surgery in December stent placement in right leg by dr. Ruff.  Patient complains of heat and cold insensitivity.  Patient denies any hair loss.  Patient complains of diarrhea which occurs daily.  Patient's weight today shows she has lost 7 pounds.         Insomina:  Patient states she sleeps 90 minutes at a time.  She has difficulty staying asleep but not going to sleep.  She states she gets maybe 4 hours of sleep daily.  This started about 2-3 weeks ago and she feels this is related to school problems her grandson is having.  Sleep hygiene explained as patient drinks 1 pot of coffee daily.  She was asked to cut back.  She will also be started on melatonin.       Refill of Gabapentin taken for pain secondary to neuropathy from chronic PVD:  Patient requests refill of gabapentin she has been taking for neuropathy from PVD.  She states it is a 6/10 cramping/burning/tingling pain which is worse at night and with activity and improves with gabapentin.  She takes 100 mg TID and would like a refill.   Patient was informed that pain management should be prescribing this for her and will need to address this with them in the future.  She voiced understanding.      Preventative:  PHQ-9 Depression Screening  Little interest or pleasure in doing things? 2   Feeling down, depressed, or hopeless? 1   Trouble falling or staying asleep, or sleeping too much? 1   Feeling tired or having " little energy? 1   Poor appetite or overeating? 1   Feeling bad about yourself - or that you are a failure or have let yourself or your family down? 1   Trouble concentrating on things, such as reading the newspaper or watching television? 1   Moving or speaking so slowly that other people could have noticed? Or the opposite - being so fidgety or restless that you have been moving around a lot more than usual? 1   Thoughts that you would be better off dead, or of hurting yourself in some way? 0   PHQ-9 Total Score 9   If you checked off any problems, how difficult have these problems made it for you to do your work, take care of things at home, or get along with other people? Not difficult at all       On osteoporosis therapy?No     Past Medical Hx:  Past Medical History:   Diagnosis Date   • Anxiety and depression    • Coronary artery disease involving native coronary artery of native heart 12/06/2017    seen on Cardiac Cath - Left main 50-70% stenosis, 12/27/2017 repeat cath showed coronary spasm with no stenosis   • Hyperlipidemia    • Hypertension    • Kidney cysts    • Post-menopausal 1990   • Skin cancer    • Stroke (CMS/[facility]) 2015   • Vascular disease        Past Surgical Hx:  Past Surgical History:   Procedure Laterality Date   • ARTERIOGRAM N/A 9/28/2018    Procedure: aortoiliac arteriogram possible angioplasty stent atherectomy thrombolysis bilateral iliac stents;  Surgeon: Luan Ruff MD;  Location: [facility] ANGIO INVASIVE LOCATION;  Service: Interventional Radiology   • CARDIAC CATHETERIZATION  12/06/2017    Done at [facility] - Left Main 50-70% Stenosis - no stenting done, recommendation was urgent CABG.  EF 50-60%   • CERVICAL FUSION  1985    C5-6   • COLONOSCOPY N/A 6/29/2018    Procedure: COLONOSCOPY;  Surgeon: Oz Way MD;  Location: [facility] ENDOSCOPY;  Service: Gastroenterology   • FEMORAL POPLITEAL BYPASS Right 12/19/2018    Procedure: femoral to femoral artery  bypass, RIGHT FEMORAL POPLITEAL BYPASS right lower extremity arteriogram          (C-ARM# AND C-ARM TABLE);  Surgeon: Luan Ruff MD;  Location: [facility];  Service: Vascular   • FINGER SURGERY Left     third finger   • ROTATOR CUFF REPAIR Left 06/30/2011    done in [place]       Health Maintenance:  Health Maintenance   Topic Date Due   • TDAP/TD VACCINES (1 - Tdap) 09/04/1969   • ZOSTER VACCINE (1 of 2) 09/04/2000   • LIPID PANEL  03/23/2019   • MEDICARE ANNUAL WELLNESS  04/04/2019   • PNEUMOCOCCAL VACCINES (65+ LOW/MEDIUM RISK) (2 of 2 - PPSV23) 04/05/2019   • LUNG CANCER SCREENING  04/11/2019   • DXA SCAN  04/25/2020   • MAMMOGRAM  05/04/2020   • COLONOSCOPY  06/29/2023   • HEPATITIS C SCREENING  Completed   • INFLUENZA VACCINE  Completed       Current Meds:    Current Outpatient Medications:   •  amLODIPine (NORVASC) 2.5 MG tablet, Take 1 tablet by mouth 2 (Two) Times a Day., Disp: 60 tablet, Rfl: 3  •  aspirin 81 MG EC tablet, Take 1 tablet by mouth Daily., Disp: 150 tablet, Rfl: 2  •  atorvastatin (LIPITOR) 10 MG tablet, Take 1 tablet by mouth Daily., Disp: 30 tablet, Rfl: 5  •  buPROPion SR (WELLBUTRIN SR) 150 MG 12 hr tablet, Take 150 mg by mouth Every 12 (Twelve) Hours., Disp: , Rfl: 3  •  calcium-vitamin D (OSCAL 500/200 D-3) 500-200 MG-UNIT per tablet, Take 1 tablet by mouth Daily., Disp: 30 tablet, Rfl: 11  •  CHANTIX 1 MG tablet, Take 1 mg by mouth 2 (Two) Times a Day., Disp: , Rfl: 2  •  clopidogrel (PLAVIX) 75 MG tablet, Take 1 tablet by mouth Daily., Disp: 30 tablet, Rfl: 11  •  diclofenac (VOLTAREN) 1 % gel gel, Apply  topically to the appropriate area as directed 2 (Two) Times a Day., Disp: 30 g, Rfl: 1  •  FLUoxetine (PROZAC) 20 MG capsule, Take 1 capsule by mouth Daily., Disp: 90 capsule, Rfl: 3  •  fluticasone (FLONASE) 50 MCG/ACT nasal spray, 1 spray into the nostril(s) as directed by provider Daily., Disp: 16 mL, Rfl: 6  •  gabapentin (NEURONTIN) 100 MG capsule, Take 1 capsule by mouth  3 (Three) Times a Day., Disp: 90 capsule, Rfl: 2  •  HYDROcodone-acetaminophen (NORCO) 7.5-325 MG per tablet, Take 1 tablet by mouth Every 6 (Six) Hours As Needed for Moderate Pain ., Disp: , Rfl:   •  lisinopril (PRINIVIL,ZESTRIL) 10 MG tablet, TAKE 1 TABLET BY MOUTH EVERY DAY, Disp: 30 tablet, Rfl: 3  •  naproxen (NAPROSYN) 500 MG tablet, Take 500 mg by mouth 2 (Two) Times a Day With Meals., Disp: , Rfl: 2  •  OS-LORENE CALCIUM + D3 500-200 MG-UNIT tablet, Take 1 tablet by mouth Daily., Disp: , Rfl: 2  •  Prenatal Vit-Fe Fumarate-FA (PRENATAL VITAMIN 27-0.8) 27-0.8 MG tablet tablet, Take 1 tablet by mouth Daily., Disp: , Rfl:   •  varenicline (CHANTIX STARTING MONTH PAK) 0.5 MG X 11 & 1 MG X 42 tablet, Take 0.5 mg one daily on days 1-3 and and 0.5 mg twice daily on days 4-7.Then 1 mg twice daily for a total of 12 weeks., Disp: 53 tablet, Rfl: 0  •  melatonin 3 MG tablet, Take 1 tablet by mouth Every Night., Disp: 30 tablet, Rfl: 1    Allergies:  Patient has no known allergies.    Family Hx:  Family History   Problem Relation Age of Onset   • Lung cancer Mother    • Hypertension Mother    • Diabetes Maternal Grandmother    • Heart disease Maternal Grandmother    • Hypertension Maternal Grandfather    • Heart disease Maternal Grandfather    • Heart disease Other    • Hypertension Other    • Cancer Other         Social History:  Social History     Socioeconomic History   • Marital status:      Spouse name: Not on file   • Number of children: 1   • Years of education: Not on file   • Highest education level: Not on file   Social Needs   • Financial resource strain: Not on file   • Food insecurity - worry: Not on file   • Food insecurity - inability: Not on file   • Transportation needs - medical: Not on file   • Transportation needs - non-medical: Not on file   Occupational History   • Occupation: Retired     Comment: Patient resides alone (daughter resides across street).   Tobacco Use   • Smoking status: Former  "Smoker     Packs/day: 1.00     Years: 48.00     Pack years: 48.00     Types: Cigarettes     Last attempt to quit: 2018     Years since quittin.1   • Smokeless tobacco: Never Used   Substance and Sexual Activity   • Alcohol use: Yes     Frequency: Monthly or less     Comment: socially   • Drug use: No   • Sexual activity: Defer     Comment: .   Other Topics Concern   • Not on file   Social History Narrative   • Not on file       Review of Systems   Constitutional: Positive for appetite change and unexpected weight change. Negative for activity change, fatigue and fever.   HENT: Negative for ear pain and sore throat.    Eyes: Negative for pain and visual disturbance.   Respiratory: Positive for cough. Negative for shortness of breath.    Cardiovascular: Negative for chest pain and palpitations.   Gastrointestinal: Positive for diarrhea. Negative for abdominal pain, constipation, nausea and vomiting.   Endocrine: Positive for cold intolerance and heat intolerance.   Genitourinary: Negative for difficulty urinating and dysuria.   Musculoskeletal: Negative for arthralgias and gait problem.   Skin: Negative for color change and rash.   Neurological: Negative for dizziness, weakness and headaches.   Hematological: Negative for adenopathy. Does not bruise/bleed easily.   Psychiatric/Behavioral: Negative for agitation, confusion and sleep disturbance.       Objective:     /78   Pulse 58   Ht 162.6 cm (64\")   Wt 45.4 kg (100 lb)   SpO2 97%   BMI 17.16 kg/m²     Physical Exam   Constitutional: She is oriented to person, place, and time. She appears well-developed and well-nourished. No distress.   HENT:   Head: Normocephalic and atraumatic.   Nose: Nose normal.   Eyes: Conjunctivae are normal. Right eye exhibits no discharge. Left eye exhibits no discharge.   Neck: Neck supple.   Cardiovascular: Normal rate, regular rhythm and normal heart sounds. Exam reveals no gallop and no friction rub.   No " murmur heard.  Pulmonary/Chest: Effort normal and breath sounds normal. No accessory muscle usage. No respiratory distress. She has no wheezes. She has no rales. She exhibits no tenderness.   Abdominal: Soft. Bowel sounds are normal. She exhibits no distension. There is no tenderness.   Musculoskeletal: She exhibits no edema or deformity.   Neurological: She is alert and oriented to person, place, and time.   Skin: Skin is warm and dry. No rash noted. She is not diaphoretic. No erythema. No pallor.   Psychiatric: She has a normal mood and affect. Her behavior is normal.        Assessment/Plan:     Jessie was seen today for weight loss and depression.    Diagnoses and all orders for this visit:    Weight loss  -     TSH; Future  -     CBC Auto Differential  -     Comprehensive metabolic panel; Future    Medication monitoring encounter  -     Cancel: Urine Drug Screen - Urine, Clean Catch; Future  -     Urine Drug Screen - Urine, Clean Catch    Primary insomnia  -     melatonin 3 MG tablet; Take 1 tablet by mouth Every Night.    Chronic pain syndrome  -     gabapentin (NEURONTIN) 100 MG capsule; Take 1 capsule by mouth 3 (Three) Times a Day.    PVD (peripheral vascular disease) (CMS/HCC)  -     gabapentin (NEURONTIN) 100 MG capsule; Take 1 capsule by mouth 3 (Three) Times a Day.      Patient was offered change of antidepressant but declined at this time.  Patient was informed that change in taste could be related to the chantix or could be due to increased ability to taste things after quitting smoking.  She voiced understanding.     Abdullahi reviewed # [id number]    Follow-up:     Return in about 4 weeks (around 3/19/2019) for Recheck weight loss.      Goals        Patient Stated    • Quit smoking / using tobacco (pt-stated)      Barriers: compliance with cessation medications         Other    • Pain control      Barriers: severe PAD, pt continues to smoke          Patient's Body mass index is 17.16 kg/m². BMI is below  normal parameters. Recommendations include: no follow-up required.      Health Maintenance   Topic Date Due   • TDAP/TD VACCINES (1 - Tdap) 09/04/1969   • ZOSTER VACCINE (1 of 2) 09/04/2000   • LIPID PANEL  03/23/2019   • MEDICARE ANNUAL WELLNESS  04/04/2019   • PNEUMOCOCCAL VACCINES (65+ LOW/MEDIUM RISK) (2 of 2 - PPSV23) 04/05/2019   • LUNG CANCER SCREENING  04/11/2019   • DXA SCAN  04/25/2020   • MAMMOGRAM  05/04/2020   • COLONOSCOPY  06/29/2023   • HEPATITIS C SCREENING  Completed   • INFLUENZA VACCINE  Completed       Patient quit smoking on December 15 2018, currently on chantix.   occasional/rare  eat more fruits and vegetables, increase water intake, increase physical activity, reduce screen time, cut out extra servings, family to eat at dinner table more often, plan meals and have 3 meals a day    RISK SCORE: 3          This document has been electronically signed by Mony Miller MD on February 21, 2019 11:44 AM

## 2019-02-21 ENCOUNTER — TELEPHONE (OUTPATIENT)
Dept: FAMILY MEDICINE CLINIC | Facility: CLINIC | Age: 69
End: 2019-02-21

## 2019-02-21 RX ORDER — GABAPENTIN 100 MG/1
100 CAPSULE ORAL 3 TIMES DAILY
Qty: 90 CAPSULE | Refills: 2 | Status: SHIPPED | OUTPATIENT
Start: 2019-02-21 | End: 2020-03-03 | Stop reason: SDUPTHER

## 2019-02-21 RX ORDER — LANOLIN ALCOHOL/MO/W.PET/CERES
3 CREAM (GRAM) TOPICAL NIGHTLY
Qty: 30 TABLET | Refills: 1 | Status: SHIPPED | OUTPATIENT
Start: 2019-02-21 | End: 2019-04-22 | Stop reason: SDUPTHER

## 2019-02-21 NOTE — PROGRESS NOTES
I have reviewed the notes, assessments, and/or procedures performed by Dr. Mony Miller, I concur with her  documentation and assessment and plan for Jessie Jordan        This document has been electronically signed by Pancho Reyna MD on February 21, 2019 3:47 PM

## 2019-02-21 NOTE — TELEPHONE ENCOUNTER
Pt called and is needing refills on her gabapentin (NEURONTIN) 100 MG capsule and she said that [facility] called her and there was no refills on this medication. I tried to explain to her that the nurse said that there were refills there for all the medications that she was needing and that she would have to call the pharmacy to get those refilled.       Thank you,    Deidre

## 2019-02-22 DIAGNOSIS — I10 ESSENTIAL HYPERTENSION: ICD-10-CM

## 2019-02-28 RX ORDER — AMLODIPINE BESYLATE 2.5 MG/1
2.5 TABLET ORAL 2 TIMES DAILY
Qty: 60 TABLET | Refills: 3 | Status: SHIPPED | OUTPATIENT
Start: 2019-02-28 | End: 2019-12-03

## 2019-03-01 RX ORDER — AMLODIPINE BESYLATE 2.5 MG/1
TABLET ORAL
Qty: 180 TABLET | Refills: 0 | Status: SHIPPED | OUTPATIENT
Start: 2019-03-01 | End: 2019-08-23 | Stop reason: SDUPTHER

## 2019-03-11 RX ORDER — ATORVASTATIN CALCIUM 10 MG/1
10 TABLET, FILM COATED ORAL DAILY
Qty: 30 TABLET | Refills: 5 | Status: SHIPPED | OUTPATIENT
Start: 2019-03-11 | End: 2019-12-13 | Stop reason: SDUPTHER

## 2019-04-22 DIAGNOSIS — F51.01 PRIMARY INSOMNIA: ICD-10-CM

## 2019-04-29 RX ORDER — OMEPRAZOLE MAGNESIUM 20 MG
CAPSULE,DELAYED RELEASE (ENTERIC COATED) ORAL
Qty: 30 TABLET | Refills: 1 | Status: SHIPPED | OUTPATIENT
Start: 2019-04-29 | End: 2019-06-30 | Stop reason: SDUPTHER

## 2019-05-03 DIAGNOSIS — I10 ESSENTIAL HYPERTENSION: ICD-10-CM

## 2019-05-03 RX ORDER — LISINOPRIL 10 MG/1
10 TABLET ORAL DAILY
Qty: 30 TABLET | Refills: 3 | Status: SHIPPED | OUTPATIENT
Start: 2019-05-03 | End: 2019-08-07 | Stop reason: SDUPTHER

## 2019-05-21 ENCOUNTER — OFFICE VISIT (OUTPATIENT)
Dept: CARDIAC SURGERY | Facility: CLINIC | Age: 69
End: 2019-05-21

## 2019-05-21 VITALS
WEIGHT: 107 LBS | BODY MASS INDEX: 18.27 KG/M2 | OXYGEN SATURATION: 98 % | DIASTOLIC BLOOD PRESSURE: 70 MMHG | SYSTOLIC BLOOD PRESSURE: 122 MMHG | TEMPERATURE: 97.5 F | HEIGHT: 64 IN | HEART RATE: 73 BPM

## 2019-05-21 DIAGNOSIS — I65.23 BILATERAL CAROTID ARTERY STENOSIS: ICD-10-CM

## 2019-05-21 DIAGNOSIS — Z12.2 ENCOUNTER FOR SCREENING FOR LUNG CANCER: ICD-10-CM

## 2019-05-21 DIAGNOSIS — I73.9 PVD (PERIPHERAL VASCULAR DISEASE) (HCC): Primary | ICD-10-CM

## 2019-05-21 DIAGNOSIS — Z87.891 FORMER CIGARETTE SMOKER: ICD-10-CM

## 2019-05-21 DIAGNOSIS — R22.1 MASS OF LEFT SIDE OF NECK: ICD-10-CM

## 2019-05-21 PROCEDURE — 99215 OFFICE O/P EST HI 40 MIN: CPT | Performed by: NURSE PRACTITIONER

## 2019-05-21 NOTE — PATIENT INSTRUCTIONS
The results of your vascular studies of your right leg shows mild disease with good  circulation, in the left leg shows milddisease with good circulation.  The results of your carotid ultrasound shows mild disease of the right carotid and is stable from previous exam, ultrasound of the left carotid shows moderate disease and is worsening from previous exam.  Small spot near the left neck on ultrasound, will discuss with ENT specialist for further recommendations.  Will schedule low dose ct chest for lung cancer screening.

## 2019-05-21 NOTE — PROGRESS NOTES
CVTS Office Progress Note     Subjective   Patient ID: Jessie Jordan is a 68 y.o. female is here today for follow-up for PVD.    Chief Complaint:    Chief Complaint   Patient presents with   • Peripheral Vascular Disease     3 month follow up        PCP:  Julianna Gloria MD  Cardiology:  Dr. Palacio     History of Present Illness  Ms. Jordan is a pleasant 68 year old female with a history of HTN, PVD, CAD, carotid stenosis, and former smoker 48PY.  Prior to intervention patient has had moderate leg pain for approximately 2 years and recently has been experiencing lifestyle limiting claudication.  Previous CVA in 2015 with no overt residual deficits, she has right leg neuropathy evaluated by neurology with normal EMG.  Previous stenting to ARON  Underwent LEFT to RIGHT femoral to femoral artery bypass and RIGHT FEMORAL POPLITEAL BYPASS December 2019.  She follows today for surveillance of PVD, Carotid Stenosis.        5/2018 MONTY:  RIGHT .32 bi/monophasic.  LEFT .47 biphasic.  6/2018 Carotid Duplex:  JENNIFER 0-49% antegrade vert.  LICA 0-49% antegrade vert.  6/2018 CTA Aorta runoff:  RIGHT common iliac 90%, SFA small moderate diffuse disease, tibial diffuse disease.  LEFT  Common iliac 70%, external iliac 80%, SFA occluded reconstitutes distal via profunda collaterals, diffuse tibials.  9/28/18  Agram:  PTA/Luminex Stent LEFT iliac artery:  RCIA occluded, RIIA/REIA moderate diffuse disease, RSFA occluded, RPT diffuse distal disease  10/11/18  MONTY:  RIGHT 0.50 Fem/Pop Biphasic  PT/DP Monophasic    12/19/18 LEFT to RIGHT femoral to femoral artery bypass and RIGHT FEMORAL POPLITEAL BYPASS (PTFE)  05/21/2019 Carotid Duplex: JENNIFER 0-49% with mobile plaque mPSV 120c/s Ratio 2.4, LICA 50-69% mPSV 141c/s Ratio 2.0, Hypoechoic structure left submandibular 9.3mm  05/21/2019 MONTY: Right 0.89 triphasic femoral to distal.  Left 0.75 femoral triphasic PT biphasic  05/21/2019 Graft Survey: Left groin 335, left groin anastomosis  324, proximal graft 183, mid graft 89, distal graft 89, right groin anastomosis 158, right groin 33.  All with triphasic waveforms.        12/2017 Cardiac Catheterization:  Mild coronary irregularities.  Diffuse atherosclerosis. Medical management         The following portions of the patient's history were reviewed and updated as appropriate: allergies, current medications, past family history, past medical history, past social history, past surgical history and problem list.  Recent images independently reviewed.  Available laboratory values reviewed.      Past Medical History:   Diagnosis Date   • Anxiety and depression    • Coronary artery disease involving native coronary artery of native heart 12/06/2017    seen on Cardiac Cath - Left main 50-70% stenosis, 12/27/2017 repeat cath showed coronary spasm with no stenosis   • Hyperlipidemia    • Hypertension    • Kidney cysts    • Post-menopausal 1990   • Skin cancer    • Stroke (CMS/[facility]) 2015   • Vascular disease      Past Surgical History:   Procedure Laterality Date   • ARTERIOGRAM N/A 9/28/2018    Procedure: aortoiliac arteriogram possible angioplasty stent atherectomy thrombolysis bilateral iliac stents;  Surgeon: Luan Ruff MD;  Location: [facility] ANGIO INVASIVE LOCATION;  Service: Interventional Radiology   • CARDIAC CATHETERIZATION  12/06/2017    Done at [facility] - Left Main 50-70% Stenosis - no stenting done, recommendation was urgent CABG.  EF 50-60%   • CERVICAL FUSION  1985    C5-6   • COLONOSCOPY N/A 6/29/2018    Procedure: COLONOSCOPY;  Surgeon: Oz Way MD;  Location: [facility] ENDOSCOPY;  Service: Gastroenterology   • FEMORAL POPLITEAL BYPASS Right 12/19/2018    Procedure: femoral to femoral artery bypass, RIGHT FEMORAL POPLITEAL BYPASS right lower extremity arteriogram          (C-ARM# AND C-ARM TABLE);  Surgeon: Luan Ruff MD;  Location: [facility] OR;  Service: Vascular   • FINGER SURGERY Left     third  finger   • ROTATOR CUFF REPAIR Left 2011    done in [place]     Family History   Problem Relation Age of Onset   • Lung cancer Mother    • Hypertension Mother    • Diabetes Maternal Grandmother    • Heart disease Maternal Grandmother    • Hypertension Maternal Grandfather    • Heart disease Maternal Grandfather    • Heart disease Other    • Hypertension Other    • Cancer Other      Social History     Tobacco Use   • Smoking status: Former Smoker     Packs/day: 1.00     Years: 48.00     Pack years: 48.00     Types: Cigarettes     Last attempt to quit: 2018     Years since quittin.4   • Smokeless tobacco: Never Used   Substance Use Topics   • Alcohol use: Yes     Frequency: Monthly or less     Comment: socially   • Drug use: No       ALLERGIES:   Patient has no known allergies.    MEDICATIONS:      Current Outpatient Medications:   •  amLODIPine (NORVASC) 2.5 MG tablet, TAKE 1 TABLET BY MOUTH TWICE A DAY, Disp: 180 tablet, Rfl: 0  •  amLODIPine (NORVASC) 2.5 MG tablet, Take 1 tablet by mouth 2 (Two) Times a Day., Disp: 60 tablet, Rfl: 3  •  aspirin 81 MG EC tablet, Take 1 tablet by mouth Daily., Disp: 150 tablet, Rfl: 2  •  atorvastatin (LIPITOR) 10 MG tablet, Take 1 tablet by mouth Daily., Disp: 30 tablet, Rfl: 5  •  buPROPion SR (WELLBUTRIN SR) 150 MG 12 hr tablet, Take 150 mg by mouth Every 12 (Twelve) Hours., Disp: , Rfl: 3  •  calcium-vitamin D (OSCAL 500/200 D-3) 500-200 MG-UNIT per tablet, Take 1 tablet by mouth Daily., Disp: 30 tablet, Rfl: 11  •  CHANTIX 1 MG tablet, Take 1 mg by mouth 2 (Two) Times a Day., Disp: , Rfl: 2  •  clopidogrel (PLAVIX) 75 MG tablet, Take 1 tablet by mouth Daily., Disp: 30 tablet, Rfl: 11  •  [facility] MELATONIN 3 MG tablet, TAKE 1 TABLET BY MOUTH EVERY DAY AT NIGHT, Disp: 30 tablet, Rfl: 1  •  diclofenac (VOLTAREN) 1 % gel gel, Apply  topically to the appropriate area as directed 2 (Two) Times a Day., Disp: 30 g, Rfl: 1  •  FLUoxetine (PROZAC) 20 MG capsule, Take 1 capsule by  mouth Daily., Disp: 90 capsule, Rfl: 3  •  fluticasone (FLONASE) 50 MCG/ACT nasal spray, 1 spray into the nostril(s) as directed by provider Daily., Disp: 16 mL, Rfl: 6  •  gabapentin (NEURONTIN) 100 MG capsule, Take 1 capsule by mouth 3 (Three) Times a Day., Disp: 90 capsule, Rfl: 2  •  HYDROcodone-acetaminophen (NORCO) 7.5-325 MG per tablet, Take 1 tablet by mouth Every 6 (Six) Hours As Needed for Moderate Pain ., Disp: , Rfl:   •  lisinopril (PRINIVIL,ZESTRIL) 10 MG tablet, Take 1 tablet by mouth Daily., Disp: 30 tablet, Rfl: 3  •  naproxen (NAPROSYN) 500 MG tablet, Take 500 mg by mouth 2 (Two) Times a Day With Meals., Disp: , Rfl: 2  •  OS-LORENE CALCIUM + D3 500-200 MG-UNIT tablet, Take 1 tablet by mouth Daily., Disp: , Rfl: 2  •  Prenatal Vit-Fe Fumarate-FA (PRENATAL VITAMIN 27-0.8) 27-0.8 MG tablet tablet, Take 1 tablet by mouth Daily., Disp: , Rfl:   •  varenicline (CHANTIX STARTING MONTH JAMES) 0.5 MG X 11 & 1 MG X 42 tablet, Take 0.5 mg one daily on days 1-3 and and 0.5 mg twice daily on days 4-7.Then 1 mg twice daily for a total of 12 weeks., Disp: 53 tablet, Rfl: 0    Review of Systems   Constitution: Positive for weakness and weight gain. Negative for decreased appetite, diaphoresis, fever and malaise/fatigue.   HENT: Negative for hearing loss, hoarse voice, nosebleeds and sore throat.    Eyes: Negative for blurred vision, double vision and visual disturbance.   Cardiovascular: Negative for chest pain, claudication, dyspnea on exertion and leg swelling.   Respiratory: Negative for cough, shortness of breath, sputum production and wheezing.    Endocrine: Positive for cold intolerance. Negative for polyuria.   Hematologic/Lymphatic: Negative for bleeding problem. Bruises/bleeds easily.   Skin: Positive for unusual hair distribution. Negative for color change, nail changes and poor wound healing.        Bilateral groin and right above knee incision CDI   Musculoskeletal: Positive for arthritis, back pain and  joint pain. Negative for joint swelling.   Gastrointestinal: Negative for abdominal pain, hematemesis, melena, nausea and vomiting.   Genitourinary: Negative for flank pain, hematuria and urgency.   Neurological: Negative for brief paralysis, disturbances in coordination, focal weakness, light-headedness, numbness and paresthesias.        No amaurosis fugax, TIA, or CVA.     Psychiatric/Behavioral: Negative for depression, hallucinations and suicidal ideas.   Allergic/Immunologic: Negative for hives and persistent infections.       Vitals:    05/21/19 0953   BP: 122/70   Pulse: 73   Temp: 97.5 °F (36.4 °C)   SpO2: 98%      Objective   Temp:  [97.5 °F (36.4 °C)] 97.5 °F (36.4 °C)  Heart Rate:  [73] 73  BP: (122)/(70) 122/70  Body mass index is 18.37 kg/m².       Physical Exam   Constitutional: She is oriented to person, place, and time. She appears well-developed and well-nourished.   HENT:   Head: Normocephalic and atraumatic.   Mouth/Throat: Oropharynx is clear and moist.   Eyes: Conjunctivae and EOM are normal. Pupils are equal, round, and reactive to light.   Neck: Normal range of motion. Neck supple. No JVD present.   Cardiovascular: Normal rate, regular rhythm, normal heart sounds and intact distal pulses.   Biphasic PT/DP bilateral signals doppler   Pulmonary/Chest: Effort normal and breath sounds normal. She has no wheezes.   Abdominal: Soft. Bowel sounds are normal. She exhibits no distension. There is no tenderness.   Genitourinary:   Genitourinary Comments: deferred   Musculoskeletal: Normal range of motion. She exhibits edema (trace BLE ).   Lymphadenopathy:     She has no cervical adenopathy.   Neurological: She is alert and oriented to person, place, and time. No cranial nerve deficit.   Skin: Skin is warm and dry. Capillary refill takes less than 2 seconds. No erythema.   BIlateral groin incision CDI  Right above knee incision CDI   Psychiatric: She has a normal mood and affect. Her behavior is normal.  Judgment normal.   Nursing note and vitals reviewed.        Assessment & Plan     Radiographic Studies Reviewed  05/21/2019 Carotid Duplex: JENNIFER 0-49% with mobile plaque mPSV 120c/s Ratio 2.4, LICA 50-69% mPSV 141c/s Ratio 2.0, Hypoechoic structure left submandibular 9.3mm  05/21/2019 MONTY: Right 0.89 triphasic femoral to distal.  Left 0.75 femoral triphasic PT biphasic  05/21/2019 Graft Survey: Left groin 335, left groin anastomosis 324, proximal graft 183, mid graft 89, distal graft 89, right groin anastomosis 158, right groin 33.  All with triphasic waveforms.       Peripheral Vascular Disease  Results of todays MONTY shows moderate disease of the left leg and mild of the right.  Currently, patient does not complain of claudication.  Would recommend repeat MONTY in 6 months for surveillance of PVD.  Medical management includes aspirin, statin, Plavix, lisinopril.  We have discussed the benefit of improving conditioning as a means to improve walking distance and claudication symptoms.  Lifestyle changes as below.  Ultrasound demonstrates graft patency repeat in one year.    Carotid Artery Disease  Results of today's Carotid Duplex demonstrates mild disease of Right Carotid with mobile plaque and moderate of Left Carotid.  Would recommend follow up with repeat carotid duplex in 6 months to monitor progression of carotid artery disease, mobile plaque discussed with Dr. Ruff continue DAPT.  We recommend CT angiogram of carotids once >70% stenosis is suggested by duplex in patients with stable renal function.  Patients with >70% stenosis proven by CT would be considered for intervention MARY/CEA.  Continue medical management of vascular disease.     Lung Cancer Screening  Due for annual LDCT.  Former smoker, quit 3 years ago 48 Pack year history.    Hypoechoic Structure on Carotid Ultrasound  9.3mm, asymptomatic, not palpable.  Not identified on prior carotid ultrasound.  Discussed case with Dr. Clancy, will obtain CT  of neck and soft tissue with contrast and referral to ENT for further evaluation.      Lifestyle Modifications  Recommended lifestyle modifications to reduce the progression of vascular disease include increasing moderate activity to 150 minutes per week, avoidance of smoking, optimizing blood pressure control, strict control of diabetes, and management of hyperlipidemia.  Choose a diet that emphasizes intake of vegetables, fruits, and whole grains; includes low-fat dairy products, poultry, fish, legumes, nontropical vegetable oils, and nuts; and limits intake of sweets, sugar-sweetened beverages, and red meats.  Limit alcohol intake to one drink per day in females and 2 drinks per day in men.          Detailed discussion regarding risks, benefits, and treatment plan. Images independently reviewed. Patient understands, agrees, and wishes to proceed with plan.       This document has been electronically signed by MARIPOSA Mcqueen-BC @  On May 21, 2019 12:35 PM

## 2019-06-06 ENCOUNTER — HOSPITAL ENCOUNTER (OUTPATIENT)
Dept: CT IMAGING | Facility: HOSPITAL | Age: 69
Discharge: HOME | End: 2019-06-06

## 2019-06-06 ENCOUNTER — HOSPITAL ENCOUNTER (OUTPATIENT)
Dept: CT IMAGING | Facility: HOSPITAL | Age: 69
Discharge: HOME | End: 2019-06-06
Admitting: NURSE PRACTITIONER

## 2019-06-06 ENCOUNTER — LAB (OUTPATIENT)
Dept: LAB | Facility: HOSPITAL | Age: 69
End: 2019-06-06

## 2019-06-06 DIAGNOSIS — Z12.2 ENCOUNTER FOR SCREENING FOR LUNG CANCER: ICD-10-CM

## 2019-06-06 DIAGNOSIS — I73.9 PVD (PERIPHERAL VASCULAR DISEASE) (HCC): Primary | ICD-10-CM

## 2019-06-06 DIAGNOSIS — I73.9 PVD (PERIPHERAL VASCULAR DISEASE) (HCC): ICD-10-CM

## 2019-06-06 DIAGNOSIS — R22.1 MASS OF LEFT SIDE OF NECK: ICD-10-CM

## 2019-06-06 DIAGNOSIS — Z87.891 FORMER CIGARETTE SMOKER: ICD-10-CM

## 2019-06-06 DIAGNOSIS — I65.23 BILATERAL CAROTID ARTERY STENOSIS: ICD-10-CM

## 2019-06-06 LAB
ANION GAP SERPL CALCULATED.3IONS-SCNC: 12 MMOL/L
BUN BLD-MCNC: 8 MG/DL (ref 8–23)
BUN/CREAT SERPL: 10.8 (ref 7–25)
CALCIUM SPEC-SCNC: 9.2 MG/DL (ref 8.6–10.5)
CHLORIDE SERPL-SCNC: 100 MMOL/L (ref 98–107)
CO2 SERPL-SCNC: 27 MMOL/L (ref 22–29)
CREAT BLD-MCNC: 0.74 MG/DL (ref 0.57–1)
GFR SERPL CREATININE-BSD FRML MDRD: 78 ML/MIN/1.73
GLUCOSE BLD-MCNC: 93 MG/DL (ref 65–99)
POTASSIUM BLD-SCNC: 4.3 MMOL/L (ref 3.5–5.2)
SODIUM BLD-SCNC: 139 MMOL/L (ref 136–145)

## 2019-06-06 PROCEDURE — 70491 CT SOFT TISSUE NECK W/DYE: CPT

## 2019-06-06 PROCEDURE — 36415 COLL VENOUS BLD VENIPUNCTURE: CPT

## 2019-06-06 PROCEDURE — 25010000002 IOPAMIDOL 61 % SOLUTION: Performed by: NURSE PRACTITIONER

## 2019-06-06 PROCEDURE — G0297 LDCT FOR LUNG CA SCREEN: HCPCS

## 2019-06-06 PROCEDURE — 80048 BASIC METABOLIC PNL TOTAL CA: CPT

## 2019-06-06 RX ADMIN — IOPAMIDOL 75 ML: 612 INJECTION, SOLUTION INTRAVENOUS at 14:16

## 2019-06-30 DIAGNOSIS — F51.01 PRIMARY INSOMNIA: ICD-10-CM

## 2019-07-01 ENCOUNTER — OFFICE VISIT (OUTPATIENT)
Dept: OTOLARYNGOLOGY | Facility: CLINIC | Age: 69
End: 2019-07-01

## 2019-07-01 VITALS
TEMPERATURE: 98.1 F | OXYGEN SATURATION: 99 % | BODY MASS INDEX: 18.78 KG/M2 | WEIGHT: 110 LBS | HEIGHT: 64 IN | HEART RATE: 79 BPM

## 2019-07-01 DIAGNOSIS — R22.1 NECK MASS: Primary | ICD-10-CM

## 2019-07-01 PROCEDURE — 99213 OFFICE O/P EST LOW 20 MIN: CPT | Performed by: OTOLARYNGOLOGY

## 2019-07-01 NOTE — PATIENT INSTRUCTIONS

## 2019-07-02 RX ORDER — LANOLIN ALCOHOL/MO/W.PET/CERES
CREAM (GRAM) TOPICAL
Qty: 30 TABLET | Refills: 1 | Status: SHIPPED | OUTPATIENT
Start: 2019-07-02 | End: 2019-09-13 | Stop reason: SDUPTHER

## 2019-07-02 NOTE — PROGRESS NOTES
Subjective   Jessie Jordan is a 68 y.o. female.    F up neck mass    History of Present Illness   Comes in with no new symptoms of pain discomfort drainage of her neck is not any fever any trouble swallowing or breathing      The following portions of the patient's history were reviewed and updated as appropriate: allergies, current medications, past family history, past medical history, past social history, past surgical history and problem list.      Current Outpatient Medications:   •  amLODIPine (NORVASC) 2.5 MG tablet, Take 1 tablet by mouth 2 (Two) Times a Day., Disp: 60 tablet, Rfl: 3  •  aspirin 81 MG EC tablet, Take 1 tablet by mouth Daily., Disp: 150 tablet, Rfl: 2  •  atorvastatin (LIPITOR) 10 MG tablet, Take 1 tablet by mouth Daily., Disp: 30 tablet, Rfl: 5  •  calcium-vitamin D (OSCAL 500/200 D-3) 500-200 MG-UNIT per tablet, Take 1 tablet by mouth Daily., Disp: 30 tablet, Rfl: 11  •  clopidogrel (PLAVIX) 75 MG tablet, Take 1 tablet by mouth Daily., Disp: 30 tablet, Rfl: 11  •  [facility] MELATONIN 3 MG tablet, TAKE 1 TABLET BY MOUTH EVERY DAY AT NIGHT, Disp: 30 tablet, Rfl: 1  •  diclofenac (VOLTAREN) 1 % gel gel, Apply  topically to the appropriate area as directed 2 (Two) Times a Day., Disp: 30 g, Rfl: 1  •  FLUoxetine (PROZAC) 20 MG capsule, Take 1 capsule by mouth Daily., Disp: 90 capsule, Rfl: 3  •  fluticasone (FLONASE) 50 MCG/ACT nasal spray, 1 spray into the nostril(s) as directed by provider Daily., Disp: 16 mL, Rfl: 6  •  gabapentin (NEURONTIN) 100 MG capsule, Take 1 capsule by mouth 3 (Three) Times a Day., Disp: 90 capsule, Rfl: 2  •  HYDROcodone-acetaminophen (NORCO) 7.5-325 MG per tablet, Take 1 tablet by mouth Every 6 (Six) Hours As Needed for Moderate Pain ., Disp: , Rfl:   •  lisinopril (PRINIVIL,ZESTRIL) 10 MG tablet, Take 1 tablet by mouth Daily., Disp: 30 tablet, Rfl: 3  •  naproxen (NAPROSYN) 500 MG tablet, Take 500 mg by mouth 2 (Two) Times a Day With Meals., Disp: , Rfl: 2  •  OS-LORENE  CALCIUM + D3 500-200 MG-UNIT tablet, Take 1 tablet by mouth Daily., Disp: , Rfl: 2  •  Prenatal Vit-Fe Fumarate-FA (PRENATAL VITAMIN 27-0.8) 27-0.8 MG tablet tablet, Take 1 tablet by mouth Daily., Disp: , Rfl:   •  amLODIPine (NORVASC) 2.5 MG tablet, TAKE 1 TABLET BY MOUTH TWICE A DAY, Disp: 180 tablet, Rfl: 0    No Known Allergies          Review of Systems   Constitutional: Negative for fever.   HENT: Negative for facial swelling, sore throat, trouble swallowing and voice change.    Hematological: Negative for adenopathy.           Objective   Physical Exam   Constitutional: She appears well-developed.   HENT:   Head: Normocephalic.   Right Ear: Tympanic membrane, external ear and ear canal normal.   Left Ear: Tympanic membrane, external ear and ear canal normal.   Nose: Nose normal.   Mouth/Throat: Uvula is midline, oropharynx is clear and moist and mucous membranes are normal.   Eyes: Conjunctivae are normal.   Neck: Normal range of motion. No tracheal deviation present. No thyromegaly present.   Cardiovascular: Normal rate.   Pulmonary/Chest: Effort normal.   Musculoskeletal: Normal range of motion.   Lymphadenopathy:     She has no cervical adenopathy.   Skin: Skin is warm.   Psychiatric: She has a normal mood and affect. Thought content normal.     ultrasound     TECHNIQUE: Multiple axial images are obtained throughout the neck  following the administration of IV contrast, 75 ml of Isovue-300  contrast was administered intravenously without complication.  This exam was performed according to our departmental  dose-optimization program, which includes automated exposure  control, adjustment of the mA and/or kV according to patient size  and/or use of iterative reconstruction technique.   Total DLP is 132 mGy*cm.     COMPARISON: None     FINDINGS: Biapical pulmonary scarring is noted. Mild emphysema is  noted in the lung apices. There is no adenopathy in the neck.  Degenerative and postsurgical changes are  noted in the cervical  spine. The epiglottis and airway is unremarkable. There is no  prevertebral soft tissue swelling. Bilateral submandibular glands  appear unremarkable. Palpable marker overlies the left  submandibular gland. There is no adjacent mass or fluid  collection. No mucosal lesion is noted. No acute bony abnormality  is noted.     IMPRESSION:  Chronic findings as above with no acute abnormality.  Specifically no left submandibular mass or fluid collection.     Electronically       Assessment/Plan   Jessie was seen today for neck mass.    Diagnoses and all orders for this visit:    Neck mass    She was reportedly said to have a neck mass but CT scan shows no evidence of mass she does not have any symptoms that are either.    I discussed with her what signs and symptoms look for otherwise we will see her as needed since the CT was negative

## 2019-08-07 DIAGNOSIS — I10 ESSENTIAL HYPERTENSION: ICD-10-CM

## 2019-08-07 RX ORDER — LISINOPRIL 10 MG/1
TABLET ORAL
Qty: 90 TABLET | Refills: 3 | Status: SHIPPED | OUTPATIENT
Start: 2019-08-07 | End: 2019-12-13 | Stop reason: SDUPTHER

## 2019-08-23 DIAGNOSIS — I10 ESSENTIAL HYPERTENSION: ICD-10-CM

## 2019-08-23 RX ORDER — AMLODIPINE BESYLATE 2.5 MG/1
TABLET ORAL
Qty: 180 TABLET | Refills: 0 | Status: SHIPPED | OUTPATIENT
Start: 2019-08-23 | End: 2019-12-13 | Stop reason: SDUPTHER

## 2019-09-13 DIAGNOSIS — F51.01 PRIMARY INSOMNIA: ICD-10-CM

## 2019-09-13 RX ORDER — OMEPRAZOLE MAGNESIUM 20 MG
CAPSULE,DELAYED RELEASE (ENTERIC COATED) ORAL
Qty: 30 TABLET | Refills: 1 | Status: SHIPPED | OUTPATIENT
Start: 2019-09-13 | End: 2019-12-13 | Stop reason: SDUPTHER

## 2019-09-17 RX ORDER — CLOPIDOGREL BISULFATE 75 MG/1
TABLET ORAL
Qty: 90 TABLET | Refills: 3 | Status: SHIPPED | OUTPATIENT
Start: 2019-09-17 | End: 2019-12-13 | Stop reason: SDUPTHER

## 2019-12-03 ENCOUNTER — OFFICE VISIT (OUTPATIENT)
Dept: CARDIAC SURGERY | Facility: CLINIC | Age: 69
End: 2019-12-03

## 2019-12-03 VITALS
HEART RATE: 97 BPM | DIASTOLIC BLOOD PRESSURE: 80 MMHG | BODY MASS INDEX: 19.81 KG/M2 | WEIGHT: 116 LBS | OXYGEN SATURATION: 99 % | HEIGHT: 64 IN | SYSTOLIC BLOOD PRESSURE: 122 MMHG

## 2019-12-03 DIAGNOSIS — I73.9 PVD (PERIPHERAL VASCULAR DISEASE) (HCC): ICD-10-CM

## 2019-12-03 DIAGNOSIS — I65.23 BILATERAL CAROTID ARTERY STENOSIS: ICD-10-CM

## 2019-12-03 DIAGNOSIS — I73.9 PVD (PERIPHERAL VASCULAR DISEASE) WITH CLAUDICATION (HCC): Primary | ICD-10-CM

## 2019-12-03 PROCEDURE — 99214 OFFICE O/P EST MOD 30 MIN: CPT | Performed by: NURSE PRACTITIONER

## 2019-12-03 NOTE — PATIENT INSTRUCTIONS
The results of your carotid ultrasound shows moderate disease of the right carotid and is stable from previous exam, ultrasound of the left carotid shows moderate disease and is stable from previous exam.    Follow up in 6 months    If you should experience any neurological symptoms including but not limited to visual or speech disturbances confusion, seizures, or weakness of limbs of one side of your body notify [facility] V[facility] center immediately for evaluation or if after hours present to the nearest Emergency Department.     The results of your vascular studies of your right leg shows moderate disease with good  circulation, in the left leg shows moderatedisease with fair circulation.      If signs and symptoms of ischemia should occur including but not limited to pale/blue discoloration of limb, increasing pain with ambulation or at rest, or a non-healing wound. Patient is to notify [facility] and Vascular center for immediate evaluation.

## 2019-12-09 NOTE — PROGRESS NOTES
CVTS Office Progress Note     12/9/2019    Jessie Jordan  1950    Chief Complaint:    Chief Complaint   Patient presents with   • Follow-up     6 mo PVD       HPI:      PCP:  Julianna Gloria MD  Cardiology:  Dr. Spence    69 y.o. female with HTN(stable, increased risk stroke, rupture), Hyperlipidemia(stable, increased risk cardiovascular events) and COPD(stable, increased risk pulmonary complications) PVD, MARY.  former smoker and quit 2018.  Right leg pain x2 years, underwent left-right fem-fem, fem-pop.  Follow up surveillance has remained stable.  Bilateral leg pain unrelated to activity or ambulation, chronic pain management.  No other associated signs, symptoms or modifying factors.    5/2018 MONTY:  RIGHT .32 bi/monophasic.  LEFT .47 biphasic.  6/2018 Carotid Duplex:  JENNIFER 0-49% antegrade vert.  LICA 0-49% antegrade vert.  6/2018 CTA Aorta runoff:  RIGHT common iliac 90%, SFA small moderate diffuse disease, tibial diffuse disease.  LEFT  Common iliac 70%, external iliac 80%, SFA occluded reconstitutes distal via profunda collaterals, diffuse tibials.  9/28/18  Agram:  PTA/Luminex Stent LEFT iliac artery:  RCIA occluded, RIIA/REIA moderate diffuse disease, RSFA occluded, RPT diffuse distal disease  10/11/18  MONTY:  RIGHT 0.50 Fem/Pop Biphasic  PT/DP Monophasic    12/19/18 LEFT to RIGHT femoral to femoral artery bypass and RIGHT FEMORAL POPLITEAL BYPASS (PTFE)  05/21/2019 Carotid Duplex: JENNIFER 0-49% with mobile plaque mPSV 120c/s Ratio 2.4, LICA 50-69% mPSV 141c/s Ratio 2.0, Hypoechoic structure left submandibular 9.3mm  05/21/2019 MONTY: Right 0.89 triphasic femoral to distal.  Left 0.75 femoral triphasic PT biphasic  05/21/2019 Graft Survey: Left groin 335, left groin anastomosis 324, proximal graft 183, mid graft 89, distal graft 89, right groin anastomosis 158, right groin 33.  All with triphasic waveforms.    12/3/19 Right 0.82 fem biphasic triphasic-distally.  Left 0.62 biphasic.   12/3/19 Carotid  Duplex: JENNIFER 50-69% mPSV 117c/s Ratio 2.8, LICA 50-69% mPSV 162c/s Ratio 1.8, Antegrade vertebrals        The following portions of the patient's history were reviewed and updated as appropriate: allergies, current medications, past family history, past medical history, past social history, past surgical history and problem list.  Recent images independently reviewed.  Available laboratory values reviewed.    PMH:  Past Medical History:   Diagnosis Date   • Anxiety and depression    • Coronary artery disease involving native coronary artery of native heart 12/06/2017    seen on Cardiac Cath - Left main 50-70% stenosis, 12/27/2017 repeat cath showed coronary spasm with no stenosis   • Hyperlipidemia    • Hypertension    • Kidney cysts    • Post-menopausal 1990   • Skin cancer    • Stroke (CMS/[facility]) 2015   • Vascular disease      Past Surgical History:   Procedure Laterality Date   • ARTERIOGRAM N/A 9/28/2018    Procedure: aortoiliac arteriogram possible angioplasty stent atherectomy thrombolysis bilateral iliac stents;  Surgeon: Luan Ruff MD;  Location: [facility] ANGIO INVASIVE LOCATION;  Service: Interventional Radiology   • CARDIAC CATHETERIZATION  12/06/2017    Done at [facility] - Left Main 50-70% Stenosis - no stenting done, recommendation was urgent CABG.  EF 50-60%   • CERVICAL FUSION  1985    C5-6   • COLONOSCOPY N/A 6/29/2018    Procedure: COLONOSCOPY;  Surgeon: Oz Way MD;  Location: [facility] ENDOSCOPY;  Service: Gastroenterology   • FEMORAL POPLITEAL BYPASS Right 12/19/2018    Procedure: femoral to femoral artery bypass, RIGHT FEMORAL POPLITEAL BYPASS right lower extremity arteriogram          (C-ARM# AND C-ARM TABLE);  Surgeon: Luan Ruff MD;  Location: [facility] OR;  Service: Vascular   • FINGER SURGERY Left     third finger   • ROTATOR CUFF REPAIR Left 06/30/2011    done in [place]     Family History   Problem Relation Age of Onset   • Lung cancer Mother    •  Hypertension Mother    • Diabetes Maternal Grandmother    • Heart disease Maternal Grandmother    • Hypertension Maternal Grandfather    • Heart disease Maternal Grandfather    • Heart disease Other    • Hypertension Other    • Cancer Other      Social History     Tobacco Use   • Smoking status: Former Smoker     Packs/day: 1.00     Years: 48.00     Pack years: 48.00     Types: Cigarettes     Last attempt to quit: 2018     Years since quittin.9   • Smokeless tobacco: Never Used   Substance Use Topics   • Alcohol use: Yes     Frequency: Monthly or less     Comment: socially   • Drug use: No       ALLERGIES:  No Known Allergies      MEDICATIONS:    Current Outpatient Medications:   •  amLODIPine (NORVASC) 2.5 MG tablet, TAKE 1 TABLET BY MOUTH TWICE A DAY, Disp: 180 tablet, Rfl: 0  •  atorvastatin (LIPITOR) 10 MG tablet, Take 1 tablet by mouth Daily., Disp: 30 tablet, Rfl: 5  •  clopidogrel (PLAVIX) 75 MG tablet, TAKE 1 TABLET BY MOUTH EVERY DAY, Disp: 90 tablet, Rfl: 3  •  [employer] MELATONIN 3 MG tablet, TAKE 1 TABLET BY MOUTH EVERY DAY AT NIGHT, Disp: 30 tablet, Rfl: 1  •  diclofenac (VOLTAREN) 1 % gel gel, Apply  topically to the appropriate area as directed 2 (Two) Times a Day., Disp: 30 g, Rfl: 1  •  FLUoxetine (PROZAC) 20 MG capsule, Take 1 capsule by mouth Daily., Disp: 90 capsule, Rfl: 3  •  fluticasone (FLONASE) 50 MCG/ACT nasal spray, 1 spray into the nostril(s) as directed by provider Daily., Disp: 16 mL, Rfl: 6  •  gabapentin (NEURONTIN) 100 MG capsule, Take 1 capsule by mouth 3 (Three) Times a Day., Disp: 90 capsule, Rfl: 2  •  HYDROcodone-acetaminophen (NORCO) 7.5-325 MG per tablet, Take 1 tablet by mouth Every 6 (Six) Hours As Needed for Moderate Pain ., Disp: , Rfl:   •  lisinopril (PRINIVIL,ZESTRIL) 10 MG tablet, TAKE 1 TABLET BY MOUTH EVERY DAY, Disp: 90 tablet, Rfl: 3  •  naproxen (NAPROSYN) 500 MG tablet, Take 500 mg by mouth 2 (Two) Times a Day With Meals., Disp: , Rfl: 2  •  OS-LORENE CALCIUM +  "D3 500-200 MG-UNIT tablet, Take 1 tablet by mouth Daily., Disp: , Rfl: 2  •  Prenatal Vit-Fe Fumarate-FA (PRENATAL VITAMIN 27-0.8) 27-0.8 MG tablet tablet, Take 1 tablet by mouth Daily., Disp: , Rfl:       Review of Systems   Constitution: Negative for chills, decreased appetite, fever and weight loss.   HENT: Negative for congestion, nosebleeds and sore throat.    Eyes: Negative for blurred vision, visual disturbance and visual halos.   Cardiovascular: Positive for dyspnea on exertion. Negative for chest pain and leg swelling.   Respiratory: Negative for cough, shortness of breath, sputum production and wheezing.    Endocrine: Negative for cold intolerance and polyuria.   Hematologic/Lymphatic: Negative for bleeding problem. Bruises/bleeds easily.        Does not report S/S of adverse bleeding event including nose bleeds, hematuria, melena, gingival bleeding, or hematemesis.   Skin: Positive for unusual hair distribution. Negative for flushing and nail changes.   Musculoskeletal: Positive for arthritis, back pain and joint pain.   Gastrointestinal: Negative for bloating, abdominal pain, hematemesis, melena, nausea and vomiting.   Genitourinary: Negative for flank pain and hematuria.   Neurological: Positive for weakness. Negative for brief paralysis, difficulty with concentration, focal weakness, light-headedness, loss of balance, numbness and paresthesias.        No amaurosis fugax, TIA, or CVA.     Psychiatric/Behavioral: Negative for altered mental status, depression, substance abuse and suicidal ideas.   Allergic/Immunologic: Negative for hives and persistent infections.         Vitals:    12/03/19 1040   BP: 122/80   BP Location: Left arm   Patient Position: Sitting   Cuff Size: Adult   Pulse: 97   SpO2: 99%   Weight: 52.6 kg (116 lb)   Height: 162.6 cm (64\")     Physical Exam   Constitutional: She is oriented to person, place, and time. She appears well-developed and well-nourished.   HENT:   Head: " Normocephalic and atraumatic.   Mouth/Throat: Oropharynx is clear and moist.   Eyes: Pupils are equal, round, and reactive to light. Conjunctivae and EOM are normal.   Neck: Normal range of motion. Neck supple.   Cardiovascular: Normal rate and intact distal pulses.   No murmur heard.  RLE +2  LLE +1  R carotid bruit   Pulmonary/Chest: Effort normal and breath sounds normal. No respiratory distress.   Abdominal: Soft. Bowel sounds are normal. She exhibits no distension.   Musculoskeletal: Normal range of motion. She exhibits tenderness (both legs). She exhibits no edema.   Neurological: She is alert and oriented to person, place, and time.   Skin: Skin is warm and dry. Capillary refill takes less than 2 seconds.   There is no evidence of skin breakdown of the bilateral lower extremities.  BLE pink, no evidence of ischemia.  Feet are absent of dependent rubor.   Psychiatric: She has a normal mood and affect. Judgment normal.   Nursing note and vitals reviewed.      Assessment & Plan     Independent Review of Studies  12/3/19 Right 0.82 fem biphasic triphasic-distally.  Left 0.62 biphasic.   12/3/19 Carotid Duplex: JENNIFER 50-69% mPSV 117c/s Ratio 2.8, LICA 50-69% mPSV 162c/s Ratio 1.8, Antegrade vertebrals    1. PVD (peripheral vascular disease) with claudication (CMS/HCC)  New biphasic signals left femoral.  Potentially suggestive of iliac stenosis.  Case reviewed with Dr. Ruff.  Plan surveillance of fem-fem, left iliac stent survey.    Medical management with statin, plavix, ACE.    - Doppler Ankle Brachial Index Single Level CAR; Future  - Duplex Lower Extremity Art / Grafts - Right CAR; Future  - Duplex Carotid Ultrasound CAR; Future  - Duplex Aorta IVC Iliac Graft Limited CAR; Future    2. Bilateral carotid artery stenosis  Moderate bilateral disease.    Repeat 6 months  Medical management as above.    - Duplex Carotid Ultrasound CAR; Future      Detailed discussion regarding risks, benefits, and treatment  plan. Images independently reviewed. Patient understands, agrees, and wishes to proceed with plan.       This document has been electronically signed by ZACKERY McqueenP-BC @  On December 9, 2019 2:12 PM      EMR Dragon/Transcription disclaimer:   Much of this encounter note is an electronic transcription/translation of spoken language to printed text. The electronic translation of spoken language may permit erroneous, or at times, nonsensical words or phrases to be inadvertently transcribed; Although I have reviewed the note for such errors, some may still exist.

## 2019-12-13 ENCOUNTER — APPOINTMENT (OUTPATIENT)
Dept: LAB | Facility: HOSPITAL | Age: 69
End: 2019-12-13

## 2019-12-13 ENCOUNTER — OFFICE VISIT (OUTPATIENT)
Dept: FAMILY MEDICINE CLINIC | Facility: CLINIC | Age: 69
End: 2019-12-13

## 2019-12-13 VITALS
HEART RATE: 68 BPM | WEIGHT: 118 LBS | HEIGHT: 64 IN | SYSTOLIC BLOOD PRESSURE: 150 MMHG | OXYGEN SATURATION: 98 % | BODY MASS INDEX: 20.14 KG/M2 | DIASTOLIC BLOOD PRESSURE: 90 MMHG

## 2019-12-13 DIAGNOSIS — Z91.89 COMPLIANCE WITH MEDICATION REGIMEN: ICD-10-CM

## 2019-12-13 DIAGNOSIS — Z91.199 MEDICAL NON-COMPLIANCE: ICD-10-CM

## 2019-12-13 DIAGNOSIS — G62.9 PERIPHERAL POLYNEUROPATHY: Primary | ICD-10-CM

## 2019-12-13 DIAGNOSIS — F51.01 PRIMARY INSOMNIA: ICD-10-CM

## 2019-12-13 DIAGNOSIS — Z51.81 MEDICATION MONITORING ENCOUNTER: ICD-10-CM

## 2019-12-13 DIAGNOSIS — I10 ESSENTIAL HYPERTENSION: ICD-10-CM

## 2019-12-13 DIAGNOSIS — F19.10 DRUG ABUSE (HCC): ICD-10-CM

## 2019-12-13 LAB
AMPHET+METHAMPHET UR QL: NEGATIVE
AMPHETAMINES UR QL: NEGATIVE
BARBITURATES UR QL SCN: NEGATIVE
BENZODIAZ UR QL SCN: NEGATIVE
BUPRENORPHINE SERPL-MCNC: NEGATIVE NG/ML
CANNABINOIDS SERPL QL: NEGATIVE
COCAINE UR QL: NEGATIVE
METHADONE UR QL SCN: NEGATIVE
OPIATES UR QL: POSITIVE
OXYCODONE UR QL SCN: NEGATIVE
PCP UR QL SCN: NEGATIVE
PROPOXYPH UR QL: NEGATIVE
TRICYCLICS UR QL SCN: NEGATIVE

## 2019-12-13 PROCEDURE — 99213 OFFICE O/P EST LOW 20 MIN: CPT | Performed by: STUDENT IN AN ORGANIZED HEALTH CARE EDUCATION/TRAINING PROGRAM

## 2019-12-13 PROCEDURE — 80307 DRUG TEST PRSMV CHEM ANLYZR: CPT | Performed by: STUDENT IN AN ORGANIZED HEALTH CARE EDUCATION/TRAINING PROGRAM

## 2019-12-13 RX ORDER — LANOLIN ALCOHOL/MO/W.PET/CERES
3 CREAM (GRAM) TOPICAL
Qty: 30 TABLET | Refills: 1 | Status: SHIPPED | OUTPATIENT
Start: 2019-12-13 | End: 2020-01-07 | Stop reason: SDUPTHER

## 2019-12-13 RX ORDER — NAPROXEN 500 MG/1
500 TABLET ORAL 2 TIMES DAILY WITH MEALS
Qty: 30 TABLET | Refills: 2 | Status: SHIPPED | OUTPATIENT
Start: 2019-12-13 | End: 2020-02-10 | Stop reason: SDUPTHER

## 2019-12-13 RX ORDER — ATORVASTATIN CALCIUM 10 MG/1
10 TABLET, FILM COATED ORAL DAILY
Qty: 30 TABLET | Refills: 5 | Status: SHIPPED | OUTPATIENT
Start: 2019-12-13 | End: 2020-06-10

## 2019-12-13 RX ORDER — POLYETHYLENE GLYCOL 3350 17 G/17G
17 POWDER, FOR SOLUTION ORAL DAILY
Qty: 100 EACH | Refills: 3 | Status: SHIPPED | OUTPATIENT
Start: 2019-12-13 | End: 2020-07-02

## 2019-12-13 RX ORDER — HYDROCODONE BITARTRATE AND ACETAMINOPHEN 7.5; 325 MG/1; MG/1
1 TABLET ORAL EVERY 6 HOURS PRN
Qty: 30 TABLET | Refills: 0 | Status: SHIPPED | OUTPATIENT
Start: 2019-12-13 | End: 2020-01-10 | Stop reason: SDUPTHER

## 2019-12-13 RX ORDER — LISINOPRIL 10 MG/1
10 TABLET ORAL DAILY
Qty: 90 TABLET | Refills: 3 | Status: SHIPPED | OUTPATIENT
Start: 2019-12-13 | End: 2020-06-22 | Stop reason: SDUPTHER

## 2019-12-13 RX ORDER — FERROUS SULFATE 325(65) MG
325 TABLET ORAL
Qty: 30 TABLET | Refills: 3 | Status: SHIPPED | OUTPATIENT
Start: 2019-12-13 | End: 2020-03-23

## 2019-12-13 RX ORDER — FLUOXETINE HYDROCHLORIDE 20 MG/1
20 CAPSULE ORAL DAILY
Qty: 90 CAPSULE | Refills: 3 | Status: SHIPPED | OUTPATIENT
Start: 2019-12-13 | End: 2020-12-17

## 2019-12-13 RX ORDER — CLOPIDOGREL BISULFATE 75 MG/1
75 TABLET ORAL DAILY
Qty: 90 TABLET | Refills: 3 | Status: SHIPPED | OUTPATIENT
Start: 2019-12-13 | End: 2020-12-17

## 2019-12-13 RX ORDER — GABAPENTIN 300 MG/1
300 CAPSULE ORAL 3 TIMES DAILY
Qty: 90 CAPSULE | Refills: 2 | Status: SHIPPED | OUTPATIENT
Start: 2019-12-13 | End: 2020-01-10 | Stop reason: SDUPTHER

## 2019-12-13 RX ORDER — AMLODIPINE BESYLATE 2.5 MG/1
2.5 TABLET ORAL 2 TIMES DAILY
Qty: 180 TABLET | Refills: 0 | Status: SHIPPED | OUTPATIENT
Start: 2019-12-13 | End: 2020-02-10 | Stop reason: SDUPTHER

## 2019-12-16 NOTE — PROGRESS NOTES
I have reviewed the notes, assessments, and/or procedures performed by Julianna Gloria MD, I concur with her/his documentation and assessment and plan for Jessie Jordan.                This document has been electronically signed by Justice Bonilla MD on December 16, 2019 3:19 PM

## 2019-12-16 NOTE — PROGRESS NOTES
ID: Jessie Jordan    CC:   Chief Complaint   Patient presents with   • Pain     all over       Subjective:     HPI     Jessie Jordan is a 69 y.o. female who presents for pain over the hip back and shoulder. Patient also has peripheral neuropathy. She has been getting her medications from the pain clinic here, but would like to start getting them from our clinic because she has to see a different doctor every time she goes to the pain clinic and she doesn't like that. They recommended she go to [place] or [place], but that would be too far for her. Patient states she can come here every month and get drug tested as much as we would like. She would just like a constant doctor.     Preventative:  Over the past 2 weeks, have you felt down, depressed, or hopeless? no  Over the past 2 weeks, have you felt little interest or pleasure in doing things? no  Clinical depression screening refused by patient no    On osteoporosis therapy? No     Past Medical Hx:  Past Medical History:   Diagnosis Date   • Anxiety and depression    • Coronary artery disease involving native coronary artery of native heart 12/06/2017    seen on Cardiac Cath - Left main 50-70% stenosis, 12/27/2017 repeat cath showed coronary spasm with no stenosis   • Hyperlipidemia    • Hypertension    • Kidney cysts    • Post-menopausal 1990   • Skin cancer    • Stroke (CMS/[facility]) 2015   • Vascular disease        Past Surgical Hx:  Past Surgical History:   Procedure Laterality Date   • ARTERIOGRAM N/A 9/28/2018    Procedure: aortoiliac arteriogram possible angioplasty stent atherectomy thrombolysis bilateral iliac stents;  Surgeon: Luan Ruff MD;  Location: [facility] INVASIVE LOCATION;  Service: Interventional Radiology   • CARDIAC CATHETERIZATION  12/06/2017    Done at [facility] - Left Main 50-70% Stenosis - no stenting done, recommendation was urgent CABG.  EF 50-60%   • CERVICAL FUSION  1985    C5-6   • COLONOSCOPY  N/A 6/29/2018    Procedure: COLONOSCOPY;  Surgeon: Oz Way MD;  Location: [facility] ENDOSCOPY;  Service: Gastroenterology   • FEMORAL POPLITEAL BYPASS Right 12/19/2018    Procedure: femoral to femoral artery bypass, RIGHT FEMORAL POPLITEAL BYPASS right lower extremity arteriogram          (C-ARM# AND C-ARM TABLE);  Surgeon: Luan Ruff MD;  Location: [facility] OR;  Service: Vascular   • FINGER SURGERY Left     third finger   • ROTATOR CUFF REPAIR Left 06/30/2011    done in [place]       Health Maintenance:  Health Maintenance   Topic Date Due   • TDAP/TD VACCINES (1 - Tdap) 09/04/1961   • ZOSTER VACCINE (1 of 2) 09/04/2000   • LIPID PANEL  03/23/2019   • MEDICARE ANNUAL WELLNESS  04/04/2019   • PNEUMOCOCCAL VACCINES (65+ LOW/MEDIUM RISK) (2 of 2 - PPSV23) 04/05/2019   • INFLUENZA VACCINE  08/01/2019   • DXA SCAN  04/25/2020   • MAMMOGRAM  05/04/2020   • LUNG CANCER SCREENING  06/06/2020   • COLONOSCOPY  06/29/2023   • HEPATITIS C SCREENING  Completed       Current Meds:    Current Outpatient Medications:   •  amLODIPine (NORVASC) 2.5 MG tablet, Take 1 tablet by mouth 2 (Two) Times a Day., Disp: 180 tablet, Rfl: 0  •  atorvastatin (LIPITOR) 10 MG tablet, Take 1 tablet by mouth Daily., Disp: 30 tablet, Rfl: 5  •  clopidogrel (PLAVIX) 75 MG tablet, Take 1 tablet by mouth Daily., Disp: 90 tablet, Rfl: 3  •  diclofenac (VOLTAREN) 1 % gel gel, Apply  topically to the appropriate area as directed 2 (Two) Times a Day., Disp: 30 g, Rfl: 1  •  FLUoxetine (PROZAC) 20 MG capsule, Take 1 capsule by mouth Daily., Disp: 90 capsule, Rfl: 3  •  gabapentin (NEURONTIN) 100 MG capsule, Take 1 capsule by mouth 3 (Three) Times a Day., Disp: 90 capsule, Rfl: 2  •  HYDROcodone-acetaminophen (NORCO) 7.5-325 MG per tablet, Take 1 tablet by mouth Every 6 (Six) Hours As Needed for Moderate Pain ., Disp: , Rfl:   •  lisinopril (PRINIVIL,ZESTRIL) 10 MG tablet, Take 1 tablet by mouth Daily., Disp: 90 tablet, Rfl: 3  •  melatonin ([employer]S  MELATONIN) 3 MG tablet, Take 1 tablet by mouth every night at bedtime., Disp: 30 tablet, Rfl: 1  •  OS-LORENE CALCIUM + D3 500-200 MG-UNIT tablet, Take 1 tablet by mouth Daily., Disp: , Rfl: 2  •  ferrous sulfate 325 (65 FE) MG tablet, Take 1 tablet by mouth Daily With Breakfast., Disp: 30 tablet, Rfl: 3  •  fluticasone (FLONASE) 50 MCG/ACT nasal spray, 1 spray into the nostril(s) as directed by provider Daily., Disp: 16 mL, Rfl: 6  •  gabapentin (NEURONTIN) 300 MG capsule, Take 1 capsule by mouth 3 (Three) Times a Day., Disp: 90 capsule, Rfl: 2  •  HYDROcodone-acetaminophen (NORCO) 7.5-325 MG per tablet, Take 1 tablet by mouth Every 6 (Six) Hours As Needed for Moderate Pain  or Severe Pain ., Disp: 30 tablet, Rfl: 0  •  naproxen (NAPROSYN) 500 MG tablet, Take 1 tablet by mouth 2 (Two) Times a Day With Meals., Disp: 30 tablet, Rfl: 2  •  polyethylene glycol (MIRALAX) packet, Take 17 g by mouth Daily., Disp: 100 each, Rfl: 3  •  Prenatal Vit-Fe Fumarate-FA (PRENATAL VITAMIN 27-0.8) 27-0.8 MG tablet tablet, Take 1 tablet by mouth Daily., Disp: , Rfl:     Allergies:  Patient has no known allergies.    Family Hx:  Family History   Problem Relation Age of Onset   • Lung cancer Mother    • Hypertension Mother    • Diabetes Maternal Grandmother    • Heart disease Maternal Grandmother    • Hypertension Maternal Grandfather    • Heart disease Maternal Grandfather    • Heart disease Other    • Hypertension Other    • Cancer Other         Social History:  Social History     Socioeconomic History   • Marital status:      Spouse name: Not on file   • Number of children: 1   • Years of education: Not on file   • Highest education level: Not on file   Occupational History   • Occupation: Retired     Comment: Patient resides alone (daughter resides across street).   Tobacco Use   • Smoking status: Former Smoker     Packs/day: 1.00     Years: 48.00     Pack years: 48.00     Types: Cigarettes     Last attempt to quit: 12/18/2018  "    Years since quittin.9   • Smokeless tobacco: Never Used   Substance and Sexual Activity   • Alcohol use: Yes     Frequency: Monthly or less     Comment: socially   • Drug use: No   • Sexual activity: Defer     Comment: .       Review of Systems   Constitutional: Negative for activity change, appetite change, chills, fatigue and fever.   HENT: Negative for drooling, ear discharge, ear pain, facial swelling, hearing loss, mouth sores, rhinorrhea and sinus pain.    Eyes: Negative for pain, redness and itching.   Respiratory: Negative for cough, choking, chest tightness, shortness of breath and stridor.    Cardiovascular: Negative for chest pain, palpitations and leg swelling.   Gastrointestinal: Negative for abdominal distention, abdominal pain, anal bleeding, blood in stool, constipation, diarrhea and nausea.   Endocrine: Negative for heat intolerance, polydipsia and polyphagia.   Genitourinary: Negative for dysuria, flank pain, frequency and genital sores.   Musculoskeletal: Positive for arthralgias, back pain and myalgias. Negative for gait problem and joint swelling.   Skin: Negative for pallor and rash.   Neurological: Negative for seizures, facial asymmetry, speech difficulty, light-headedness, numbness and headaches.   Hematological: Negative for adenopathy. Does not bruise/bleed easily.   Psychiatric/Behavioral: Negative for confusion, decreased concentration, dysphoric mood and hallucinations. The patient is not nervous/anxious and is not hyperactive.            Objective:     /90   Pulse 68   Ht 162.6 cm (64\")   Wt 53.5 kg (118 lb)   SpO2 98%   BMI 20.25 kg/m²     Physical Exam   Constitutional: She is oriented to person, place, and time. She appears well-developed and well-nourished.   HENT:   Head: Normocephalic and atraumatic.   Right Ear: External ear normal.   Left Ear: External ear normal.   Nose: Nose normal.   Mouth/Throat: Oropharynx is clear and moist.   Eyes: Pupils are " equal, round, and reactive to light. Conjunctivae and EOM are normal.   Neck: Normal range of motion. Neck supple.   Cardiovascular: Normal rate, regular rhythm, normal heart sounds and intact distal pulses.   Pulmonary/Chest: Effort normal and breath sounds normal.   Abdominal: Soft. Bowel sounds are normal.   Musculoskeletal: Normal range of motion.   Neurological: She is alert and oriented to person, place, and time.   Skin: Skin is warm and dry. Capillary refill takes less than 2 seconds.   Psychiatric: She has a normal mood and affect. Her behavior is normal. Judgment and thought content normal.              Assessment/Plan:     Jessie was seen today for pain.    Diagnoses and all orders for this visit:    Peripheral polyneuropathy  -     gabapentin (NEURONTIN) 300 MG capsule; Take 1 capsule by mouth 3 (Three) Times a Day.  -     HYDROcodone-acetaminophen (NORCO) 7.5-325 MG per tablet; Take 1 tablet by mouth Every 6 (Six) Hours As Needed for Moderate Pain  or Severe Pain .    Essential hypertension  -     lisinopril (PRINIVIL,ZESTRIL) 10 MG tablet; Take 1 tablet by mouth Daily.  -     amLODIPine (NORVASC) 2.5 MG tablet; Take 1 tablet by mouth 2 (Two) Times a Day.    Primary insomnia  -     melatonin ([employer]S MELATONIN) 3 MG tablet; Take 1 tablet by mouth every night at bedtime.    Compliance with medication regimen   - will get medication compliance paperwork signed. Patient is explained the rules of receiving controlled medications from here. Patient states she will come every month for her medications. Will refill the norco and gabapentin. juan diego reviewed and consistent with records. Will consider moca periodically as patient is on two controlled medications     Medical non-compliance    Drug abuse (CMS/[facility])    Medication monitoring encounter  -     Urine Drug Screen - Urine, Clean Catch; Future  -     Urine Drug Screen - Urine, Clean Catch    Other orders  -     naproxen (NAPROSYN) 500 MG tablet; Take 1 tablet  by mouth 2 (Two) Times a Day With Meals.  -     FLUoxetine (PROZAC) 20 MG capsule; Take 1 capsule by mouth Daily.  -     clopidogrel (PLAVIX) 75 MG tablet; Take 1 tablet by mouth Daily.  -     atorvastatin (LIPITOR) 10 MG tablet; Take 1 tablet by mouth Daily.  -     ferrous sulfate 325 (65 FE) MG tablet; Take 1 tablet by mouth Daily With Breakfast.  -     polyethylene glycol (MIRALAX) packet; Take 17 g by mouth Daily.          Follow-up:     1 month     Goals:   Goals     • Pain control      Barriers: severe PAD, pt continues to smoke      • Quit smoking / using tobacco (pt-stated)      Barriers: compliance with cessation medications          Barriers to goals:  None     Health Maintenance   Topic Date Due   • TDAP/TD VACCINES (1 - Tdap) 09/04/1961   • ZOSTER VACCINE (1 of 2) 09/04/2000   • LIPID PANEL  03/23/2019   • MEDICARE ANNUAL WELLNESS  04/04/2019   • PNEUMOCOCCAL VACCINES (65+ LOW/MEDIUM RISK) (2 of 2 - PPSV23) 04/05/2019   • INFLUENZA VACCINE  08/01/2019   • DXA SCAN  04/25/2020   • MAMMOGRAM  05/04/2020   • LUNG CANCER SCREENING  06/06/2020   • COLONOSCOPY  06/29/2023   • HEPATITIS C SCREENING  Completed       Tobacco: nonsmoker  Alcohol: does not drink  Lifestyle: Patient's Body mass index is 20.25 kg/m². BMI is within normal parameters. No follow-up required..   increase water intake, reduce portion size, cut out extra servings, family to eat at dinner table more often, keep TV off during meals and plan meals    RISK SCORE: 3         Julianna Gloria M.D. PGY1  [facility]  [address] [place] [address]  Office: [phone]    This document has been electronically signed by Julianna Gloria MD on December 16, 2019 2:02 PM            This document has been electronically signed by Julianna Gloria MD on December 16, 2019 1:57 PM

## 2020-01-07 DIAGNOSIS — F51.01 PRIMARY INSOMNIA: ICD-10-CM

## 2020-01-07 RX ORDER — LANOLIN ALCOHOL/MO/W.PET/CERES
3 CREAM (GRAM) TOPICAL
Qty: 30 TABLET | Refills: 2 | Status: SHIPPED | OUTPATIENT
Start: 2020-01-07 | End: 2020-03-31

## 2020-01-10 ENCOUNTER — OFFICE VISIT (OUTPATIENT)
Dept: FAMILY MEDICINE CLINIC | Facility: CLINIC | Age: 70
End: 2020-01-10

## 2020-01-10 VITALS
DIASTOLIC BLOOD PRESSURE: 80 MMHG | OXYGEN SATURATION: 97 % | HEART RATE: 62 BPM | SYSTOLIC BLOOD PRESSURE: 130 MMHG | HEIGHT: 64 IN | WEIGHT: 119 LBS | BODY MASS INDEX: 20.32 KG/M2

## 2020-01-10 DIAGNOSIS — G62.9 PERIPHERAL POLYNEUROPATHY: ICD-10-CM

## 2020-01-10 PROCEDURE — 99213 OFFICE O/P EST LOW 20 MIN: CPT | Performed by: STUDENT IN AN ORGANIZED HEALTH CARE EDUCATION/TRAINING PROGRAM

## 2020-01-10 RX ORDER — GABAPENTIN 400 MG/1
400 CAPSULE ORAL 3 TIMES DAILY
Qty: 90 CAPSULE | Refills: 2 | Status: SHIPPED | OUTPATIENT
Start: 2020-01-10 | End: 2020-02-10 | Stop reason: SDUPTHER

## 2020-01-10 RX ORDER — HYDROCODONE BITARTRATE AND ACETAMINOPHEN 7.5; 325 MG/1; MG/1
1 TABLET ORAL EVERY 6 HOURS PRN
Qty: 30 TABLET | Refills: 0 | Status: SHIPPED | OUTPATIENT
Start: 2020-01-10 | End: 2020-04-06 | Stop reason: SDUPTHER

## 2020-01-14 NOTE — PROGRESS NOTES
ID: Jessie Jordan    CC:   No chief complaint on file.      Subjective:     HPI     Jessie Jordan is a 69 y.o. female who presents for pain over the hip back and shoulder. Last visit patient told me its hard to go to her pain clinic because she sees a different doctor every time. Patient states she would like to start getting her meds here. So I decreased the amount of pills we give her of the norco from 90 to 30. She states it has been hard only taking one a day. She is still having a lot of pain and the only that controls it is the norco and gabapentin.     Preventative:  Over the past 2 weeks, have you felt down, depressed, or hopeless? no  Over the past 2 weeks, have you felt little interest or pleasure in doing things? no  Clinical depression screening refused by patient no    On osteoporosis therapy? No     Past Medical Hx:  Past Medical History:   Diagnosis Date   • Anxiety and depression    • Coronary artery disease involving native coronary artery of native heart 12/06/2017    seen on Cardiac Cath - Left main 50-70% stenosis, 12/27/2017 repeat cath showed coronary spasm with no stenosis   • Hyperlipidemia    • Hypertension    • Kidney cysts    • Post-menopausal 1990   • Skin cancer    • Stroke (CMS/[facility]) 2015   • Vascular disease        Past Surgical Hx:  Past Surgical History:   Procedure Laterality Date   • ARTERIOGRAM N/A 9/28/2018    Procedure: aortoiliac arteriogram possible angioplasty stent atherectomy thrombolysis bilateral iliac stents;  Surgeon: Luan Ruff MD;  Location: [facility] ANGIO INVASIVE LOCATION;  Service: Interventional Radiology   • CARDIAC CATHETERIZATION  12/06/2017    Done at [facility] - Left Main 50-70% Stenosis - no stenting done, recommendation was urgent CABG.  EF 50-60%   • CERVICAL FUSION  1985    C5-6   • COLONOSCOPY N/A 6/29/2018    Procedure: COLONOSCOPY;  Surgeon: Oz Way MD;  Location: [facility] ENDOSCOPY;  Service: Gastroenterology      • FEMORAL POPLITEAL BYPASS Right 12/19/2018    Procedure: femoral to femoral artery bypass, RIGHT FEMORAL POPLITEAL BYPASS right lower extremity arteriogram          (C-ARM# AND C-ARM TABLE);  Surgeon: Luan Ruff MD;  Location: [facility] OR;  Service: Vascular   • FINGER SURGERY Left     third finger   • ROTATOR CUFF REPAIR Left 06/30/2011    done in [place]       Health Maintenance:  Health Maintenance   Topic Date Due   • TDAP/TD VACCINES (1 - Tdap) 09/04/1961   • ZOSTER VACCINE (1 of 2) 09/04/2000   • Pneumococcal Vaccine Once at 65 Years Old  09/04/2015   • LIPID PANEL  03/23/2019   • MEDICARE ANNUAL WELLNESS  04/04/2019   • INFLUENZA VACCINE  08/01/2019   • DXA SCAN  04/25/2020   • MAMMOGRAM  05/04/2020   • LUNG CANCER SCREENING  06/06/2020   • COLONOSCOPY  06/29/2023   • HEPATITIS C SCREENING  Completed       Current Meds:    Current Outpatient Medications:   •  amLODIPine (NORVASC) 2.5 MG tablet, Take 1 tablet by mouth 2 (Two) Times a Day., Disp: 180 tablet, Rfl: 0  •  atorvastatin (LIPITOR) 10 MG tablet, Take 1 tablet by mouth Daily., Disp: 30 tablet, Rfl: 5  •  clopidogrel (PLAVIX) 75 MG tablet, Take 1 tablet by mouth Daily., Disp: 90 tablet, Rfl: 3  •  diclofenac (VOLTAREN) 1 % gel gel, Apply  topically to the appropriate area as directed 2 (Two) Times a Day., Disp: 30 g, Rfl: 1  •  ferrous sulfate 325 (65 FE) MG tablet, Take 1 tablet by mouth Daily With Breakfast., Disp: 30 tablet, Rfl: 3  •  FLUoxetine (PROZAC) 20 MG capsule, Take 1 capsule by mouth Daily., Disp: 90 capsule, Rfl: 3  •  fluticasone (FLONASE) 50 MCG/ACT nasal spray, 1 spray into the nostril(s) as directed by provider Daily., Disp: 16 mL, Rfl: 6  •  gabapentin (NEURONTIN) 100 MG capsule, Take 1 capsule by mouth 3 (Three) Times a Day., Disp: 90 capsule, Rfl: 2  •  gabapentin (NEURONTIN) 400 MG capsule, Take 1 capsule by mouth 3 (Three) Times a Day., Disp: 90 capsule, Rfl: 2  •  HYDROcodone-acetaminophen (NORCO) 7.5-325 MG per tablet,  Take 1 tablet by mouth Every 6 (Six) Hours As Needed for Moderate Pain ., Disp: , Rfl:   •  HYDROcodone-acetaminophen (NORCO) 7.5-325 MG per tablet, Take 1 tablet by mouth Every 6 (Six) Hours As Needed for Moderate Pain  or Severe Pain ., Disp: 30 tablet, Rfl: 0  •  lisinopril (PRINIVIL,ZESTRIL) 10 MG tablet, Take 1 tablet by mouth Daily., Disp: 90 tablet, Rfl: 3  •  melatonin ([employer] MELATONIN) 3 MG tablet, Take 1 tablet by mouth every night at bedtime for 90 days., Disp: 30 tablet, Rfl: 2  •  naproxen (NAPROSYN) 500 MG tablet, Take 1 tablet by mouth 2 (Two) Times a Day With Meals., Disp: 30 tablet, Rfl: 2  •  OS-LORENE CALCIUM + D3 500-200 MG-UNIT tablet, Take 1 tablet by mouth Daily., Disp: , Rfl: 2  •  polyethylene glycol (MIRALAX) packet, Take 17 g by mouth Daily., Disp: 100 each, Rfl: 3  •  Prenatal Vit-Fe Fumarate-FA (PRENATAL VITAMIN 27-0.8) 27-0.8 MG tablet tablet, Take 1 tablet by mouth Daily., Disp: , Rfl:     Allergies:  Patient has no known allergies.    Family Hx:  Family History   Problem Relation Age of Onset   • Lung cancer Mother    • Hypertension Mother    • Diabetes Maternal Grandmother    • Heart disease Maternal Grandmother    • Hypertension Maternal Grandfather    • Heart disease Maternal Grandfather    • Heart disease Other    • Hypertension Other    • Cancer Other         Social History:  Social History     Socioeconomic History   • Marital status:      Spouse name: Not on file   • Number of children: 1   • Years of education: Not on file   • Highest education level: Not on file   Occupational History   • Occupation: Retired     Comment: Patient resides alone (daughter resides across street).   Tobacco Use   • Smoking status: Former Smoker     Packs/day: 1.00     Years: 48.00     Pack years: 48.00     Types: Cigarettes     Last attempt to quit: 2018     Years since quittin.0   • Smokeless tobacco: Never Used   Substance and Sexual Activity   • Alcohol use: Yes     Frequency:  "Monthly or less     Comment: socially   • Drug use: No   • Sexual activity: Defer     Comment: .       Review of Systems   Constitutional: Negative for activity change, appetite change, chills, fatigue and fever.   HENT: Negative for drooling, ear discharge, ear pain, facial swelling, hearing loss, mouth sores, rhinorrhea and sinus pain.    Eyes: Negative for pain, redness and itching.   Respiratory: Negative for cough, choking, chest tightness, shortness of breath and stridor.    Cardiovascular: Negative for chest pain, palpitations and leg swelling.   Gastrointestinal: Negative for abdominal distention, abdominal pain, anal bleeding, blood in stool, constipation, diarrhea and nausea.   Endocrine: Negative for heat intolerance, polydipsia and polyphagia.   Genitourinary: Negative for dysuria, flank pain, frequency and genital sores.   Musculoskeletal: Positive for arthralgias, back pain and myalgias. Negative for gait problem and joint swelling.   Skin: Negative for pallor and rash.   Neurological: Negative for seizures, facial asymmetry, speech difficulty, light-headedness, numbness and headaches.   Hematological: Negative for adenopathy. Does not bruise/bleed easily.   Psychiatric/Behavioral: Negative for confusion, decreased concentration, dysphoric mood and hallucinations. The patient is not nervous/anxious and is not hyperactive.            Objective:     /80   Pulse 62   Ht 162.6 cm (64\")   Wt 54 kg (119 lb)   SpO2 97%   BMI 20.43 kg/m²     Physical Exam   Constitutional: She is oriented to person, place, and time. She appears well-developed and well-nourished.   HENT:   Head: Normocephalic and atraumatic.   Right Ear: External ear normal.   Left Ear: External ear normal.   Nose: Nose normal.   Mouth/Throat: Oropharynx is clear and moist.   Eyes: Pupils are equal, round, and reactive to light. Conjunctivae and EOM are normal.   Neck: Normal range of motion. Neck supple.   Cardiovascular: " Normal rate, regular rhythm, normal heart sounds and intact distal pulses.   Pulmonary/Chest: Effort normal and breath sounds normal.   Abdominal: Soft. Bowel sounds are normal.   Musculoskeletal: Normal range of motion.   Neurological: She is alert and oriented to person, place, and time.   Skin: Skin is warm and dry. Capillary refill takes less than 2 seconds.   Psychiatric: She has a normal mood and affect. Her behavior is normal. Judgment and thought content normal.              Assessment/Plan:     Jessie was seen today for pain.    Diagnoses and all orders for this visit:    Peripheral polyneuropathy  -     gabapentin (NEURONTIN) 400 MG capsule; Take 1 capsule by mouth 3 (Three) Times a Day.  -     HYDROcodone-acetaminophen (NORCO) 7.5-325 MG per tablet; Take 1 tablet by mouth Every 6 (Six) Hours As Needed for Moderate Pain  or Severe Pain .   - advised patient to go back to the pain clinic to get her meds as I cant give her the 90 tablets she would like. I did go up on her gabapentin from 300 to 400 and hopefully that will help. Patient states she will try to go back to the pain clinic.    - reviewed juan diego and is consistent with patients records     Follow-up:     1 month     Goals:   Goals     • Pain control      Barriers: severe PAD, pt continues to smoke      • Quit smoking / using tobacco (pt-stated)      Barriers: compliance with cessation medications          Barriers to goals:  None     Health Maintenance   Topic Date Due   • TDAP/TD VACCINES (1 - Tdap) 09/04/1961   • ZOSTER VACCINE (1 of 2) 09/04/2000   • Pneumococcal Vaccine Once at 65 Years Old  09/04/2015   • LIPID PANEL  03/23/2019   • MEDICARE ANNUAL WELLNESS  04/04/2019   • INFLUENZA VACCINE  08/01/2019   • DXA SCAN  04/25/2020   • MAMMOGRAM  05/04/2020   • LUNG CANCER SCREENING  06/06/2020   • COLONOSCOPY  06/29/2023   • HEPATITIS C SCREENING  Completed       Tobacco: nonsmoker  Alcohol: does not drink  Lifestyle: Patient's Body mass index is  20.43 kg/m². BMI is within normal parameters. No follow-up required..   increase water intake, reduce portion size, cut out extra servings, family to eat at dinner table more often, keep TV off during meals and plan meals    RISK SCORE: 3         Julianna Gloria M.D. PGY2  [facility]  [address] [place] [address]  Office: [phone]    This document has been electronically signed by Julianna Gloria MD on January 13, 2020 7:49 PM            This document has been electronically signed by Julianna Gloria MD on January 13, 2020 7:49 PM

## 2020-01-23 NOTE — PROGRESS NOTES
No checked this patient out to you. This is the one that wants to get pain medication from us because she doesn't want to keep seeing different doctors at the pain clinic.

## 2020-01-24 NOTE — PROGRESS NOTES
I have reviewed the notes, assessments, and/or procedures performed by *Julianna Gloria MD  **during office visit. I concur with her/his documentation and assessment and plan for Jessie Jordan.          This document has been electronically signed by Constantine Bradley MD on January 24, 2020 9:54 AM

## 2020-02-10 ENCOUNTER — OFFICE VISIT (OUTPATIENT)
Dept: FAMILY MEDICINE CLINIC | Facility: CLINIC | Age: 70
End: 2020-02-10

## 2020-02-10 VITALS
WEIGHT: 118 LBS | BODY MASS INDEX: 20.14 KG/M2 | HEART RATE: 63 BPM | OXYGEN SATURATION: 98 % | SYSTOLIC BLOOD PRESSURE: 110 MMHG | HEIGHT: 64 IN | DIASTOLIC BLOOD PRESSURE: 70 MMHG

## 2020-02-10 DIAGNOSIS — I10 ESSENTIAL HYPERTENSION: ICD-10-CM

## 2020-02-10 DIAGNOSIS — I73.9 PVD (PERIPHERAL VASCULAR DISEASE) (HCC): ICD-10-CM

## 2020-02-10 DIAGNOSIS — G89.4 CHRONIC PAIN SYNDROME: ICD-10-CM

## 2020-02-10 DIAGNOSIS — G62.9 PERIPHERAL POLYNEUROPATHY: ICD-10-CM

## 2020-02-10 PROCEDURE — 99213 OFFICE O/P EST LOW 20 MIN: CPT | Performed by: STUDENT IN AN ORGANIZED HEALTH CARE EDUCATION/TRAINING PROGRAM

## 2020-02-10 RX ORDER — AMLODIPINE BESYLATE 2.5 MG/1
2.5 TABLET ORAL 2 TIMES DAILY
Qty: 180 TABLET | Refills: 0 | Status: SHIPPED | OUTPATIENT
Start: 2020-02-10 | End: 2020-06-22 | Stop reason: SDUPTHER

## 2020-02-10 RX ORDER — NAPROXEN 500 MG/1
500 TABLET ORAL 2 TIMES DAILY WITH MEALS
Qty: 30 TABLET | Refills: 2 | Status: SHIPPED | OUTPATIENT
Start: 2020-02-10 | End: 2020-04-27 | Stop reason: SDUPTHER

## 2020-02-10 RX ORDER — GABAPENTIN 400 MG/1
400 CAPSULE ORAL 3 TIMES DAILY
Qty: 90 CAPSULE | Refills: 2 | Status: SHIPPED | OUTPATIENT
Start: 2020-02-10 | End: 2020-03-13 | Stop reason: SDUPTHER

## 2020-02-10 RX ORDER — FLUTICASONE PROPIONATE 50 MCG
1 SPRAY, SUSPENSION (ML) NASAL DAILY
Qty: 16 ML | Refills: 6 | OUTPATIENT
Start: 2020-02-10 | End: 2020-03-04

## 2020-02-10 RX ORDER — GABAPENTIN 400 MG/1
400 CAPSULE ORAL 3 TIMES DAILY
Qty: 90 CAPSULE | Refills: 2 | Status: CANCELLED | OUTPATIENT
Start: 2020-02-10

## 2020-02-10 RX ORDER — GABAPENTIN 100 MG/1
100 CAPSULE ORAL 3 TIMES DAILY
Qty: 90 CAPSULE | Refills: 2 | Status: CANCELLED | OUTPATIENT
Start: 2020-02-10

## 2020-02-10 RX ORDER — HYDROCODONE BITARTRATE AND ACETAMINOPHEN 7.5; 325 MG/1; MG/1
1 TABLET ORAL EVERY 6 HOURS PRN
Qty: 30 TABLET | Refills: 0 | Status: SHIPPED | OUTPATIENT
Start: 2020-02-10 | End: 2020-04-06 | Stop reason: SDUPTHER

## 2020-02-10 NOTE — PROGRESS NOTES
ID: Jessie Jordan    CC:   Chief Complaint   Patient presents with   • Peripheral Neuropathy       Subjective:     HPI     Jessie Jordan is a 69 y.o. female who presents for peripheral neuropathy. Patient would like a refill on her gabapentin and Norco. Patient states she has 1/2 tablet left of the norco. She had a uds done last time that was consistent with patients reported medication. juan diego [id number]. Patient received her medications on 1/10. Patient states her pain is well controlled on the norco and peripheral neuropathy with the gabapentin. Patient has no other concerns today.     Patient takes norvasc 2.5 mg for her blood pressure and it has been stable and well controlled. Today its 110/70.     Preventative:  Over the past 2 weeks, have you felt down, depressed, or hopeless? no  Over the past 2 weeks, have you felt little interest or pleasure in doing things? no  Clinical depression screening refused by patient no    On osteoporosis therapy? no    Past Medical Hx:  Past Medical History:   Diagnosis Date   • Anxiety and depression    • Coronary artery disease involving native coronary artery of native heart 12/06/2017    seen on Cardiac Cath - Left main 50-70% stenosis, 12/27/2017 repeat cath showed coronary spasm with no stenosis   • Hyperlipidemia    • Hypertension    • Kidney cysts    • Post-menopausal 1990   • Skin cancer    • Stroke (CMS/HCC) 2015   • Vascular disease        Past Surgical Hx:  Past Surgical History:   Procedure Laterality Date   • ARTERIOGRAM N/A 9/28/2018    Procedure: aortoiliac arteriogram possible angioplasty stent atherectomy thrombolysis bilateral iliac stents;  Surgeon: Luan Ruff MD;  Location: [facility] ANGIO INVASIVE LOCATION;  Service: Interventional Radiology   • CARDIAC CATHETERIZATION  12/06/2017    Done at [facility] - Left Main 50-70% Stenosis - no stenting done, recommendation was urgent CABG.  EF 50-60%   • CERVICAL FUSION  1985    C5-6     • COLONOSCOPY N/A 6/29/2018    Procedure: COLONOSCOPY;  Surgeon: Oz Way MD;  Location: [facility] ENDOSCOPY;  Service: Gastroenterology   • FEMORAL POPLITEAL BYPASS Right 12/19/2018    Procedure: femoral to femoral artery bypass, RIGHT FEMORAL POPLITEAL BYPASS right lower extremity arteriogram          (C-ARM# AND C-ARM TABLE);  Surgeon: Luan Ruff MD;  Location: [facility] OR;  Service: Vascular   • FINGER SURGERY Left     third finger   • ROTATOR CUFF REPAIR Left 06/30/2011    done in [place]       Health Maintenance:  Health Maintenance   Topic Date Due   • TDAP/TD VACCINES (1 - Tdap) 09/04/1961   • ZOSTER VACCINE (1 of 2) 09/04/2000   • Pneumococcal Vaccine Once at 65 Years Old  09/04/2015   • LIPID PANEL  03/23/2019   • MEDICARE ANNUAL WELLNESS  04/04/2019   • INFLUENZA VACCINE  08/01/2019   • DXA SCAN  04/25/2020   • MAMMOGRAM  05/04/2020   • LUNG CANCER SCREENING  06/06/2020   • COLONOSCOPY  06/29/2023   • HEPATITIS C SCREENING  Completed       Current Meds:    Current Outpatient Medications:   •  amLODIPine (NORVASC) 2.5 MG tablet, Take 1 tablet by mouth 2 (Two) Times a Day., Disp: 180 tablet, Rfl: 0  •  atorvastatin (LIPITOR) 10 MG tablet, Take 1 tablet by mouth Daily., Disp: 30 tablet, Rfl: 5  •  clopidogrel (PLAVIX) 75 MG tablet, Take 1 tablet by mouth Daily., Disp: 90 tablet, Rfl: 3  •  diclofenac (VOLTAREN) 1 % gel gel, Apply  topically to the appropriate area as directed 2 (Two) Times a Day., Disp: 30 g, Rfl: 1  •  ferrous sulfate 325 (65 FE) MG tablet, Take 1 tablet by mouth Daily With Breakfast., Disp: 30 tablet, Rfl: 3  •  FLUoxetine (PROZAC) 20 MG capsule, Take 1 capsule by mouth Daily., Disp: 90 capsule, Rfl: 3  •  fluticasone (FLONASE) 50 MCG/ACT nasal spray, 1 spray into the nostril(s) as directed by provider Daily., Disp: 16 mL, Rfl: 6  •  gabapentin (NEURONTIN) 100 MG capsule, Take 1 capsule by mouth 3 (Three) Times a Day., Disp: 90 capsule, Rfl: 2  •  gabapentin (NEURONTIN) 400 MG  capsule, Take 1 capsule by mouth 3 (Three) Times a Day., Disp: 90 capsule, Rfl: 2  •  HYDROcodone-acetaminophen (NORCO) 7.5-325 MG per tablet, Take 1 tablet by mouth Every 6 (Six) Hours As Needed for Moderate Pain  or Severe Pain ., Disp: 30 tablet, Rfl: 0  •  lisinopril (PRINIVIL,ZESTRIL) 10 MG tablet, Take 1 tablet by mouth Daily., Disp: 90 tablet, Rfl: 3  •  melatonin ([employer]S MELATONIN) 3 MG tablet, Take 1 tablet by mouth every night at bedtime for 90 days., Disp: 30 tablet, Rfl: 2  •  naproxen (NAPROSYN) 500 MG tablet, Take 1 tablet by mouth 2 (Two) Times a Day With Meals., Disp: 30 tablet, Rfl: 2  •  OS-LORENE CALCIUM + D3 500-200 MG-UNIT tablet, Take 1 tablet by mouth Daily., Disp: , Rfl: 2  •  polyethylene glycol (MIRALAX) packet, Take 17 g by mouth Daily., Disp: 100 each, Rfl: 3  •  Prenatal Vit-Fe Fumarate-FA (PRENATAL VITAMIN 27-0.8) 27-0.8 MG tablet tablet, Take 1 tablet by mouth Daily., Disp: , Rfl:   •  HYDROcodone-acetaminophen (NORCO) 7.5-325 MG per tablet, Take 1 tablet by mouth Every 6 (Six) Hours As Needed for Moderate Pain ., Disp: 30 tablet, Rfl: 0    Allergies:  Patient has no known allergies.    Family Hx:  Family History   Problem Relation Age of Onset   • Lung cancer Mother    • Hypertension Mother    • Diabetes Maternal Grandmother    • Heart disease Maternal Grandmother    • Hypertension Maternal Grandfather    • Heart disease Maternal Grandfather    • Heart disease Other    • Hypertension Other    • Cancer Other         Social History:  Social History     Socioeconomic History   • Marital status:      Spouse name: Not on file   • Number of children: 1   • Years of education: Not on file   • Highest education level: Not on file   Occupational History   • Occupation: Retired     Comment: Patient resides alone (daughter resides across street).   Tobacco Use   • Smoking status: Former Smoker     Packs/day: 1.00     Years: 48.00     Pack years: 48.00     Types: Cigarettes     Last attempt to  "quit: 2018     Years since quittin.1   • Smokeless tobacco: Never Used   Substance and Sexual Activity   • Alcohol use: Yes     Frequency: Monthly or less     Comment: socially   • Drug use: No   • Sexual activity: Defer     Comment: .       Review of Systems   Constitutional: Negative for activity change, appetite change, chills, fatigue and fever.   HENT: Negative for drooling, ear discharge, ear pain, facial swelling, hearing loss, mouth sores, rhinorrhea and sinus pain.    Eyes: Negative for pain, redness and itching.   Respiratory: Negative for cough, choking, chest tightness, shortness of breath and stridor.    Cardiovascular: Negative for chest pain, palpitations and leg swelling.   Gastrointestinal: Negative for abdominal distention, abdominal pain, anal bleeding, blood in stool, constipation, diarrhea and nausea.   Endocrine: Negative for heat intolerance, polydipsia and polyphagia.   Genitourinary: Negative for dysuria, flank pain, frequency and genital sores.   Musculoskeletal: Positive for back pain and myalgias. Negative for gait problem and joint swelling.   Skin: Negative for pallor and rash.   Neurological: Positive for numbness. Negative for seizures, facial asymmetry, speech difficulty, light-headedness and headaches.   Hematological: Negative for adenopathy. Does not bruise/bleed easily.   Psychiatric/Behavioral: Negative for confusion, decreased concentration, dysphoric mood and hallucinations. The patient is not nervous/anxious and is not hyperactive.            Objective:     /70   Pulse 63   Ht 162.6 cm (64\")   Wt 53.5 kg (118 lb)   SpO2 98%   BMI 20.25 kg/m²     Physical Exam   Constitutional: She is oriented to person, place, and time. She appears well-developed and well-nourished.   HENT:   Head: Normocephalic and atraumatic.   Right Ear: External ear normal.   Left Ear: External ear normal.   Nose: Nose normal.   Mouth/Throat: Oropharynx is clear and moist.   "   Eyes: Pupils are equal, round, and reactive to light. Conjunctivae and EOM are normal.   Neck: Normal range of motion. Neck supple.   Cardiovascular: Normal rate, regular rhythm, normal heart sounds and intact distal pulses.   Pulmonary/Chest: Effort normal and breath sounds normal.   Abdominal: Soft. Bowel sounds are normal.   Musculoskeletal: Normal range of motion.   Neurological: She is alert and oriented to person, place, and time.   Skin: Skin is warm and dry.   Psychiatric: She has a normal mood and affect. Her behavior is normal. Judgment and thought content normal.            Assessment/Plan:     Jessie was seen today for peripheral neuropathy.    Diagnoses and all orders for this visit:    Essential hypertension  -     amLODIPine (NORVASC) 2.5 MG tablet; Take 1 tablet by mouth 2 (Two) Times a Day.  - Well controlled and stable     Peripheral polyneuropathy  -     gabapentin (NEURONTIN) 400 MG capsule; Take 1 capsule by mouth 3 (Three) Times a Day.  -     HYDROcodone-acetaminophen (NORCO) 7.5-325 MG per tablet; Take 1 tablet by mouth Every 6 (Six) Hours As Needed for Moderate Pain .    Chronic pain syndrome  -     HYDROcodone-acetaminophen (NORCO) 7.5-325 MG per tablet; Take 1 tablet by mouth Every 6 (Six) Hours As Needed for Moderate Pain .    PVD (peripheral vascular disease) (CMS/[facility])    Other orders  -     naproxen (NAPROSYN) 500 MG tablet; Take 1 tablet by mouth 2 (Two) Times a Day With Meals.  -     fluticasone (FLONASE) 50 MCG/ACT nasal spray; 1 spray into the nostril(s) as directed by provider Daily.      [id number]- juan diego reviewed and consistent with records. Will refill the norco and gabapentin today. Patient to call back next time a few days before she runs out of [place]. Trying to get her in with pain management so she can get her medication from them.     Follow-up:     No follow-ups on file.      Goals:   Goals     • Pain control      Barriers: severe PAD, pt continues to smoke      • Quit  smoking / using tobacco (pt-stated)      Barriers: compliance with cessation medications          Barriers to goals: pain medication    Health Maintenance   Topic Date Due   • TDAP/TD VACCINES (1 - Tdap) 09/04/1961   • ZOSTER VACCINE (1 of 2) 09/04/2000   • Pneumococcal Vaccine Once at 65 Years Old  09/04/2015   • LIPID PANEL  03/23/2019   • MEDICARE ANNUAL WELLNESS  04/04/2019   • INFLUENZA VACCINE  08/01/2019   • DXA SCAN  04/25/2020   • MAMMOGRAM  05/04/2020   • LUNG CANCER SCREENING  06/06/2020   • COLONOSCOPY  06/29/2023   • HEPATITIS C SCREENING  Completed       Tobacco: nonsmoker  Alcohol: does not drink  Lifestyle: Patient's Body mass index is 20.25 kg/m². BMI is above normal parameters. Recommendations include: exercise counseling and nutrition counseling.   family to eat at dinner table more often and eat breakfast    RISK SCORE: 3         Julianna Gloria M.D. PGY1  [facility]  [address] [place] [address]  Office: [phone]    This document has been electronically signed by Julianna Gloria MD on February 10, 2020 10:43 AM          This document has been electronically signed by Julianna Gloria MD on February 10, 2020 10:40 AM

## 2020-02-10 NOTE — PROGRESS NOTES
I have reviewed the notes, assessments, and/or procedures performed by Julianna Gloria MD, I concur with her/his documentation and assessment and plan for Jessie Jordan.                This document has been electronically signed by Justice Bonilla MD on February 10, 2020 1:30 PM

## 2020-03-03 DIAGNOSIS — G89.4 CHRONIC PAIN SYNDROME: ICD-10-CM

## 2020-03-03 DIAGNOSIS — I73.9 PVD (PERIPHERAL VASCULAR DISEASE) (HCC): ICD-10-CM

## 2020-03-04 RX ORDER — FLUTICASONE PROPIONATE 50 MCG
SPRAY, SUSPENSION (ML) NASAL
Qty: 16 ML | Refills: 0 | Status: SHIPPED | OUTPATIENT
Start: 2020-03-04 | End: 2020-03-19 | Stop reason: SDUPTHER

## 2020-03-06 RX ORDER — GABAPENTIN 100 MG/1
100 CAPSULE ORAL 3 TIMES DAILY
Qty: 90 CAPSULE | Refills: 2 | Status: SHIPPED | OUTPATIENT
Start: 2020-03-06 | End: 2020-03-13

## 2020-03-10 ENCOUNTER — TELEPHONE (OUTPATIENT)
Dept: FAMILY MEDICINE CLINIC | Facility: CLINIC | Age: 70
End: 2020-03-10

## 2020-03-13 DIAGNOSIS — G62.9 PERIPHERAL POLYNEUROPATHY: ICD-10-CM

## 2020-03-13 RX ORDER — GABAPENTIN 400 MG/1
400 CAPSULE ORAL 3 TIMES DAILY
Qty: 90 CAPSULE | Refills: 2 | Status: SHIPPED | OUTPATIENT
Start: 2020-03-13 | End: 2020-04-30 | Stop reason: SDUPTHER

## 2020-03-13 RX ORDER — GABAPENTIN 400 MG/1
400 CAPSULE ORAL 3 TIMES DAILY
Qty: 90 CAPSULE | Refills: 2 | OUTPATIENT
Start: 2020-03-13

## 2020-03-20 RX ORDER — FLUTICASONE PROPIONATE 50 MCG
2 SPRAY, SUSPENSION (ML) NASAL DAILY
Qty: 3 BOTTLE | Refills: 3 | Status: SHIPPED | OUTPATIENT
Start: 2020-03-20 | End: 2020-04-30 | Stop reason: SDUPTHER

## 2020-03-23 RX ORDER — FERROUS SULFATE 325(65) MG
TABLET ORAL
Qty: 90 TABLET | Refills: 1 | Status: SHIPPED | OUTPATIENT
Start: 2020-03-23 | End: 2020-06-23 | Stop reason: SDUPTHER

## 2020-03-31 DIAGNOSIS — F51.01 PRIMARY INSOMNIA: ICD-10-CM

## 2020-03-31 RX ORDER — OMEPRAZOLE MAGNESIUM 20 MG
CAPSULE,DELAYED RELEASE (ENTERIC COATED) ORAL
Qty: 90 TABLET | Refills: 0 | Status: SHIPPED | OUTPATIENT
Start: 2020-03-31 | End: 2020-07-06 | Stop reason: SDUPTHER

## 2020-04-02 ENCOUNTER — TELEPHONE (OUTPATIENT)
Dept: FAMILY MEDICINE CLINIC | Facility: CLINIC | Age: 70
End: 2020-04-02

## 2020-04-06 DIAGNOSIS — G89.4 CHRONIC PAIN SYNDROME: ICD-10-CM

## 2020-04-06 DIAGNOSIS — G62.9 PERIPHERAL POLYNEUROPATHY: ICD-10-CM

## 2020-04-06 NOTE — TELEPHONE ENCOUNTER
I CALLED PATIENT TO CHANGE HER APPT VISIT TO TELEPHONE DUE TO COVID 19; SHE IS GOING TO NEED A REFILL ON HER HYDROcodone-acetaminophen (NORCO) 7.5-325 MG per tablet AND WOULD LIKE FOR IT TO BE SENT ELECTRONICALLY TO [facility] IN [place] PLEASE.        THANK YOU,      MERRY

## 2020-04-07 RX ORDER — HYDROCODONE BITARTRATE AND ACETAMINOPHEN 7.5; 325 MG/1; MG/1
1 TABLET ORAL EVERY 6 HOURS PRN
Qty: 30 TABLET | Refills: 0 | Status: SHIPPED | OUTPATIENT
Start: 2020-04-07 | End: 2020-04-30 | Stop reason: SDUPTHER

## 2020-04-27 RX ORDER — NAPROXEN 500 MG/1
TABLET ORAL
Qty: 30 TABLET | Refills: 2 | Status: SHIPPED | OUTPATIENT
Start: 2020-04-27 | End: 2020-06-22 | Stop reason: SDUPTHER

## 2020-04-30 ENCOUNTER — OFFICE VISIT (OUTPATIENT)
Dept: FAMILY MEDICINE CLINIC | Facility: CLINIC | Age: 70
End: 2020-04-30

## 2020-04-30 DIAGNOSIS — H61.23 BILATERAL IMPACTED CERUMEN: ICD-10-CM

## 2020-04-30 DIAGNOSIS — J30.9 ALLERGIC RHINITIS, UNSPECIFIED SEASONALITY, UNSPECIFIED TRIGGER: Primary | ICD-10-CM

## 2020-04-30 DIAGNOSIS — G89.4 CHRONIC PAIN SYNDROME: ICD-10-CM

## 2020-04-30 DIAGNOSIS — G62.9 PERIPHERAL POLYNEUROPATHY: ICD-10-CM

## 2020-04-30 PROCEDURE — 99442 PR PHYS/QHP TELEPHONE EVALUATION 11-20 MIN: CPT | Performed by: STUDENT IN AN ORGANIZED HEALTH CARE EDUCATION/TRAINING PROGRAM

## 2020-04-30 RX ORDER — GABAPENTIN 400 MG/1
400 CAPSULE ORAL 3 TIMES DAILY
Qty: 90 CAPSULE | Refills: 2 | Status: SHIPPED | OUTPATIENT
Start: 2020-04-30 | End: 2020-06-22 | Stop reason: SDUPTHER

## 2020-04-30 RX ORDER — HYDROCODONE BITARTRATE AND ACETAMINOPHEN 7.5; 325 MG/1; MG/1
1 TABLET ORAL EVERY 6 HOURS PRN
Qty: 30 TABLET | Refills: 0 | Status: SHIPPED | OUTPATIENT
Start: 2020-04-30 | End: 2020-06-22 | Stop reason: SDUPTHER

## 2020-04-30 RX ORDER — FLUTICASONE PROPIONATE 50 MCG
2 SPRAY, SUSPENSION (ML) NASAL DAILY
Qty: 3 BOTTLE | Refills: 3 | Status: SHIPPED | OUTPATIENT
Start: 2020-04-30 | End: 2021-06-02

## 2020-04-30 NOTE — PROGRESS NOTES
ID: Jessie Jordan  You have chosen to receive care through a telephone visit. Do you consent to use a telephone visit for your medical care today? Yes    CC:   Chief Complaint   Patient presents with   • Peripheral Neuropathy     3 mo follow up       Subjective:     HPI     Jessie Jordan is a 69 y.o. female who presents for peripheral neuropathy follow up. Patient states she is doing well on the gabapentin and norco. She ran out of the norco two days ago and because of that her pain is a 9 right now. Patient otherwise denies shortness of breath, chest pain, headaches, blurry vision.     Patient states her ears are filled with wax. She states they are itchy as well for the past two-three days. She used to use some type of otic solution that she would like to try to help this.     Patient also states she ran out of her flonase and her sinuses are acting up. She has some nasal congestion, watery itchy eyes, and sneezing. Denies cough, shortness of breath, nausea, vomiting, headache, loss of taste or smell.     Preventative:  Over the past 2 weeks, have you felt down, depressed, or hopeless? no  Over the past 2 weeks, have you felt little interest or pleasure in doing things? no  Clinical depression screening refused by patient no    On osteoporosis therapy? no    Past Medical Hx:  Past Medical History:   Diagnosis Date   • Anxiety and depression    • Coronary artery disease involving native coronary artery of native heart 12/06/2017    seen on Cardiac Cath - Left main 50-70% stenosis, 12/27/2017 repeat cath showed coronary spasm with no stenosis   • Hyperlipidemia    • Hypertension    • Kidney cysts    • Post-menopausal 1990   • Skin cancer    • Stroke (CMS/[facility]) 2015   • Vascular disease        Past Surgical Hx:  Past Surgical History:   Procedure Laterality Date   • ARTERIOGRAM N/A 9/28/2018    Procedure: aortoiliac arteriogram possible angioplasty stent atherectomy thrombolysis bilateral iliac stents;   Surgeon: Luan Ruff MD;  Location: [facility], [place] ANGIO INVASIVE LOCATION;  Service: Interventional Radiology   • CARDIAC CATHETERIZATION  12/06/2017    Done at [facility], [place] - Left Main 50-70% Stenosis - no stenting done, recommendation was urgent CABG.  EF 50-60%   • CERVICAL FUSION  1985    C5-6   • COLONOSCOPY N/A 6/29/2018    Procedure: COLONOSCOPY;  Surgeon: Oz Way MD;  Location: [facility] [place] ENDOSCOPY;  Service: Gastroenterology   • FEMORAL POPLITEAL BYPASS Right 12/19/2018    Procedure: femoral to femoral artery bypass, RIGHT FEMORAL POPLITEAL BYPASS right lower extremity arteriogram          (C-ARM# AND C-ARM TABLE);  Surgeon: Luan Ruff MD;  Location: [facility] [place] OR;  Service: Vascular   • FINGER SURGERY Left     third finger   • ROTATOR CUFF REPAIR Left 06/30/2011    done in [place]       Health Maintenance:  Health Maintenance   Topic Date Due   • TDAP/TD VACCINES (1 - Tdap) 09/04/1961   • ZOSTER VACCINE (1 of 2) 09/04/2000   • Pneumococcal Vaccine Once at 65 Years Old  09/04/2015   • LIPID PANEL  03/23/2019   • MEDICARE ANNUAL WELLNESS  04/04/2019   • DXA SCAN  04/25/2020   • MAMMOGRAM  05/04/2020   • LUNG CANCER SCREENING  06/06/2020   • INFLUENZA VACCINE  08/01/2020   • COLONOSCOPY  06/29/2023   • HEPATITIS C SCREENING  Completed       Current Meds:    Current Outpatient Medications:   •  amLODIPine (NORVASC) 2.5 MG tablet, Take 1 tablet by mouth 2 (Two) Times a Day., Disp: 180 tablet, Rfl: 0  •  atorvastatin (LIPITOR) 10 MG tablet, Take 1 tablet by mouth Daily., Disp: 30 tablet, Rfl: 5  •  clopidogrel (PLAVIX) 75 MG tablet, Take 1 tablet by mouth Daily., Disp: 90 tablet, Rfl: 3  •  [employer] MELATONIN 3 MG tablet, TAKE 1 TABLET BY MOUTH EVERY NIGHT AT BEDTIME FOR 90 DAYS., Disp: 90 tablet, Rfl: 0  •  diclofenac (VOLTAREN) 1 % gel gel, Apply  topically to the appropriate area as directed 2 (Two) Times a Day., Disp: 30 g, Rfl: 1  •  ferrous sulfate 325 (65 FE) MG tablet, TAKE 1 TABLET  BY MOUTH EVERY DAY WITH BREAKFAST, Disp: 90 tablet, Rfl: 1  •  FLUoxetine (PROZAC) 20 MG capsule, Take 1 capsule by mouth Daily., Disp: 90 capsule, Rfl: 3  •  fluticasone (FLONASE) 50 MCG/ACT nasal spray, 2 sprays by Each Nare route Daily., Disp: 3 bottle, Rfl: 3  •  HYDROcodone-acetaminophen (NORCO) 7.5-325 MG per tablet, Take 1 tablet by mouth Every 6 (Six) Hours As Needed for Moderate Pain ., Disp: 30 tablet, Rfl: 0  •  lisinopril (PRINIVIL,ZESTRIL) 10 MG tablet, Take 1 tablet by mouth Daily., Disp: 90 tablet, Rfl: 3  •  naproxen (NAPROSYN) 500 MG tablet, TAKE 1 TABLET BY MOUTH TWICE A DAY WITH MEALS, Disp: 30 tablet, Rfl: 2  •  OS-LORENE CALCIUM + D3 500-200 MG-UNIT tablet, Take 1 tablet by mouth Daily., Disp: , Rfl: 2  •  polyethylene glycol (MIRALAX) packet, Take 17 g by mouth Daily., Disp: 100 each, Rfl: 3  •  Prenatal Vit-Fe Fumarate-FA (PRENATAL VITAMIN 27-0.8) 27-0.8 MG tablet tablet, Take 1 tablet by mouth Daily., Disp: , Rfl:   •  carbamide peroxide (Debrox) 6.5 % otic solution, Administer 5 drops into both ears 2 (Two) Times a Day., Disp: 15 mL, Rfl: 3  •  gabapentin (NEURONTIN) 400 MG capsule, Take 1 capsule by mouth 3 (Three) Times a Day., Disp: 90 capsule, Rfl: 2    Allergies:  Patient has no known allergies.    Family Hx:  Family History   Problem Relation Age of Onset   • Lung cancer Mother    • Hypertension Mother    • Diabetes Maternal Grandmother    • Heart disease Maternal Grandmother    • Hypertension Maternal Grandfather    • Heart disease Maternal Grandfather    • Heart disease Other    • Hypertension Other    • Cancer Other         Social History:  Social History     Socioeconomic History   • Marital status:      Spouse name: Not on file   • Number of children: 1   • Years of education: Not on file   • Highest education level: Not on file   Occupational History   • Occupation: Retired     Comment: Patient resides alone (daughter resides across street).   Tobacco Use   • Smoking status:  Former Smoker     Packs/day: 1.00     Years: 48.00     Pack years: 48.00     Types: Cigarettes     Last attempt to quit: 2018     Years since quittin.3   • Smokeless tobacco: Never Used   Substance and Sexual Activity   • Alcohol use: Yes     Frequency: Monthly or less     Comment: socially   • Drug use: No   • Sexual activity: Defer     Comment: .       Review of Systems   Constitutional: Negative for activity change, appetite change, chills, fatigue and fever.   HENT: Positive for sinus pressure, sinus pain and sneezing. Negative for drooling, ear discharge, ear pain, facial swelling, hearing loss, mouth sores and rhinorrhea.    Eyes: Negative for pain, redness and itching.   Respiratory: Negative for cough, choking, chest tightness, shortness of breath and stridor.    Cardiovascular: Negative for chest pain, palpitations and leg swelling.   Gastrointestinal: Negative for abdominal distention, abdominal pain, anal bleeding, blood in stool, constipation, diarrhea and nausea.   Endocrine: Negative for heat intolerance, polydipsia and polyphagia.   Genitourinary: Negative for dysuria, flank pain, frequency and genital sores.   Musculoskeletal: Negative for back pain, gait problem, joint swelling and myalgias.   Skin: Negative for pallor and rash.   Neurological: Positive for numbness. Negative for seizures, facial asymmetry, speech difficulty, light-headedness and headaches.   Hematological: Negative for adenopathy. Does not bruise/bleed easily.   Psychiatric/Behavioral: Negative for confusion, decreased concentration, dysphoric mood and hallucinations. The patient is not nervous/anxious and is not hyperactive.            Objective:     Breastfeeding No     Physical Exam           Assessment/Plan:     Jessie was seen today for peripheral neuropathy.    Diagnoses and all orders for this visit:    Allergic rhinitis, unspecified seasonality, unspecified trigger    Peripheral polyneuropathy  -      gabapentin (NEURONTIN) 400 MG capsule; Take 1 capsule by mouth 3 (Three) Times a Day.  -     HYDROcodone-acetaminophen (NORCO) 7.5-325 MG per tablet; Take 1 tablet by mouth Every 6 (Six) Hours As Needed for Moderate Pain .    Chronic pain syndrome  -     HYDROcodone-acetaminophen (NORCO) 7.5-325 MG per tablet; Take 1 tablet by mouth Every 6 (Six) Hours As Needed for Moderate Pain .    Bilateral impacted cerumen    Other orders  -     carbamide peroxide (Debrox) 6.5 % otic solution; Administer 5 drops into both ears 2 (Two) Times a Day.  -     fluticasone (FLONASE) 50 MCG/ACT nasal spray; 2 sprays by Each Nare route Daily.      Abdullahi: [id number], reviewed and consistent with patient reports. Will refill norco and gabapentin.   Patient also complaining of some ear wax, will perscribe debrox to use for four days. If no relief, told patient to call back and will send in something else.   Patient also has some allergies. Congestion, sneezing. Will refill flonase.     Follow-up:     1 month     Goals:   Goals     • Pain control      Barriers: severe PAD, pt continues to smoke      • Quit smoking / using tobacco (pt-stated)      Barriers: compliance with cessation medications          Barriers to goals: none     Health Maintenance   Topic Date Due   • TDAP/TD VACCINES (1 - Tdap) 09/04/1961   • ZOSTER VACCINE (1 of 2) 09/04/2000   • Pneumococcal Vaccine Once at 65 Years Old  09/04/2015   • LIPID PANEL  03/23/2019   • MEDICARE ANNUAL WELLNESS  04/04/2019   • DXA SCAN  04/25/2020   • MAMMOGRAM  05/04/2020   • LUNG CANCER SCREENING  06/06/2020   • INFLUENZA VACCINE  08/01/2020   • COLONOSCOPY  06/29/2023   • HEPATITIS C SCREENING  Completed       Tobacco: nonsmoker  Alcohol: does not drink  Lifestyle: Patient's There is no height or weight on file to calculate BMI. BMI is within normal parameters. No follow-up required..   cut out extra servings, reduce fast food intake, keep TV off during meals, plan meals, eat breakfast and  have 3 meals a day    RISK SCORE: 4    Time spent: 11 min         This document has been electronically signed by Julianna Gloria MD on April 30, 2020 16:32

## 2020-06-10 RX ORDER — ATORVASTATIN CALCIUM 10 MG/1
TABLET, FILM COATED ORAL
Qty: 90 TABLET | Refills: 1 | Status: SHIPPED | OUTPATIENT
Start: 2020-06-10 | End: 2020-06-22 | Stop reason: SDUPTHER

## 2020-06-11 ENCOUNTER — OFFICE VISIT (OUTPATIENT)
Dept: CARDIAC SURGERY | Facility: CLINIC | Age: 70
End: 2020-06-11

## 2020-06-11 VITALS
DIASTOLIC BLOOD PRESSURE: 60 MMHG | BODY MASS INDEX: 19.81 KG/M2 | HEIGHT: 64 IN | WEIGHT: 116 LBS | OXYGEN SATURATION: 99 % | HEART RATE: 73 BPM | SYSTOLIC BLOOD PRESSURE: 118 MMHG

## 2020-06-11 DIAGNOSIS — E78.2 MIXED HYPERLIPIDEMIA: ICD-10-CM

## 2020-06-11 DIAGNOSIS — I65.23 BILATERAL CAROTID ARTERY STENOSIS: ICD-10-CM

## 2020-06-11 DIAGNOSIS — I73.9 PVD (PERIPHERAL VASCULAR DISEASE) (HCC): Primary | ICD-10-CM

## 2020-06-11 PROCEDURE — 99214 OFFICE O/P EST MOD 30 MIN: CPT | Performed by: NURSE PRACTITIONER

## 2020-06-11 NOTE — PATIENT INSTRUCTIONS
The results of your carotid ultrasound shows mild disease of the right carotid and is stable from previous exam, ultrasound of the left carotid shows moderate disease and is stable from previous exam.    Follow up in 6 months    If you should experience any neurological symptoms including but not limited to visual or speech disturbances confusion, seizures, or weakness of limbs of one side of your body notify Heart and Vascular center immediately for evaluation or if after hours present to the nearest Emergency Department.     The results of your vascular studies of your right leg shows mild disease with good  circulation, in the left leg shows milddisease with good circulation.      If signs and symptoms of ischemia should occur including but not limited to pale/blue discoloration of limb, increasing pain with ambulation or at rest, or a non-healing wound. Patient is to notify Heart and Vascular center for immediate evaluation.    Follow up 6 months repeat studies

## 2020-06-15 DIAGNOSIS — F17.200 TOBACCO DEPENDENCE: Primary | ICD-10-CM

## 2020-06-15 DIAGNOSIS — Z87.891 PERSONAL HISTORY OF NICOTINE DEPENDENCE: ICD-10-CM

## 2020-06-17 DIAGNOSIS — Z87.891 PERSONAL HISTORY OF NICOTINE DEPENDENCE: ICD-10-CM

## 2020-06-17 DIAGNOSIS — F17.200 TOBACCO DEPENDENCE: Primary | ICD-10-CM

## 2020-06-17 NOTE — PROGRESS NOTES
CVTS Office Progress Note     6/17/2020    Jessie Jordan  1950    Chief Complaint:    Chief Complaint   Patient presents with   • Peripheral Vascular Disease       HPI:      PCP:  Julianna Gloria MD  Cardiology:  Dr. Spence    69 y.o. female with HTN(stable, increased risk stroke, rupture), Hyperlipidemia(stable, increased risk cardiovascular events) and COPD(stable, increased risk pulmonary complications) PVD, MARY.  former smoker and quit 2018.  Right leg pain x2 years, underwent left-right fem-fem, fem-pop.  Follow up surveillance has remained stable.  Bilateral leg pain unrelated to activity or ambulation, chronic pain management.  No other associated signs, symptoms or modifying factors.    5/2018 MONTY:  RIGHT .32 bi/monophasic.  LEFT .47 biphasic.  6/2018 Carotid Duplex:  JENNIFER 0-49% antegrade vert.  LICA 0-49% antegrade vert.  6/2018 CTA Aorta runoff:  RIGHT common iliac 90%, SFA small moderate diffuse disease, tibial diffuse disease.  LEFT  Common iliac 70%, external iliac 80%, SFA occluded reconstitutes distal via profunda collaterals, diffuse tibials.  9/28/18  Agram:  PTA/Luminex Stent LEFT iliac artery:  RCIA occluded, RIIA/REIA moderate diffuse disease, RSFA occluded, RPT diffuse distal disease  10/11/18  MONTY:  RIGHT 0.50 Fem/Pop Biphasic  PT/DP Monophasic    12/19/18 LEFT to RIGHT femoral to femoral artery bypass and RIGHT FEMORAL POPLITEAL BYPASS (PTFE)  05/21/2019 Carotid Duplex: JENNIFER 0-49% with mobile plaque mPSV 120c/s Ratio 2.4, LICA 50-69% mPSV 141c/s Ratio 2.0, Hypoechoic structure left submandibular 9.3mm  05/21/2019 MONTY: Right 0.89 triphasic femoral to distal.  Left 0.75 femoral triphasic PT biphasic  05/21/2019 Graft Survey: Left groin 335, left groin anastomosis 324, proximal graft 183, mid graft 89, distal graft 89, right groin anastomosis 158, right groin 33.  All with triphasic waveforms.    12/3/19 Right 0.82 fem biphasic triphasic-distally.  Left 0.62 biphasic.   12/3/19  Carotid Duplex: JENNIFER 50-69% mPSV 117c/s Ratio 2.8, LICA 50-69% mPSV 162c/s Ratio 1.8, Antegrade vertebrals  6/2020 Carotid Duplex: JENNIFER 0-49% mPSV 122c/s Ratio 2.0, LICA 50-69% mPSV 157c/s Ratio 1.6, Antegrade vertebrals  6/2020 MONTY: Right 0.9 triphasic.  Left 0.81 triphasic.   6/2020 fem-Fem US: Patent, mPSV 439cm/s area appears widely patent.    6/2020 Right Fem-pop US: Patent, mPSV 155cm/s triphasic throughout      The following portions of the patient's history were reviewed and updated as appropriate: allergies, current medications, past family history, past medical history, past social history, past surgical history and problem list.  Recent images independently reviewed.  Available laboratory values reviewed.    PMH:  Past Medical History:   Diagnosis Date   • Anxiety and depression    • Coronary artery disease involving native coronary artery of native heart 12/06/2017    seen on Cardiac Cath - Left main 50-70% stenosis, 12/27/2017 repeat cath showed coronary spasm with no stenosis   • Hyperlipidemia    • Hypertension    • Kidney cysts    • Post-menopausal 1990   • Skin cancer    • Stroke (CMS/[facility]) 2015   • Vascular disease      Past Surgical History:   Procedure Laterality Date   • ARTERIOGRAM N/A 9/28/2018    Procedure: aortoiliac arteriogram possible angioplasty stent atherectomy thrombolysis bilateral iliac stents;  Surgeon: Luan Ruff MD;  Location: [facility] ANGIO INVASIVE LOCATION;  Service: Interventional Radiology   • CARDIAC CATHETERIZATION  12/06/2017    Done at [facility], [place] - Left Main 50-70% Stenosis - no stenting done, recommendation was urgent CABG.  EF 50-60%   • CERVICAL FUSION  1985    C5-6   • COLONOSCOPY N/A 6/29/2018    Procedure: COLONOSCOPY;  Surgeon: Oz Way MD;  Location: [facility] ENDOSCOPY;  Service: Gastroenterology   • FEMORAL POPLITEAL BYPASS Right 12/19/2018    Procedure: femoral to femoral artery bypass, RIGHT FEMORAL POPLITEAL BYPASS right lower  extremity arteriogram          (C-ARM# AND C-ARM TABLE);  Surgeon: Luan Ruff MD;  Location: [facility] OR;  Service: Vascular   • FINGER SURGERY Left     third finger   • ROTATOR CUFF REPAIR Left 2011    done in [place]     Family History   Problem Relation Age of Onset   • Lung cancer Mother    • Hypertension Mother    • Diabetes Maternal Grandmother    • Heart disease Maternal Grandmother    • Hypertension Maternal Grandfather    • Heart disease Maternal Grandfather    • Heart disease Other    • Hypertension Other    • Cancer Other      Social History     Tobacco Use   • Smoking status: Former Smoker     Packs/day: 1.00     Years: 48.00     Pack years: 48.00     Types: Cigarettes     Last attempt to quit: 2018     Years since quittin.4   • Smokeless tobacco: Never Used   Substance Use Topics   • Alcohol use: Yes     Frequency: Monthly or less     Comment: socially   • Drug use: No       ALLERGIES:  No Known Allergies      MEDICATIONS:    Current Outpatient Medications:   •  amLODIPine (NORVASC) 2.5 MG tablet, Take 1 tablet by mouth 2 (Two) Times a Day., Disp: 180 tablet, Rfl: 0  •  atorvastatin (LIPITOR) 10 MG tablet, TAKE 1 TABLET BY MOUTH EVERY DAY, Disp: 90 tablet, Rfl: 1  •  carbamide peroxide (Debrox) 6.5 % otic solution, Administer 5 drops into both ears 2 (Two) Times a Day., Disp: 15 mL, Rfl: 3  •  clopidogrel (PLAVIX) 75 MG tablet, Take 1 tablet by mouth Daily., Disp: 90 tablet, Rfl: 3  •  [employer] MELATONIN 3 MG tablet, TAKE 1 TABLET BY MOUTH EVERY NIGHT AT BEDTIME FOR 90 DAYS., Disp: 90 tablet, Rfl: 0  •  diclofenac (VOLTAREN) 1 % gel gel, Apply  topically to the appropriate area as directed 2 (Two) Times a Day., Disp: 30 g, Rfl: 1  •  ferrous sulfate 325 (65 FE) MG tablet, TAKE 1 TABLET BY MOUTH EVERY DAY WITH BREAKFAST, Disp: 90 tablet, Rfl: 1  •  FLUoxetine (PROZAC) 20 MG capsule, Take 1 capsule by mouth Daily., Disp: 90 capsule, Rfl: 3  •  fluticasone (FLONASE) 50 MCG/ACT nasal spray,  2 sprays by Each Nare route Daily., Disp: 3 bottle, Rfl: 3  •  gabapentin (NEURONTIN) 400 MG capsule, Take 1 capsule by mouth 3 (Three) Times a Day., Disp: 90 capsule, Rfl: 2  •  HYDROcodone-acetaminophen (NORCO) 7.5-325 MG per tablet, Take 1 tablet by mouth Every 6 (Six) Hours As Needed for Moderate Pain ., Disp: 30 tablet, Rfl: 0  •  lisinopril (PRINIVIL,ZESTRIL) 10 MG tablet, Take 1 tablet by mouth Daily., Disp: 90 tablet, Rfl: 3  •  naproxen (NAPROSYN) 500 MG tablet, TAKE 1 TABLET BY MOUTH TWICE A DAY WITH MEALS, Disp: 30 tablet, Rfl: 2  •  OS-LORENE CALCIUM + D3 500-200 MG-UNIT tablet, Take 1 tablet by mouth Daily., Disp: , Rfl: 2  •  polyethylene glycol (MIRALAX) packet, Take 17 g by mouth Daily., Disp: 100 each, Rfl: 3  •  Prenatal Vit-Fe Fumarate-FA (PRENATAL VITAMIN 27-0.8) 27-0.8 MG tablet tablet, Take 1 tablet by mouth Daily., Disp: , Rfl:       Review of Systems   Constitution: Negative for chills, decreased appetite, fever and weight loss.   HENT: Negative for congestion, nosebleeds and sore throat.    Eyes: Negative for blurred vision, visual disturbance and visual halos.   Cardiovascular: Positive for dyspnea on exertion. Negative for chest pain and leg swelling.   Respiratory: Negative for cough, shortness of breath, sputum production and wheezing.    Endocrine: Negative for cold intolerance and polyuria.   Hematologic/Lymphatic: Negative for bleeding problem. Bruises/bleeds easily.        Does not report S/S of adverse bleeding event including nose bleeds, hematuria, melena, gingival bleeding, or hematemesis.   Skin: Positive for unusual hair distribution. Negative for flushing and nail changes.   Musculoskeletal: Positive for arthritis, back pain and joint pain.   Gastrointestinal: Negative for bloating, abdominal pain, hematemesis, melena, nausea and vomiting.   Genitourinary: Negative for flank pain and hematuria.   Neurological: Positive for weakness. Negative for brief paralysis, difficulty with  "concentration, focal weakness, light-headedness, loss of balance, numbness and paresthesias.        No amaurosis fugax, TIA, or CVA.     Psychiatric/Behavioral: Negative for altered mental status, depression, substance abuse and suicidal ideas.   Allergic/Immunologic: Negative for hives and persistent infections.         Vitals:    06/11/20 1022 06/11/20 1025   BP: 122/64 118/60   BP Location: Left arm Right arm   Pulse: 73    SpO2: 99%    Weight: 52.6 kg (116 lb)    Height: 162.6 cm (64\")      Physical Exam   Constitutional: She is oriented to person, place, and time. She appears well-developed and well-nourished.   HENT:   Head: Normocephalic and atraumatic.   Mouth/Throat: Oropharynx is clear and moist.   Eyes: Pupils are equal, round, and reactive to light. Conjunctivae and EOM are normal.   Neck: Normal range of motion. Neck supple.   Cardiovascular: Normal rate and intact distal pulses.   No murmur heard.  RLE +2  LLE +1  R carotid bruit   Pulmonary/Chest: Effort normal and breath sounds normal. No respiratory distress.   Abdominal: Soft. Bowel sounds are normal. She exhibits no distension.   Musculoskeletal: Normal range of motion. She exhibits tenderness (both legs). She exhibits no edema.   Neurological: She is alert and oriented to person, place, and time.   Skin: Skin is warm and dry. Capillary refill takes less than 2 seconds.   There is no evidence of skin breakdown of the bilateral lower extremities.  BLE pink, no evidence of ischemia.  Feet are absent of dependent rubor.   Psychiatric: She has a normal mood and affect. Judgment normal.   Nursing note and vitals reviewed.      Assessment & Plan     Independent Review of Studies  6/2020 Carotid Duplex: JENNIFER 0-49% mPSV 122c/s Ratio 2.0, LICA 50-69% mPSV 157c/s Ratio 1.6, Antegrade vertebrals  6/2020 MONTY: Right 0.9 triphasic.  Left 0.81 triphasic.   6/2020 fem-Fem US: Patent, mPSV 439cm/s area appears widely patent.    6/2020 Right Fem-pop US: Patent, " mPSV 155cm/s triphasic throughout    1. PVD (peripheral vascular disease) with claudication (CMS/HCC)  Patent fem-fem, fem-pop grafts  Mild bilateral disease post intervention  Repeat MONTY in 6 months    Medical management with statin, plavix, ACE.    - Doppler Ankle Brachial Index Single Level CAR; Future    2. Bilateral carotid artery stenosis  Moderate bilateral disease.  Unchanged   Repeat 6 months  Medical management as above.    - Duplex Carotid Ultrasound CAR; Future    3. Mixed hyperlipidemia  Lipid-lowering therapy has been proven beneficial in patients with cardio-vascular disease. Current guidelines recommend statin treatment for all patients with PAD,CAD and carotid stenosis. Statins are beneficial in preventing cardiovascular events, increasing functional capacity and lower the risk of adverse limb loss in PAD. Statins decrease the progression of plaque formation and may improve peripheral vessel lining, and aid in reversing atherosclerosis.      Detailed discussion regarding risks, benefits, and treatment plan. Images independently reviewed. Patient understands, agrees, and wishes to proceed with plan.       This document has been electronically signed by Lino Bradley AGACNP-BC @  On June 17, 2020 14:26      EMR Dragon/Transcription disclaimer:   Much of this encounter note is an electronic transcription/translation of spoken language to printed text. The electronic translation of spoken language may permit erroneous, or at times, nonsensical words or phrases to be inadvertently transcribed; Although I have reviewed the note for such errors, some may still exist.

## 2020-06-22 ENCOUNTER — OFFICE VISIT (OUTPATIENT)
Dept: FAMILY MEDICINE CLINIC | Facility: CLINIC | Age: 70
End: 2020-06-22

## 2020-06-22 ENCOUNTER — LAB (OUTPATIENT)
Dept: LAB | Facility: HOSPITAL | Age: 70
End: 2020-06-22

## 2020-06-22 VITALS
SYSTOLIC BLOOD PRESSURE: 118 MMHG | TEMPERATURE: 96.8 F | HEIGHT: 64 IN | OXYGEN SATURATION: 98 % | BODY MASS INDEX: 20.83 KG/M2 | WEIGHT: 122 LBS | DIASTOLIC BLOOD PRESSURE: 70 MMHG | HEART RATE: 72 BPM

## 2020-06-22 DIAGNOSIS — E55.9 VITAMIN D DEFICIENCY, UNSPECIFIED: ICD-10-CM

## 2020-06-22 DIAGNOSIS — I10 ESSENTIAL HYPERTENSION: ICD-10-CM

## 2020-06-22 DIAGNOSIS — Z00.00 PHYSICAL EXAM: ICD-10-CM

## 2020-06-22 DIAGNOSIS — Z13.21 ENCOUNTER FOR VITAMIN DEFICIENCY SCREENING: ICD-10-CM

## 2020-06-22 DIAGNOSIS — G62.9 PERIPHERAL POLYNEUROPATHY: ICD-10-CM

## 2020-06-22 DIAGNOSIS — G89.4 CHRONIC PAIN SYNDROME: ICD-10-CM

## 2020-06-22 DIAGNOSIS — Z13.220 SCREENING FOR HYPERLIPIDEMIA: ICD-10-CM

## 2020-06-22 DIAGNOSIS — Z00.00 PHYSICAL EXAM: Primary | ICD-10-CM

## 2020-06-22 DIAGNOSIS — E78.2 MIXED HYPERLIPIDEMIA: ICD-10-CM

## 2020-06-22 PROCEDURE — 80061 LIPID PANEL: CPT | Performed by: STUDENT IN AN ORGANIZED HEALTH CARE EDUCATION/TRAINING PROGRAM

## 2020-06-22 PROCEDURE — 82746 ASSAY OF FOLIC ACID SERUM: CPT

## 2020-06-22 PROCEDURE — 85025 COMPLETE CBC W/AUTO DIFF WBC: CPT | Performed by: STUDENT IN AN ORGANIZED HEALTH CARE EDUCATION/TRAINING PROGRAM

## 2020-06-22 PROCEDURE — 85007 BL SMEAR W/DIFF WBC COUNT: CPT | Performed by: STUDENT IN AN ORGANIZED HEALTH CARE EDUCATION/TRAINING PROGRAM

## 2020-06-22 PROCEDURE — 80053 COMPREHEN METABOLIC PANEL: CPT | Performed by: STUDENT IN AN ORGANIZED HEALTH CARE EDUCATION/TRAINING PROGRAM

## 2020-06-22 PROCEDURE — 36415 COLL VENOUS BLD VENIPUNCTURE: CPT | Performed by: STUDENT IN AN ORGANIZED HEALTH CARE EDUCATION/TRAINING PROGRAM

## 2020-06-22 PROCEDURE — 82306 VITAMIN D 25 HYDROXY: CPT

## 2020-06-22 PROCEDURE — 82607 VITAMIN B-12: CPT

## 2020-06-22 PROCEDURE — 99213 OFFICE O/P EST LOW 20 MIN: CPT | Performed by: STUDENT IN AN ORGANIZED HEALTH CARE EDUCATION/TRAINING PROGRAM

## 2020-06-22 RX ORDER — NAPROXEN 500 MG/1
500 TABLET ORAL 2 TIMES DAILY WITH MEALS
Qty: 30 TABLET | Refills: 2 | Status: SHIPPED | OUTPATIENT
Start: 2020-06-22 | End: 2021-06-04 | Stop reason: SDUPTHER

## 2020-06-22 RX ORDER — ATORVASTATIN CALCIUM 10 MG/1
10 TABLET, FILM COATED ORAL DAILY
Qty: 90 TABLET | Refills: 1 | Status: SHIPPED | OUTPATIENT
Start: 2020-06-22 | End: 2021-02-26

## 2020-06-22 RX ORDER — LISINOPRIL 10 MG/1
10 TABLET ORAL DAILY
Qty: 90 TABLET | Refills: 3 | Status: SHIPPED | OUTPATIENT
Start: 2020-06-22 | End: 2021-06-04 | Stop reason: SDUPTHER

## 2020-06-22 RX ORDER — GABAPENTIN 400 MG/1
400 CAPSULE ORAL 3 TIMES DAILY
Qty: 90 CAPSULE | Refills: 2 | Status: SHIPPED | OUTPATIENT
Start: 2020-06-22 | End: 2022-10-25

## 2020-06-22 RX ORDER — AMLODIPINE BESYLATE 2.5 MG/1
2.5 TABLET ORAL 2 TIMES DAILY
Qty: 180 TABLET | Refills: 0 | Status: SHIPPED | OUTPATIENT
Start: 2020-06-22 | End: 2020-09-09 | Stop reason: SDUPTHER

## 2020-06-22 RX ORDER — HYDROCODONE BITARTRATE AND ACETAMINOPHEN 7.5; 325 MG/1; MG/1
1 TABLET ORAL EVERY 6 HOURS PRN
Qty: 30 TABLET | Refills: 0 | Status: SHIPPED | OUTPATIENT
Start: 2020-06-22 | End: 2022-04-12

## 2020-06-22 NOTE — PROGRESS NOTES
ID: Jessie Jordan    CC:   Chief Complaint   Patient presents with   • Pain   • Fatigue       Subjective:     Pain   Associated symptoms include numbness. Pertinent negatives include no abdominal pain, chest pain, chills, coughing, fatigue, fever, headaches, joint swelling, myalgias, nausea or rash.   Fatigue   Associated symptoms include numbness. Pertinent negatives include no abdominal pain, chest pain, chills, coughing, fatigue, fever, headaches, joint swelling, myalgias, nausea or rash.        Jessie Jordan is a 69 y.o. female who presents for peripheral neuropathy follow up. Patient states she is doing well on the gabapentin and norco. She has an appt with the pain clinic starting next month, so will be getting medications from there. Patient otherwise denies shortness of breath, chest pain, headaches, blurry vision, dizziness, respiratory distress.       Preventative:  Over the past 2 weeks, have you felt down, depressed, or hopeless? no  Over the past 2 weeks, have you felt little interest or pleasure in doing things? no  Clinical depression screening refused by patient no    On osteoporosis therapy? no    Past Medical Hx:  Past Medical History:   Diagnosis Date   • Anxiety and depression    • Coronary artery disease involving native coronary artery of native heart 12/06/2017    seen on Cardiac Cath - Left main 50-70% stenosis, 12/27/2017 repeat cath showed coronary spasm with no stenosis   • Hyperlipidemia    • Hypertension    • Kidney cysts    • Post-menopausal 1990   • Skin cancer    • Stroke (CMS/HCC) 2015   • Vascular disease        Past Surgical Hx:  Past Surgical History:   Procedure Laterality Date   • ARTERIOGRAM N/A 9/28/2018    Procedure: aortoiliac arteriogram possible angioplasty stent atherectomy thrombolysis bilateral iliac stents;  Surgeon: Luan Ruff MD;  Location: [facility];  Service: Interventional Radiology   • CARDIAC CATHETERIZATION  12/06/2017     Done at [facility] - Left Main 50-70% Stenosis - no stenting done, recommendation was urgent CABG.  EF 50-60%   • CERVICAL FUSION  1985    C5-6   • COLONOSCOPY N/A 6/29/2018    Procedure: COLONOSCOPY;  Surgeon: Oz Way MD;  Location: [facility] ENDOSCOPY;  Service: Gastroenterology   • FEMORAL POPLITEAL BYPASS Right 12/19/2018    Procedure: femoral to femoral artery bypass, RIGHT FEMORAL POPLITEAL BYPASS right lower extremity arteriogram          (C-ARM# AND C-ARM TABLE);  Surgeon: Luan Ruff MD;  Location: [facility] OR;  Service: Vascular   • FINGER SURGERY Left     third finger   • ROTATOR CUFF REPAIR Left 06/30/2011    done in [place]       Health Maintenance:  Health Maintenance   Topic Date Due   • TDAP/TD VACCINES (1 - Tdap) 09/04/1961   • ZOSTER VACCINE (1 of 2) 09/04/2000   • Pneumococcal Vaccine Once at 65 Years Old  09/04/2015   • LIPID PANEL  03/23/2019   • MEDICARE ANNUAL WELLNESS  04/04/2019   • DXA SCAN  04/25/2020   • MAMMOGRAM  05/04/2020   • LUNG CANCER SCREENING  06/06/2020   • INFLUENZA VACCINE  08/01/2020   • COLONOSCOPY  06/29/2023   • HEPATITIS C SCREENING  Completed       Current Meds:    Current Outpatient Medications:   •  amLODIPine (NORVASC) 2.5 MG tablet, Take 1 tablet by mouth 2 (Two) Times a Day., Disp: 180 tablet, Rfl: 0  •  atorvastatin (LIPITOR) 10 MG tablet, Take 1 tablet by mouth Daily., Disp: 90 tablet, Rfl: 1  •  carbamide peroxide (Debrox) 6.5 % otic solution, Administer 5 drops into both ears 2 (Two) Times a Day., Disp: 15 mL, Rfl: 3  •  clopidogrel (PLAVIX) 75 MG tablet, Take 1 tablet by mouth Daily., Disp: 90 tablet, Rfl: 3  •  [employer] MELATONIN 3 MG tablet, TAKE 1 TABLET BY MOUTH EVERY NIGHT AT BEDTIME FOR 90 DAYS., Disp: 90 tablet, Rfl: 0  •  diclofenac (VOLTAREN) 1 % gel gel, Apply  topically to the appropriate area as directed 2 (Two) Times a Day., Disp: 30 g, Rfl: 1  •  ferrous sulfate 325 (65 FE) MG tablet, TAKE 1 TABLET BY MOUTH EVERY DAY WITH  BREAKFAST, Disp: 90 tablet, Rfl: 1  •  FLUoxetine (PROZAC) 20 MG capsule, Take 1 capsule by mouth Daily., Disp: 90 capsule, Rfl: 3  •  fluticasone (FLONASE) 50 MCG/ACT nasal spray, 2 sprays by Each Nare route Daily., Disp: 3 bottle, Rfl: 3  •  gabapentin (NEURONTIN) 400 MG capsule, Take 1 capsule by mouth 3 (Three) Times a Day., Disp: 90 capsule, Rfl: 2  •  HYDROcodone-acetaminophen (NORCO) 7.5-325 MG per tablet, Take 1 tablet by mouth Every 6 (Six) Hours As Needed for Moderate Pain ., Disp: 30 tablet, Rfl: 0  •  lisinopril (PRINIVIL,ZESTRIL) 10 MG tablet, Take 1 tablet by mouth Daily., Disp: 90 tablet, Rfl: 3  •  naproxen (NAPROSYN) 500 MG tablet, Take 1 tablet by mouth 2 (Two) Times a Day With Meals., Disp: 30 tablet, Rfl: 2  •  OS-LORENE CALCIUM + D3 500-200 MG-UNIT tablet, Take 1 tablet by mouth Daily., Disp: , Rfl: 2  •  polyethylene glycol (MIRALAX) packet, Take 17 g by mouth Daily., Disp: 100 each, Rfl: 3  •  Prenatal Vit-Fe Fumarate-FA (PRENATAL VITAMIN 27-0.8) 27-0.8 MG tablet tablet, Take 1 tablet by mouth Daily., Disp: , Rfl:     Allergies:  Patient has no known allergies.    Family Hx:  Family History   Problem Relation Age of Onset   • Lung cancer Mother    • Hypertension Mother    • Diabetes Maternal Grandmother    • Heart disease Maternal Grandmother    • Hypertension Maternal Grandfather    • Heart disease Maternal Grandfather    • Heart disease Other    • Hypertension Other    • Cancer Other         Social History:  Social History     Socioeconomic History   • Marital status:      Spouse name: Not on file   • Number of children: 1   • Years of education: Not on file   • Highest education level: Not on file   Occupational History   • Occupation: Retired     Comment: Patient resides alone (daughter resides across street).   Tobacco Use   • Smoking status: Former Smoker     Packs/day: 1.00     Years: 48.00     Pack years: 48.00     Types: Cigarettes     Last attempt to quit: 12/18/2018     Years  "since quittin.5   • Smokeless tobacco: Never Used   Substance and Sexual Activity   • Alcohol use: Yes     Frequency: Monthly or less     Comment: socially   • Drug use: No   • Sexual activity: Defer     Comment: .       Review of Systems   Constitutional: Negative for activity change, appetite change, chills, fatigue and fever.   HENT: Negative for drooling, ear discharge, ear pain, facial swelling, hearing loss, mouth sores, rhinorrhea, sinus pressure, sinus pain and sneezing.    Eyes: Negative for pain, redness and itching.   Respiratory: Negative for cough, choking, chest tightness, shortness of breath and stridor.    Cardiovascular: Negative for chest pain, palpitations and leg swelling.   Gastrointestinal: Negative for abdominal distention, abdominal pain, anal bleeding, blood in stool, constipation, diarrhea and nausea.   Endocrine: Negative for heat intolerance, polydipsia and polyphagia.   Genitourinary: Negative for dysuria, flank pain, frequency and genital sores.   Musculoskeletal: Negative for back pain, gait problem, joint swelling and myalgias.   Skin: Negative for pallor and rash.   Neurological: Positive for numbness. Negative for seizures, facial asymmetry, speech difficulty, light-headedness and headaches.   Hematological: Negative for adenopathy. Does not bruise/bleed easily.   Psychiatric/Behavioral: Negative for confusion, decreased concentration, dysphoric mood and hallucinations. The patient is not nervous/anxious and is not hyperactive.            Objective:     /70   Pulse 72   Temp 96.8 °F (36 °C)   Ht 162.6 cm (64\")   Wt 55.3 kg (122 lb)   SpO2 98%   BMI 20.94 kg/m²     Physical Exam   Constitutional: She is oriented to person, place, and time. She appears well-developed and well-nourished.   HENT:   Head: Normocephalic and atraumatic.   Right Ear: External ear normal.   Left Ear: External ear normal.   Nose: Nose normal.   Mouth/Throat: Oropharynx is clear and " moist.   Eyes: Pupils are equal, round, and reactive to light. Conjunctivae and EOM are normal.   Neck: Normal range of motion. Neck supple.   Cardiovascular: Normal rate, regular rhythm, normal heart sounds and intact distal pulses.   Pulmonary/Chest: Effort normal and breath sounds normal.   Abdominal: Soft. Bowel sounds are normal.   Musculoskeletal: Normal range of motion.   Neurological: She is alert and oriented to person, place, and time.   Skin: Skin is warm and dry.   Psychiatric: She has a normal mood and affect. Her behavior is normal. Judgment and thought content normal.              Assessment/Plan:     Jessie was seen today for pain and fatigue.    Diagnoses and all orders for this visit:    Physical exam  -     Comprehensive Metabolic Panel  -     CBC & Differential  -     Vitamin D 25 hydroxy; Future  -     Lipid Panel  -     Folate; Future  -     Vitamin B12; Future  -     CBC Auto Differential    Essential hypertension  -     lisinopril (PRINIVIL,ZESTRIL) 10 MG tablet; Take 1 tablet by mouth Daily.  -     amLODIPine (NORVASC) 2.5 MG tablet; Take 1 tablet by mouth 2 (Two) Times a Day.    Peripheral polyneuropathy  -     gabapentin (NEURONTIN) 400 MG capsule; Take 1 capsule by mouth 3 (Three) Times a Day.  -     HYDROcodone-acetaminophen (NORCO) 7.5-325 MG per tablet; Take 1 tablet by mouth Every 6 (Six) Hours As Needed for Moderate Pain .  -     Comprehensive Metabolic Panel  -     CBC & Differential  -     Vitamin D 25 hydroxy; Future  -     Lipid Panel  -     Folate; Future  -     Vitamin B12; Future  -     CBC Auto Differential    Chronic pain syndrome  -     HYDROcodone-acetaminophen (NORCO) 7.5-325 MG per tablet; Take 1 tablet by mouth Every 6 (Six) Hours As Needed for Moderate Pain .    Encounter for vitamin deficiency screening  -     Comprehensive Metabolic Panel  -     CBC & Differential  -     Vitamin D 25 hydroxy; Future  -     Lipid Panel  -     Folate; Future  -     Vitamin B12;  Future  -     CBC Auto Differential    Vitamin D deficiency, unspecified   -     Vitamin D 25 hydroxy; Future    Screening for hyperlipidemia  -     atorvastatin (LIPITOR) 10 MG tablet; Take 1 tablet by mouth Daily.  -     Lipid Panel    Mixed hyperlipidemia   -     Lipid Panel    Other orders  -     naproxen (NAPROSYN) 500 MG tablet; Take 1 tablet by mouth 2 (Two) Times a Day With Meals.      Abdullahi: [id number], reviewed and consistent with patient reports. Will refill norco and gabapentin. Will also get labs because it has been over a year. Patient will start going to a pain clinic starting next month, so patient will start getting pain meds from there.       Follow-up:     1 month     Goals:   Goals     • Pain control      Barriers: severe PAD, pt continues to smoke      • Quit smoking / using tobacco (pt-stated)      Barriers: compliance with cessation medications          Barriers to goals: none     Health Maintenance   Topic Date Due   • TDAP/TD VACCINES (1 - Tdap) 09/04/1961   • ZOSTER VACCINE (1 of 2) 09/04/2000   • Pneumococcal Vaccine Once at 65 Years Old  09/04/2015   • LIPID PANEL  03/23/2019   • MEDICARE ANNUAL WELLNESS  04/04/2019   • DXA SCAN  04/25/2020   • MAMMOGRAM  05/04/2020   • LUNG CANCER SCREENING  06/06/2020   • INFLUENZA VACCINE  08/01/2020   • COLONOSCOPY  06/29/2023   • HEPATITIS C SCREENING  Completed       Tobacco: nonsmoker  Alcohol: does not drink  Lifestyle: Patient's Body mass index is 20.94 kg/m². BMI is within normal parameters. No follow-up required..   cut out extra servings, reduce fast food intake, keep TV off during meals, plan meals, eat breakfast and have 3 meals a day    RISK SCORE: 4         Julianna Gloria M.D. PGY2  [facility]  [address] [place], [place] [id number]  Office: [phone]    This document has been electronically signed by Julianna Gloria MD on June 22, 2020 15:23            This document has been electronically  signed by Julianna Gloria MD on June 22, 2020 15:20

## 2020-06-23 ENCOUNTER — TELEPHONE (OUTPATIENT)
Dept: FAMILY MEDICINE CLINIC | Facility: CLINIC | Age: 70
End: 2020-06-23

## 2020-06-23 DIAGNOSIS — Z87.891 PERSONAL HISTORY OF NICOTINE DEPENDENCE: ICD-10-CM

## 2020-06-23 DIAGNOSIS — D50.8 OTHER IRON DEFICIENCY ANEMIA: ICD-10-CM

## 2020-06-23 DIAGNOSIS — Z12.11 COLON CANCER SCREENING: Primary | ICD-10-CM

## 2020-06-23 DIAGNOSIS — D50.8 OTHER IRON DEFICIENCY ANEMIA: Primary | ICD-10-CM

## 2020-06-23 DIAGNOSIS — F17.200 TOBACCO DEPENDENCE: Primary | ICD-10-CM

## 2020-06-23 LAB
25(OH)D3 SERPL-MCNC: 10.5 NG/ML (ref 30–100)
ALBUMIN SERPL-MCNC: 4.3 G/DL (ref 3.5–5.2)
ALBUMIN/GLOB SERPL: 1.7 G/DL
ALP SERPL-CCNC: 119 U/L (ref 39–117)
ALT SERPL W P-5'-P-CCNC: 8 U/L (ref 1–33)
ANION GAP SERPL CALCULATED.3IONS-SCNC: 12.5 MMOL/L (ref 5–15)
ANISOCYTOSIS BLD QL: NORMAL
AST SERPL-CCNC: 15 U/L (ref 1–32)
BASOPHILS # BLD MANUAL: 0.1 10*3/MM3 (ref 0–0.2)
BASOPHILS NFR BLD AUTO: 1 % (ref 0–1.5)
BILIRUB SERPL-MCNC: 0.2 MG/DL (ref 0.2–1.2)
BUN BLD-MCNC: 9 MG/DL (ref 8–23)
BUN/CREAT SERPL: 11.7 (ref 7–25)
CALCIUM SPEC-SCNC: 9.1 MG/DL (ref 8.6–10.5)
CHLORIDE SERPL-SCNC: 99 MMOL/L (ref 98–107)
CHOLEST SERPL-MCNC: 149 MG/DL (ref 0–200)
CO2 SERPL-SCNC: 22.5 MMOL/L (ref 22–29)
CREAT BLD-MCNC: 0.77 MG/DL (ref 0.57–1)
DEPRECATED RDW RBC AUTO: 42.3 FL (ref 37–54)
EOSINOPHIL # BLD MANUAL: 0.29 10*3/MM3 (ref 0–0.4)
EOSINOPHIL NFR BLD MANUAL: 3 % (ref 0.3–6.2)
ERYTHROCYTE [DISTWIDTH] IN BLOOD BY AUTOMATED COUNT: 19.1 % (ref 12.3–15.4)
FOLATE SERPL-MCNC: 3.39 NG/ML (ref 4.78–24.2)
GFR SERPL CREATININE-BSD FRML MDRD: 74 ML/MIN/1.73
GLOBULIN UR ELPH-MCNC: 2.5 GM/DL
GLUCOSE BLD-MCNC: 109 MG/DL (ref 65–99)
HCT VFR BLD AUTO: 26.9 % (ref 34–46.6)
HDLC SERPL-MCNC: 61 MG/DL (ref 40–60)
HGB BLD-MCNC: 7.3 G/DL (ref 12–15.9)
HYPOCHROMIA BLD QL: NORMAL
LDLC SERPL CALC-MCNC: 69 MG/DL (ref 0–100)
LDLC/HDLC SERPL: 1.13 {RATIO}
LYMPHOCYTES # BLD MANUAL: 1.93 10*3/MM3 (ref 0.7–3.1)
LYMPHOCYTES NFR BLD MANUAL: 20.2 % (ref 19.6–45.3)
LYMPHOCYTES NFR BLD MANUAL: 6.1 % (ref 5–12)
MCH RBC QN AUTO: 17.1 PG (ref 26.6–33)
MCHC RBC AUTO-ENTMCNC: 27.1 G/DL (ref 31.5–35.7)
MCV RBC AUTO: 63.1 FL (ref 79–97)
MICROCYTES BLD QL: NORMAL
MONOCYTES # BLD AUTO: 0.58 10*3/MM3 (ref 0.1–0.9)
NEUTROPHILS # BLD AUTO: 6.65 10*3/MM3 (ref 1.7–7)
NEUTROPHILS NFR BLD MANUAL: 69.7 % (ref 42.7–76)
PLAT MORPH BLD: NORMAL
PLATELET # BLD AUTO: 514 10*3/MM3 (ref 140–450)
PMV BLD AUTO: 9.9 FL (ref 6–12)
POIKILOCYTOSIS BLD QL SMEAR: NORMAL
POLYCHROMASIA BLD QL SMEAR: NORMAL
POTASSIUM BLD-SCNC: 4.3 MMOL/L (ref 3.5–5.2)
PROT SERPL-MCNC: 6.8 G/DL (ref 6–8.5)
RBC # BLD AUTO: 4.26 10*6/MM3 (ref 3.77–5.28)
SODIUM BLD-SCNC: 134 MMOL/L (ref 136–145)
TRIGL SERPL-MCNC: 94 MG/DL (ref 0–150)
VIT B12 BLD-MCNC: 253 PG/ML (ref 211–946)
VLDLC SERPL-MCNC: 18.8 MG/DL (ref 5–40)
WBC MORPH BLD: NORMAL
WBC NRBC COR # BLD: 9.54 10*3/MM3 (ref 3.4–10.8)

## 2020-06-23 RX ORDER — ERGOCALCIFEROL 1.25 MG/1
50000 CAPSULE ORAL WEEKLY
Qty: 5 CAPSULE | Refills: 3 | Status: SHIPPED | OUTPATIENT
Start: 2020-06-23 | End: 2020-11-06

## 2020-06-23 RX ORDER — FERROUS SULFATE 325(65) MG
1 TABLET ORAL
Qty: 90 TABLET | Refills: 1 | Status: SHIPPED | OUTPATIENT
Start: 2020-06-23 | End: 2020-12-17

## 2020-06-23 RX ORDER — FOLIC ACID 1 MG/1
1 TABLET ORAL DAILY
Qty: 30 TABLET | Refills: 3 | Status: SHIPPED | OUTPATIENT
Start: 2020-06-23 | End: 2021-06-04

## 2020-06-23 NOTE — TELEPHONE ENCOUNTER
Called and let her know to go to the Lab.     ----- Message from Julianna Gloria MD sent at 6/23/2020  8:50 AM CDT -----  Please call patient and let her know shhe needs to come in for a blood test on Thursday. Thank you. Just to go straight to lab. The order is already in.

## 2020-06-24 NOTE — PROGRESS NOTES
I have reviewed the notes, assessments, and/or procedures performed by Dr. Gloria, I concur with her/his documentation and assessment and plan for Jessie Jordan.       This document has been electronically signed by Hi Abrams MD on June 24, 2020 17:23

## 2020-06-25 ENCOUNTER — LAB (OUTPATIENT)
Dept: LAB | Facility: HOSPITAL | Age: 70
End: 2020-06-25

## 2020-06-25 LAB
ACANTHOCYTES BLD QL SMEAR: NORMAL
ANISOCYTOSIS BLD QL: NORMAL
BASOPHILS # BLD AUTO: 0.07 10*3/MM3 (ref 0–0.2)
BASOPHILS NFR BLD AUTO: 0.6 % (ref 0–1.5)
DEPRECATED RDW RBC AUTO: 42.3 FL (ref 37–54)
EOSINOPHIL # BLD AUTO: 0.27 10*3/MM3 (ref 0–0.4)
EOSINOPHIL NFR BLD AUTO: 2.4 % (ref 0.3–6.2)
ERYTHROCYTE [DISTWIDTH] IN BLOOD BY AUTOMATED COUNT: 19.9 % (ref 12.3–15.4)
HCT VFR BLD AUTO: 27.1 % (ref 34–46.6)
HGB BLD-MCNC: 7.5 G/DL (ref 12–15.9)
HYPOCHROMIA BLD QL: NORMAL
LYMPHOCYTES # BLD AUTO: 2.16 10*3/MM3 (ref 0.7–3.1)
LYMPHOCYTES NFR BLD AUTO: 19.3 % (ref 19.6–45.3)
MCH RBC QN AUTO: 17.2 PG (ref 26.6–33)
MCHC RBC AUTO-ENTMCNC: 27.7 G/DL (ref 31.5–35.7)
MCV RBC AUTO: 62 FL (ref 79–97)
MICROCYTES BLD QL: NORMAL
MONOCYTES # BLD AUTO: 1.3 10*3/MM3 (ref 0.1–0.9)
MONOCYTES NFR BLD AUTO: 11.6 % (ref 5–12)
NEUTROPHILS # BLD AUTO: 7.37 10*3/MM3 (ref 1.7–7)
NEUTROPHILS NFR BLD AUTO: 65.7 % (ref 42.7–76)
PLAT MORPH BLD: NORMAL
PLATELET # BLD AUTO: 475 10*3/MM3 (ref 140–450)
PMV BLD AUTO: 9.6 FL (ref 6–12)
POIKILOCYTOSIS BLD QL SMEAR: NORMAL
RBC # BLD AUTO: 4.37 10*6/MM3 (ref 3.77–5.28)
TARGETS BLD QL SMEAR: NORMAL
WBC MORPH BLD: NORMAL
WBC NRBC COR # BLD: 11.22 10*3/MM3 (ref 3.4–10.8)

## 2020-06-25 PROCEDURE — 85025 COMPLETE CBC W/AUTO DIFF WBC: CPT | Performed by: STUDENT IN AN ORGANIZED HEALTH CARE EDUCATION/TRAINING PROGRAM

## 2020-06-25 PROCEDURE — 85007 BL SMEAR W/DIFF WBC COUNT: CPT | Performed by: STUDENT IN AN ORGANIZED HEALTH CARE EDUCATION/TRAINING PROGRAM

## 2020-06-25 PROCEDURE — 36415 COLL VENOUS BLD VENIPUNCTURE: CPT | Performed by: STUDENT IN AN ORGANIZED HEALTH CARE EDUCATION/TRAINING PROGRAM

## 2020-06-29 ENCOUNTER — HOSPITAL ENCOUNTER (OUTPATIENT)
Dept: CT IMAGING | Facility: HOSPITAL | Age: 70
Discharge: HOME | End: 2020-06-29
Admitting: FAMILY MEDICINE

## 2020-06-29 DIAGNOSIS — Z87.891 PERSONAL HISTORY OF NICOTINE DEPENDENCE: ICD-10-CM

## 2020-06-29 DIAGNOSIS — F17.200 TOBACCO DEPENDENCE: ICD-10-CM

## 2020-06-29 PROCEDURE — G0297 LDCT FOR LUNG CA SCREEN: HCPCS

## 2020-06-30 ENCOUNTER — OFFICE VISIT (OUTPATIENT)
Dept: GASTROENTEROLOGY | Facility: CLINIC | Age: 70
End: 2020-06-30

## 2020-06-30 VITALS
SYSTOLIC BLOOD PRESSURE: 143 MMHG | HEART RATE: 82 BPM | HEIGHT: 64 IN | WEIGHT: 113.4 LBS | DIASTOLIC BLOOD PRESSURE: 72 MMHG | BODY MASS INDEX: 19.36 KG/M2

## 2020-06-30 DIAGNOSIS — K21.9 GASTROESOPHAGEAL REFLUX DISEASE, ESOPHAGITIS PRESENCE NOT SPECIFIED: ICD-10-CM

## 2020-06-30 DIAGNOSIS — D50.0 IRON DEFICIENCY ANEMIA DUE TO CHRONIC BLOOD LOSS: Primary | ICD-10-CM

## 2020-06-30 DIAGNOSIS — R53.81 MALAISE AND FATIGUE: ICD-10-CM

## 2020-06-30 DIAGNOSIS — R53.83 MALAISE AND FATIGUE: ICD-10-CM

## 2020-06-30 PROCEDURE — 99214 OFFICE O/P EST MOD 30 MIN: CPT | Performed by: NURSE PRACTITIONER

## 2020-06-30 RX ORDER — SODIUM CHLORIDE 0.9 % (FLUSH) 0.9 %
3 SYRINGE (ML) INJECTION EVERY 12 HOURS SCHEDULED
Status: CANCELLED | OUTPATIENT
Start: 2020-07-06

## 2020-06-30 RX ORDER — SODIUM, POTASSIUM,MAG SULFATES 17.5-3.13G
1 SOLUTION, RECONSTITUTED, ORAL ORAL EVERY 12 HOURS
Qty: 2 BOTTLE | Refills: 0 | Status: SHIPPED | OUTPATIENT
Start: 2020-06-30 | End: 2020-07-06 | Stop reason: HOSPADM

## 2020-06-30 RX ORDER — SODIUM CHLORIDE 0.9 % (FLUSH) 0.9 %
10 SYRINGE (ML) INJECTION AS NEEDED
Status: CANCELLED | OUTPATIENT
Start: 2020-07-06

## 2020-06-30 RX ORDER — DEXTROSE AND SODIUM CHLORIDE 5; .45 G/100ML; G/100ML
30 INJECTION, SOLUTION INTRAVENOUS CONTINUOUS PRN
Status: CANCELLED | OUTPATIENT
Start: 2020-07-06

## 2020-06-30 NOTE — PATIENT INSTRUCTIONS

## 2020-06-30 NOTE — PROGRESS NOTES
Chief Complaint   Patient presents with   • Colon Cancer Screening     Colon Screen   • Heartburn   • Diarrhea   • Abnormal Lab       Subjective    Jessie Jordan is a 69 y.o. female. she is here today for follow-up.    69-year-old female presents to discuss anemia.  Routine lab work with primary care provider noted hemoglobin is 7.3 she was started on oral iron and recheck noted hemoglobin of 7.5.  She underwent colonoscopy 6/29/2018 which appeared normal.  She denies any visible blood in her stool or melena.    Heartburn   She reports no abdominal pain, no nausea or no sore throat. Associated symptoms include fatigue and weight loss. Pertinent negatives include no melena.   Diarrhea    Associated symptoms include weight loss. Pertinent negatives include no abdominal pain, arthralgias, chills, fever or vomiting.   Abnormal Lab   Associated symptoms include fatigue and weakness. Pertinent negatives include no abdominal pain, anorexia, arthralgias, chills, diaphoresis, fever, nausea, sore throat or vomiting.   Anemia   Presents for initial visit. Symptoms include malaise/fatigue, paresthesias, pica and weight loss. There has been no abdominal pain, anorexia, bruising/bleeding easily, confusion, fever, leg swelling, light-headedness, pallor or palpitations. Signs of blood loss that are not present include hematemesis, hematochezia, melena, menorrhagia and vaginal bleeding. Past treatments include oral iron supplements. There is no history of alcohol abuse, cancer, chronic liver disease, chronic renal disease, clotting disorder or dementia. Procedure history includes colonoscopy.       Plan; schedule patient for EGD and colonoscopy due to symptomatic anemia.      The following portions of the patient's history were reviewed and updated as appropriate:   Past Medical History:   Diagnosis Date   • Anxiety and depression    • Coronary artery disease involving native coronary artery of native heart 12/06/2017    seen on  Cardiac Cath - Left main 50-70% stenosis, 2017 repeat cath showed coronary spasm with no stenosis   • Hyperlipidemia    • Hypertension    • Kidney cysts    • Post-menopausal    • Skin cancer    • Stroke (CMS/Formerly [facility])    • Vascular disease      Past Surgical History:   Procedure Laterality Date   • ARTERIOGRAM N/A 2018    Procedure: aortoiliac arteriogram possible angioplasty stent atherectomy thrombolysis bilateral iliac stents;  Surgeon: Luan Ruff MD;  Location: [facility] ANGIO INVASIVE LOCATION;  Service: Interventional Radiology   • CARDIAC CATHETERIZATION  2017    Done at [facility], [place], operated by [facility] - Left Main 50-70% Stenosis - no stenting done, recommendation was urgent CABG.  EF 50-60%   • CERVICAL FUSION      C5-6   • COLONOSCOPY N/A 2018    Procedure: COLONOSCOPY;  Surgeon: Oz Way MD;  Location: [facility] ENDOSCOPY;  Service: Gastroenterology   • FEMORAL POPLITEAL BYPASS Right 2018    Procedure: femoral to femoral artery bypass, RIGHT FEMORAL POPLITEAL BYPASS right lower extremity arteriogram          (C-ARM# AND C-ARM TABLE);  Surgeon: Luan Ruff MD;  Location: [facility] OR;  Service: Vascular   • FINGER SURGERY Left     third finger   • ROTATOR CUFF REPAIR Left 2011    done in [place]     Family History   Problem Relation Age of Onset   • Lung cancer Mother    • Hypertension Mother    • Diabetes Maternal Grandmother    • Heart disease Maternal Grandmother    • Hypertension Maternal Grandfather    • Heart disease Maternal Grandfather    • Heart disease Other    • Hypertension Other    • Cancer Other      OB History        2    Para   2    Term   1            AB        Living   1       SAB        TAB        Ectopic        Molar        Multiple        Live Births                  Prior to Admission medications    Medication Sig Start Date End Date Taking? Authorizing Provider   amLODIPine (NORVASC) 2.5 MG tablet Take 1 tablet by  mouth 2 (Two) Times a Day. 6/22/20  Yes Julianna Gloria MD   atorvastatin (LIPITOR) 10 MG tablet Take 1 tablet by mouth Daily. 6/22/20  Yes Julianna Gloria MD   carbamide peroxide (Debrox) 6.5 % otic solution Administer 5 drops into both ears 2 (Two) Times a Day. 4/30/20  Yes Julianna Gloria MD   clopidogrel (PLAVIX) 75 MG tablet Take 1 tablet by mouth Daily. 12/13/19  Yes Julianna Gloria MD   [facility] MELATONIN 3 MG tablet TAKE 1 TABLET BY MOUTH EVERY NIGHT AT BEDTIME FOR 90 DAYS. 3/31/20  Yes Julianna Gloria MD   diclofenac (VOLTAREN) 1 % gel gel Apply  topically to the appropriate area as directed 2 (Two) Times a Day. 8/16/18  Yes Hi Abrams MD   ferrous sulfate 325 (65 FE) MG tablet Take 1 tablet by mouth Daily With Breakfast. 6/23/20  Yes Julianna Gloria MD   FLUoxetine (PROZAC) 20 MG capsule Take 1 capsule by mouth Daily. 12/13/19  Yes Julianna Gloria MD   fluticasone (FLONASE) 50 MCG/ACT nasal spray 2 sprays by Each Nare route Daily. 4/30/20  Yes Julianna Gloria MD   folic acid (FOLVITE) 1 MG tablet Take 1 tablet by mouth Daily. 6/23/20  Yes Julianna Gloria MD   gabapentin (NEURONTIN) 400 MG capsule Take 1 capsule by mouth 3 (Three) Times a Day. 6/22/20  Yes Julianna Gloria MD   HYDROcodone-acetaminophen (NORCO) 7.5-325 MG per tablet Take 1 tablet by mouth Every 6 (Six) Hours As Needed for Moderate Pain . 6/22/20  Yes Julianna Gloria MD   lisinopril (PRINIVIL,ZESTRIL) 10 MG tablet Take 1 tablet by mouth Daily. 6/22/20  Yes Julianna Gloria MD   naproxen (NAPROSYN) 500 MG tablet Take 1 tablet by mouth 2 (Two) Times a Day With Meals. 6/22/20  Yes Julianna Gloria MD   OS-LORENE CALCIUM + D3 500-200 MG-UNIT tablet Take 1 tablet by mouth Daily. 1/30/19  Yes Joe Dixon MD   polyethylene glycol (MIRALAX) packet Take 17 g by mouth Daily. 12/13/19  Yes Julianna Gloria MD   Prenatal Vit-Fe Fumarate-FA (PRENATAL VITAMIN 27-0.8) 27-0.8 MG tablet tablet Take 1 tablet by mouth Daily. 12/21/18  Yes Kirk  "HERNAN Bettencourt   vitamin D (ERGOCALCIFEROL) 1.25 MG (49724 UT) capsule capsule Take 1 capsule by mouth 1 (One) Time Per Week. 20  Yes Julianna Gloria MD     No Known Allergies  Social History     Socioeconomic History   • Marital status: Single     Spouse name: Not on file   • Number of children: 1   • Years of education: Not on file   • Highest education level: Not on file   Occupational History   • Occupation: Retired     Comment: Patient resides alone (daughter resides across street).   Tobacco Use   • Smoking status: Former Smoker     Packs/day: 1.00     Years: 48.00     Pack years: 48.00     Types: Cigarettes     Last attempt to quit: 2018     Years since quittin.5   • Smokeless tobacco: Never Used   Substance and Sexual Activity   • Alcohol use: Yes     Frequency: Monthly or less     Comment: socially   • Drug use: No   • Sexual activity: Defer     Comment: .       Review of Systems  Review of Systems   Constitutional: Positive for appetite change, fatigue, malaise/fatigue, unexpected weight change and weight loss. Negative for activity change, chills, diaphoresis and fever.   HENT: Negative for sore throat and trouble swallowing.    Respiratory: Negative for shortness of breath.    Cardiovascular: Negative for palpitations.   Gastrointestinal: Positive for diarrhea. Negative for abdominal distention, abdominal pain, anal bleeding, anorexia, blood in stool, constipation, hematemesis, hematochezia, melena, nausea, rectal pain and vomiting.   Genitourinary: Negative for menorrhagia and vaginal bleeding.   Musculoskeletal: Negative for arthralgias.   Skin: Negative for pallor.   Neurological: Positive for weakness and paresthesias. Negative for light-headedness.   Hematological: Does not bruise/bleed easily.   Psychiatric/Behavioral: Negative for confusion.        /72 (BP Location: Left arm, Patient Position: Sitting)   Pulse 82   Ht 162.6 cm (64\")   Wt 51.4 kg (113 lb 6.4 oz)   " BMI 19.47 kg/m²     Objective    Physical Exam   Constitutional: She is oriented to person, place, and time. She appears well-developed and well-nourished. She is cooperative. No distress.   HENT:   Head: Normocephalic and atraumatic.   Neck: Normal range of motion. Neck supple. No thyromegaly present.   Cardiovascular: Normal rate, regular rhythm and normal heart sounds.   Pulmonary/Chest: Effort normal and breath sounds normal. She has no wheezes. She has no rhonchi. She has no rales.   Abdominal: Soft. Normal appearance and bowel sounds are normal. She exhibits no distension. There is no hepatosplenomegaly. There is no tenderness. There is no rigidity and no guarding. No hernia.   Lymphadenopathy:     She has no cervical adenopathy.   Neurological: She is alert and oriented to person, place, and time.   Skin: Skin is warm, dry and intact. No rash noted. No pallor.   Psychiatric: She has a normal mood and affect. Her speech is normal.     Orders Only on 06/23/2020   Component Date Value Ref Range Status   • WBC 06/25/2020 11.22* 3.40 - 10.80 10*3/mm3 Final   • RBC 06/25/2020 4.37  3.77 - 5.28 10*6/mm3 Final   • Hemoglobin 06/25/2020 7.5* 12.0 - 15.9 g/dL Final   • Hematocrit 06/25/2020 27.1* 34.0 - 46.6 % Final   • MCV 06/25/2020 62.0* 79.0 - 97.0 fL Final   • MCH 06/25/2020 17.2* 26.6 - 33.0 pg Final   • MCHC 06/25/2020 27.7* 31.5 - 35.7 g/dL Final   • RDW 06/25/2020 19.9* 12.3 - 15.4 % Final   • RDW-SD 06/25/2020 42.3  37.0 - 54.0 fl Final   • MPV 06/25/2020 9.6  6.0 - 12.0 fL Final   • Platelets 06/25/2020 475* 140 - 450 10*3/mm3 Final   • Neutrophil % 06/25/2020 65.7  42.7 - 76.0 % Final   • Lymphocyte % 06/25/2020 19.3* 19.6 - 45.3 % Final   • Monocyte % 06/25/2020 11.6  5.0 - 12.0 % Final   • Eosinophil % 06/25/2020 2.4  0.3 - 6.2 % Final   • Basophil % 06/25/2020 0.6  0.0 - 1.5 % Final   • Neutrophils, Absolute 06/25/2020 7.37* 1.70 - 7.00 10*3/mm3 Final   • Lymphocytes, Absolute 06/25/2020 2.16  0.70 -  3.10 10*3/mm3 Final   • Monocytes, Absolute 06/25/2020 1.30* 0.10 - 0.90 10*3/mm3 Final   • Eosinophils, Absolute 06/25/2020 0.27  0.00 - 0.40 10*3/mm3 Final   • Basophils, Absolute 06/25/2020 0.07  0.00 - 0.20 10*3/mm3 Final   • Acanthocytes 06/25/2020 Slight/1+  None Seen Final   • Anisocytosis 06/25/2020 Mod/2+  None Seen Final   • Hypochromia 06/25/2020 Mod/2+  None Seen Final   • Microcytes 06/25/2020 Mod/2+  None Seen Final   • Poikilocytes 06/25/2020 Mod/2+  None Seen Final   • Target Cells 06/25/2020 Mod/2+  None Seen Final   • WBC Morphology 06/25/2020 Normal  Normal Final   • Platelet Morphology 06/25/2020 Normal  Normal Final     Assessment/Plan      1. Iron deficiency anemia due to chronic blood loss    2. Malaise and fatigue    3. Gastroesophageal reflux disease, esophagitis presence not specified    .       Orders placed during this encounter include:  Orders Placed This Encounter   Procedures   • Follow Anesthesia Guidelines / Standing Orders     Standing Status:   Future   • Obtain Informed Consent     Standing Status:   Future     Order Specific Question:   Informed Consent Given For     Answer:   ESOPHAGOGASTRODUODENOSCOPY and Colonoscopy       ESOPHAGOGASTRODUODENOSCOPY ASAP (N/A), COLONOSCOPY (N/A)    Review and/or summary of lab tests, radiology, procedures, medications. Review and summary of old records and obtaining of history. The risks and benefits of my recommendations, as well as other treatment options were discussed with the patient today. Questions were answered.    New Medications Ordered This Visit   Medications   • sodium-potassium-magnesium sulfates (Suprep Bowel Prep Kit) 17.5-3.13-1.6 GM/177ML solution oral solution     Sig: Take 1 bottle by mouth Every 12 (Twelve) Hours.     Dispense:  2 bottle     Refill:  0       Follow-up: Return in about 4 weeks (around 7/28/2020) for After test.          This document has been electronically signed by HERNAN Phillips on June 30, 2020  15:38             Results for orders placed or performed in visit on 06/23/20   Scan Slide   Result Value Ref Range    Acanthocytes Slight/1+ None Seen    Anisocytosis Mod/2+ None Seen    Hypochromia Mod/2+ None Seen    Microcytes Mod/2+ None Seen    Poikilocytes Mod/2+ None Seen    Target Cells Mod/2+ None Seen    WBC Morphology Normal Normal    Platelet Morphology Normal Normal   CBC Auto Differential   Result Value Ref Range    WBC 11.22 (H) 3.40 - 10.80 10*3/mm3    RBC 4.37 3.77 - 5.28 10*6/mm3    Hemoglobin 7.5 (L) 12.0 - 15.9 g/dL    Hematocrit 27.1 (L) 34.0 - 46.6 %    MCV 62.0 (L) 79.0 - 97.0 fL    MCH 17.2 (L) 26.6 - 33.0 pg    MCHC 27.7 (L) 31.5 - 35.7 g/dL    RDW 19.9 (H) 12.3 - 15.4 %    RDW-SD 42.3 37.0 - 54.0 fl    MPV 9.6 6.0 - 12.0 fL    Platelets 475 (H) 140 - 450 10*3/mm3    Neutrophil % 65.7 42.7 - 76.0 %    Lymphocyte % 19.3 (L) 19.6 - 45.3 %    Monocyte % 11.6 5.0 - 12.0 %    Eosinophil % 2.4 0.3 - 6.2 %    Basophil % 0.6 0.0 - 1.5 %    Neutrophils, Absolute 7.37 (H) 1.70 - 7.00 10*3/mm3    Lymphocytes, Absolute 2.16 0.70 - 3.10 10*3/mm3    Monocytes, Absolute 1.30 (H) 0.10 - 0.90 10*3/mm3    Eosinophils, Absolute 0.27 0.00 - 0.40 10*3/mm3    Basophils, Absolute 0.07 0.00 - 0.20 10*3/mm3   Results for orders placed or performed in visit on 06/22/20   Vitamin D 25 hydroxy   Result Value Ref Range    25 Hydroxy, Vitamin D 10.5 (L) 30.0 - 100.0 ng/ml   Folate   Result Value Ref Range    Folate 3.39 (L) 4.78 - 24.20 ng/mL   Vitamin B12   Result Value Ref Range    Vitamin B-12 253 211 - 946 pg/mL   Results for orders placed or performed in visit on 06/22/20   CBC Auto Differential   Result Value Ref Range    WBC 9.54 3.40 - 10.80 10*3/mm3    RBC 4.26 3.77 - 5.28 10*6/mm3    Hemoglobin 7.3 (L) 12.0 - 15.9 g/dL    Hematocrit 26.9 (L) 34.0 - 46.6 %    MCV 63.1 (L) 79.0 - 97.0 fL    MCH 17.1 (L) 26.6 - 33.0 pg    MCHC 27.1 (L) 31.5 - 35.7 g/dL    RDW 19.1 (H) 12.3 - 15.4 %    RDW-SD 42.3 37.0 - 54.0 fl     MPV 9.9 6.0 - 12.0 fL    Platelets 514 (H) 140 - 450 10*3/mm3   Manual Differential   Result Value Ref Range    Neutrophil % 69.7 42.7 - 76.0 %    Lymphocyte % 20.2 19.6 - 45.3 %    Monocyte % 6.1 5.0 - 12.0 %    Eosinophil % 3.0 0.3 - 6.2 %    Basophil % 1.0 0.0 - 1.5 %    Neutrophils Absolute 6.65 1.70 - 7.00 10*3/mm3    Lymphocytes Absolute 1.93 0.70 - 3.10 10*3/mm3    Monocytes Absolute 0.58 0.10 - 0.90 10*3/mm3    Eosinophils Absolute 0.29 0.00 - 0.40 10*3/mm3    Basophils Absolute 0.10 0.00 - 0.20 10*3/mm3    Anisocytosis Slight/1+ None Seen    Hypochromia Large/3+ None Seen    Microcytes Slight/1+ None Seen    Poikilocytes Mod/2+ None Seen    Polychromasia Mod/2+ None Seen    WBC Morphology Normal Normal    Platelet Morphology Normal Normal   Lipid Panel   Result Value Ref Range    Total Cholesterol 149 0 - 200 mg/dL    Triglycerides 94 0 - 150 mg/dL    HDL Cholesterol 61 (H) 40 - 60 mg/dL    LDL Cholesterol  69 0 - 100 mg/dL    VLDL Cholesterol 18.8 5 - 40 mg/dL    LDL/HDL Ratio 1.13    Comprehensive Metabolic Panel   Result Value Ref Range    Glucose 109 (H) 65 - 99 mg/dL    BUN 9 8 - 23 mg/dL    Creatinine 0.77 0.57 - 1.00 mg/dL    Sodium 134 (L) 136 - 145 mmol/L    Potassium 4.3 3.5 - 5.2 mmol/L    Chloride 99 98 - 107 mmol/L    CO2 22.5 22.0 - 29.0 mmol/L    Calcium 9.1 8.6 - 10.5 mg/dL    Total Protein 6.8 6.0 - 8.5 g/dL    Albumin 4.30 3.50 - 5.20 g/dL    ALT (SGPT) 8 1 - 33 U/L    AST (SGOT) 15 1 - 32 U/L    Alkaline Phosphatase 119 (H) 39 - 117 U/L    Total Bilirubin 0.2 0.2 - 1.2 mg/dL    eGFR Non African Amer 74 >60 mL/min/1.73    Globulin 2.5 gm/dL    A/G Ratio 1.7 g/dL    BUN/Creatinine Ratio 11.7 7.0 - 25.0    Anion Gap 12.5 5.0 - 15.0 mmol/L   Results for orders placed or performed during the hospital encounter of 06/11/20   Duplex Carotid Ultrasound CAR   Result Value Ref Range    Prox CCA .0 cm/sec    Prox CCA PSV 87.0 cm/sec    Prox CCA EDV 19.7 cm/sec    Prox CCA EDV 16.3 cm/sec     Dist CCA PSV 86.5 cm/sec    Dist CCA PSV 79.2 cm/sec    Dist CCA EDV 22.6 cm/sec    Dist CCA EDV 19.8 cm/sec    Prox ECA .0 cm/sec    Prox ECA .0 cm/sec    Prox ECA EDV 26.5 cm/sec    Prox ECA EDV 22.5 cm/sec    Prox ICA .0 cm/sec    Prox ICA PSV 99.9 cm/sec    Prox ICA EDV 32.2 cm/sec    Prox ICA EDV 27.9 cm/sec    Mid ICA .0 cm/sec    Mid ICA .0 cm/sec    Mid ICA EDV 37.0 cm/sec    Mid ICA EDV 31.3 cm/sec    Dist ICA .0 cm/sec    Dist ICA .0 cm/sec    Dist ICA EDV 37.0 cm/sec    Dist ICA EDV 39.1 cm/sec    Vertebral A PSV 64.5 cm/sec    Vertebral A PSV 27.4 cm/sec    Vertebral A EDV 17.7 cm/sec    Vertebral A EDV 5.5 cm/sec    ICA/CCA ratio 1.5     ICA/CCA ratio 1.8     Diastolic ICA/CCA Ratio 2     ICA/CCA diastolic ratio 1.6      *Note: Due to a large number of results and/or encounters for the requested time period, some results have not been displayed. A complete set of results can be found in Results Review.

## 2020-07-03 DIAGNOSIS — F51.01 PRIMARY INSOMNIA: ICD-10-CM

## 2020-07-03 PROCEDURE — U0003 INFECTIOUS AGENT DETECTION BY NUCLEIC ACID (DNA OR RNA); SEVERE ACUTE RESPIRATORY SYNDROME CORONAVIRUS 2 (SARS-COV-2) (CORONAVIRUS DISEASE [COVID-19]), AMPLIFIED PROBE TECHNIQUE, MAKING USE OF HIGH THROUGHPUT TECHNOLOGIES AS DESCRIBED BY CMS-2020-01-R: HCPCS | Performed by: INTERNAL MEDICINE

## 2020-07-03 PROCEDURE — C9803 HOPD COVID-19 SPEC COLLECT: HCPCS | Performed by: INTERNAL MEDICINE

## 2020-07-05 LAB
COVID LABCORP PRIORITY: NORMAL
SARS-COV-2 RNA RESP QL NAA+PROBE: NOT DETECTED

## 2020-07-06 ENCOUNTER — HOSPITAL ENCOUNTER (OUTPATIENT)
Facility: HOSPITAL | Age: 70
Setting detail: HOSPITAL OUTPATIENT SURGERY
Discharge: HOME | End: 2020-07-06
Attending: INTERNAL MEDICINE | Admitting: INTERNAL MEDICINE

## 2020-07-06 ENCOUNTER — TELEPHONE (OUTPATIENT)
Dept: FAMILY MEDICINE CLINIC | Facility: CLINIC | Age: 70
End: 2020-07-06

## 2020-07-06 ENCOUNTER — ANESTHESIA EVENT (OUTPATIENT)
Dept: GASTROENTEROLOGY | Facility: HOSPITAL | Age: 70
End: 2020-07-06

## 2020-07-06 ENCOUNTER — ANESTHESIA (OUTPATIENT)
Dept: GASTROENTEROLOGY | Facility: HOSPITAL | Age: 70
End: 2020-07-06

## 2020-07-06 VITALS
HEIGHT: 63 IN | HEART RATE: 62 BPM | WEIGHT: 114.2 LBS | SYSTOLIC BLOOD PRESSURE: 157 MMHG | DIASTOLIC BLOOD PRESSURE: 72 MMHG | TEMPERATURE: 96.6 F | OXYGEN SATURATION: 95 % | BODY MASS INDEX: 20.23 KG/M2 | RESPIRATION RATE: 20 BRPM

## 2020-07-06 DIAGNOSIS — K21.9 GASTROESOPHAGEAL REFLUX DISEASE, ESOPHAGITIS PRESENCE NOT SPECIFIED: ICD-10-CM

## 2020-07-06 DIAGNOSIS — R53.81 MALAISE AND FATIGUE: ICD-10-CM

## 2020-07-06 DIAGNOSIS — D50.0 IRON DEFICIENCY ANEMIA DUE TO CHRONIC BLOOD LOSS: ICD-10-CM

## 2020-07-06 DIAGNOSIS — R53.83 MALAISE AND FATIGUE: ICD-10-CM

## 2020-07-06 DIAGNOSIS — G89.4 CHRONIC PAIN SYNDROME: ICD-10-CM

## 2020-07-06 DIAGNOSIS — G62.9 PERIPHERAL POLYNEUROPATHY: ICD-10-CM

## 2020-07-06 PROCEDURE — 43239 EGD BIOPSY SINGLE/MULTIPLE: CPT | Performed by: INTERNAL MEDICINE

## 2020-07-06 PROCEDURE — 88305 TISSUE EXAM BY PATHOLOGIST: CPT

## 2020-07-06 PROCEDURE — 25010000002 PROPOFOL 10 MG/ML EMULSION: Performed by: NURSE ANESTHETIST, CERTIFIED REGISTERED

## 2020-07-06 PROCEDURE — 45380 COLONOSCOPY AND BIOPSY: CPT | Performed by: INTERNAL MEDICINE

## 2020-07-06 RX ORDER — PROMETHAZINE HYDROCHLORIDE 25 MG/ML
12.5 INJECTION, SOLUTION INTRAMUSCULAR; INTRAVENOUS ONCE AS NEEDED
Status: DISCONTINUED | OUTPATIENT
Start: 2020-07-06 | End: 2020-07-06 | Stop reason: HOSPADM

## 2020-07-06 RX ORDER — PROMETHAZINE HYDROCHLORIDE 25 MG/1
25 TABLET ORAL ONCE AS NEEDED
Status: DISCONTINUED | OUTPATIENT
Start: 2020-07-06 | End: 2020-07-06 | Stop reason: HOSPADM

## 2020-07-06 RX ORDER — DEXTROSE AND SODIUM CHLORIDE 5; .45 G/100ML; G/100ML
30 INJECTION, SOLUTION INTRAVENOUS CONTINUOUS PRN
Status: DISCONTINUED | OUTPATIENT
Start: 2020-07-06 | End: 2020-07-06 | Stop reason: HOSPADM

## 2020-07-06 RX ORDER — MEPERIDINE HYDROCHLORIDE 25 MG/ML
12.5 INJECTION INTRAMUSCULAR; INTRAVENOUS; SUBCUTANEOUS
Status: DISCONTINUED | OUTPATIENT
Start: 2020-07-06 | End: 2020-07-06 | Stop reason: HOSPADM

## 2020-07-06 RX ORDER — ONDANSETRON 2 MG/ML
4 INJECTION INTRAMUSCULAR; INTRAVENOUS ONCE AS NEEDED
Status: DISCONTINUED | OUTPATIENT
Start: 2020-07-06 | End: 2020-07-06 | Stop reason: HOSPADM

## 2020-07-06 RX ORDER — PROPOFOL 10 MG/ML
VIAL (ML) INTRAVENOUS AS NEEDED
Status: DISCONTINUED | OUTPATIENT
Start: 2020-07-06 | End: 2020-07-06 | Stop reason: SURG

## 2020-07-06 RX ORDER — PROMETHAZINE HYDROCHLORIDE 25 MG/1
25 SUPPOSITORY RECTAL ONCE AS NEEDED
Status: DISCONTINUED | OUTPATIENT
Start: 2020-07-06 | End: 2020-07-06 | Stop reason: HOSPADM

## 2020-07-06 RX ORDER — LIDOCAINE HYDROCHLORIDE 20 MG/ML
INJECTION, SOLUTION INTRAVENOUS AS NEEDED
Status: DISCONTINUED | OUTPATIENT
Start: 2020-07-06 | End: 2020-07-06 | Stop reason: SURG

## 2020-07-06 RX ORDER — OMEPRAZOLE MAGNESIUM 20 MG
CAPSULE,DELAYED RELEASE (ENTERIC COATED) ORAL
Qty: 90 TABLET | Refills: 0 | Status: SHIPPED | OUTPATIENT
Start: 2020-07-06 | End: 2020-09-09 | Stop reason: SDUPTHER

## 2020-07-06 RX ORDER — HYDROCODONE BITARTRATE AND ACETAMINOPHEN 7.5; 325 MG/1; MG/1
1 TABLET ORAL EVERY 6 HOURS PRN
Qty: 30 TABLET | Refills: 0 | OUTPATIENT
Start: 2020-07-06

## 2020-07-06 RX ORDER — SODIUM CHLORIDE 0.9 % (FLUSH) 0.9 %
3 SYRINGE (ML) INJECTION EVERY 12 HOURS SCHEDULED
Status: DISCONTINUED | OUTPATIENT
Start: 2020-07-06 | End: 2020-07-06 | Stop reason: HOSPADM

## 2020-07-06 RX ORDER — SODIUM CHLORIDE 0.9 % (FLUSH) 0.9 %
10 SYRINGE (ML) INJECTION AS NEEDED
Status: DISCONTINUED | OUTPATIENT
Start: 2020-07-06 | End: 2020-07-06 | Stop reason: HOSPADM

## 2020-07-06 RX ADMIN — LIDOCAINE HYDROCHLORIDE 50 MG: 20 INJECTION, SOLUTION INTRAVENOUS at 09:50

## 2020-07-06 RX ADMIN — PROPOFOL 10 MG: 10 INJECTION, EMULSION INTRAVENOUS at 09:54

## 2020-07-06 RX ADMIN — PROPOFOL 10 MG: 10 INJECTION, EMULSION INTRAVENOUS at 10:03

## 2020-07-06 RX ADMIN — DEXTROSE AND SODIUM CHLORIDE 30 ML/HR: 5; 450 INJECTION, SOLUTION INTRAVENOUS at 09:02

## 2020-07-06 RX ADMIN — PROPOFOL 30 MG: 10 INJECTION, EMULSION INTRAVENOUS at 09:59

## 2020-07-06 RX ADMIN — PROPOFOL 50 MG: 10 INJECTION, EMULSION INTRAVENOUS at 09:50

## 2020-07-06 NOTE — TELEPHONE ENCOUNTER
Spoke with patient and informed her that she last got it filled on 6/22 and told her she wasn't due for a refill. Patient said Dr Gloria only gives her enough for 8 days. I told her I would send her a message

## 2020-07-06 NOTE — TELEPHONE ENCOUNTER
Pt called and left voicemail stating she needed a refill on pain medication. According to pt's med list, pt gets Norco 7.5mg, last filled on 6/22/2020 by Dr. Julianna Gloria.     Pt can be reached at [phone]    Thank you.

## 2020-07-06 NOTE — ANESTHESIA POSTPROCEDURE EVALUATION
Patient: Jessie Jordan    Procedure Summary     Date:  07/06/20 Room / Location:  [facility] ENDOSCOPY 1 / [facility] ENDOSCOPY    Anesthesia Start:  0947 Anesthesia Stop:  1009    Procedures:       ESOPHAGOGASTRODUODENOSCOPY ASAP (N/A )      COLONOSCOPY (N/A ) Diagnosis:       Iron deficiency anemia due to chronic blood loss      Malaise and fatigue      Gastroesophageal reflux disease, esophagitis presence not specified      (Iron deficiency anemia due to chronic blood loss [D50.0])      (Malaise and fatigue [R53.81, R53.83])      (Gastroesophageal reflux disease, esophagitis presence not specified [K21.9])    Surgeon:  Oz Way MD Provider:  Max Mir CRNA    Anesthesia Type:  MAC ASA Status:  3          Anesthesia Type: MAC    Vitals  No vitals data found for the desired time range.          Post Anesthesia Care and Evaluation    Patient location during evaluation: bedside  Patient participation: complete - patient cannot participate  Level of consciousness: awake  Pain score: 0  Pain management: adequate  Airway patency: patent  Anesthetic complications: No anesthetic complications  PONV Status: none  Cardiovascular status: acceptable  Respiratory status: acceptable  Hydration status: acceptable

## 2020-07-06 NOTE — TELEPHONE ENCOUNTER
Patient left voicemail advising she needs refills. She did not specify what refills are needed     I called back n/a left a voicemail for her to call back  [phone]      Julianna wan

## 2020-07-06 NOTE — ANESTHESIA PREPROCEDURE EVALUATION
Anesthesia Evaluation     NPO Solid Status: > 8 hours  NPO Liquid Status: > 8 hours           Airway   Mallampati: II  Dental - normal exam     Pulmonary - normal exam   (+) shortness of breath,   Cardiovascular - normal exam    (+) hypertension, CAD, PVD, hyperlipidemia,  carotid artery disease      Neuro/Psych  (+) CVA, psychiatric history,     GI/Hepatic/Renal/Endo    (+)  GERD,  renal disease,     Musculoskeletal     Abdominal    Substance History      OB/GYN          Other      history of cancer                    Anesthesia Plan    ASA 3     MAC     intravenous induction     Anesthetic plan, all risks, benefits, and alternatives have been provided, discussed and informed consent has been obtained with: patient.

## 2020-07-07 NOTE — TELEPHONE ENCOUNTER
Per juan diego [phone] I only give patient 30 tablets to use prn. Patient is aware of this because I told her I would not be giving her more than 30 due to risk of resp depression.

## 2020-07-08 LAB
LAB AP CASE REPORT: NORMAL
PATH REPORT.FINAL DX SPEC: NORMAL

## 2020-07-14 ENCOUNTER — OFFICE VISIT (OUTPATIENT)
Dept: GASTROENTEROLOGY | Facility: CLINIC | Age: 70
End: 2020-07-14

## 2020-07-14 ENCOUNTER — LAB (OUTPATIENT)
Dept: LAB | Facility: HOSPITAL | Age: 70
End: 2020-07-14

## 2020-07-14 VITALS
DIASTOLIC BLOOD PRESSURE: 85 MMHG | WEIGHT: 112.6 LBS | SYSTOLIC BLOOD PRESSURE: 148 MMHG | HEART RATE: 94 BPM | HEIGHT: 64 IN | BODY MASS INDEX: 19.22 KG/M2

## 2020-07-14 DIAGNOSIS — K25.9 GASTRIC EROSION DETERMINED BY ENDOSCOPY: Primary | ICD-10-CM

## 2020-07-14 DIAGNOSIS — D50.0 IRON DEFICIENCY ANEMIA DUE TO CHRONIC BLOOD LOSS: ICD-10-CM

## 2020-07-14 DIAGNOSIS — K21.9 GASTROESOPHAGEAL REFLUX DISEASE WITHOUT ESOPHAGITIS: ICD-10-CM

## 2020-07-14 LAB
HCT VFR BLD AUTO: 35.9 % (ref 34–46.6)
HGB BLD-MCNC: 10.5 G/DL (ref 12–15.9)
IRON 24H UR-MRATE: 33 MCG/DL (ref 37–145)
IRON SATN MFR SERPL: 7 % (ref 20–50)
MCH RBC QN AUTO: 20.8 PG (ref 26.6–33)
MCHC RBC AUTO-ENTMCNC: 29.2 G/DL (ref 31.5–35.7)
MCV RBC AUTO: 71.1 FL (ref 79–97)
PLATELET # BLD AUTO: 562 10*3/MM3 (ref 140–450)
PMV BLD AUTO: 10.2 FL (ref 6–12)
RBC # BLD AUTO: 5.05 10*6/MM3 (ref 3.77–5.28)
TIBC SERPL-MCNC: 492 MCG/DL (ref 298–536)
TRANSFERRIN SERPL-MCNC: 330 MG/DL (ref 200–360)
WBC # BLD AUTO: 11.17 10*3/MM3 (ref 3.4–10.8)

## 2020-07-14 PROCEDURE — 84466 ASSAY OF TRANSFERRIN: CPT | Performed by: NURSE PRACTITIONER

## 2020-07-14 PROCEDURE — 83540 ASSAY OF IRON: CPT | Performed by: NURSE PRACTITIONER

## 2020-07-14 PROCEDURE — 85027 COMPLETE CBC AUTOMATED: CPT | Performed by: NURSE PRACTITIONER

## 2020-07-14 PROCEDURE — 99213 OFFICE O/P EST LOW 20 MIN: CPT | Performed by: NURSE PRACTITIONER

## 2020-07-14 PROCEDURE — 36415 COLL VENOUS BLD VENIPUNCTURE: CPT | Performed by: NURSE PRACTITIONER

## 2020-07-14 RX ORDER — PANTOPRAZOLE SODIUM 40 MG/1
40 TABLET, DELAYED RELEASE ORAL DAILY
Qty: 30 TABLET | Refills: 11 | Status: SHIPPED | OUTPATIENT
Start: 2020-07-14 | End: 2021-06-04 | Stop reason: SDUPTHER

## 2020-07-14 NOTE — PROGRESS NOTES
Chief Complaint   Patient presents with   • Diarrhea   • Heartburn       Subjective    Jessie Jordan is a 69 y.o. female. she is here today for follow-up.    History of Present Illness  69-year-old female presents to discuss EGD and colonoscopy results which were done due to anemia.  States she still feels weak and tired but overall has felt some better recently.  States blood thinner has been discontinued she denies any melanotic stool or change in bowel habits.  Ports she has modified her diet decrease any alcohol consumption stopped eating spicy greasy foods and symptoms have improved somewhat.  EGD noted gastritis in the stomach normal duodenum normal esophagus and a single nonbleeding erosion in the antrum.  There was no stigmata of recent bleeding.  Antrum biopsy noted reactive changes negative for H. pylori, dysplasia or malignancy.  Colonoscopy had adequate prep noted hemorrhoids otherwise normal exam.  Terminal ileum and random colonic biopsy noted no significant pathologic change.  Repeat is recommended in 5 years for surveillance.       The following portions of the patient's history were reviewed and updated as appropriate:   Past Medical History:   Diagnosis Date   • Anxiety and depression    • Coronary artery disease involving native coronary artery of native heart 12/06/2017    seen on Cardiac Cath - Left main 50-70% stenosis, 12/27/2017 repeat cath showed coronary spasm with no stenosis   • Hyperlipidemia    • Hypertension    • Kidney cysts    • Post-menopausal 1990   • Skin cancer    • Stroke (CMS/HCC) 2015   • Vascular disease      Past Surgical History:   Procedure Laterality Date   • ARTERIOGRAM N/A 9/28/2018    Procedure: aortoiliac arteriogram possible angioplasty stent atherectomy thrombolysis bilateral iliac stents;  Surgeon: Luan Ruff MD;  Location: [facility] ANGIO INVASIVE LOCATION;  Service: Interventional Radiology   • CARDIAC CATHETERIZATION  12/06/2017    [place]  [facility] - Left Main 50-70% Stenosis - no stenting done, recommendation was urgent CABG.  EF 50-60%   • CERVICAL FUSION      C5-6   • COLONOSCOPY N/A 2018    Procedure: COLONOSCOPY;  Surgeon: Oz Way MD;  Location: [facility] ENDOSCOPY;  Service: Gastroenterology   • COLONOSCOPY N/A 2020    Procedure: COLONOSCOPY;  Surgeon: Oz Way MD;  Location: [facility] ENDOSCOPY;  Service: Gastroenterology;  Laterality: N/A;   • ENDOSCOPY N/A 2020    Procedure: ESOPHAGOGASTRODUODENOSCOPY ASAP;  Surgeon: Oz Way MD;  Location: [facility] ENDOSCOPY;  Service: Gastroenterology;  Laterality: N/A;   • FEMORAL POPLITEAL BYPASS Right 2018    Procedure: femoral to femoral artery bypass, RIGHT FEMORAL POPLITEAL BYPASS right lower extremity arteriogram          (C-ARM# AND C-ARM TABLE);  Surgeon: Luan Ruff MD;  Location: [facility] OR;  Service: Vascular   • FINGER SURGERY Left     third finger   • ROTATOR CUFF REPAIR Left 2011    done in [place]     Family History   Problem Relation Age of Onset   • Lung cancer Mother    • Hypertension Mother    • Diabetes Maternal Grandmother    • Heart disease Maternal Grandmother    • Hypertension Maternal Grandfather    • Heart disease Maternal Grandfather    • Heart disease Other    • Hypertension Other    • Cancer Other      OB History        2    Para   2    Term   1            AB        Living   1       SAB        TAB        Ectopic        Molar        Multiple        Live Births                  Prior to Admission medications    Medication Sig Start Date End Date Taking? Authorizing Provider   amLODIPine (NORVASC) 2.5 MG tablet Take 1 tablet by mouth 2 (Two) Times a Day. 20  Yes Julianna Gloria MD   atorvastatin (LIPITOR) 10 MG tablet Take 1 tablet by mouth Daily. 20  Yes Julianna Gloria MD   carbamide peroxide (Debrox) 6.5 % otic solution Administer 5 drops into both ears 2 (Two) Times a Day. 20  Yes  Julianna Gloria MD   clopidogrel (PLAVIX) 75 MG tablet Take 1 tablet by mouth Daily. 12/13/19  Yes Julianna Gloria MD   [facility] MELATONIN 3 MG tablet TAKE 1 TABLET BY MOUTH EVERY NIGHT AT BEDTIME FOR 90 DAYS. 7/6/20  Yes Julianna Gloria MD   diclofenac (VOLTAREN) 1 % gel gel Apply  topically to the appropriate area as directed 2 (Two) Times a Day. 8/16/18  Yes Hi Abrams MD   ferrous sulfate 325 (65 FE) MG tablet Take 1 tablet by mouth Daily With Breakfast. 6/23/20  Yes Julianna Gloria MD   FLUoxetine (PROZAC) 20 MG capsule Take 1 capsule by mouth Daily. 12/13/19  Yes Julianna Gloria MD   fluticasone (FLONASE) 50 MCG/ACT nasal spray 2 sprays by Each Nare route Daily. 4/30/20  Yes Julianna Gloria MD   folic acid (FOLVITE) 1 MG tablet Take 1 tablet by mouth Daily. 6/23/20  Yes Julianna Gloria MD   gabapentin (NEURONTIN) 400 MG capsule Take 1 capsule by mouth 3 (Three) Times a Day. 6/22/20  Yes Julianna Gloria MD   HYDROcodone-acetaminophen (NORCO) 7.5-325 MG per tablet Take 1 tablet by mouth Every 6 (Six) Hours As Needed for Moderate Pain . 6/22/20  Yes Julianna Gloria MD   lisinopril (PRINIVIL,ZESTRIL) 10 MG tablet Take 1 tablet by mouth Daily. 6/22/20  Yes Julianna Gloria MD   naproxen (NAPROSYN) 500 MG tablet Take 1 tablet by mouth 2 (Two) Times a Day With Meals. 6/22/20  Yes Julianna Gloria MD   OS-LORENE CALCIUM + D3 500-200 MG-UNIT tablet Take 1 tablet by mouth Daily. 1/30/19  Yes Joe Dixon MD   Prenatal Vit-Fe Fumarate-FA (PRENATAL VITAMIN 27-0.8) 27-0.8 MG tablet tablet Take 1 tablet by mouth Daily. 12/21/18  Yes Mikel Bradley APRN   vitamin D (ERGOCALCIFEROL) 1.25 MG (39990 UT) capsule capsule Take 1 capsule by mouth 1 (One) Time Per Week. 6/23/20  Yes Julianna Gloria MD     No Known Allergies  Social History     Socioeconomic History   • Marital status: Single     Spouse name: Not on file   • Number of children: 1   • Years of education: Not on file   • Highest education level: Not on file  "  Occupational History   • Occupation: Retired     Comment: Patient resides alone (daughter resides across street).   Tobacco Use   • Smoking status: Former Smoker     Packs/day: 1.00     Years: 48.00     Pack years: 48.00     Types: Cigarettes     Last attempt to quit: 2018     Years since quittin.5   • Smokeless tobacco: Never Used   Substance and Sexual Activity   • Alcohol use: Yes     Frequency: Monthly or less     Comment: occasional beer    • Drug use: No   • Sexual activity: Defer     Comment: .       Review of Systems  Review of Systems   Constitutional: Positive for fatigue. Negative for activity change, appetite change, chills, diaphoresis, fever and unexpected weight change.   HENT: Negative for sore throat and trouble swallowing.    Respiratory: Negative for shortness of breath.    Gastrointestinal: Positive for abdominal pain and nausea (some if over heat ). Negative for abdominal distention, anal bleeding (tender/discomfort ), blood in stool, constipation, diarrhea, rectal pain and vomiting.   Musculoskeletal: Negative for arthralgias.   Skin: Negative for pallor.   Neurological: Positive for weakness. Negative for light-headedness.        /85 (BP Location: Left arm)   Pulse 94   Ht 162.6 cm (64\")   Wt 51.1 kg (112 lb 9.6 oz)   BMI 19.33 kg/m²     Objective    Physical Exam   Constitutional: She is oriented to person, place, and time. She appears well-developed and well-nourished. She is cooperative. No distress.   HENT:   Head: Normocephalic and atraumatic.   Neck: Normal range of motion. Neck supple. No thyromegaly present.   Cardiovascular: Normal rate, regular rhythm and normal heart sounds.   Pulmonary/Chest: Effort normal and breath sounds normal. She has no wheezes. She has no rhonchi. She has no rales.   Abdominal: Soft. Normal appearance and bowel sounds are normal. She exhibits no distension. There is no hepatosplenomegaly. There is no tenderness. There is no " rigidity and no guarding. No hernia.   Lymphadenopathy:     She has no cervical adenopathy.   Neurological: She is alert and oriented to person, place, and time.   Skin: Skin is warm, dry and intact. No rash noted. No pallor.   Psychiatric: She has a normal mood and affect. Her speech is normal.     Admission on 07/06/2020, Discharged on 07/06/2020   Component Date Value Ref Range Status   • SARS-CoV-2, IDA 07/03/2020 Not Detected  Not Detected Final    This test was developed and its performance characteristics determined  by [employer]. This test has not been FDA cleared or  approved. This test has been authorized by FDA under an Emergency Use  Authorization (EUA). This test is only authorized for the duration of  time the declaration that circumstances exist justifying the  authorization of the emergency use of in vitro diagnostic tests for  detection of SARS-CoV-2 virus and/or diagnosis of COVID-19 infection  under section 564(b)(1) of the Act, 21 U.S.C. 360bbb-3(b)(1), unless  the authorization is terminated or revoked sooner.  When diagnostic testing is negative, the possibility of a false  negative result should be considered in the context of a patient's  recent exposures and the presence of clinical signs and symptoms  consistent with COVID-19. An individual without symptoms of COVID-19  and who is not shedding SARS-CoV-2 virus would expect to have a  negative (not detected) result in this assay.   • COVID [facility] PRIORITY 07/03/2020 Comment   Final    Received   • Case Report 07/06/2020    Final                    Value:Surgical Pathology Report                         Case: [id number]                                  Authorizing Provider:  Oz Way MD        Collected:           07/06/2020 09:55 AM          Ordering Location:     [facility]             Received:            07/06/2020 01:55 PM                                 [facility] SUITES                                                      Pathologist:           Rojelio Ballard MD                                                            Specimens:   1) - Gastric, Antrum                                                                                2) - Large Intestine, TI                                                                            3) - Large Intestine, colonic mucosa                                                      • Final Diagnosis 07/06/2020    Final                    Value:This result contains rich text formatting which cannot be displayed here.     Assessment/Plan      1. Gastric erosion determined by endoscopy    2. Gastroesophageal reflux disease without esophagitis    3. Iron deficiency anemia due to chronic blood loss    .   Recommend patient continue oral iron we will recheck CBC and iron studies today follow-up in 1 month if there is further drop may schedule repeat EGD with measures to control bleeding.  PPI daily for gastritis.    Orders placed during this encounter include:  Orders Placed This Encounter   Procedures   • CBC (No Diff)   • Iron Profile       * Surgery not found *    Review and/or summary of lab tests, radiology, procedures, medications. Review and summary of old records and obtaining of history. The risks and benefits of my recommendations, as well as other treatment options were discussed with the patient today. Questions were answered.    New Medications Ordered This Visit   Medications   • pantoprazole (PROTONIX) 40 MG EC tablet     Sig: Take 1 tablet by mouth Daily.     Dispense:  30 tablet     Refill:  11       Follow-up: Return in about 4 weeks (around 8/11/2020) for Recheck.          This document has been electronically signed by HERNAN Phillips on July 14, 2020 10:41             Results for orders placed or performed during the hospital encounter of 07/06/20   COVID [facility] Priority - Swab, Nasopharynx   Result Value Ref Range    COVID [facility] PRIORITY Comment     COVID-19,[facility] ROUTINE, NP/OP SWAB IN TRANSPORT MEDIA OR ESWAB 72 HR TAT - Swab, Nasopharynx   Result Value Ref Range    SARS-CoV-2, IDA Not Detected Not Detected   Tissue Pathology Exam   Result Value Ref Range    Case Report       Surgical Pathology Report                         Case: [id number]                                  Authorizing Provider:  Oz Way MD        Collected:           07/06/2020 09:55 AM          Ordering Location:     [facility]             Received:            07/06/2020 01:55 PM                                 [facility] SUITES                                                     Pathologist:           Rojelio Ballard MD                                                            Specimens:   1) - Gastric, Antrum                                                                                2) - Large Intestine, TI                                                                            3) - Large Intestine, colonic mucosa                                                       Final Diagnosis       See Scanned Report       Results for orders placed or performed in visit on 06/23/20   Scan Slide   Result Value Ref Range    Acanthocytes Slight/1+ None Seen    Anisocytosis Mod/2+ None Seen    Hypochromia Mod/2+ None Seen    Microcytes Mod/2+ None Seen    Poikilocytes Mod/2+ None Seen    Target Cells Mod/2+ None Seen    WBC Morphology Normal Normal    Platelet Morphology Normal Normal   CBC Auto Differential   Result Value Ref Range    WBC 11.22 (H) 3.40 - 10.80 10*3/mm3    RBC 4.37 3.77 - 5.28 10*6/mm3    Hemoglobin 7.5 (L) 12.0 - 15.9 g/dL    Hematocrit 27.1 (L) 34.0 - 46.6 %    MCV 62.0 (L) 79.0 - 97.0 fL    MCH 17.2 (L) 26.6 - 33.0 pg    MCHC 27.7 (L) 31.5 - 35.7 g/dL    RDW 19.9 (H) 12.3 - 15.4 %    RDW-SD 42.3 37.0 - 54.0 fl    MPV 9.6 6.0 - 12.0 fL    Platelets 475 (H) 140 - 450 10*3/mm3    Neutrophil % 65.7 42.7 - 76.0 %    Lymphocyte % 19.3 (L) 19.6 - 45.3 %     Monocyte % 11.6 5.0 - 12.0 %    Eosinophil % 2.4 0.3 - 6.2 %    Basophil % 0.6 0.0 - 1.5 %    Neutrophils, Absolute 7.37 (H) 1.70 - 7.00 10*3/mm3    Lymphocytes, Absolute 2.16 0.70 - 3.10 10*3/mm3    Monocytes, Absolute 1.30 (H) 0.10 - 0.90 10*3/mm3    Eosinophils, Absolute 0.27 0.00 - 0.40 10*3/mm3    Basophils, Absolute 0.07 0.00 - 0.20 10*3/mm3   Results for orders placed or performed in visit on 06/22/20   Vitamin D 25 hydroxy   Result Value Ref Range    25 Hydroxy, Vitamin D 10.5 (L) 30.0 - 100.0 ng/ml   Folate   Result Value Ref Range    Folate 3.39 (L) 4.78 - 24.20 ng/mL   Vitamin B12   Result Value Ref Range    Vitamin B-12 253 211 - 946 pg/mL   Results for orders placed or performed in visit on 06/22/20   CBC Auto Differential   Result Value Ref Range    WBC 9.54 3.40 - 10.80 10*3/mm3    RBC 4.26 3.77 - 5.28 10*6/mm3    Hemoglobin 7.3 (L) 12.0 - 15.9 g/dL    Hematocrit 26.9 (L) 34.0 - 46.6 %    MCV 63.1 (L) 79.0 - 97.0 fL    MCH 17.1 (L) 26.6 - 33.0 pg    MCHC 27.1 (L) 31.5 - 35.7 g/dL    RDW 19.1 (H) 12.3 - 15.4 %    RDW-SD 42.3 37.0 - 54.0 fl    MPV 9.9 6.0 - 12.0 fL    Platelets 514 (H) 140 - 450 10*3/mm3   Manual Differential   Result Value Ref Range    Neutrophil % 69.7 42.7 - 76.0 %    Lymphocyte % 20.2 19.6 - 45.3 %    Monocyte % 6.1 5.0 - 12.0 %    Eosinophil % 3.0 0.3 - 6.2 %    Basophil % 1.0 0.0 - 1.5 %    Neutrophils Absolute 6.65 1.70 - 7.00 10*3/mm3    Lymphocytes Absolute 1.93 0.70 - 3.10 10*3/mm3    Monocytes Absolute 0.58 0.10 - 0.90 10*3/mm3    Eosinophils Absolute 0.29 0.00 - 0.40 10*3/mm3    Basophils Absolute 0.10 0.00 - 0.20 10*3/mm3    Anisocytosis Slight/1+ None Seen    Hypochromia Large/3+ None Seen    Microcytes Slight/1+ None Seen    Poikilocytes Mod/2+ None Seen    Polychromasia Mod/2+ None Seen    WBC Morphology Normal Normal    Platelet Morphology Normal Normal   Lipid Panel   Result Value Ref Range    Total Cholesterol 149 0 - 200 mg/dL    Triglycerides 94 0 - 150 mg/dL    HDL  Cholesterol 61 (H) 40 - 60 mg/dL    LDL Cholesterol  69 0 - 100 mg/dL    VLDL Cholesterol 18.8 5 - 40 mg/dL    LDL/HDL Ratio 1.13    Comprehensive Metabolic Panel   Result Value Ref Range    Glucose 109 (H) 65 - 99 mg/dL    BUN 9 8 - 23 mg/dL    Creatinine 0.77 0.57 - 1.00 mg/dL    Sodium 134 (L) 136 - 145 mmol/L    Potassium 4.3 3.5 - 5.2 mmol/L    Chloride 99 98 - 107 mmol/L    CO2 22.5 22.0 - 29.0 mmol/L    Calcium 9.1 8.6 - 10.5 mg/dL    Total Protein 6.8 6.0 - 8.5 g/dL    Albumin 4.30 3.50 - 5.20 g/dL    ALT (SGPT) 8 1 - 33 U/L    AST (SGOT) 15 1 - 32 U/L    Alkaline Phosphatase 119 (H) 39 - 117 U/L    Total Bilirubin 0.2 0.2 - 1.2 mg/dL    eGFR Non African Amer 74 >60 mL/min/1.73    Globulin 2.5 gm/dL    A/G Ratio 1.7 g/dL    BUN/Creatinine Ratio 11.7 7.0 - 25.0    Anion Gap 12.5 5.0 - 15.0 mmol/L   Results for orders placed or performed during the hospital encounter of 06/11/20   Duplex Carotid Ultrasound CAR   Result Value Ref Range    Prox CCA .0 cm/sec    Prox CCA PSV 87.0 cm/sec    Prox CCA EDV 19.7 cm/sec    Prox CCA EDV 16.3 cm/sec    Dist CCA PSV 86.5 cm/sec    Dist CCA PSV 79.2 cm/sec    Dist CCA EDV 22.6 cm/sec    Dist CCA EDV 19.8 cm/sec    Prox ECA .0 cm/sec    Prox ECA .0 cm/sec    Prox ECA EDV 26.5 cm/sec    Prox ECA EDV 22.5 cm/sec    Prox ICA .0 cm/sec    Prox ICA PSV 99.9 cm/sec    Prox ICA EDV 32.2 cm/sec    Prox ICA EDV 27.9 cm/sec    Mid ICA .0 cm/sec    Mid ICA .0 cm/sec    Mid ICA EDV 37.0 cm/sec    Mid ICA EDV 31.3 cm/sec    Dist ICA .0 cm/sec    Dist ICA .0 cm/sec    Dist ICA EDV 37.0 cm/sec    Dist ICA EDV 39.1 cm/sec    Vertebral A PSV 64.5 cm/sec    Vertebral A PSV 27.4 cm/sec    Vertebral A EDV 17.7 cm/sec    Vertebral A EDV 5.5 cm/sec    ICA/CCA ratio 1.5     ICA/CCA ratio 1.8     Diastolic ICA/CCA Ratio 2     ICA/CCA diastolic ratio 1.6      *Note: Due to a large number of results and/or encounters for the requested time  period, some results have not been displayed. A complete set of results can be found in Results Review.

## 2020-07-14 NOTE — PATIENT INSTRUCTIONS

## 2020-07-15 ENCOUNTER — TELEPHONE (OUTPATIENT)
Dept: GASTROENTEROLOGY | Facility: CLINIC | Age: 70
End: 2020-07-15

## 2020-07-15 NOTE — TELEPHONE ENCOUNTER
Relayed results and recommendations to patient who voiced understanding.      ----- Message from HERNAN Sanchez sent at 7/15/2020  4:26 PM CDT -----  Please call patient with results. Labs have improved. Follow up as planned Continue oral iron

## 2020-07-21 ENCOUNTER — TELEPHONE (OUTPATIENT)
Dept: FAMILY MEDICINE CLINIC | Facility: CLINIC | Age: 70
End: 2020-07-21

## 2020-07-21 NOTE — TELEPHONE ENCOUNTER
Daughter (Jo-Ann, listed on verbal release) called advising they just left Pain Management [facility]. She had her first initial visit today but they could not give her any pain medication because they did not have the records they needed from [facility].      Daughter is needing to know is Dr Gloria can get patient 5 day worth of pain medication until pain management can get her meds sent in.      (In reference to this matter please see telephone encounter / refill encounter 07/06/2020 to 07/07/2020)  Dr Gloria is out on Rot[place] please send to a partner.     Daughter Jo-Ann  is requesting a call back today [phone]    Thank you

## 2020-08-13 ENCOUNTER — OFFICE VISIT (OUTPATIENT)
Dept: GASTROENTEROLOGY | Facility: CLINIC | Age: 70
End: 2020-08-13

## 2020-08-13 VITALS
OXYGEN SATURATION: 96 % | BODY MASS INDEX: 19.36 KG/M2 | SYSTOLIC BLOOD PRESSURE: 130 MMHG | DIASTOLIC BLOOD PRESSURE: 70 MMHG | HEART RATE: 70 BPM | WEIGHT: 113.4 LBS | HEIGHT: 64 IN

## 2020-08-13 DIAGNOSIS — D50.0 IRON DEFICIENCY ANEMIA DUE TO CHRONIC BLOOD LOSS: Primary | ICD-10-CM

## 2020-08-13 DIAGNOSIS — K21.00 GASTROESOPHAGEAL REFLUX DISEASE WITH ESOPHAGITIS: ICD-10-CM

## 2020-08-13 PROCEDURE — 99213 OFFICE O/P EST LOW 20 MIN: CPT | Performed by: NURSE PRACTITIONER

## 2020-08-13 NOTE — PATIENT INSTRUCTIONS

## 2020-09-09 DIAGNOSIS — I10 ESSENTIAL HYPERTENSION: ICD-10-CM

## 2020-09-09 DIAGNOSIS — F51.01 PRIMARY INSOMNIA: ICD-10-CM

## 2020-09-09 RX ORDER — AMLODIPINE BESYLATE 2.5 MG/1
TABLET ORAL
Qty: 180 TABLET | Refills: 0 | Status: SHIPPED | OUTPATIENT
Start: 2020-09-09 | End: 2020-12-17

## 2020-09-09 RX ORDER — OMEPRAZOLE MAGNESIUM 20 MG
CAPSULE,DELAYED RELEASE (ENTERIC COATED) ORAL
Qty: 90 TABLET | Refills: 0 | Status: SHIPPED | OUTPATIENT
Start: 2020-09-09 | End: 2020-12-17

## 2020-09-29 ENCOUNTER — OFFICE VISIT (OUTPATIENT)
Dept: FAMILY MEDICINE CLINIC | Facility: CLINIC | Age: 70
End: 2020-09-29

## 2020-09-29 VITALS
OXYGEN SATURATION: 97 % | HEIGHT: 63 IN | DIASTOLIC BLOOD PRESSURE: 74 MMHG | TEMPERATURE: 97.5 F | WEIGHT: 115 LBS | HEART RATE: 67 BPM | SYSTOLIC BLOOD PRESSURE: 138 MMHG | BODY MASS INDEX: 20.38 KG/M2

## 2020-09-29 DIAGNOSIS — Z23 NEEDS FLU SHOT: Primary | ICD-10-CM

## 2020-09-29 DIAGNOSIS — Z72.0 TOBACCO ABUSE: ICD-10-CM

## 2020-09-29 PROCEDURE — 90686 IIV4 VACC NO PRSV 0.5 ML IM: CPT | Performed by: STUDENT IN AN ORGANIZED HEALTH CARE EDUCATION/TRAINING PROGRAM

## 2020-09-29 PROCEDURE — 99213 OFFICE O/P EST LOW 20 MIN: CPT | Performed by: STUDENT IN AN ORGANIZED HEALTH CARE EDUCATION/TRAINING PROGRAM

## 2020-09-29 PROCEDURE — G0008 ADMIN INFLUENZA VIRUS VAC: HCPCS | Performed by: STUDENT IN AN ORGANIZED HEALTH CARE EDUCATION/TRAINING PROGRAM

## 2020-09-29 RX ORDER — NAPROXEN 500 MG/1
500 TABLET ORAL 2 TIMES DAILY WITH MEALS
Qty: 30 TABLET | Refills: 2 | Status: CANCELLED | OUTPATIENT
Start: 2020-09-29

## 2020-09-29 NOTE — PROGRESS NOTES
ID: Jessie Jordan    CC:   Chief Complaint   Patient presents with   • Anemia     3 MONTH        Subjective:     HPI     Jessie Jordan is a 70 y.o. female who presents for tobacco abuse. Patient smokes about a pack a day. Patient would like to quit smoking.     Preventative:  Over the past 2 weeks, have you felt down, depressed, or hopeless? no  Over the past 2 weeks, have you felt little interest or pleasure in doing things? no  Clinical depression screening refused by patient no    On osteoporosis therapy? no    Past Medical Hx:  Past Medical History:   Diagnosis Date   • Anxiety and depression    • Coronary artery disease involving native coronary artery of native heart 12/06/2017    seen on Cardiac Cath - Left main 50-70% stenosis, 12/27/2017 repeat cath showed coronary spasm with no stenosis   • Hyperlipidemia    • Hypertension    • Kidney cysts    • Post-menopausal 1990   • Skin cancer    • Stroke (CMS/[facility]) 2015   • Vascular disease        Past Surgical Hx:  Past Surgical History:   Procedure Laterality Date   • ARTERIOGRAM N/A 9/28/2018    Procedure: aortoiliac arteriogram possible angioplasty stent atherectomy thrombolysis bilateral iliac stents;  Surgeon: Luan Ruff MD;  Location: [facility] ANGIO INVASIVE LOCATION;  Service: Interventional Radiology   • CARDIAC CATHETERIZATION  12/06/2017    Done at [facility], [place], operated by [facility] - Left Main 50-70% Stenosis - no stenting done, recommendation was urgent CABG.  EF 50-60%   • CERVICAL FUSION  1985    C5-6   • COLONOSCOPY N/A 6/29/2018    Procedure: COLONOSCOPY;  Surgeon: Oz Way MD;  Location: [facility] ENDOSCOPY;  Service: Gastroenterology   • COLONOSCOPY N/A 7/6/2020    Procedure: COLONOSCOPY;  Surgeon: Oz Way MD;  Location: [facility] ENDOSCOPY;  Service: Gastroenterology;  Laterality: N/A;   • ENDOSCOPY N/A 7/6/2020    Procedure: ESOPHAGOGASTRODUODENOSCOPY ASAP;  Surgeon: Oz Way MD;  Location: [facility] ENDOSCOPY;  Service:  Gastroenterology;  Laterality: N/A;   • FEMORAL POPLITEAL BYPASS Right 12/19/2018    Procedure: femoral to femoral artery bypass, RIGHT FEMORAL POPLITEAL BYPASS right lower extremity arteriogram          (C-ARM# AND C-ARM TABLE);  Surgeon: Luan Ruff MD;  Location: [facility];  Service: Vascular   • FINGER SURGERY Left     third finger   • ROTATOR CUFF REPAIR Left 06/30/2011    done in [place]       Health Maintenance:  Health Maintenance   Topic Date Due   • TDAP/TD VACCINES (1 - Tdap) 09/04/1969   • ZOSTER VACCINE (1 of 2) 09/04/2000   • MEDICARE ANNUAL WELLNESS  04/04/2019   • Pneumococcal Vaccine 65+ (2 of 2 - PPSV23) 04/05/2019   • DXA SCAN  04/25/2020   • MAMMOGRAM  05/04/2020   • INFLUENZA VACCINE  08/01/2020   • LIPID PANEL  06/22/2021   • LUNG CANCER SCREENING  06/29/2021   • COLONOSCOPY  07/06/2025   • HEPATITIS C SCREENING  Completed       Current Meds:    Current Outpatient Medications:   •  amLODIPine (NORVASC) 2.5 MG tablet, TAKE 1 TABLET BY MOUTH TWICE A DAY, Disp: 180 tablet, Rfl: 0  •  atorvastatin (LIPITOR) 10 MG tablet, Take 1 tablet by mouth Daily., Disp: 90 tablet, Rfl: 1  •  carbamide peroxide (Debrox) 6.5 % otic solution, Administer 5 drops into both ears 2 (Two) Times a Day., Disp: 15 mL, Rfl: 3  •  clopidogrel (PLAVIX) 75 MG tablet, Take 1 tablet by mouth Daily., Disp: 90 tablet, Rfl: 3  •  [employer] MELATONIN 3 MG tablet, TAKE 1 TABLET BY MOUTH EVERY NIGHT AT BEDTIME FOR 90 DAYS., Disp: 90 tablet, Rfl: 0  •  diclofenac (VOLTAREN) 1 % gel gel, Apply  topically to the appropriate area as directed 2 (Two) Times a Day., Disp: 30 g, Rfl: 1  •  ferrous sulfate 325 (65 FE) MG tablet, Take 1 tablet by mouth Daily With Breakfast., Disp: 90 tablet, Rfl: 1  •  FLUoxetine (PROZAC) 20 MG capsule, Take 1 capsule by mouth Daily., Disp: 90 capsule, Rfl: 3  •  fluticasone (FLONASE) 50 MCG/ACT nasal spray, 2 sprays by Each Nare route Daily., Disp: 3 bottle, Rfl: 3  •  folic acid (FOLVITE) 1 MG tablet, Take  1 tablet by mouth Daily., Disp: 30 tablet, Rfl: 3  •  gabapentin (NEURONTIN) 400 MG capsule, Take 1 capsule by mouth 3 (Three) Times a Day., Disp: 90 capsule, Rfl: 2  •  HYDROcodone-acetaminophen (NORCO) 7.5-325 MG per tablet, Take 1 tablet by mouth Every 6 (Six) Hours As Needed for Moderate Pain ., Disp: 30 tablet, Rfl: 0  •  lisinopril (PRINIVIL,ZESTRIL) 10 MG tablet, Take 1 tablet by mouth Daily., Disp: 90 tablet, Rfl: 3  •  naproxen (NAPROSYN) 500 MG tablet, Take 1 tablet by mouth 2 (Two) Times a Day With Meals., Disp: 30 tablet, Rfl: 2  •  OS-LORENE CALCIUM + D3 500-200 MG-UNIT tablet, Take 1 tablet by mouth Daily., Disp: , Rfl: 2  •  pantoprazole (PROTONIX) 40 MG EC tablet, Take 1 tablet by mouth Daily., Disp: 30 tablet, Rfl: 11  •  Prenatal Vit-Fe Fumarate-FA (PRENATAL VITAMIN 27-0.8) 27-0.8 MG tablet tablet, Take 1 tablet by mouth Daily., Disp: , Rfl:   •  vitamin D (ERGOCALCIFEROL) 1.25 MG (13440 UT) capsule capsule, Take 1 capsule by mouth 1 (One) Time Per Week., Disp: 5 capsule, Rfl: 3  •  varenicline (Chantix Starting Month Idris) 0.5 MG X 11 & 1 MG X 42 tablet, Take 0.5 mg one daily on days 1-3 and and 0.5 mg twice daily on days 4-7.Then 1 mg twice daily for a total of 12 weeks., Disp: 42 tablet, Rfl: 0    Allergies:  Patient has no known allergies.    Family Hx:  Family History   Problem Relation Age of Onset   • Lung cancer Mother    • Hypertension Mother    • Diabetes Maternal Grandmother    • Heart disease Maternal Grandmother    • Hypertension Maternal Grandfather    • Heart disease Maternal Grandfather    • Heart disease Other    • Hypertension Other    • Cancer Other         Social History:  Social History     Socioeconomic History   • Marital status: Single     Spouse name: Not on file   • Number of children: 1   • Years of education: Not on file   • Highest education level: Not on file   Occupational History   • Occupation: Retired     Comment: Patient resides alone (daughter resides across street).  "  Tobacco Use   • Smoking status: Former Smoker     Packs/day: 1.00     Years: 48.00     Pack years: 48.00     Types: Cigarettes     Quit date: 2018     Years since quittin.7   • Smokeless tobacco: Never Used   Substance and Sexual Activity   • Alcohol use: Yes     Frequency: Monthly or less     Comment: occasional beer    • Drug use: No   • Sexual activity: Defer     Comment: .       Review of Systems   Constitutional: Negative for activity change, appetite change, chills, fatigue and fever.   HENT: Negative for drooling, ear discharge, ear pain, facial swelling, hearing loss, mouth sores, rhinorrhea and sinus pain.    Eyes: Negative for pain, redness and itching.   Respiratory: Negative for cough, choking, chest tightness, shortness of breath and stridor.    Cardiovascular: Negative for chest pain, palpitations and leg swelling.   Gastrointestinal: Negative for abdominal distention, abdominal pain, anal bleeding, blood in stool, constipation, diarrhea and nausea.   Endocrine: Negative for heat intolerance, polydipsia and polyphagia.   Genitourinary: Negative for dysuria, flank pain, frequency and genital sores.   Musculoskeletal: Negative for back pain, gait problem, joint swelling and myalgias.   Skin: Negative for pallor and rash.   Neurological: Negative for seizures, facial asymmetry, speech difficulty, light-headedness, numbness and headaches.   Hematological: Negative for adenopathy. Does not bruise/bleed easily.   Psychiatric/Behavioral: Negative for confusion, decreased concentration, dysphoric mood and hallucinations. The patient is not nervous/anxious and is not hyperactive.            Objective:     /74   Pulse 67   Temp 97.5 °F (36.4 °C)   Ht 160 cm (63\")   Wt 52.2 kg (115 lb)   SpO2 97%   BMI 20.37 kg/m²     Physical Exam  Constitutional:       Appearance: She is well-developed.   HENT:      Head: Normocephalic and atraumatic.      Right Ear: External ear normal.      " Left Ear: External ear normal.      Nose: Nose normal.   Eyes:      Conjunctiva/sclera: Conjunctivae normal.      Pupils: Pupils are equal, round, and reactive to light.   Neck:      Musculoskeletal: Normal range of motion and neck supple.   Cardiovascular:      Rate and Rhythm: Normal rate and regular rhythm.      Heart sounds: Normal heart sounds.   Pulmonary:      Effort: Pulmonary effort is normal.      Breath sounds: Normal breath sounds.   Abdominal:      General: Bowel sounds are normal.      Palpations: Abdomen is soft.   Musculoskeletal: Normal range of motion.   Skin:     General: Skin is warm and dry.   Neurological:      Mental Status: She is alert and oriented to person, place, and time.   Psychiatric:         Behavior: Behavior normal.         Thought Content: Thought content normal.         Judgment: Judgment normal.                Assessment/Plan:     Jessie was seen today for anemia.    Diagnoses and all orders for this visit:    Needs flu shot  -     FluLaval Quad >6 Months (1066-2192)    Tobacco abuse    Other orders  -     varenicline (Chantix Starting Month Pak) 0.5 MG X 11 & 1 MG X 42 tablet; Take 0.5 mg one daily on days 1-3 and and 0.5 mg twice daily on days 4-7.Then 1 mg twice daily for a total of 12 weeks.      Patient will be starting on chantix. Will quit in 10-12 days. Patient understands and is to follow up with me in one month. She is aware of risks and benefits.     Follow-up:     1 month    Goals:   Goals     • Pain control      Barriers: severe PAD, pt continues to smoke      • Quit smoking / using tobacco (pt-stated)      Barriers: compliance with cessation medications          Barriers to goals: none    Health Maintenance   Topic Date Due   • TDAP/TD VACCINES (1 - Tdap) 09/04/1969   • ZOSTER VACCINE (1 of 2) 09/04/2000   • MEDICARE ANNUAL WELLNESS  04/04/2019   • Pneumococcal Vaccine 65+ (2 of 2 - PPSV23) 04/05/2019   • DXA SCAN  04/25/2020   • MAMMOGRAM  05/04/2020   • INFLUENZA  VACCINE  08/01/2020   • LIPID PANEL  06/22/2021   • LUNG CANCER SCREENING  06/29/2021   • COLONOSCOPY  07/06/2025   • HEPATITIS C SCREENING  Completed       Tobacco: nonsmoker  Alcohol: does not drink  Lifestyle: Patient's Body mass index is 20.37 kg/m². BMI is within normal parameters. No follow-up required..   reduce portion size, reduce fast food intake, family to eat at dinner table more often, plan meals, eat breakfast and have 3 meals a day    RISK SCORE: 3         Julianna Gloria M.D. PGY3  [facility]  [address] [place] [address]  Office: [phone]    This document has been electronically signed by Julianna Gloria MD on September 29, 2020 15:44 CDT            This document has been electronically signed by Julianna Gloria MD on September 29, 2020 15:42 CDT

## 2020-11-06 RX ORDER — ERGOCALCIFEROL 1.25 MG/1
50000 CAPSULE ORAL WEEKLY
Qty: 12 CAPSULE | Refills: 1 | Status: SHIPPED | OUTPATIENT
Start: 2020-11-06 | End: 2021-04-29

## 2020-11-10 ENCOUNTER — LAB (OUTPATIENT)
Dept: LAB | Facility: HOSPITAL | Age: 70
End: 2020-11-10

## 2020-11-10 ENCOUNTER — OFFICE VISIT (OUTPATIENT)
Dept: GASTROENTEROLOGY | Facility: CLINIC | Age: 70
End: 2020-11-10

## 2020-11-10 VITALS
WEIGHT: 116.8 LBS | TEMPERATURE: 98.5 F | OXYGEN SATURATION: 97 % | HEIGHT: 64 IN | HEART RATE: 63 BPM | DIASTOLIC BLOOD PRESSURE: 70 MMHG | SYSTOLIC BLOOD PRESSURE: 150 MMHG | BODY MASS INDEX: 19.94 KG/M2

## 2020-11-10 DIAGNOSIS — D50.0 IRON DEFICIENCY ANEMIA DUE TO CHRONIC BLOOD LOSS: Primary | ICD-10-CM

## 2020-11-10 DIAGNOSIS — K21.00 GASTROESOPHAGEAL REFLUX DISEASE WITH ESOPHAGITIS WITHOUT HEMORRHAGE: ICD-10-CM

## 2020-11-10 LAB
DEPRECATED RDW RBC AUTO: 44.5 FL (ref 37–54)
ERYTHROCYTE [DISTWIDTH] IN BLOOD BY AUTOMATED COUNT: 14.7 % (ref 12.3–15.4)
HCT VFR BLD AUTO: 33.7 % (ref 34–46.6)
HGB BLD-MCNC: 11.1 G/DL (ref 12–15.9)
IRON 24H UR-MRATE: 38 MCG/DL (ref 37–145)
IRON SATN MFR SERPL: 9 % (ref 20–50)
MCH RBC QN AUTO: 27.4 PG (ref 26.6–33)
MCHC RBC AUTO-ENTMCNC: 32.9 G/DL (ref 31.5–35.7)
MCV RBC AUTO: 83.2 FL (ref 79–97)
PLATELET # BLD AUTO: 362 10*3/MM3 (ref 140–450)
PMV BLD AUTO: 11.8 FL (ref 6–12)
RBC # BLD AUTO: 4.05 10*6/MM3 (ref 3.77–5.28)
TIBC SERPL-MCNC: 431 MCG/DL (ref 298–536)
TRANSFERRIN SERPL-MCNC: 289 MG/DL (ref 200–360)
WBC # BLD AUTO: 9.52 10*3/MM3 (ref 3.4–10.8)

## 2020-11-10 PROCEDURE — 36415 COLL VENOUS BLD VENIPUNCTURE: CPT | Performed by: NURSE PRACTITIONER

## 2020-11-10 PROCEDURE — 85027 COMPLETE CBC AUTOMATED: CPT | Performed by: NURSE PRACTITIONER

## 2020-11-10 PROCEDURE — 84466 ASSAY OF TRANSFERRIN: CPT | Performed by: NURSE PRACTITIONER

## 2020-11-10 PROCEDURE — 99213 OFFICE O/P EST LOW 20 MIN: CPT | Performed by: NURSE PRACTITIONER

## 2020-11-10 PROCEDURE — 83540 ASSAY OF IRON: CPT | Performed by: NURSE PRACTITIONER

## 2020-11-10 RX ORDER — GABAPENTIN 300 MG/1
300 CAPSULE ORAL 3 TIMES DAILY
COMMUNITY
Start: 2020-10-23 | End: 2021-01-15

## 2020-11-10 NOTE — PROGRESS NOTES
Chief Complaint   Patient presents with   • 12 Week Clinical Appointment     Iron Deficiency Anemia Due To Chronic Blood Loss       Subjective    Jessie Jordan is a 70 y.o. female. she is here today for follow-up.  70-year-old female presents for follow-up regarding anemia she forgot to get her blood work completed prior to office visit we ordered in August.  Reports she still feels tired but abdominal pain is greatly improved denies any issues with reflux and states bowel movements have regulated to about 1-2 times per day with no melena or hematochezia.    Anemia  Presents for follow-up visit. Symptoms include malaise/fatigue. There has been no abdominal pain, anorexia, bruising/bleeding easily, confusion, fever, leg swelling, light-headedness, pallor, palpitations, paresthesias, pica or weight loss. Signs of blood loss that are not present include hematemesis, hematochezia, melena, menorrhagia and vaginal bleeding. There are no compliance problems.  Side effects of medications include fatigue.            The following portions of the patient's history were reviewed and updated as appropriate:   Past Medical History:   Diagnosis Date   • Anxiety and depression    • Coronary artery disease involving native coronary artery of native heart 12/06/2017    seen on Cardiac Cath - Left main 50-70% stenosis, 12/27/2017 repeat cath showed coronary spasm with no stenosis   • Hyperlipidemia    • Hypertension    • Kidney cysts    • Post-menopausal 1990   • Skin cancer    • Stroke (CMS/HCC) 2015   • Vascular disease      Past Surgical History:   Procedure Laterality Date   • ARTERIOGRAM N/A 9/28/2018    Procedure: aortoiliac arteriogram possible angioplasty stent atherectomy thrombolysis bilateral iliac stents;  Surgeon: Luan Ruff MD;  Location: [facility] ANGIO INVASIVE LOCATION;  Service: Interventional Radiology   • CARDIAC CATHETERIZATION  12/06/2017    Done at [facility] - Left Main 50-70%  Stenosis - no stenting done, recommendation was urgent CABG.  EF 50-60%   • CERVICAL FUSION      C5-6   • COLONOSCOPY N/A 2018    Procedure: COLONOSCOPY;  Surgeon: Oz Way MD;  Location: [facility] ENDOSCOPY;  Service: Gastroenterology   • COLONOSCOPY N/A 2020    Procedure: COLONOSCOPY;  Surgeon: Oz Way MD;  Location: [facility] ENDOSCOPY;  Service: Gastroenterology;  Laterality: N/A;   • ENDOSCOPY N/A 2020    Procedure: ESOPHAGOGASTRODUODENOSCOPY ASAP;  Surgeon: Oz Way MD;  Location: [facility] ENDOSCOPY;  Service: Gastroenterology;  Laterality: N/A;   • FEMORAL POPLITEAL BYPASS Right 2018    Procedure: femoral to femoral artery bypass, RIGHT FEMORAL POPLITEAL BYPASS right lower extremity arteriogram          (C-ARM# AND C-ARM TABLE);  Surgeon: Luan Ruff MD;  Location: [facility] OR;  Service: Vascular   • FINGER SURGERY Left     third finger   • ROTATOR CUFF REPAIR Left 2011    done in [place]     Family History   Problem Relation Age of Onset   • Lung cancer Mother    • Hypertension Mother    • Diabetes Maternal Grandmother    • Heart disease Maternal Grandmother    • Hypertension Maternal Grandfather    • Heart disease Maternal Grandfather    • Heart disease Other    • Hypertension Other    • Cancer Other      OB History        2    Para   2    Term   1            AB        Living   1       SAB        TAB        Ectopic        Molar        Multiple        Live Births                  Prior to Admission medications    Medication Sig Start Date End Date Taking? Authorizing Provider   amLODIPine (NORVASC) 2.5 MG tablet TAKE 1 TABLET BY MOUTH TWICE A DAY 20  Yes Julianna Gloria MD   atorvastatin (LIPITOR) 10 MG tablet Take 1 tablet by mouth Daily. 20  Yes Julianna Gloria MD   carbamide peroxide (Debrox) 6.5 % otic solution Administer 5 drops into both ears 2 (Two) Times a Day. 20  Yes Julianna Gloria MD   clopidogrel (PLAVIX) 75 MG tablet  Take 1 tablet by mouth Daily. 12/13/19  Yes Julianna Gloria MD   [facility] MELATONIN 3 MG tablet TAKE 1 TABLET BY MOUTH EVERY NIGHT AT BEDTIME FOR 90 DAYS. 9/9/20  Yes Julianna Gloria MD   diclofenac (VOLTAREN) 1 % gel gel Apply  topically to the appropriate area as directed 2 (Two) Times a Day. 8/16/18  Yes Hi Abrams MD   ferrous sulfate 325 (65 FE) MG tablet Take 1 tablet by mouth Daily With Breakfast. 6/23/20  Yes Julianna Gloria MD   FLUoxetine (PROZAC) 20 MG capsule Take 1 capsule by mouth Daily. 12/13/19  Yes Julianna Gloria MD   fluticasone (FLONASE) 50 MCG/ACT nasal spray 2 sprays by Each Nare route Daily. 4/30/20  Yes Julianna Gloria MD   folic acid (FOLVITE) 1 MG tablet Take 1 tablet by mouth Daily. 6/23/20  Yes Julianna Gloria MD   gabapentin (NEURONTIN) 300 MG capsule Take 300 mg by mouth 3 (Three) Times a Day. 10/23/20  Yes Joe Dixon MD   HYDROcodone-acetaminophen (NORCO) 7.5-325 MG per tablet Take 1 tablet by mouth Every 6 (Six) Hours As Needed for Moderate Pain . 6/22/20  Yes Julianna Gloria MD   lisinopril (PRINIVIL,ZESTRIL) 10 MG tablet Take 1 tablet by mouth Daily. 6/22/20  Yes Julianna Gloria MD   OS-LORENE CALCIUM + D3 500-200 MG-UNIT tablet Take 1 tablet by mouth Daily. 1/30/19  Yes Joe Dixon MD   pantoprazole (PROTONIX) 40 MG EC tablet Take 1 tablet by mouth Daily. 7/14/20  Yes Estela Mesa APRN   Prenatal Vit-Fe Fumarate-FA (PRENATAL VITAMIN 27-0.8) 27-0.8 MG tablet tablet Take 1 tablet by mouth Daily. 12/21/18  Yes Mikel Bradley APRN   varenicline (Chantix Starting Month Pak) 0.5 MG X 11 & 1 MG X 42 tablet Take 0.5 mg one daily on days 1-3 and and 0.5 mg twice daily on days 4-7.Then 1 mg twice daily for a total of 12 weeks. 9/29/20  Yes Julianna Gloria MD   vitamin D (ERGOCALCIFEROL) 1.25 MG (43201 UT) capsule capsule TAKE 1 CAPSULE BY MOUTH 1 (ONE) TIME PER WEEK. 11/6/20  Yes Julianna Gloria MD   gabapentin (NEURONTIN) 400 MG capsule Take 1 capsule by mouth 3  "(Three) Times a Day. 20   Julianna Gloria MD   naproxen (NAPROSYN) 500 MG tablet Take 1 tablet by mouth 2 (Two) Times a Day With Meals. 20   Julianna Gloria MD     No Known Allergies  Social History     Socioeconomic History   • Marital status: Single     Spouse name: Not on file   • Number of children: 1   • Years of education: Not on file   • Highest education level: Not on file   Occupational History   • Occupation: Retired     Comment: Patient resides alone (daughter resides across street).   Tobacco Use   • Smoking status: Former Smoker     Packs/day: 1.00     Years: 48.00     Pack years: 48.00     Types: Cigarettes     Quit date: 2018     Years since quittin.8   • Smokeless tobacco: Never Used   Substance and Sexual Activity   • Alcohol use: Yes     Frequency: Monthly or less     Comment: occasional beer    • Drug use: No   • Sexual activity: Defer     Comment: .       Review of Systems  Review of Systems   Constitutional: Positive for malaise/fatigue. Negative for activity change, appetite change, chills, diaphoresis, fatigue, fever, unexpected weight change and weight loss.   HENT: Negative for sore throat and trouble swallowing.    Respiratory: Negative for shortness of breath.    Cardiovascular: Negative for palpitations.   Gastrointestinal: Negative for abdominal distention, abdominal pain, anal bleeding, anorexia, blood in stool, constipation, diarrhea, hematemesis, hematochezia, melena, nausea, rectal pain and vomiting.   Genitourinary: Negative for menorrhagia and vaginal bleeding.   Musculoskeletal: Negative for arthralgias.   Skin: Negative for pallor.   Neurological: Negative for light-headedness and paresthesias.   Hematological: Does not bruise/bleed easily.   Psychiatric/Behavioral: Negative for confusion.        /70   Pulse 63   Temp 98.5 °F (36.9 °C) (Temporal)   Ht 162.6 cm (64\")   Wt 53 kg (116 lb 12.8 oz)   SpO2 97%   BMI 20.05 kg/m²     Objective    "   Physical Exam  Constitutional:       General: She is not in acute distress.     Appearance: Normal appearance. She is well-developed.   HENT:      Head: Normocephalic and atraumatic.   Neck:      Musculoskeletal: Normal range of motion and neck supple.      Thyroid: No thyromegaly.   Cardiovascular:      Rate and Rhythm: Normal rate and regular rhythm.      Heart sounds: Normal heart sounds.   Pulmonary:      Effort: Pulmonary effort is normal.      Breath sounds: Normal breath sounds. No wheezing, rhonchi or rales.   Abdominal:      General: Bowel sounds are normal. There is no distension.      Palpations: Abdomen is soft. Abdomen is not rigid.      Tenderness: There is no abdominal tenderness. There is no guarding.      Hernia: No hernia is present.   Lymphadenopathy:      Cervical: No cervical adenopathy.   Skin:     General: Skin is warm and dry.      Coloration: Skin is not pale.      Findings: No rash.   Neurological:      Mental Status: She is alert and oriented to person, place, and time.   Psychiatric:         Speech: Speech normal.         Behavior: Behavior is cooperative.       Office Visit on 07/14/2020   Component Date Value Ref Range Status   • WBC 07/14/2020 11.17* 3.40 - 10.80 10*3/mm3 Final   • RBC 07/14/2020 5.05  3.77 - 5.28 10*6/mm3 Final   • Hemoglobin 07/14/2020 10.5* 12.0 - 15.9 g/dL Final   • Hematocrit 07/14/2020 35.9  34.0 - 46.6 % Final   • MCV 07/14/2020 71.1* 79.0 - 97.0 fL Final   • MCH 07/14/2020 20.8* 26.6 - 33.0 pg Final   • MCHC 07/14/2020 29.2* 31.5 - 35.7 g/dL Final   • MPV 07/14/2020 10.2  6.0 - 12.0 fL Final   • Platelets 07/14/2020 562* 140 - 450 10*3/mm3 Final   • Iron 07/14/2020 33* 37 - 145 mcg/dL Final   • Iron Saturation 07/14/2020 7* 20 - 50 % Final   • Transferrin 07/14/2020 330  200 - 360 mg/dL Final   • TIBC 07/14/2020 492  298 - 536 mcg/dL Final     Assessment/Plan      1. Iron deficiency anemia due to chronic blood loss    2. Gastroesophageal reflux disease with  esophagitis without hemorrhage    .   Continue PPI daily for reflux.  Recheck CBC and iron profile today we will call patient with results when available if labs are stable recommend follow-up with PCP return to GI office sooner if needed    Orders placed during this encounter include:  No orders of the defined types were placed in this encounter.      * Surgery not found *    Review and/or summary of lab tests, radiology, procedures, medications. Review and summary of old records and obtaining of history. The risks and benefits of my recommendations, as well as other treatment options were discussed with the patient today. Questions were answered.    No orders of the defined types were placed in this encounter.      Follow-up: Return for Recheck, After test.          This document has been electronically signed by HERNAN Phillips on November 10, 2020 10:53 CST             Results for orders placed or performed in visit on 07/14/20   Iron Profile    Specimen: Blood   Result Value Ref Range    Iron 33 (L) 37 - 145 mcg/dL    Iron Saturation 7 (L) 20 - 50 %    Transferrin 330 200 - 360 mg/dL    TIBC 492 298 - 536 mcg/dL   CBC (No Diff)    Specimen: Blood   Result Value Ref Range    WBC 11.17 (H) 3.40 - 10.80 10*3/mm3    RBC 5.05 3.77 - 5.28 10*6/mm3    Hemoglobin 10.5 (L) 12.0 - 15.9 g/dL    Hematocrit 35.9 34.0 - 46.6 %    MCV 71.1 (L) 79.0 - 97.0 fL    MCH 20.8 (L) 26.6 - 33.0 pg    MCHC 29.2 (L) 31.5 - 35.7 g/dL    MPV 10.2 6.0 - 12.0 fL    Platelets 562 (H) 140 - 450 10*3/mm3   Results for orders placed or performed during the hospital encounter of 07/06/20   COVID [facility] Priority - Swab, Nasopharynx    Specimen: Nasopharynx; Swab   Result Value Ref Range    COVID [facility] PRIORITY Comment    COVID-19,[facility] ROUTINE, NP/OP SWAB IN TRANSPORT MEDIA OR ESWAB 72 HR TAT - Swab, Nasopharynx    Specimen: Nasopharynx; Swab   Result Value Ref Range    SARS-CoV-2, IDA Not Detected Not Detected   Tissue Pathology Exam     Specimen: A: Gastric, Antrum; Tissue    B: Large Intestine; Tissue    C: Large Intestine; Tissue   Result Value Ref Range    Case Report       Surgical Pathology Report                         Case: [id number]                                  Authorizing Provider:  Oz Way MD        Collected:           07/06/2020 09:55 AM          Ordering Location:     [facility]             Received:            07/06/2020 01:55 PM                                 [facility]                                                     Pathologist:           Rojelio Ballard MD                                                            Specimens:   1) - Gastric, Antrum                                                                                2) - Large Intestine, TI                                                                            3) - Large Intestine, colonic mucosa                                                       Final Diagnosis       See Scanned Report       Results for orders placed or performed in visit on 06/23/20   Scan Slide    Specimen: Blood   Result Value Ref Range    Acanthocytes Slight/1+ None Seen    Anisocytosis Mod/2+ None Seen    Hypochromia Mod/2+ None Seen    Microcytes Mod/2+ None Seen    Poikilocytes Mod/2+ None Seen    Target Cells Mod/2+ None Seen    WBC Morphology Normal Normal    Platelet Morphology Normal Normal   CBC Auto Differential    Specimen: Blood   Result Value Ref Range    WBC 11.22 (H) 3.40 - 10.80 10*3/mm3    RBC 4.37 3.77 - 5.28 10*6/mm3    Hemoglobin 7.5 (L) 12.0 - 15.9 g/dL    Hematocrit 27.1 (L) 34.0 - 46.6 %    MCV 62.0 (L) 79.0 - 97.0 fL    MCH 17.2 (L) 26.6 - 33.0 pg    MCHC 27.7 (L) 31.5 - 35.7 g/dL    RDW 19.9 (H) 12.3 - 15.4 %    RDW-SD 42.3 37.0 - 54.0 fl    MPV 9.6 6.0 - 12.0 fL    Platelets 475 (H) 140 - 450 10*3/mm3    Neutrophil % 65.7 42.7 - 76.0 %    Lymphocyte % 19.3 (L) 19.6 - 45.3 %    Monocyte % 11.6 5.0 - 12.0 %    Eosinophil % 2.4 0.3 -  6.2 %    Basophil % 0.6 0.0 - 1.5 %    Neutrophils, Absolute 7.37 (H) 1.70 - 7.00 10*3/mm3    Lymphocytes, Absolute 2.16 0.70 - 3.10 10*3/mm3    Monocytes, Absolute 1.30 (H) 0.10 - 0.90 10*3/mm3    Eosinophils, Absolute 0.27 0.00 - 0.40 10*3/mm3    Basophils, Absolute 0.07 0.00 - 0.20 10*3/mm3   Results for orders placed or performed in visit on 06/22/20   Vitamin D 25 hydroxy    Specimen: Blood   Result Value Ref Range    25 Hydroxy, Vitamin D 10.5 (L) 30.0 - 100.0 ng/ml   Folate    Specimen: Blood   Result Value Ref Range    Folate 3.39 (L) 4.78 - 24.20 ng/mL   Vitamin B12    Specimen: Blood   Result Value Ref Range    Vitamin B-12 253 211 - 946 pg/mL   Results for orders placed or performed in visit on 06/22/20   CBC Auto Differential    Specimen: Blood   Result Value Ref Range    WBC 9.54 3.40 - 10.80 10*3/mm3    RBC 4.26 3.77 - 5.28 10*6/mm3    Hemoglobin 7.3 (L) 12.0 - 15.9 g/dL    Hematocrit 26.9 (L) 34.0 - 46.6 %    MCV 63.1 (L) 79.0 - 97.0 fL    MCH 17.1 (L) 26.6 - 33.0 pg    MCHC 27.1 (L) 31.5 - 35.7 g/dL    RDW 19.1 (H) 12.3 - 15.4 %    RDW-SD 42.3 37.0 - 54.0 fl    MPV 9.9 6.0 - 12.0 fL    Platelets 514 (H) 140 - 450 10*3/mm3   Manual Differential    Specimen: Blood   Result Value Ref Range    Neutrophil % 69.7 42.7 - 76.0 %    Lymphocyte % 20.2 19.6 - 45.3 %    Monocyte % 6.1 5.0 - 12.0 %    Eosinophil % 3.0 0.3 - 6.2 %    Basophil % 1.0 0.0 - 1.5 %    Neutrophils Absolute 6.65 1.70 - 7.00 10*3/mm3    Lymphocytes Absolute 1.93 0.70 - 3.10 10*3/mm3    Monocytes Absolute 0.58 0.10 - 0.90 10*3/mm3    Eosinophils Absolute 0.29 0.00 - 0.40 10*3/mm3    Basophils Absolute 0.10 0.00 - 0.20 10*3/mm3    Anisocytosis Slight/1+ None Seen    Hypochromia Large/3+ None Seen    Microcytes Slight/1+ None Seen    Poikilocytes Mod/2+ None Seen    Polychromasia Mod/2+ None Seen    WBC Morphology Normal Normal    Platelet Morphology Normal Normal   Lipid Panel    Specimen: Blood   Result Value Ref Range    Total Cholesterol  149 0 - 200 mg/dL    Triglycerides 94 0 - 150 mg/dL    HDL Cholesterol 61 (H) 40 - 60 mg/dL    LDL Cholesterol  69 0 - 100 mg/dL    VLDL Cholesterol 18.8 5 - 40 mg/dL    LDL/HDL Ratio 1.13    Comprehensive Metabolic Panel    Specimen: Blood   Result Value Ref Range    Glucose 109 (H) 65 - 99 mg/dL    BUN 9 8 - 23 mg/dL    Creatinine 0.77 0.57 - 1.00 mg/dL    Sodium 134 (L) 136 - 145 mmol/L    Potassium 4.3 3.5 - 5.2 mmol/L    Chloride 99 98 - 107 mmol/L    CO2 22.5 22.0 - 29.0 mmol/L    Calcium 9.1 8.6 - 10.5 mg/dL    Total Protein 6.8 6.0 - 8.5 g/dL    Albumin 4.30 3.50 - 5.20 g/dL    ALT (SGPT) 8 1 - 33 U/L    AST (SGOT) 15 1 - 32 U/L    Alkaline Phosphatase 119 (H) 39 - 117 U/L    Total Bilirubin 0.2 0.2 - 1.2 mg/dL    eGFR Non African Amer 74 >60 mL/min/1.73    Globulin 2.5 gm/dL    A/G Ratio 1.7 g/dL    BUN/Creatinine Ratio 11.7 7.0 - 25.0    Anion Gap 12.5 5.0 - 15.0 mmol/L     *Note: Due to a large number of results and/or encounters for the requested time period, some results have not been displayed. A complete set of results can be found in Results Review.

## 2020-11-10 NOTE — PATIENT INSTRUCTIONS
MyPlate from USDA    MyPlate is an outline of a general healthy diet based on the 2010 Dietary Guidelines for Americans, from the U.S. Department of Agriculture (USDA). It sets guidelines for how much food you should eat from each food group based on your age, sex, and level of physical activity.  What are tips for following MyPlate?  To follow MyPlate recommendations:  · Eat a wide variety of fruits and vegetables, grains, and protein foods.  · Serve smaller portions and eat less food throughout the day.  · Limit portion sizes to avoid overeating.  · Enjoy your food.  · Get at least 150 minutes of exercise every week. This is about 30 minutes each day, 5 or more days per week.  It can be difficult to have every meal look like MyPlate. Think about MyPlate as eating guidelines for an entire day, rather than each individual meal.  Fruits and vegetables  · Make half of your plate fruits and vegetables.  · Eat many different colors of fruits and vegetables each day.  · For a 2,000 calorie daily food plan, eat:  ? 2½ cups of vegetables every day.  ? 2 cups of fruit every day.  · 1 cup is equal to:  ? 1 cup raw or cooked vegetables.  ? 1 cup raw fruit.  ? 1 medium-sized orange, apple, or banana.  ? 1 cup 100% fruit or vegetable juice.  ? 2 cups raw leafy greens, such as lettuce, spinach, or kale.  ? ½ cup dried fruit.  Grains  · One fourth of your plate should be grains.  · Make at least half of the grains you eat each day whole grains.  · For a 2,000 calorie daily food plan, eat 6 oz of grains every day.  · 1 oz is equal to:  ? 1 slice bread.  ? 1 cup cereal.  ? ½ cup cooked rice, cereal, or pasta.  Protein  · One fourth of your plate should be protein.  · Eat a wide variety of protein foods, including meat, poultry, fish, eggs, beans, nuts, and tofu.  · For a 2,000 calorie daily food plan, eat 5½ oz of protein every day.  · 1 oz is equal to:  ? 1 oz meat, poultry, or fish.  ? ¼ cup cooked beans.  ? 1 egg.  ? ½ oz nuts  or seeds.  ? 1 Tbsp peanut butter.  Dairy  · Drink fat-free or low-fat (1%) milk.  · Eat or drink dairy as a side to meals.  · For a 2,000 calorie daily food plan, eat or drink 3 cups of dairy every day.  · 1 cup is equal to:  ? 1 cup milk, yogurt, cottage cheese, or soy milk (soy beverage).  ? 2 oz processed cheese.  ? 1½ oz natural cheese.  Fats, oils, salt, and sugars  · Only small amounts of oils are recommended.  · Avoid foods that are high in calories and low in nutritional value (empty calories), like foods high in fat or added sugars.  · Choose foods that are low in salt (sodium). Choose foods that have less than 140 milligrams (mg) of sodium per serving.  · Drink water instead of sugary drinks. Drink enough water each day to keep your urine pale yellow.  Where to find support  · Work with your health care provider or a nutrition specialist (dietitian) to develop a customized eating plan that is right for you.  · Download an lori (mobile application) to help you track your daily food intake.  Where to find more information  · Go to ChooseMyPlate.gov for more information.  Summary  · MyPlate is a general guideline for healthy eating from the USDA. It is based on the 2010 Dietary Guidelines for Americans.  · In general, fruits and vegetables should take up ½ of your plate, grains should take up ¼ of your plate, and protein should take up ¼ of your plate.  This information is not intended to replace advice given to you by your health care provider. Make sure you discuss any questions you have with your health care provider.  Document Released: 01/06/2009 Document Revised: 05/21/2020 Document Reviewed: 03/19/2018  Elsevier Patient Education © 2020 [employer]

## 2020-11-11 ENCOUNTER — TELEPHONE (OUTPATIENT)
Dept: GASTROENTEROLOGY | Facility: CLINIC | Age: 70
End: 2020-11-11

## 2020-11-11 NOTE — TELEPHONE ENCOUNTER
Relayed results to patient who voiced understanding.       ----- Message from HERNAN Sanchez sent at 11/11/2020 11:59 AM CST -----  Please call patient with results. Labs continue to improve.

## 2020-12-17 DIAGNOSIS — F51.01 PRIMARY INSOMNIA: ICD-10-CM

## 2020-12-17 DIAGNOSIS — I10 ESSENTIAL HYPERTENSION: ICD-10-CM

## 2020-12-17 RX ORDER — FLUOXETINE HYDROCHLORIDE 20 MG/1
CAPSULE ORAL
Qty: 90 CAPSULE | Refills: 3 | Status: SHIPPED | OUTPATIENT
Start: 2020-12-17 | End: 2021-06-04 | Stop reason: SDUPTHER

## 2020-12-17 RX ORDER — CLOPIDOGREL BISULFATE 75 MG/1
TABLET ORAL
Qty: 90 TABLET | Refills: 3 | Status: SHIPPED | OUTPATIENT
Start: 2020-12-17 | End: 2021-06-04 | Stop reason: SDUPTHER

## 2020-12-17 RX ORDER — OMEPRAZOLE MAGNESIUM 20 MG
CAPSULE,DELAYED RELEASE (ENTERIC COATED) ORAL
Qty: 90 TABLET | Refills: 0 | Status: SHIPPED | OUTPATIENT
Start: 2020-12-17 | End: 2021-03-31 | Stop reason: SDUPTHER

## 2020-12-17 RX ORDER — FERROUS SULFATE 325(65) MG
TABLET ORAL
Qty: 90 TABLET | Refills: 1 | Status: SHIPPED | OUTPATIENT
Start: 2020-12-17 | End: 2021-06-04 | Stop reason: SDUPTHER

## 2020-12-17 RX ORDER — AMLODIPINE BESYLATE 2.5 MG/1
TABLET ORAL
Qty: 180 TABLET | Refills: 0 | Status: SHIPPED | OUTPATIENT
Start: 2020-12-17 | End: 2021-06-04 | Stop reason: SDUPTHER

## 2020-12-21 RX ORDER — VARENICLINE TARTRATE 1 MG/1
TABLET, FILM COATED ORAL
Qty: 56 TABLET | Refills: 2 | Status: SHIPPED | OUTPATIENT
Start: 2020-12-21 | End: 2021-03-31

## 2020-12-29 ENCOUNTER — OFFICE VISIT (OUTPATIENT)
Dept: FAMILY MEDICINE CLINIC | Facility: CLINIC | Age: 70
End: 2020-12-29

## 2020-12-29 VITALS
OXYGEN SATURATION: 98 % | HEART RATE: 65 BPM | DIASTOLIC BLOOD PRESSURE: 70 MMHG | HEIGHT: 64 IN | SYSTOLIC BLOOD PRESSURE: 122 MMHG | BODY MASS INDEX: 19.58 KG/M2 | WEIGHT: 114.7 LBS | TEMPERATURE: 97.8 F

## 2020-12-29 DIAGNOSIS — E55.9 VITAMIN D DEFICIENCY: Primary | ICD-10-CM

## 2020-12-29 PROCEDURE — 99212 OFFICE O/P EST SF 10 MIN: CPT | Performed by: STUDENT IN AN ORGANIZED HEALTH CARE EDUCATION/TRAINING PROGRAM

## 2020-12-29 RX ORDER — FOLIC ACID 1 MG/1
1 TABLET ORAL DAILY
Qty: 30 TABLET | Refills: 3 | Status: SHIPPED | OUTPATIENT
Start: 2020-12-29 | End: 2021-06-04 | Stop reason: SDUPTHER

## 2020-12-29 NOTE — PROGRESS NOTES
ID: Jessie Jordan    CC:   Chief Complaint   Patient presents with   • Follow-up     3 month        Subjective:     HPI     Jessie Jordan is a 70 y.o. female who presents for 3 month follow up. Patient is doing very well. She has no chest pain, nausea, vomiting, diarrhea, constipation. Patients blood pressure has been well controlled. Patient states she quit smoking, except for the one cigarette occasionally. Denies shortness of air.     Preventative:  Over the past 2 weeks, have you felt down, depressed, or hopeless? no  Over the past 2 weeks, have you felt little interest or pleasure in doing things? no  Clinical depression screening refused by patient no    On osteoporosis therapy? no    Past Medical Hx:  Past Medical History:   Diagnosis Date   • Anxiety and depression    • Coronary artery disease involving native coronary artery of native heart 12/06/2017    seen on Cardiac Cath - Left main 50-70% stenosis, 12/27/2017 repeat cath showed coronary spasm with no stenosis   • Hyperlipidemia    • Hypertension    • Kidney cysts    • Post-menopausal 1990   • Skin cancer    • Stroke (CMS/[facility]) 2015   • Vascular disease        Past Surgical Hx:  Past Surgical History:   Procedure Laterality Date   • ARTERIOGRAM N/A 9/28/2018    Procedure: aortoiliac arteriogram possible angioplasty stent atherectomy thrombolysis bilateral iliac stents;  Surgeon: Luan Ruff MD;  Location: [facility] ANGIO INVASIVE LOCATION;  Service: Interventional Radiology   • CARDIAC CATHETERIZATION  12/06/2017    Done at [facility] - Left Main 50-70% Stenosis - no stenting done, recommendation was urgent CABG.  EF 50-60%   • CERVICAL FUSION  1985    C5-6   • COLONOSCOPY N/A 6/29/2018    Procedure: COLONOSCOPY;  Surgeon: Oz Way MD;  Location: [facility] ENDOSCOPY;  Service: Gastroenterology   • COLONOSCOPY N/A 7/6/2020    Procedure: COLONOSCOPY;  Surgeon: Oz Way MD;  Location: [facility] ENDOSCOPY;  Service:  Gastroenterology;  Laterality: N/A;   • ENDOSCOPY N/A 7/6/2020    Procedure: ESOPHAGOGASTRODUODENOSCOPY ASAP;  Surgeon: Oz Way MD;  Location: [facility] ENDOSCOPY;  Service: Gastroenterology;  Laterality: N/A;   • FEMORAL POPLITEAL BYPASS Right 12/19/2018    Procedure: femoral to femoral artery bypass, RIGHT FEMORAL POPLITEAL BYPASS right lower extremity arteriogram          (C-ARM# AND C-ARM TABLE);  Surgeon: Luan Ruff MD;  Location: [facility] OR;  Service: Vascular   • FINGER SURGERY Left     third finger   • ROTATOR CUFF REPAIR Left 06/30/2011    done in [place]       Health Maintenance:  Health Maintenance   Topic Date Due   • TDAP/TD VACCINES (1 - Tdap) 09/04/1969   • ZOSTER VACCINE (1 of 2) 09/04/2000   • ANNUAL WELLNESS VISIT  04/04/2019   • Pneumococcal Vaccine 65+ (2 of 2 - PPSV23) 04/05/2019   • DXA SCAN  04/25/2020   • MAMMOGRAM  05/04/2020   • LIPID PANEL  06/22/2021   • LUNG CANCER SCREENING  06/29/2021   • COLONOSCOPY  07/06/2025   • HEPATITIS C SCREENING  Completed   • INFLUENZA VACCINE  Completed   • MENINGOCOCCAL VACCINE  Aged Out       Current Meds:    Current Outpatient Medications:   •  amLODIPine (NORVASC) 2.5 MG tablet, TAKE 1 TABLET BY MOUTH TWICE A DAY, Disp: 180 tablet, Rfl: 0  •  atorvastatin (LIPITOR) 10 MG tablet, Take 1 tablet by mouth Daily., Disp: 90 tablet, Rfl: 1  •  carbamide peroxide (Debrox) 6.5 % otic solution, Administer 5 drops into both ears 2 (Two) Times a Day., Disp: 15 mL, Rfl: 3  •  Chantix Continuing Month Idris 1 MG tablet, TAKE AS DIRECTED IN BOX, Disp: 56 tablet, Rfl: 2  •  clopidogrel (PLAVIX) 75 MG tablet, TAKE 1 TABLET BY MOUTH EVERY DAY, Disp: 90 tablet, Rfl: 3  •  [facility] Melatonin 3 MG tablet, TAKE 1 TABLET BY MOUTH EVERY NIGHT AT BEDTIME FOR 90 DAYS., Disp: 90 tablet, Rfl: 0  •  diclofenac (VOLTAREN) 1 % gel gel, Apply  topically to the appropriate area as directed 2 (Two) Times a Day., Disp: 30 g, Rfl: 1  •  ferrous sulfate 325 (65 FE) MG tablet, TAKE 1  TABLET BY MOUTH EVERY DAY WITH BREAKFAST, Disp: 90 tablet, Rfl: 1  •  FLUoxetine (PROzac) 20 MG capsule, TAKE 1 CAPSULE BY MOUTH EVERY DAY, Disp: 90 capsule, Rfl: 3  •  fluticasone (FLONASE) 50 MCG/ACT nasal spray, 2 sprays by Each Nare route Daily., Disp: 3 bottle, Rfl: 3  •  folic acid (FOLVITE) 1 MG tablet, Take 1 tablet by mouth Daily., Disp: 30 tablet, Rfl: 3  •  gabapentin (NEURONTIN) 300 MG capsule, Take 300 mg by mouth 3 (Three) Times a Day., Disp: , Rfl:   •  gabapentin (NEURONTIN) 400 MG capsule, Take 1 capsule by mouth 3 (Three) Times a Day., Disp: 90 capsule, Rfl: 2  •  HYDROcodone-acetaminophen (NORCO) 7.5-325 MG per tablet, Take 1 tablet by mouth Every 6 (Six) Hours As Needed for Moderate Pain ., Disp: 30 tablet, Rfl: 0  •  lisinopril (PRINIVIL,ZESTRIL) 10 MG tablet, Take 1 tablet by mouth Daily., Disp: 90 tablet, Rfl: 3  •  OS-LORENE CALCIUM + D3 500-200 MG-UNIT tablet, Take 1 tablet by mouth Daily., Disp: , Rfl: 2  •  pantoprazole (PROTONIX) 40 MG EC tablet, Take 1 tablet by mouth Daily., Disp: 30 tablet, Rfl: 11  •  Prenatal Vit-Fe Fumarate-FA (PRENATAL VITAMIN 27-0.8) 27-0.8 MG tablet tablet, Take 1 tablet by mouth Daily., Disp: , Rfl:   •  vitamin D (ERGOCALCIFEROL) 1.25 MG (63101 UT) capsule capsule, TAKE 1 CAPSULE BY MOUTH 1 (ONE) TIME PER WEEK., Disp: 12 capsule, Rfl: 1  •  Diclofenac Sodium (VOLTAREN) 1 % gel gel, Apply 4 g topically to the appropriate area as directed 4 (Four) Times a Day As Needed (pain)., Disp: 100 g, Rfl: 3  •  folic acid (FOLVITE) 1 MG tablet, Take 1 tablet by mouth Daily., Disp: 30 tablet, Rfl: 3  •  naproxen (NAPROSYN) 500 MG tablet, Take 1 tablet by mouth 2 (Two) Times a Day With Meals., Disp: 30 tablet, Rfl: 2    Allergies:  Patient has no known allergies.    Family Hx:  Family History   Problem Relation Age of Onset   • Lung cancer Mother    • Hypertension Mother    • Diabetes Maternal Grandmother    • Heart disease Maternal Grandmother    • Hypertension Maternal  Grandfather    • Heart disease Maternal Grandfather    • Heart disease Other    • Hypertension Other    • Cancer Other         Social History:  Social History     Socioeconomic History   • Marital status: Single     Spouse name: Not on file   • Number of children: 1   • Years of education: Not on file   • Highest education level: Not on file   Occupational History   • Occupation: Retired     Comment: Patient resides alone (daughter resides across street).   Tobacco Use   • Smoking status: Former Smoker     Packs/day: 1.00     Years: 48.00     Pack years: 48.00     Types: Cigarettes     Quit date: 2018     Years since quittin.0   • Smokeless tobacco: Never Used   Substance and Sexual Activity   • Alcohol use: Yes     Frequency: Monthly or less     Comment: occasional beer    • Drug use: No   • Sexual activity: Defer     Comment: .       Review of Systems   Constitutional: Negative for activity change, appetite change, chills, fatigue and fever.   HENT: Negative for drooling, ear discharge, ear pain, facial swelling, hearing loss, mouth sores, rhinorrhea and sinus pain.    Eyes: Negative for pain, redness and itching.   Respiratory: Negative for cough, choking, chest tightness, shortness of breath and stridor.    Cardiovascular: Negative for chest pain, palpitations and leg swelling.   Gastrointestinal: Negative for abdominal distention, abdominal pain, anal bleeding, blood in stool, constipation, diarrhea and nausea.   Endocrine: Negative for heat intolerance, polydipsia and polyphagia.   Genitourinary: Negative for dysuria, flank pain, frequency and genital sores.   Musculoskeletal: Negative for back pain, gait problem, joint swelling and myalgias.   Skin: Negative for pallor and rash.   Neurological: Negative for seizures, facial asymmetry, speech difficulty, light-headedness, numbness and headaches.   Hematological: Negative for adenopathy. Does not bruise/bleed easily.   Psychiatric/Behavioral:  "Negative for confusion, decreased concentration, dysphoric mood and hallucinations. The patient is not nervous/anxious and is not hyperactive.            Objective:     /70   Pulse 65   Temp 97.8 °F (36.6 °C)   Ht 162.6 cm (64\")   Wt 52 kg (114 lb 11.2 oz)   SpO2 98%   BMI 19.69 kg/m²     Physical Exam  Constitutional:       Appearance: She is well-developed.   HENT:      Head: Normocephalic and atraumatic.      Right Ear: External ear normal.      Left Ear: External ear normal.      Nose: Nose normal.   Eyes:      Conjunctiva/sclera: Conjunctivae normal.      Pupils: Pupils are equal, round, and reactive to light.   Neck:      Musculoskeletal: Normal range of motion and neck supple.   Cardiovascular:      Rate and Rhythm: Normal rate and regular rhythm.      Heart sounds: Normal heart sounds.   Pulmonary:      Effort: Pulmonary effort is normal.      Breath sounds: Normal breath sounds.   Abdominal:      General: Bowel sounds are normal.      Palpations: Abdomen is soft.   Musculoskeletal: Normal range of motion.   Skin:     General: Skin is warm and dry.   Neurological:      Mental Status: She is alert and oriented to person, place, and time.   Psychiatric:         Behavior: Behavior normal.         Thought Content: Thought content normal.         Judgment: Judgment normal.                Assessment/Plan:     Diagnoses and all orders for this visit:    1. Vitamin D deficiency (Primary)  -     Vitamin D 25 hydroxy; Future    Other orders  -     Diclofenac Sodium (VOLTAREN) 1 % gel gel; Apply 4 g topically to the appropriate area as directed 4 (Four) Times a Day As Needed (pain).  Dispense: 100 g; Refill: 3  -     folic acid (FOLVITE) 1 MG tablet; Take 1 tablet by mouth Daily.  Dispense: 30 tablet; Refill: 3      No rx changes. Will recheck patients vitamin d, as it is expensive for her to buy. If her level is normal, will buy otc vitamin d tablets.     Follow-up:     1 month    Goals:   Goals     • Pain " control      Barriers: severe PAD, pt continues to smoke      • Quit smoking / using tobacco (pt-stated)      Barriers: compliance with cessation medications          Barriers to goals: none    Health Maintenance   Topic Date Due   • TDAP/TD VACCINES (1 - Tdap) 09/04/1969   • ZOSTER VACCINE (1 of 2) 09/04/2000   • ANNUAL WELLNESS VISIT  04/04/2019   • Pneumococcal Vaccine 65+ (2 of 2 - PPSV23) 04/05/2019   • DXA SCAN  04/25/2020   • MAMMOGRAM  05/04/2020   • LIPID PANEL  06/22/2021   • LUNG CANCER SCREENING  06/29/2021   • COLONOSCOPY  07/06/2025   • HEPATITIS C SCREENING  Completed   • INFLUENZA VACCINE  Completed   • MENINGOCOCCAL VACCINE  Aged Out       Tobacco: nonsmoker  Alcohol: does not drink  Lifestyle: Patient's Body mass index is 19.69 kg/m². BMI is within normal parameters. No follow-up required..   reduce portion size, reduce fast food intake, family to eat at dinner table more often, plan meals, eat breakfast and have 3 meals a day    RISK SCORE: 3         Julianna Gloria M.D. PGY3  [facility]  [address] [place] [address]  Office: [phone]    This document has been electronically signed by Julianna Gloria MD on December 29, 2020 15:54 CST            This document has been electronically signed by Julianna Gloria MD on December 29, 2020 15:54 CST

## 2020-12-29 NOTE — PROGRESS NOTES
I have reviewed the notes, assessments, and/or procedures performed by Dr. Julianna Gloria, I concur with her  documentation and assessment and plan for Jessie Jordan        This document has been electronically signed by Pancho Reyna MD on December 29, 2020 16:39 CST

## 2021-01-05 ENCOUNTER — OFFICE VISIT (OUTPATIENT)
Dept: GASTROENTEROLOGY | Facility: CLINIC | Age: 71
End: 2021-01-05

## 2021-01-05 VITALS
BODY MASS INDEX: 19.36 KG/M2 | SYSTOLIC BLOOD PRESSURE: 124 MMHG | HEART RATE: 74 BPM | WEIGHT: 113.4 LBS | DIASTOLIC BLOOD PRESSURE: 75 MMHG | HEIGHT: 64 IN

## 2021-01-05 DIAGNOSIS — D50.0 IRON DEFICIENCY ANEMIA DUE TO CHRONIC BLOOD LOSS: Primary | ICD-10-CM

## 2021-01-05 PROCEDURE — 99213 OFFICE O/P EST LOW 20 MIN: CPT | Performed by: NURSE PRACTITIONER

## 2021-01-05 NOTE — PATIENT INSTRUCTIONS
Anemia    Anemia is a condition in which you do not have enough red blood cells or hemoglobin. Hemoglobin is a substance in red blood cells that carries oxygen. When you do not have enough red blood cells or hemoglobin (are anemic), your body cannot get enough oxygen and your organs may not work properly. As a result, you may feel very tired or have other problems.  What are the causes?  Common causes of anemia include:  · Excessive bleeding. Anemia can be caused by excessive bleeding inside or outside the body, including bleeding from the intestine or from periods in women.  · Poor nutrition.  · Long-lasting (chronic) kidney, thyroid, and liver disease.  · Bone marrow disorders.  · Cancer and treatments for cancer.  · HIV (human immunodeficiency virus) and AIDS (acquired immunodeficiency syndrome).  · Treatments for HIV and AIDS.  · Spleen problems.  · Blood disorders.  · Infections, medicines, and autoimmune disorders that destroy red blood cells.  What are the signs or symptoms?  Symptoms of this condition include:  · Minor weakness.  · Dizziness.  · Headache.  · Feeling heartbeats that are irregular or faster than normal (palpitations).  · Shortness of breath, especially with exercise.  · Paleness.  · Cold sensitivity.  · Indigestion.  · Nausea.  · Difficulty sleeping.  · Difficulty concentrating.  Symptoms may occur suddenly or develop slowly. If your anemia is mild, you may not have symptoms.  How is this diagnosed?  This condition is diagnosed based on:  · Blood tests.  · Your medical history.  · A physical exam.  · Bone marrow biopsy.  Your health care provider may also check your stool (feces) for blood and may do additional testing to look for the cause of your bleeding.  You may also have other tests, including:  · Imaging tests, such as a CT scan or MRI.  · Endoscopy.  · Colonoscopy.  How is this treated?  Treatment for this condition depends on the cause. If you continue to lose a lot of blood, you may  need to be treated at a hospital. Treatment may include:  · Taking supplements of iron, vitamin B12, or folic acid.  · Taking a hormone medicine (erythropoietin) that can help to stimulate red blood cell growth.  · Having a blood transfusion. This may be needed if you lose a lot of blood.  · Making changes to your diet.  · Having surgery to remove your spleen.  Follow these instructions at home:  · Take over-the-counter and prescription medicines only as told by your health care provider.  · Take supplements only as told by your health care provider.  · Follow any diet instructions that you were given.  · Keep all follow-up visits as told by your health care provider. This is important.  Contact a health care provider if:  · You develop new bleeding anywhere in the body.  Get help right away if:  · You are very weak.  · You are short of breath.  · You have pain in your abdomen or chest.  · You are dizzy or feel faint.  · You have trouble concentrating.  · You have bloody or black, tarry stools.  · You vomit repeatedly or you vomit up blood.  Summary  · Anemia is a condition in which you do not have enough red blood cells or enough of a substance in your red blood cells that carries oxygen (hemoglobin).  · Symptoms may occur suddenly or develop slowly.  · If your anemia is mild, you may not have symptoms.  · This condition is diagnosed with blood tests as well as a medical history and physical exam. Other tests may be needed.  · Treatment for this condition depends on the cause of the anemia.  This information is not intended to replace advice given to you by your health care provider. Make sure you discuss any questions you have with your health care provider.  Document Revised: 11/30/2018 Document Reviewed: 01/19/2018  Elsevier Patient Education © 2020 [employer]

## 2021-01-05 NOTE — PROGRESS NOTES
"Chief Complaint  Anemia    Subjective          Jessie Jordan presents to [facility] for   Anemia  Presents for follow-up visit. Symptoms include malaise/fatigue, pallor and weight loss (down 3 pounds since november ). There has been no abdominal pain, anorexia, bruising/bleeding easily, confusion, fever, leg swelling, light-headedness, paresthesias or pica. Signs of blood loss that are not present include hematemesis, hematochezia and melena. There are no compliance problems.  Side effects of medications include fatigue.   We checked lab work last visit November 10 which noted improvement of hemoglobin to 11.1.  She denies any visible blood in stool states she still tired with minimal activity but overall has been doing better reports a good appetite but has not been snacking as much recently.    Objective   Vital Signs:   /75 (BP Location: Left arm)   Pulse 74   Ht 162.6 cm (64\")   Wt 51.4 kg (113 lb 6.4 oz)   BMI 19.47 kg/m²     Physical Exam  Abdominal:      General: Bowel sounds are normal. There is no distension.      Palpations: Abdomen is soft.      Tenderness: There is no abdominal tenderness.        Result Review :     CBC    CBC 6/25/20 7/14/20 11/10/20   WBC 11.22 (A) 11.17 (A) 9.52   RBC 4.37 5.05 4.05   Hemoglobin 7.5 (A) 10.5 (A) 11.1 (A)   Hematocrit 27.1 (A) 35.9 33.7 (A)   MCV 62.0 (A) 71.1 (A) 83.2   MCH 17.2 (A) 20.8 (A) 27.4   MCHC 27.7 (A) 29.2 (A) 32.9   RDW 19.9 (A)  14.7   Platelets 475 (A) 562 (A) 362   (A) Abnormal value                      Assessment and Plan    Problem List Items Addressed This Visit        Other    Iron deficiency anemia due to chronic blood loss - Primary    Overview     Added automatically from request for surgery [id number]         Relevant Orders    CBC & Differential        I spent 20 minutes caring for Jessie on this date of service. This time includes time spent by me in the following activities:preparing for the " visit, reviewing tests, performing a medically appropriate examination and/or evaluation , counseling and educating the patient/family/caregiver, ordering medications, tests, or procedures and documenting information in the medical record  Follow Up   Return in about 6 months (around 7/5/2021).  Patient was given instructions and counseling regarding her condition or for health maintenance advice. Please see specific information pulled into the AVS if appropriate.

## 2021-01-15 ENCOUNTER — OFFICE VISIT (OUTPATIENT)
Dept: FAMILY MEDICINE CLINIC | Facility: CLINIC | Age: 71
End: 2021-01-15

## 2021-01-15 VITALS
HEIGHT: 64 IN | DIASTOLIC BLOOD PRESSURE: 60 MMHG | HEART RATE: 61 BPM | OXYGEN SATURATION: 97 % | BODY MASS INDEX: 20.14 KG/M2 | WEIGHT: 118 LBS | SYSTOLIC BLOOD PRESSURE: 110 MMHG

## 2021-01-15 DIAGNOSIS — G89.4 CHRONIC PAIN SYNDROME: ICD-10-CM

## 2021-01-15 DIAGNOSIS — G62.9 PERIPHERAL POLYNEUROPATHY: ICD-10-CM

## 2021-01-15 DIAGNOSIS — Z00.00 WELLNESS EXAMINATION: Primary | ICD-10-CM

## 2021-01-15 PROCEDURE — G0439 PPPS, SUBSEQ VISIT: HCPCS | Performed by: STUDENT IN AN ORGANIZED HEALTH CARE EDUCATION/TRAINING PROGRAM

## 2021-01-15 NOTE — PROGRESS NOTES
The ABCs of the Annual Wellness Visit  Subsequent Medicare Wellness Visit    Chief Complaint   Patient presents with   • Medicare Wellness-subsequent       Subjective   History of Present Illness:  Jessie Jordan is a 70 y.o. female who presents for a Subsequent Medicare Wellness Visit.    HEALTH RISK ASSESSMENT    Recent Hospitalizations:  No hospitalization(s) within the last year.    Current Medical Providers:  Patient Care Team:  Julianna Gloria MD as PCP - General (Family Medicine)  Kofi Lagos MD as Resident (Family Medicine)    Smoking Status:  Social History     Tobacco Use   Smoking Status Former Smoker   • Packs/day: 1.00   • Years: 48.00   • Pack years: 48.00   • Types: Cigarettes   • Quit date: 2018   • Years since quittin.0   Smokeless Tobacco Never Used       Alcohol Consumption:  Social History     Substance and Sexual Activity   Alcohol Use Yes   • Frequency: Monthly or less    Comment: occasional beer        Depression Screen:   PHQ-2/PHQ-9 Depression Screening 2019   Little interest or pleasure in doing things 2   Feeling down, depressed, or hopeless 1   Trouble falling or staying asleep, or sleeping too much 1   Feeling tired or having little energy 1   Poor appetite or overeating 1   Feeling bad about yourself - or that you are a failure or have let yourself or your family down 1   Trouble concentrating on things, such as reading the newspaper or watching television 1   Moving or speaking so slowly that other people could have noticed. Or the opposite - being so fidgety or restless that you have been moving around a lot more than usual 1   Thoughts that you would be better off dead, or of hurting yourself in some way 0   Total Score 9   If you checked off any problems, how difficult have these problems made it for you to do your work, take care of things at home, or get along with other people? Not difficult at all       Fall Risk Screen:  STEADI Fall Risk Assessment  has not been completed.    Health Habits and Functional and Cognitive Screening:  Functional & Cognitive Status 4/5/2018   Do you have difficulty preparing food and eating? No   Do you have difficulty bathing yourself, getting dressed or grooming yourself? No   Do you have difficulty using the toilet? No   Do you have difficulty moving around from place to place? No   Do you have trouble with steps or getting out of a bed or a chair? No   Do you need help using the phone?  No   Do you need help with transportation? No   Do you need help shopping? Yes   Do you need help preparing meals?  Yes   Do you need help with housework?  No   Do you need help with laundry? Yes   Do you need help taking your medications? No   Do you need help managing money? No   Do you ever drive or ride in a car without wearing a seat belt? No   Have you felt unusual stress, anger or loneliness in the last month? No   Who do you live with? Other   If you need help, do you have trouble finding someone available to you? No   Have you been bothered in the last four weeks by sexual problems? No   Do you have difficulty concentrating, remembering or making decisions? No         Does the patient have evidence of cognitive impairment? No    Asprin use counseling:Taking ASA appropriately as indicated    Age-appropriate Screening Schedule:  Refer to the list below for future screening recommendations based on patient's age, sex and/or medical conditions. Orders for these recommended tests are listed in the plan section. The patient has been provided with a written plan.    Health Maintenance   Topic Date Due   • TDAP/TD VACCINES (1 - Tdap) 09/04/1969   • ZOSTER VACCINE (1 of 2) 09/04/2000   • DXA SCAN  04/25/2020   • MAMMOGRAM  05/04/2020   • LIPID PANEL  06/22/2021   • COLONOSCOPY  07/06/2025   • INFLUENZA VACCINE  Completed          The following portions of the patient's history were reviewed and updated as appropriate: allergies, current  medications, past family history, past medical history, past social history, past surgical history and problem list.    Outpatient Medications Prior to Visit   Medication Sig Dispense Refill   • amLODIPine (NORVASC) 2.5 MG tablet TAKE 1 TABLET BY MOUTH TWICE A  tablet 0   • atorvastatin (LIPITOR) 10 MG tablet Take 1 tablet by mouth Daily. 90 tablet 1   • carbamide peroxide (Debrox) 6.5 % otic solution Administer 5 drops into both ears 2 (Two) Times a Day. 15 mL 3   • Chantix Continuing Month Idris 1 MG tablet TAKE AS DIRECTED IN BOX 56 tablet 2   • clopidogrel (PLAVIX) 75 MG tablet TAKE 1 TABLET BY MOUTH EVERY DAY 90 tablet 3   • [facility] Melatonin 3 MG tablet TAKE 1 TABLET BY MOUTH EVERY NIGHT AT BEDTIME FOR 90 DAYS. 90 tablet 0   • diclofenac (VOLTAREN) 1 % gel gel Apply  topically to the appropriate area as directed 2 (Two) Times a Day. 30 g 1   • Diclofenac Sodium (VOLTAREN) 1 % gel gel Apply 4 g topically to the appropriate area as directed 4 (Four) Times a Day As Needed (pain). 100 g 3   • ferrous sulfate 325 (65 FE) MG tablet TAKE 1 TABLET BY MOUTH EVERY DAY WITH BREAKFAST 90 tablet 1   • FLUoxetine (PROzac) 20 MG capsule TAKE 1 CAPSULE BY MOUTH EVERY DAY 90 capsule 3   • fluticasone (FLONASE) 50 MCG/ACT nasal spray 2 sprays by Each Nare route Daily. 3 bottle 3   • folic acid (FOLVITE) 1 MG tablet Take 1 tablet by mouth Daily. 30 tablet 3   • folic acid (FOLVITE) 1 MG tablet Take 1 tablet by mouth Daily. 30 tablet 3   • gabapentin (NEURONTIN) 400 MG capsule Take 1 capsule by mouth 3 (Three) Times a Day. 90 capsule 2   • HYDROcodone-acetaminophen (NORCO) 7.5-325 MG per tablet Take 1 tablet by mouth Every 6 (Six) Hours As Needed for Moderate Pain . 30 tablet 0   • naproxen (NAPROSYN) 500 MG tablet Take 1 tablet by mouth 2 (Two) Times a Day With Meals. 30 tablet 2   • OS-LORENE CALCIUM + D3 500-200 MG-UNIT tablet Take 1 tablet by mouth Daily.  2   • pantoprazole (PROTONIX) 40 MG EC tablet Take 1 tablet by mouth  Daily. 30 tablet 11   • Prenatal Vit-Fe Fumarate-FA (PRENATAL VITAMIN 27-0.8) 27-0.8 MG tablet tablet Take 1 tablet by mouth Daily.     • vitamin D (ERGOCALCIFEROL) 1.25 MG (26732 UT) capsule capsule TAKE 1 CAPSULE BY MOUTH 1 (ONE) TIME PER WEEK. 12 capsule 1   • gabapentin (NEURONTIN) 300 MG capsule Take 300 mg by mouth 3 (Three) Times a Day.     • lisinopril (PRINIVIL,ZESTRIL) 10 MG tablet Take 1 tablet by mouth Daily. 90 tablet 3     No facility-administered medications prior to visit.        Patient Active Problem List   Diagnosis   • Essential hypertension   • Heavy tobacco smoker >10 cigarettes per day   • Chronic pain syndrome   • BMI less than 19,adult   • Coronary artery disease involving native coronary artery of native heart   • PVD (peripheral vascular disease) (CMS/HCC)   • Decreased pulses in feet   • Abnormal finding on radiology exam   • Nicotine dependence, cigarettes, with other nicotine-induced disorders   • SOB (shortness of breath)   • Palpitations   • Bilateral carotid artery stenosis   • Follow-up surgery care   • Post-operative pain   • Mass of left side of neck   • Encounter for screening for lung cancer   • Mixed hyperlipidemia   • Iron deficiency anemia due to chronic blood loss   • Malaise and fatigue   • Gastroesophageal reflux disease       Advanced Care Planning:  ACP discussion was declined by the patient. Patient does not have an advance directive, information provided.    Review of Systems   Constitutional: Negative for activity change, appetite change, chills, fatigue and fever.   HENT: Negative for drooling, ear discharge, ear pain, facial swelling, hearing loss, mouth sores, rhinorrhea and sinus pain.    Eyes: Negative for pain, redness and itching.   Respiratory: Negative for cough, choking, chest tightness, shortness of breath and stridor.    Cardiovascular: Negative for chest pain, palpitations and leg swelling.   Gastrointestinal: Negative for abdominal distention, abdominal  "pain, anal bleeding, blood in stool, constipation, diarrhea and nausea.   Endocrine: Negative for heat intolerance, polydipsia and polyphagia.   Genitourinary: Negative for dysuria, flank pain, frequency and genital sores.   Musculoskeletal: Negative for back pain, gait problem, joint swelling and myalgias.   Skin: Negative for pallor and rash.   Neurological: Negative for seizures, facial asymmetry, speech difficulty, light-headedness, numbness and headaches.   Hematological: Negative for adenopathy. Does not bruise/bleed easily.   Psychiatric/Behavioral: Negative for confusion, decreased concentration, dysphoric mood and hallucinations. The patient is not nervous/anxious and is not hyperactive.        Compared to one year ago, the patient feels her physical health is the same.  Compared to one year ago, the patient feels her mental health is the same.    Reviewed chart for potential of high risk medication in the elderly: yes  Reviewed chart for potential of harmful drug interactions in the elderly:yes    Objective         Vitals:    01/15/21 1448   BP: 110/60   BP Location: Left arm   Patient Position: Sitting   Cuff Size: Adult   Pulse: 61   SpO2: 97%   Weight: 53.5 kg (118 lb)   Height: 162.6 cm (64\")   PainSc: 0-No pain       Body mass index is 20.25 kg/m².  Discussed the patient's BMI with her. The BMI is below average; BMI management plan is completed.    Physical Exam  Constitutional:       Appearance: She is well-developed.   HENT:      Head: Normocephalic and atraumatic.      Right Ear: External ear normal.      Left Ear: External ear normal.      Nose: Nose normal.   Eyes:      Conjunctiva/sclera: Conjunctivae normal.      Pupils: Pupils are equal, round, and reactive to light.   Neck:      Musculoskeletal: Normal range of motion and neck supple.   Cardiovascular:      Rate and Rhythm: Normal rate and regular rhythm.      Heart sounds: Normal heart sounds.   Pulmonary:      Effort: Pulmonary effort is " normal.      Breath sounds: Normal breath sounds.   Abdominal:      General: Bowel sounds are normal.      Palpations: Abdomen is soft.   Musculoskeletal: Normal range of motion.   Skin:     General: Skin is warm and dry.   Neurological:      Mental Status: She is alert and oriented to person, place, and time.   Psychiatric:         Behavior: Behavior normal.         Thought Content: Thought content normal.         Judgment: Judgment normal.               Assessment/Plan   Medicare Risks and Personalized Health Plan  CMS Preventative Services Quick Reference  Depression/Dysphoria  Osteoprorosis Risk    The above risks/problems have been discussed with the patient.  Pertinent information has been shared with the patient in the After Visit Summary.  Follow up plans and orders are seen below in the Assessment/Plan Section.    Diagnoses and all orders for this visit:    1. Peripheral polyneuropathy    2. Chronic pain syndrome      Follow Up:  1 year    An After Visit Summary and PPPS were given to the patient.              Julianna Gloria M.D. PGY3  [facility]  [address] [place] [address]  Office: [phone]    This document has been electronically signed by Julianna Gloria MD on January 15, 2021 15:22 CST  \

## 2021-01-18 NOTE — PROGRESS NOTES
I was present onsite throughout the encounter.  I have reviewed the notes, assessments, and/or procedures performed by Julianna Gloria MD, I concur with her/his documentation and assessment and plan for Jessienick Jordan.                This document has been electronically signed by Justice Bonilla MD on January 18, 2021 13:56 CST

## 2021-02-01 ENCOUNTER — IMMUNIZATION (OUTPATIENT)
Dept: VACCINE CLINIC | Facility: HOSPITAL | Age: 71
End: 2021-02-01

## 2021-02-01 PROCEDURE — 0001A: CPT | Performed by: NURSE PRACTITIONER

## 2021-02-01 PROCEDURE — 91300 HC SARSCOV02 VAC 30MCG/0.3ML IM: CPT | Performed by: NURSE PRACTITIONER

## 2021-02-22 ENCOUNTER — IMMUNIZATION (OUTPATIENT)
Dept: VACCINE CLINIC | Facility: HOSPITAL | Age: 71
End: 2021-02-22

## 2021-02-22 PROCEDURE — 0002A: CPT | Performed by: THORACIC SURGERY (CARDIOTHORACIC VASCULAR SURGERY)

## 2021-02-22 PROCEDURE — 91300 HC SARSCOV02 VAC 30MCG/0.3ML IM: CPT | Performed by: THORACIC SURGERY (CARDIOTHORACIC VASCULAR SURGERY)

## 2021-02-26 DIAGNOSIS — Z13.220 SCREENING FOR HYPERLIPIDEMIA: ICD-10-CM

## 2021-02-26 RX ORDER — ATORVASTATIN CALCIUM 10 MG/1
TABLET, FILM COATED ORAL
Qty: 90 TABLET | Refills: 1 | Status: SHIPPED | OUTPATIENT
Start: 2021-02-26 | End: 2021-06-04 | Stop reason: SDUPTHER

## 2021-03-01 ENCOUNTER — OFFICE VISIT (OUTPATIENT)
Dept: CARDIAC SURGERY | Facility: CLINIC | Age: 71
End: 2021-03-01

## 2021-03-01 VITALS
HEART RATE: 67 BPM | SYSTOLIC BLOOD PRESSURE: 141 MMHG | DIASTOLIC BLOOD PRESSURE: 81 MMHG | OXYGEN SATURATION: 99 % | TEMPERATURE: 98.4 F

## 2021-03-01 DIAGNOSIS — F17.210 HEAVY TOBACCO SMOKER >10 CIGARETTES PER DAY: Primary | ICD-10-CM

## 2021-03-01 DIAGNOSIS — I73.9 PVD (PERIPHERAL VASCULAR DISEASE) (HCC): ICD-10-CM

## 2021-03-01 DIAGNOSIS — E78.2 MIXED HYPERLIPIDEMIA: ICD-10-CM

## 2021-03-01 DIAGNOSIS — I65.23 BILATERAL CAROTID ARTERY STENOSIS: ICD-10-CM

## 2021-03-01 PROCEDURE — 99214 OFFICE O/P EST MOD 30 MIN: CPT | Performed by: NURSE PRACTITIONER

## 2021-03-01 NOTE — PATIENT INSTRUCTIONS
The results of your vascular studies of your right leg shows mild disease with good  circulation, in the left leg shows milddisease with good circulation.      If signs and symptoms of ischemia should occur including but not limited to pale/blue discoloration of limb, increasing pain with ambulation or at rest, or a non-healing wound. Patient is to notify [facility] for immediate evaluation.    The results of your carotid ultrasound shows mild disease of the right carotid and is stable from previous exam, ultrasound of the left carotid shows mild disease and is stable from previous exam.    Follow up in 12 months    If you should experience any neurological symptoms including but not limited to visual or speech disturbances confusion, seizures, or weakness of limbs of one side of your body notify [facility] center immediately for evaluation or if after hours present to the nearest Emergency Department.

## 2021-03-02 NOTE — PROGRESS NOTES
CVTS Office Progress Note     3/2/2021    Jessie Jordan  1950    Chief Complaint:    Chief Complaint   Patient presents with   • Carotid Artery Disease   • Peripheral Vascular Disease       HPI:      PCP:  Julianna Gloria MD  Cardiology:  Dr. Spence    70 y.o. female with HTN(stable, increased risk stroke, rupture), Hyperlipidemia(stable, increased risk cardiovascular events) and COPD(stable, increased risk pulmonary complications) PVD, MARY.  former smoker and quit 2018.  Right leg pain x2 years, underwent left-right fem-fem, fem-pop.  Follow up surveillance has remained stable.  Bilateral leg pain unrelated to activity or ambulation, chronic pain management.  No other associated signs, symptoms or modifying factors.    5/2018 MONTY:  RIGHT .32 bi/monophasic.  LEFT .47 biphasic.  6/2018 Carotid Duplex:  JENNIFER 0-49% antegrade vert.  LICA 0-49% antegrade vert.  6/2018 CTA Aorta runoff:  RIGHT common iliac 90%, SFA small moderate diffuse disease, tibial diffuse disease.  LEFT  Common iliac 70%, external iliac 80%, SFA occluded reconstitutes distal via profunda collaterals, diffuse tibials.  9/28/18  Agram:  PTA/Luminex Stent LEFT iliac artery:  RCIA occluded, RIIA/REIA moderate diffuse disease, RSFA occluded, RPT diffuse distal disease  10/11/18  MONTY:  RIGHT 0.50 Fem/Pop Biphasic  PT/DP Monophasic    12/19/18 LEFT to RIGHT femoral to femoral artery bypass and RIGHT FEMORAL POPLITEAL BYPASS (PTFE)  05/21/2019 Carotid Duplex: JENNIFER 0-49% with mobile plaque mPSV 120c/s Ratio 2.4, LICA 50-69% mPSV 141c/s Ratio 2.0, Hypoechoic structure left submandibular 9.3mm  05/21/2019 MONTY: Right 0.89 triphasic femoral to distal.  Left 0.75 femoral triphasic PT biphasic  05/21/2019 Graft Survey: Left groin 335, left groin anastomosis 324, proximal graft 183, mid graft 89, distal graft 89, right groin anastomosis 158, right groin 33.  All with triphasic waveforms.    12/3/19 Right 0.82 fem biphasic triphasic-distally.  Left  0.62 biphasic.   12/3/19 Carotid Duplex: JENNIFER 50-69% mPSV 117c/s Ratio 2.8, LICA 50-69% mPSV 162c/s Ratio 1.8, Antegrade vertebrals  6/2020 Carotid Duplex: JENNIFER 0-49% mPSV 122c/s Ratio 2.0, LICA 50-69% mPSV 157c/s Ratio 1.6, Antegrade vertebrals  6/2020 MONTY: Right 0.9 triphasic.  Left 0.81 triphasic.   6/2020 fem-Fem US: Patent, mPSV 439cm/s area appears widely patent.    6/2020 Right Fem-pop US: Patent, mPSV 155cm/s triphasic throughout  3/2021 Carotid Duplex: JENNIFER 50-69% mPSV 126c/s Ratio 1.9, LICA 0-49% mPSV 117c/s Ratio 1.5, Antegrade vertebrals  3/2021 MONTY: Right 0.92 triphasic.  Left 0.81 triphasic.      The following portions of the patient's history were reviewed and updated as appropriate: allergies, current medications, past family history, past medical history, past social history, past surgical history and problem list.  Recent images independently reviewed.  Available laboratory values reviewed.    PMH:  Past Medical History:   Diagnosis Date   • Anxiety and depression    • Coronary artery disease involving native coronary artery of native heart 12/06/2017    seen on Cardiac Cath - Left main 50-70% stenosis, 12/27/2017 repeat cath showed coronary spasm with no stenosis   • Hyperlipidemia    • Hypertension    • Kidney cysts    • Post-menopausal 1990   • Skin cancer    • Stroke (CMS/[facility]) 2015   • Vascular disease      Past Surgical History:   Procedure Laterality Date   • ARTERIOGRAM N/A 9/28/2018    Procedure: aortoiliac arteriogram possible angioplasty stent atherectomy thrombolysis bilateral iliac stents;  Surgeon: Luan Ruff MD;  Location: [facility] ANGIO INVASIVE LOCATION;  Service: Interventional Radiology   • CARDIAC CATHETERIZATION  12/06/2017    Done at [facility] - Left Main 50-70% Stenosis - no stenting done, recommendation was urgent CABG.  EF 50-60%   • CERVICAL FUSION  1985    C5-6   • COLONOSCOPY N/A 6/29/2018    Procedure: COLONOSCOPY;  Surgeon: Oz Way,  MD;  Location: [facility] ENDOSCOPY;  Service: Gastroenterology   • COLONOSCOPY N/A 2020    Procedure: COLONOSCOPY;  Surgeon: Oz Way MD;  Location: [facility] ENDOSCOPY;  Service: Gastroenterology;  Laterality: N/A;   • ENDOSCOPY N/A 2020    Procedure: ESOPHAGOGASTRODUODENOSCOPY ASAP;  Surgeon: Oz Way MD;  Location: [facility] ENDOSCOPY;  Service: Gastroenterology;  Laterality: N/A;   • FEMORAL POPLITEAL BYPASS Right 2018    Procedure: femoral to femoral artery bypass, RIGHT FEMORAL POPLITEAL BYPASS right lower extremity arteriogram          (C-ARM# AND C-ARM TABLE);  Surgeon: Luan Ruff MD;  Location: [facility] OR;  Service: Vascular   • FINGER SURGERY Left     third finger   • ROTATOR CUFF REPAIR Left 2011    done in [place]     Family History   Problem Relation Age of Onset   • Lung cancer Mother    • Hypertension Mother    • Diabetes Maternal Grandmother    • Heart disease Maternal Grandmother    • Hypertension Maternal Grandfather    • Heart disease Maternal Grandfather    • Heart disease Other    • Hypertension Other    • Cancer Other      Social History     Tobacco Use   • Smoking status: Former Smoker     Packs/day: 0.25     Years: 48.00     Pack years: 12.00     Types: Cigarettes     Quit date: 2018     Years since quittin.2   • Smokeless tobacco: Never Used   • Tobacco comment: 1-2cig/day   Substance Use Topics   • Alcohol use: Yes     Frequency: Monthly or less     Comment: occasional beer    • Drug use: No       ALLERGIES:  No Known Allergies      MEDICATIONS:    Current Outpatient Medications:   •  amLODIPine (NORVASC) 2.5 MG tablet, TAKE 1 TABLET BY MOUTH TWICE A DAY, Disp: 180 tablet, Rfl: 0  •  atorvastatin (LIPITOR) 10 MG tablet, TAKE 1 TABLET BY MOUTH EVERY DAY, Disp: 90 tablet, Rfl: 1  •  carbamide peroxide (Debrox) 6.5 % otic solution, Administer 5 drops into both ears 2 (Two) Times a Day., Disp: 15 mL, Rfl: 3  •  Chantix Continuing Month Idris 1 MG  tablet, TAKE AS DIRECTED IN BOX, Disp: 56 tablet, Rfl: 2  •  clopidogrel (PLAVIX) 75 MG tablet, TAKE 1 TABLET BY MOUTH EVERY DAY, Disp: 90 tablet, Rfl: 3  •  [employer] Melatonin 3 MG tablet, TAKE 1 TABLET BY MOUTH EVERY NIGHT AT BEDTIME FOR 90 DAYS., Disp: 90 tablet, Rfl: 0  •  diclofenac (VOLTAREN) 1 % gel gel, Apply  topically to the appropriate area as directed 2 (Two) Times a Day., Disp: 30 g, Rfl: 1  •  Diclofenac Sodium (VOLTAREN) 1 % gel gel, Apply 4 g topically to the appropriate area as directed 4 (Four) Times a Day As Needed (pain)., Disp: 100 g, Rfl: 3  •  ferrous sulfate 325 (65 FE) MG tablet, TAKE 1 TABLET BY MOUTH EVERY DAY WITH BREAKFAST, Disp: 90 tablet, Rfl: 1  •  FLUoxetine (PROzac) 20 MG capsule, TAKE 1 CAPSULE BY MOUTH EVERY DAY, Disp: 90 capsule, Rfl: 3  •  fluticasone (FLONASE) 50 MCG/ACT nasal spray, 2 sprays by Each Nare route Daily., Disp: 3 bottle, Rfl: 3  •  folic acid (FOLVITE) 1 MG tablet, Take 1 tablet by mouth Daily., Disp: 30 tablet, Rfl: 3  •  folic acid (FOLVITE) 1 MG tablet, Take 1 tablet by mouth Daily., Disp: 30 tablet, Rfl: 3  •  gabapentin (NEURONTIN) 400 MG capsule, Take 1 capsule by mouth 3 (Three) Times a Day., Disp: 90 capsule, Rfl: 2  •  HYDROcodone-acetaminophen (NORCO) 7.5-325 MG per tablet, Take 1 tablet by mouth Every 6 (Six) Hours As Needed for Moderate Pain ., Disp: 30 tablet, Rfl: 0  •  lisinopril (PRINIVIL,ZESTRIL) 10 MG tablet, Take 1 tablet by mouth Daily., Disp: 90 tablet, Rfl: 3  •  naproxen (NAPROSYN) 500 MG tablet, Take 1 tablet by mouth 2 (Two) Times a Day With Meals., Disp: 30 tablet, Rfl: 2  •  OS-LORENE CALCIUM + D3 500-200 MG-UNIT tablet, Take 1 tablet by mouth Daily., Disp: , Rfl: 2  •  pantoprazole (PROTONIX) 40 MG EC tablet, Take 1 tablet by mouth Daily., Disp: 30 tablet, Rfl: 11  •  Prenatal Vit-Fe Fumarate-FA (PRENATAL VITAMIN 27-0.8) 27-0.8 MG tablet tablet, Take 1 tablet by mouth Daily., Disp: , Rfl:   •  vitamin D (ERGOCALCIFEROL) 1.25 MG (32827 UT)  capsule capsule, TAKE 1 CAPSULE BY MOUTH 1 (ONE) TIME PER WEEK., Disp: 12 capsule, Rfl: 1      Review of Systems   Constitution: Negative for chills, decreased appetite, fever and weight loss.   HENT: Negative for congestion, nosebleeds and sore throat.    Eyes: Negative for blurred vision, visual disturbance and visual halos.   Cardiovascular: Positive for dyspnea on exertion. Negative for chest pain and leg swelling.   Respiratory: Negative for cough, shortness of breath, sputum production and wheezing.    Endocrine: Negative for cold intolerance and polyuria.   Hematologic/Lymphatic: Negative for bleeding problem. Bruises/bleeds easily.        Does not report S/S of adverse bleeding event including nose bleeds, hematuria, melena, gingival bleeding, or hematemesis.   Skin: Positive for unusual hair distribution. Negative for flushing and nail changes.   Musculoskeletal: Positive for arthritis, back pain and joint pain.   Gastrointestinal: Negative for bloating, abdominal pain, hematemesis, melena, nausea and vomiting.   Genitourinary: Negative for flank pain and hematuria.   Neurological: Positive for weakness. Negative for brief paralysis, difficulty with concentration, focal weakness, light-headedness, loss of balance, numbness and paresthesias.        No amaurosis fugax, TIA, or CVA.     Psychiatric/Behavioral: Negative for altered mental status, depression, substance abuse and suicidal ideas.   Allergic/Immunologic: Negative for hives and persistent infections.         Vitals:    03/01/21 1117   BP: 141/81   BP Location: Left arm   Patient Position: Sitting   Pulse: 67   Temp: 98.4 °F (36.9 °C)   TempSrc: Infrared   SpO2: 99%     Physical Exam   Constitutional: She is oriented to person, place, and time. She appears well-developed.   HENT:   Head: Normocephalic and atraumatic.   Eyes: Pupils are equal, round, and reactive to light. Conjunctivae are normal.   Neck: Normal range of motion. Neck supple.    Cardiovascular: Normal rate.   No murmur heard.  RLE +2  LLE +1  R carotid bruit   Pulmonary/Chest: Effort normal and breath sounds normal. No respiratory distress.   Abdominal: Soft. Bowel sounds are normal. She exhibits no distension.   Musculoskeletal: Normal range of motion. Tenderness (both legs) present.   Neurological: She is alert and oriented to person, place, and time.   Skin: Skin is warm and dry. Capillary refill takes less than 2 seconds.   There is no evidence of skin breakdown of the bilateral lower extremities.  BLE pink, no evidence of ischemia.  Feet are absent of dependent rubor.   Psychiatric: Judgment normal.   Nursing note and vitals reviewed.      Assessment & Plan     Independent Review of Studies  3/2021 Carotid Duplex: JENNIFER 50-69% mPSV 126c/s Ratio 1.9, LICA 0-49% mPSV 117c/s Ratio 1.5, Antegrade vertebrals  3/2021 MONTY: Right 0.92 triphasic.  Left 0.81 triphasic.    1. PVD (peripheral vascular disease) with claudication (CMS/HCC)  Mild bilateral reduction in resting perfusion  Symptoms controlled.  Return 6 months with MONTY/Graft survey    Medical management with statin, plavix, ACE.    - Doppler Ankle Brachial Index Single Level CAR; Future    2. Bilateral carotid artery stenosis  Moderate R ICA disease, mild L ICA.  Unchanged   Repeat 12 months  Medical management as above.    - Duplex Carotid Ultrasound CAR; Future    3. Mixed hyperlipidemia  Lipid-lowering therapy has been proven beneficial in patients with cardio-vascular disease. Current guidelines recommend statin treatment for all patients with PAD,CAD and carotid stenosis. Statins are beneficial in preventing cardiovascular events, increasing functional capacity and lower the risk of adverse limb loss in PAD. Statins decrease the progression of plaque formation and may improve peripheral vessel lining, and aid in reversing atherosclerosis.    Smoking cessation assistance options offered including behavioral counseling (Smoking  Cessation Classes), Nicotine replacement therapy (patches or gum), pharmacologic therapy (Chantix, Wellbutrin). Understands tobacco increases risk of expanding AAA, MI, CVA, PAD, carcinoma. Discussion and question answer period 5-7 minutes.      Detailed discussion regarding risks, benefits, and treatment plan. Images independently reviewed. Patient understands, agrees, and wishes to proceed with plan.       This document has been electronically signed by Lino Bradley AGACNP-BC Vaibhav  On March 2, 2021 09:45 CST      EMR Dragon/Transcription disclaimer:   Much of this encounter note is an electronic transcription/translation of spoken language to printed text. The electronic translation of spoken language may permit erroneous, or at times, nonsensical words or phrases to be inadvertently transcribed; Although I have reviewed the note for such errors, some may still exist.

## 2021-03-31 ENCOUNTER — OFFICE VISIT (OUTPATIENT)
Dept: FAMILY MEDICINE CLINIC | Facility: CLINIC | Age: 71
End: 2021-03-31

## 2021-03-31 VITALS
SYSTOLIC BLOOD PRESSURE: 124 MMHG | DIASTOLIC BLOOD PRESSURE: 72 MMHG | TEMPERATURE: 97.7 F | HEART RATE: 73 BPM | WEIGHT: 111 LBS | HEIGHT: 64 IN | OXYGEN SATURATION: 97 % | BODY MASS INDEX: 18.95 KG/M2

## 2021-03-31 DIAGNOSIS — Z72.0 TOBACCO ABUSE: Primary | ICD-10-CM

## 2021-03-31 DIAGNOSIS — F51.01 PRIMARY INSOMNIA: ICD-10-CM

## 2021-03-31 PROCEDURE — 99213 OFFICE O/P EST LOW 20 MIN: CPT | Performed by: STUDENT IN AN ORGANIZED HEALTH CARE EDUCATION/TRAINING PROGRAM

## 2021-03-31 RX ORDER — VARENICLINE TARTRATE 1 MG/1
1 TABLET, FILM COATED ORAL 2 TIMES DAILY
Qty: 60 TABLET | Refills: 2 | Status: SHIPPED | OUTPATIENT
Start: 2021-04-28 | End: 2021-06-01

## 2021-03-31 RX ORDER — GABAPENTIN 300 MG/1
300 CAPSULE ORAL 3 TIMES DAILY
COMMUNITY
Start: 2021-03-03 | End: 2021-03-31

## 2021-03-31 RX ORDER — VARENICLINE TARTRATE 1 MG/1
TABLET, FILM COATED ORAL
Qty: 56 TABLET | Refills: 2 | Status: CANCELLED | OUTPATIENT
Start: 2021-03-31

## 2021-03-31 RX ORDER — LANOLIN ALCOHOL/MO/W.PET/CERES
3 CREAM (GRAM) TOPICAL NIGHTLY PRN
Qty: 90 TABLET | Refills: 0 | Status: SHIPPED | OUTPATIENT
Start: 2021-03-31 | End: 2021-06-04 | Stop reason: SDUPTHER

## 2021-03-31 NOTE — PROGRESS NOTES
ID: Jessie Jordan    CC:   Chief Complaint   Patient presents with   • Med Refill       Subjective:     HPI     Jessie Jordan is a 70 y.o. female who presents for medical refills. Patient states the chantix has helped. She is smoking 1-4 cigarettes a day. Patient states she use to smoke 1-2 packs a day. Patient also states she needs a refill on her sleep medication. She takes melatonin. Does well on that.     Preventative:  Over the past 2 weeks, have you felt down, depressed, or hopeless? no  Over the past 2 weeks, have you felt little interest or pleasure in doing things? no  Clinical depression screening refused by patient no    On osteoporosis therapy? no    Past Medical Hx:  Past Medical History:   Diagnosis Date   • Anxiety and depression    • Coronary artery disease involving native coronary artery of native heart 12/06/2017    seen on Cardiac Cath - Left main 50-70% stenosis, 12/27/2017 repeat cath showed coronary spasm with no stenosis   • Hyperlipidemia    • Hypertension    • Kidney cysts    • Post-menopausal 1990   • Skin cancer    • Stroke (CMS/[facility]) 2015   • Vascular disease        Past Surgical Hx:  Past Surgical History:   Procedure Laterality Date   • ARTERIOGRAM N/A 9/28/2018    Procedure: aortoiliac arteriogram possible angioplasty stent atherectomy thrombolysis bilateral iliac stents;  Surgeon: Luan Ruff MD;  Location: [facility] ANGIO INVASIVE LOCATION;  Service: Interventional Radiology   • CARDIAC CATHETERIZATION  12/06/2017    Done at [facility] - Left Main 50-70% Stenosis - no stenting done, recommendation was urgent CABG.  EF 50-60%   • CERVICAL FUSION  1985    C5-6   • COLONOSCOPY N/A 6/29/2018    Procedure: COLONOSCOPY;  Surgeon: Oz Way MD;  Location: [facility] ENDOSCOPY;  Service: Gastroenterology   • COLONOSCOPY N/A 7/6/2020    Procedure: COLONOSCOPY;  Surgeon: Oz Way MD;  Location: [facility] ENDOSCOPY;  Service: Gastroenterology;   Laterality: N/A;   • ENDOSCOPY N/A 7/6/2020    Procedure: ESOPHAGOGASTRODUODENOSCOPY ASAP;  Surgeon: Oz Way MD;  Location: [facility] ENDOSCOPY;  Service: Gastroenterology;  Laterality: N/A;   • FEMORAL POPLITEAL BYPASS Right 12/19/2018    Procedure: femoral to femoral artery bypass, RIGHT FEMORAL POPLITEAL BYPASS right lower extremity arteriogram          (C-ARM# AND C-ARM TABLE);  Surgeon: Luan Ruff MD;  Location: [facility] OR;  Service: Vascular   • FINGER SURGERY Left     third finger   • ROTATOR CUFF REPAIR Left 06/30/2011    done in [place]       Health Maintenance:  Health Maintenance   Topic Date Due   • TDAP/TD VACCINES (1 - Tdap) Never done   • ZOSTER VACCINE (1 of 2) Never done   • Pneumococcal Vaccine 65+ (2 of 2 - PPSV23) 04/05/2019   • DXA SCAN  04/25/2020   • MAMMOGRAM  05/04/2020   • LIPID PANEL  06/22/2021   • ANNUAL WELLNESS VISIT  01/15/2022   • COLONOSCOPY  07/06/2025   • HEPATITIS C SCREENING  Completed   • COVID-19 Vaccine  Completed   • INFLUENZA VACCINE  Completed   • MENINGOCOCCAL VACCINE  Aged Out       Current Meds:    Current Outpatient Medications:   •  amLODIPine (NORVASC) 2.5 MG tablet, TAKE 1 TABLET BY MOUTH TWICE A DAY, Disp: 180 tablet, Rfl: 0  •  atorvastatin (LIPITOR) 10 MG tablet, TAKE 1 TABLET BY MOUTH EVERY DAY, Disp: 90 tablet, Rfl: 1  •  carbamide peroxide (Debrox) 6.5 % otic solution, Administer 5 drops into both ears 2 (Two) Times a Day., Disp: 15 mL, Rfl: 3  •  clopidogrel (PLAVIX) 75 MG tablet, TAKE 1 TABLET BY MOUTH EVERY DAY, Disp: 90 tablet, Rfl: 3  •  diclofenac (VOLTAREN) 1 % gel gel, Apply  topically to the appropriate area as directed 2 (Two) Times a Day., Disp: 30 g, Rfl: 1  •  Diclofenac Sodium (VOLTAREN) 1 % gel gel, Apply 4 g topically to the appropriate area as directed 4 (Four) Times a Day As Needed (pain)., Disp: 100 g, Rfl: 3  •  ferrous sulfate 325 (65 FE) MG tablet, TAKE 1 TABLET BY MOUTH EVERY DAY WITH BREAKFAST, Disp: 90 tablet, Rfl: 1  •   FLUoxetine (PROzac) 20 MG capsule, TAKE 1 CAPSULE BY MOUTH EVERY DAY, Disp: 90 capsule, Rfl: 3  •  fluticasone (FLONASE) 50 MCG/ACT nasal spray, 2 sprays by Each Nare route Daily., Disp: 3 bottle, Rfl: 3  •  folic acid (FOLVITE) 1 MG tablet, Take 1 tablet by mouth Daily., Disp: 30 tablet, Rfl: 3  •  folic acid (FOLVITE) 1 MG tablet, Take 1 tablet by mouth Daily., Disp: 30 tablet, Rfl: 3  •  gabapentin (NEURONTIN) 400 MG capsule, Take 1 capsule by mouth 3 (Three) Times a Day., Disp: 90 capsule, Rfl: 2  •  HYDROcodone-acetaminophen (NORCO) 7.5-325 MG per tablet, Take 1 tablet by mouth Every 6 (Six) Hours As Needed for Moderate Pain ., Disp: 30 tablet, Rfl: 0  •  lisinopril (PRINIVIL,ZESTRIL) 10 MG tablet, Take 1 tablet by mouth Daily., Disp: 90 tablet, Rfl: 3  •  melatonin ([employer] Melatonin) 3 MG tablet, Take 1 tablet by mouth At Night As Needed for Sleep., Disp: 90 tablet, Rfl: 0  •  naproxen (NAPROSYN) 500 MG tablet, Take 1 tablet by mouth 2 (Two) Times a Day With Meals., Disp: 30 tablet, Rfl: 2  •  OS-LORENE CALCIUM + D3 500-200 MG-UNIT tablet, Take 1 tablet by mouth Daily., Disp: , Rfl: 2  •  pantoprazole (PROTONIX) 40 MG EC tablet, Take 1 tablet by mouth Daily., Disp: 30 tablet, Rfl: 11  •  Prenatal Vit-Fe Fumarate-FA (PRENATAL VITAMIN 27-0.8) 27-0.8 MG tablet tablet, Take 1 tablet by mouth Daily., Disp: , Rfl:   •  vitamin D (ERGOCALCIFEROL) 1.25 MG (46866 UT) capsule capsule, TAKE 1 CAPSULE BY MOUTH 1 (ONE) TIME PER WEEK., Disp: 12 capsule, Rfl: 1  •  [START ON 4/28/2021] varenicline (Chantix Continuing Month Pak) 1 MG tablet, Take 1 tablet by mouth 2 (Two) Times a Day for 56 days., Disp: 60 tablet, Rfl: 2    Allergies:  Patient has no known allergies.    Family Hx:  Family History   Problem Relation Age of Onset   • Lung cancer Mother    • Hypertension Mother    • Diabetes Maternal Grandmother    • Heart disease Maternal Grandmother    • Hypertension Maternal Grandfather    • Heart disease Maternal Grandfather    •  Heart disease Other    • Hypertension Other    • Cancer Other         Social History:  Social History     Socioeconomic History   • Marital status: Single     Spouse name: Not on file   • Number of children: 1   • Years of education: Not on file   • Highest education level: Not on file   Tobacco Use   • Smoking status: Former Smoker     Packs/day: 0.25     Years: 48.00     Pack years: 12.00     Types: Cigarettes     Quit date: 2018     Years since quittin.2   • Smokeless tobacco: Never Used   • Tobacco comment: 1-2cig/day   Substance and Sexual Activity   • Alcohol use: Yes     Comment: occasional beer    • Drug use: No   • Sexual activity: Defer     Comment: .       Review of Systems   Constitutional: Negative for activity change, appetite change, chills, fatigue and fever.   HENT: Negative for drooling, ear discharge, ear pain, facial swelling, hearing loss, mouth sores, rhinorrhea and sinus pain.    Eyes: Negative for pain, redness and itching.   Respiratory: Negative for cough, choking, chest tightness, shortness of breath and stridor.    Cardiovascular: Negative for chest pain, palpitations and leg swelling.   Gastrointestinal: Negative for abdominal distention, abdominal pain, anal bleeding, blood in stool, constipation, diarrhea and nausea.   Endocrine: Negative for heat intolerance, polydipsia and polyphagia.   Genitourinary: Negative for dysuria, flank pain, frequency and genital sores.   Musculoskeletal: Negative for back pain, gait problem, joint swelling and myalgias.   Skin: Negative for pallor and rash.   Neurological: Negative for seizures, facial asymmetry, speech difficulty, light-headedness, numbness and headaches.   Hematological: Negative for adenopathy. Does not bruise/bleed easily.   Psychiatric/Behavioral: Negative for confusion, decreased concentration, dysphoric mood and hallucinations. The patient is not nervous/anxious and is not hyperactive.            Objective:     BP  "124/72   Pulse 73   Temp 97.7 °F (36.5 °C)   Ht 162.6 cm (64\")   Wt 50.3 kg (111 lb)   SpO2 97%   BMI 19.05 kg/m²     Physical Exam  Constitutional:       Appearance: She is well-developed.   HENT:      Head: Normocephalic and atraumatic.      Right Ear: External ear normal.      Left Ear: External ear normal.      Nose: Nose normal.   Eyes:      Conjunctiva/sclera: Conjunctivae normal.      Pupils: Pupils are equal, round, and reactive to light.   Cardiovascular:      Rate and Rhythm: Normal rate and regular rhythm.      Heart sounds: Normal heart sounds.   Pulmonary:      Effort: Pulmonary effort is normal.      Breath sounds: Normal breath sounds.   Abdominal:      General: Bowel sounds are normal.      Palpations: Abdomen is soft.   Musculoskeletal:         General: Normal range of motion.      Cervical back: Normal range of motion and neck supple.   Skin:     General: Skin is warm and dry.   Neurological:      Mental Status: She is alert and oriented to person, place, and time.   Psychiatric:         Behavior: Behavior normal.         Thought Content: Thought content normal.         Judgment: Judgment normal.              Assessment/Plan:     Diagnoses and all orders for this visit:    1. Tobacco abuse (Primary)    2. Primary insomnia  -     melatonin ([employer]S Melatonin) 3 MG tablet; Take 1 tablet by mouth At Night As Needed for Sleep.  Dispense: 90 tablet; Refill: 0    Other orders  -     Cancel: varenicline (Chantix Continuing Month Idris) 1 MG tablet  Dispense: 56 tablet; Refill: 2  -     varenicline (Chantix Continuing Month Idris) 1 MG tablet; Take 1 tablet by mouth 2 (Two) Times a Day for 56 days.  Dispense: 60 tablet; Refill: 2    NO rx changes. Will continue with current management.       Follow-up:     3 months     Goals:   Goals     •  Pain control       Barriers: severe PAD, pt continues to smoke      •  Quit smoking / using tobacco (pt-stated)       Barriers: compliance with cessation medications      "     Barriers to goals: none     Health Maintenance   Topic Date Due   • TDAP/TD VACCINES (1 - Tdap) Never done   • ZOSTER VACCINE (1 of 2) Never done   • Pneumococcal Vaccine 65+ (2 of 2 - PPSV23) 04/05/2019   • DXA SCAN  04/25/2020   • MAMMOGRAM  05/04/2020   • LIPID PANEL  06/22/2021   • ANNUAL WELLNESS VISIT  01/15/2022   • COLONOSCOPY  07/06/2025   • HEPATITIS C SCREENING  Completed   • COVID-19 Vaccine  Completed   • INFLUENZA VACCINE  Completed   • MENINGOCOCCAL VACCINE  Aged Out       Tobacco: Smoking cessation counseling was provided.  Alcohol: does not drink  Lifestyle: Patient's Body mass index is 19.05 kg/m². BMI is above normal parameters. Recommendations include: exercise counseling and nutrition counseling.   reduce portion size, reduce fast food intake, family to eat at dinner table more often, keep TV off during meals, plan meals and have 3 meals a day    RISK SCORE: 3         Julianna Gloria M.D. PGY3  [facility]  [address] [place] [address]  Office: [phone]    This document has been electronically signed by Julianna Gloria MD on March 31, 2021 13:55 CDT            This document has been electronically signed by Julianna Gloria MD on March 31, 2021 13:55 CDT

## 2021-04-29 RX ORDER — ERGOCALCIFEROL 1.25 MG/1
50000 CAPSULE ORAL WEEKLY
Qty: 12 CAPSULE | Refills: 1 | Status: SHIPPED | OUTPATIENT
Start: 2021-04-29 | End: 2021-06-04 | Stop reason: SDUPTHER

## 2021-06-01 RX ORDER — VARENICLINE TARTRATE 1 MG/1
TABLET, FILM COATED ORAL
Qty: 56 TABLET | Refills: 2 | Status: SHIPPED | OUTPATIENT
Start: 2021-06-01 | End: 2021-06-04 | Stop reason: SDUPTHER

## 2021-06-02 RX ORDER — FLUTICASONE PROPIONATE 50 MCG
SPRAY, SUSPENSION (ML) NASAL
Qty: 48 ML | Refills: 3 | Status: SHIPPED | OUTPATIENT
Start: 2021-06-02 | End: 2021-06-04 | Stop reason: SDUPTHER

## 2021-06-04 ENCOUNTER — OFFICE VISIT (OUTPATIENT)
Dept: FAMILY MEDICINE CLINIC | Facility: CLINIC | Age: 71
End: 2021-06-04

## 2021-06-04 VITALS
BODY MASS INDEX: 19.38 KG/M2 | OXYGEN SATURATION: 98 % | HEART RATE: 59 BPM | SYSTOLIC BLOOD PRESSURE: 110 MMHG | TEMPERATURE: 96.9 F | WEIGHT: 113.5 LBS | DIASTOLIC BLOOD PRESSURE: 80 MMHG | HEIGHT: 64 IN

## 2021-06-04 DIAGNOSIS — G89.4 CHRONIC PAIN SYNDROME: ICD-10-CM

## 2021-06-04 DIAGNOSIS — J30.9 ALLERGIC RHINITIS, UNSPECIFIED SEASONALITY, UNSPECIFIED TRIGGER: Primary | ICD-10-CM

## 2021-06-04 DIAGNOSIS — G62.9 PERIPHERAL POLYNEUROPATHY: ICD-10-CM

## 2021-06-04 DIAGNOSIS — F51.01 PRIMARY INSOMNIA: ICD-10-CM

## 2021-06-04 DIAGNOSIS — Z13.220 SCREENING FOR HYPERLIPIDEMIA: ICD-10-CM

## 2021-06-04 DIAGNOSIS — I10 ESSENTIAL HYPERTENSION: ICD-10-CM

## 2021-06-04 PROCEDURE — 99213 OFFICE O/P EST LOW 20 MIN: CPT | Performed by: STUDENT IN AN ORGANIZED HEALTH CARE EDUCATION/TRAINING PROGRAM

## 2021-06-04 RX ORDER — FLUTICASONE PROPIONATE 50 MCG
2 SPRAY, SUSPENSION (ML) NASAL DAILY
Qty: 48 ML | Refills: 3 | Status: SHIPPED | OUTPATIENT
Start: 2021-06-04

## 2021-06-04 RX ORDER — LISINOPRIL 10 MG/1
10 TABLET ORAL DAILY
Qty: 90 TABLET | Refills: 3 | Status: SHIPPED | OUTPATIENT
Start: 2021-06-04 | End: 2021-12-14 | Stop reason: SDUPTHER

## 2021-06-04 RX ORDER — NAPROXEN 500 MG/1
500 TABLET ORAL 2 TIMES DAILY WITH MEALS
Qty: 30 TABLET | Refills: 2 | Status: SHIPPED | OUTPATIENT
Start: 2021-06-04 | End: 2021-08-13

## 2021-06-04 RX ORDER — CLOPIDOGREL BISULFATE 75 MG/1
75 TABLET ORAL DAILY
Qty: 90 TABLET | Refills: 3 | Status: SHIPPED | OUTPATIENT
Start: 2021-06-04

## 2021-06-04 RX ORDER — AMLODIPINE BESYLATE 2.5 MG/1
2.5 TABLET ORAL 2 TIMES DAILY
Qty: 180 TABLET | Refills: 0 | Status: SHIPPED | OUTPATIENT
Start: 2021-06-04 | End: 2021-08-31 | Stop reason: SDUPTHER

## 2021-06-04 RX ORDER — VARENICLINE TARTRATE 1 MG/1
1 TABLET, FILM COATED ORAL 2 TIMES DAILY
Qty: 56 TABLET | Refills: 2 | Status: SHIPPED | OUTPATIENT
Start: 2021-06-04 | End: 2022-03-10 | Stop reason: SDUPTHER

## 2021-06-04 RX ORDER — HYDROCODONE BITARTRATE AND ACETAMINOPHEN 7.5; 325 MG/1; MG/1
1 TABLET ORAL EVERY 6 HOURS PRN
Qty: 30 TABLET | Refills: 0 | Status: CANCELLED | OUTPATIENT
Start: 2021-06-04

## 2021-06-04 RX ORDER — FERROUS SULFATE 325(65) MG
1 TABLET ORAL
Qty: 90 TABLET | Refills: 1 | Status: SHIPPED | OUTPATIENT
Start: 2021-06-04 | End: 2022-08-30 | Stop reason: SDUPTHER

## 2021-06-04 RX ORDER — PRENATAL VIT/IRON FUM/FOLIC AC 27MG-0.8MG
1 TABLET ORAL DAILY
Start: 2021-06-04 | End: 2021-07-06

## 2021-06-04 RX ORDER — ATORVASTATIN CALCIUM 10 MG/1
10 TABLET, FILM COATED ORAL DAILY
Qty: 90 TABLET | Refills: 1 | Status: SHIPPED | OUTPATIENT
Start: 2021-06-04 | End: 2021-12-23 | Stop reason: SDUPTHER

## 2021-06-04 RX ORDER — FOLIC ACID 1 MG/1
1 TABLET ORAL DAILY
Qty: 30 TABLET | Refills: 3 | Status: SHIPPED | OUTPATIENT
Start: 2021-06-04 | End: 2021-12-17 | Stop reason: SDUPTHER

## 2021-06-04 RX ORDER — ERGOCALCIFEROL 1.25 MG/1
50000 CAPSULE ORAL WEEKLY
Qty: 12 CAPSULE | Refills: 1 | Status: SHIPPED | OUTPATIENT
Start: 2021-06-04 | End: 2022-01-10 | Stop reason: SDUPTHER

## 2021-06-04 RX ORDER — FLUOXETINE HYDROCHLORIDE 20 MG/1
20 CAPSULE ORAL DAILY
Qty: 90 CAPSULE | Refills: 3 | Status: SHIPPED | OUTPATIENT
Start: 2021-06-04 | End: 2022-08-19 | Stop reason: SDUPTHER

## 2021-06-04 RX ORDER — LANOLIN ALCOHOL/MO/W.PET/CERES
3 CREAM (GRAM) TOPICAL NIGHTLY PRN
Qty: 90 TABLET | Refills: 0 | Status: SHIPPED | OUTPATIENT
Start: 2021-06-04 | End: 2022-02-23 | Stop reason: SDUPTHER

## 2021-06-04 RX ORDER — PANTOPRAZOLE SODIUM 40 MG/1
40 TABLET, DELAYED RELEASE ORAL DAILY
Qty: 30 TABLET | Refills: 11 | Status: SHIPPED | OUTPATIENT
Start: 2021-06-04 | End: 2022-06-13 | Stop reason: SDUPTHER

## 2021-06-04 RX ORDER — GABAPENTIN 400 MG/1
400 CAPSULE ORAL 3 TIMES DAILY
Qty: 90 CAPSULE | Refills: 2 | Status: CANCELLED | OUTPATIENT
Start: 2021-06-04

## 2021-06-04 NOTE — PROGRESS NOTES
ID: Jessie Jordan    CC:   Chief Complaint   Patient presents with   • Follow-up       Subjective:     HPI     Jessie Jordan is a 70 y.o. female who presents for medical refills. Her blood pressure is well controlled. She has decreased the amount of smoking with chantix. Her allergies is well controlled.     Preventative:  Over the past 2 weeks, have you felt down, depressed, or hopeless? no  Over the past 2 weeks, have you felt little interest or pleasure in doing things? no  Clinical depression screening refused by patient no    On osteoporosis therapy? no    Past Medical Hx:  Past Medical History:   Diagnosis Date   • Anxiety and depression    • Coronary artery disease involving native coronary artery of native heart 12/06/2017    seen on Cardiac Cath - Left main 50-70% stenosis, 12/27/2017 repeat cath showed coronary spasm with no stenosis   • Hyperlipidemia    • Hypertension    • Kidney cysts    • Post-menopausal 1990   • Skin cancer    • Stroke (CMS/[facility]) 2015   • Vascular disease        Past Surgical Hx:  Past Surgical History:   Procedure Laterality Date   • ARTERIOGRAM N/A 9/28/2018    Procedure: aortoiliac arteriogram possible angioplasty stent atherectomy thrombolysis bilateral iliac stents;  Surgeon: Luan Ruff MD;  Location: [facility] ANGIO INVASIVE LOCATION;  Service: Interventional Radiology   • CARDIAC CATHETERIZATION  12/06/2017    Done at [facility] - Left Main 50-70% Stenosis - no stenting done, recommendation was urgent CABG.  EF 50-60%   • CERVICAL FUSION  1985    C5-6   • COLONOSCOPY N/A 6/29/2018    Procedure: COLONOSCOPY;  Surgeon: Oz Way MD;  Location: [facility] ENDOSCOPY;  Service: Gastroenterology   • COLONOSCOPY N/A 7/6/2020    Procedure: COLONOSCOPY;  Surgeon: Oz Way MD;  Location: [facility] ENDOSCOPY;  Service: Gastroenterology;  Laterality: N/A;   • ENDOSCOPY N/A 7/6/2020    Procedure: ESOPHAGOGASTRODUODENOSCOPY ASAP;  Surgeon: Seamus  Oz IRVIN MD;  Location: [facility] ENDOSCOPY;  Service: Gastroenterology;  Laterality: N/A;   • FEMORAL POPLITEAL BYPASS Right 12/19/2018    Procedure: femoral to femoral artery bypass, RIGHT FEMORAL POPLITEAL BYPASS right lower extremity arteriogram          (C-ARM# AND C-ARM TABLE);  Surgeon: Luan Ruff MD;  Location: [facility] OR;  Service: Vascular   • FINGER SURGERY Left     third finger   • ROTATOR CUFF REPAIR Left 06/30/2011    done in [place]       Health Maintenance:  Health Maintenance   Topic Date Due   • TDAP/TD VACCINES (1 - Tdap) Never done   • ZOSTER VACCINE (1 of 2) Never done   • Pneumococcal Vaccine 65+ (2 of 2 - PPSV23) 04/05/2019   • DXA SCAN  04/25/2020   • MAMMOGRAM  05/04/2020   • LIPID PANEL  06/22/2021   • INFLUENZA VACCINE  08/01/2021   • ANNUAL WELLNESS VISIT  01/15/2022   • COLORECTAL CANCER SCREENING  07/06/2025   • HEPATITIS C SCREENING  Completed   • COVID-19 Vaccine  Completed       Current Meds:    Current Outpatient Medications:   •  amLODIPine (NORVASC) 2.5 MG tablet, Take 1 tablet by mouth 2 (Two) Times a Day., Disp: 180 tablet, Rfl: 0  •  atorvastatin (LIPITOR) 10 MG tablet, Take 1 tablet by mouth Daily., Disp: 90 tablet, Rfl: 1  •  carbamide peroxide (Debrox) 6.5 % otic solution, Administer 5 drops into both ears 2 (Two) Times a Day., Disp: 15 mL, Rfl: 3  •  clopidogrel (PLAVIX) 75 MG tablet, Take 1 tablet by mouth Daily., Disp: 90 tablet, Rfl: 3  •  Diclofenac Sodium (VOLTAREN) 1 % gel gel, Apply 4 g topically to the appropriate area as directed 4 (Four) Times a Day As Needed (pain)., Disp: 100 g, Rfl: 3  •  ferrous sulfate 325 (65 FE) MG tablet, Take 1 tablet by mouth Daily With Breakfast., Disp: 90 tablet, Rfl: 1  •  FLUoxetine (PROzac) 20 MG capsule, Take 1 capsule by mouth Daily., Disp: 90 capsule, Rfl: 3  •  fluticasone (FLONASE) 50 MCG/ACT nasal spray, 2 sprays by Each Nare route Daily., Disp: 48 mL, Rfl: 3  •  folic acid (FOLVITE) 1 MG tablet, Take 1 tablet by mouth  Daily., Disp: 30 tablet, Rfl: 3  •  gabapentin (NEURONTIN) 400 MG capsule, Take 1 capsule by mouth 3 (Three) Times a Day., Disp: 90 capsule, Rfl: 2  •  HYDROcodone-acetaminophen (NORCO) 7.5-325 MG per tablet, Take 1 tablet by mouth Every 6 (Six) Hours As Needed for Moderate Pain ., Disp: 30 tablet, Rfl: 0  •  lisinopril (PRINIVIL,ZESTRIL) 10 MG tablet, Take 1 tablet by mouth Daily., Disp: 90 tablet, Rfl: 3  •  melatonin ([employer] Melatonin) 3 MG tablet, Take 1 tablet by mouth At Night As Needed for Sleep., Disp: 90 tablet, Rfl: 0  •  naproxen (NAPROSYN) 500 MG tablet, Take 1 tablet by mouth 2 (Two) Times a Day With Meals., Disp: 30 tablet, Rfl: 2  •  Os-Pipe Calcium + D3 500-200 MG-UNIT tablet, Take 1 tablet by mouth Daily., Disp: 60 tablet, Rfl: 2  •  pantoprazole (PROTONIX) 40 MG EC tablet, Take 1 tablet by mouth Daily., Disp: 30 tablet, Rfl: 11  •  Prenatal Vit-Fe Fumarate-FA (prenatal vitamin 27-0.8) 27-0.8 MG tablet tablet, Take 1 tablet by mouth Daily., Disp: , Rfl:   •  varenicline (Chantix) 1 MG tablet, Take 1 tablet by mouth 2 (Two) Times a Day., Disp: 56 tablet, Rfl: 2  •  vitamin D (ERGOCALCIFEROL) 1.25 MG (04762 UT) capsule capsule, Take 1 capsule by mouth 1 (One) Time Per Week., Disp: 12 capsule, Rfl: 1    Allergies:  Patient has no known allergies.    Family Hx:  Family History   Problem Relation Age of Onset   • Lung cancer Mother    • Hypertension Mother    • Diabetes Maternal Grandmother    • Heart disease Maternal Grandmother    • Hypertension Maternal Grandfather    • Heart disease Maternal Grandfather    • Heart disease Other    • Hypertension Other    • Cancer Other         Social History:  Social History     Socioeconomic History   • Marital status: Single     Spouse name: Not on file   • Number of children: 1   • Years of education: Not on file   • Highest education level: Not on file   Tobacco Use   • Smoking status: Former Smoker     Packs/day: 0.25     Years: 48.00     Pack years: 12.00      "Types: Cigarettes     Quit date: 2018     Years since quittin.4   • Smokeless tobacco: Never Used   • Tobacco comment: 1-2cig/day   Substance and Sexual Activity   • Alcohol use: Yes     Comment: occasional beer    • Drug use: No   • Sexual activity: Defer     Comment: .       Review of Systems   Constitutional: Negative for activity change, appetite change, chills, fatigue and fever.   HENT: Negative for drooling, ear discharge, ear pain, facial swelling, hearing loss, mouth sores, rhinorrhea and sinus pain.    Eyes: Negative for pain, redness and itching.   Respiratory: Negative for cough, choking, chest tightness, shortness of breath and stridor.    Cardiovascular: Negative for chest pain, palpitations and leg swelling.   Gastrointestinal: Negative for abdominal distention, abdominal pain, anal bleeding, blood in stool, constipation, diarrhea and nausea.   Endocrine: Negative for heat intolerance, polydipsia and polyphagia.   Genitourinary: Negative for dysuria, flank pain, frequency and genital sores.   Musculoskeletal: Negative for back pain, gait problem, joint swelling and myalgias.   Skin: Negative for pallor and rash.   Neurological: Negative for seizures, facial asymmetry, speech difficulty, light-headedness, numbness and headaches.   Hematological: Negative for adenopathy. Does not bruise/bleed easily.   Psychiatric/Behavioral: Negative for confusion, decreased concentration, dysphoric mood and hallucinations. The patient is not nervous/anxious and is not hyperactive.            Objective:     /80   Pulse 59   Temp 96.9 °F (36.1 °C)   Ht 162.6 cm (64\")   Wt 51.5 kg (113 lb 8 oz)   SpO2 98%   BMI 19.48 kg/m²     Physical Exam  Constitutional:       Appearance: She is well-developed.   HENT:      Head: Normocephalic and atraumatic.      Right Ear: External ear normal.      Left Ear: External ear normal.      Nose: Nose normal.   Eyes:      Conjunctiva/sclera: Conjunctivae " normal.      Pupils: Pupils are equal, round, and reactive to light.   Cardiovascular:      Rate and Rhythm: Normal rate and regular rhythm.      Heart sounds: Normal heart sounds.   Pulmonary:      Effort: Pulmonary effort is normal.      Breath sounds: Normal breath sounds.   Abdominal:      General: Bowel sounds are normal.      Palpations: Abdomen is soft.   Musculoskeletal:         General: Normal range of motion.      Cervical back: Normal range of motion and neck supple.   Skin:     General: Skin is warm and dry.   Neurological:      Mental Status: She is alert and oriented to person, place, and time.   Psychiatric:         Behavior: Behavior normal.         Thought Content: Thought content normal.         Judgment: Judgment normal.              Assessment/Plan:     Diagnoses and all orders for this visit:    1. Allergic rhinitis, unspecified seasonality, unspecified trigger (Primary)  -     fluticasone (FLONASE) 50 MCG/ACT nasal spray; 2 sprays by Each Nare route Daily.  Dispense: 48 mL; Refill: 3    2. Essential hypertension  -     amLODIPine (NORVASC) 2.5 MG tablet; Take 1 tablet by mouth 2 (Two) Times a Day.  Dispense: 180 tablet; Refill: 0  -     lisinopril (PRINIVIL,ZESTRIL) 10 MG tablet; Take 1 tablet by mouth Daily.  Dispense: 90 tablet; Refill: 3    3. Screening for hyperlipidemia  -     atorvastatin (LIPITOR) 10 MG tablet; Take 1 tablet by mouth Daily.  Dispense: 90 tablet; Refill: 1    4. Peripheral polyneuropathy    5. Chronic pain syndrome    6. Primary insomnia  -     melatonin ([employer] Melatonin) 3 MG tablet; Take 1 tablet by mouth At Night As Needed for Sleep.  Dispense: 90 tablet; Refill: 0    Other orders  -     carbamide peroxide (Debrox) 6.5 % otic solution; Administer 5 drops into both ears 2 (Two) Times a Day.  Dispense: 15 mL; Refill: 3  -     varenicline (Chantix) 1 MG tablet; Take 1 tablet by mouth 2 (Two) Times a Day.  Dispense: 56 tablet; Refill: 2  -     clopidogrel (PLAVIX) 75 MG  tablet; Take 1 tablet by mouth Daily.  Dispense: 90 tablet; Refill: 3  -     Diclofenac Sodium (VOLTAREN) 1 % gel gel; Apply 4 g topically to the appropriate area as directed 4 (Four) Times a Day As Needed (pain).  Dispense: 100 g; Refill: 3  -     ferrous sulfate 325 (65 FE) MG tablet; Take 1 tablet by mouth Daily With Breakfast.  Dispense: 90 tablet; Refill: 1  -     FLUoxetine (PROzac) 20 MG capsule; Take 1 capsule by mouth Daily.  Dispense: 90 capsule; Refill: 3  -     folic acid (FOLVITE) 1 MG tablet; Take 1 tablet by mouth Daily.  Dispense: 30 tablet; Refill: 3  -     Cancel: gabapentin (NEURONTIN) 400 MG capsule; Take 1 capsule by mouth 3 (Three) Times a Day.  Dispense: 90 capsule; Refill: 2  -     Cancel: HYDROcodone-acetaminophen (NORCO) 7.5-325 MG per tablet; Take 1 tablet by mouth Every 6 (Six) Hours As Needed for Moderate Pain .  Dispense: 30 tablet; Refill: 0  -     naproxen (NAPROSYN) 500 MG tablet; Take 1 tablet by mouth 2 (Two) Times a Day With Meals.  Dispense: 30 tablet; Refill: 2  -     Os-Pipe Calcium + D3 500-200 MG-UNIT tablet; Take 1 tablet by mouth Daily.  Dispense: 60 tablet; Refill: 2  -     pantoprazole (PROTONIX) 40 MG EC tablet; Take 1 tablet by mouth Daily.  Dispense: 30 tablet; Refill: 11  -     Prenatal Vit-Fe Fumarate-FA (prenatal vitamin 27-0.8) 27-0.8 MG tablet tablet; Take 1 tablet by mouth Daily.  -     vitamin D (ERGOCALCIFEROL) 1.25 MG (80558 UT) capsule capsule; Take 1 capsule by mouth 1 (One) Time Per Week.  Dispense: 12 capsule; Refill: 1    NO rx changes. Will continue with current management.       Follow-up:     3 months     Goals:   Goals     •  Pain control       Barriers: severe PAD, pt continues to smoke      •  Quit smoking / using tobacco (pt-stated)       Barriers: compliance with cessation medications          Barriers to goals: none     Health Maintenance   Topic Date Due   • TDAP/TD VACCINES (1 - Tdap) Never done   • ZOSTER VACCINE (1 of 2) Never done   •  Pneumococcal Vaccine 65+ (2 of 2 - PPSV23) 04/05/2019   • DXA SCAN  04/25/2020   • MAMMOGRAM  05/04/2020   • LIPID PANEL  06/22/2021   • INFLUENZA VACCINE  08/01/2021   • ANNUAL WELLNESS VISIT  01/15/2022   • COLORECTAL CANCER SCREENING  07/06/2025   • HEPATITIS C SCREENING  Completed   • COVID-19 Vaccine  Completed       Tobacco: Smoking cessation counseling was provided.  Alcohol: does not drink  Lifestyle: Patient's Body mass index is 19.48 kg/m². BMI is above normal parameters. Recommendations include: exercise counseling and nutrition counseling.   reduce portion size, reduce fast food intake, family to eat at dinner table more often, keep TV off during meals, plan meals and have 3 meals a day    RISK SCORE: 3         Julianna Gloria M.D. PGY3  [facility]  [address] [place] [address]  Office: [phone]    This document has been electronically signed by Julianna Gloria MD on June 4, 2021 14:57 CDT            This document has been electronically signed by Julianna Gloria MD on June 4, 2021 14:57 CDT

## 2021-06-30 NOTE — PROGRESS NOTES
I have reviewed the notes, assessments, and/or procedures performed by *Dr Gloria**during office visit. I concur with her/his documentation and assessment and plan for Jessie Jordan.          This document has been electronically signed by Constantine Bradley MD on June 30, 2021 12:02 CDT

## 2021-07-06 ENCOUNTER — LAB (OUTPATIENT)
Dept: LAB | Facility: HOSPITAL | Age: 71
End: 2021-07-06

## 2021-07-06 ENCOUNTER — OFFICE VISIT (OUTPATIENT)
Dept: GASTROENTEROLOGY | Facility: CLINIC | Age: 71
End: 2021-07-06

## 2021-07-06 VITALS
WEIGHT: 112 LBS | BODY MASS INDEX: 19.12 KG/M2 | SYSTOLIC BLOOD PRESSURE: 120 MMHG | HEART RATE: 79 BPM | HEIGHT: 64 IN | DIASTOLIC BLOOD PRESSURE: 60 MMHG

## 2021-07-06 DIAGNOSIS — D50.0 IRON DEFICIENCY ANEMIA DUE TO CHRONIC BLOOD LOSS: Primary | ICD-10-CM

## 2021-07-06 DIAGNOSIS — R53.83 MALAISE AND FATIGUE: ICD-10-CM

## 2021-07-06 DIAGNOSIS — R53.81 MALAISE AND FATIGUE: ICD-10-CM

## 2021-07-06 LAB
ALBUMIN SERPL-MCNC: 4.3 G/DL (ref 3.5–5.2)
ALBUMIN/GLOB SERPL: 1.7 G/DL
ALP SERPL-CCNC: 88 U/L (ref 39–117)
ALT SERPL W P-5'-P-CCNC: 8 U/L (ref 1–33)
ANION GAP SERPL CALCULATED.3IONS-SCNC: 13.2 MMOL/L (ref 5–15)
AST SERPL-CCNC: 11 U/L (ref 1–32)
BILIRUB SERPL-MCNC: <0.2 MG/DL (ref 0–1.2)
BUN SERPL-MCNC: 13 MG/DL (ref 8–23)
BUN/CREAT SERPL: 16.3 (ref 7–25)
CALCIUM SPEC-SCNC: 9.2 MG/DL (ref 8.6–10.5)
CHLORIDE SERPL-SCNC: 101 MMOL/L (ref 98–107)
CO2 SERPL-SCNC: 23.8 MMOL/L (ref 22–29)
CREAT SERPL-MCNC: 0.8 MG/DL (ref 0.57–1)
DEPRECATED RDW RBC AUTO: 42.1 FL (ref 37–54)
ERYTHROCYTE [DISTWIDTH] IN BLOOD BY AUTOMATED COUNT: 15.4 % (ref 12.3–15.4)
GFR SERPL CREATININE-BSD FRML MDRD: 71 ML/MIN/1.73
GLOBULIN UR ELPH-MCNC: 2.5 GM/DL
GLUCOSE SERPL-MCNC: 87 MG/DL (ref 65–99)
HCT VFR BLD AUTO: 32.2 % (ref 34–46.6)
HGB BLD-MCNC: 9.7 G/DL (ref 12–15.9)
IRON 24H UR-MRATE: 14 MCG/DL (ref 37–145)
IRON SATN MFR SERPL: 3 % (ref 20–50)
MCH RBC QN AUTO: 22.9 PG (ref 26.6–33)
MCHC RBC AUTO-ENTMCNC: 30.1 G/DL (ref 31.5–35.7)
MCV RBC AUTO: 76.1 FL (ref 79–97)
PLATELET # BLD AUTO: 432 10*3/MM3 (ref 140–450)
PMV BLD AUTO: 10.5 FL (ref 6–12)
POTASSIUM SERPL-SCNC: 4.1 MMOL/L (ref 3.5–5.2)
PROT SERPL-MCNC: 6.8 G/DL (ref 6–8.5)
RBC # BLD AUTO: 4.23 10*6/MM3 (ref 3.77–5.28)
SODIUM SERPL-SCNC: 138 MMOL/L (ref 136–145)
TIBC SERPL-MCNC: 513 MCG/DL (ref 298–536)
TRANSFERRIN SERPL-MCNC: 344 MG/DL (ref 200–360)
WBC # BLD AUTO: 9.07 10*3/MM3 (ref 3.4–10.8)

## 2021-07-06 PROCEDURE — 36415 COLL VENOUS BLD VENIPUNCTURE: CPT | Performed by: NURSE PRACTITIONER

## 2021-07-06 PROCEDURE — 83540 ASSAY OF IRON: CPT | Performed by: NURSE PRACTITIONER

## 2021-07-06 PROCEDURE — 99213 OFFICE O/P EST LOW 20 MIN: CPT | Performed by: NURSE PRACTITIONER

## 2021-07-06 PROCEDURE — 80053 COMPREHEN METABOLIC PANEL: CPT | Performed by: NURSE PRACTITIONER

## 2021-07-06 PROCEDURE — 84466 ASSAY OF TRANSFERRIN: CPT | Performed by: NURSE PRACTITIONER

## 2021-07-06 PROCEDURE — 85027 COMPLETE CBC AUTOMATED: CPT | Performed by: NURSE PRACTITIONER

## 2021-07-06 NOTE — PATIENT INSTRUCTIONS
"[facility] (25th ed., pp. 1383-5426). [place], [place]: [employer].\">   Anemia    Anemia is a condition in which there is not enough red blood cells or hemoglobin in the blood. Hemoglobin is a substance in red blood cells that carries oxygen.  When you do not have enough red blood cells or hemoglobin (are anemic), your body cannot get enough oxygen and your organs may not work properly. As a result, you may feel very tired or have other problems.  What are the causes?  Common causes of anemia include:  · Excessive bleeding. Anemia can be caused by excessive bleeding inside or outside the body, including bleeding from the intestines or from heavy menstrual periods in females.  · Poor nutrition.  · Long-lasting (chronic) kidney, thyroid, and liver disease.  · Bone marrow disorders, spleen problems, and blood disorders.  · Cancer and treatments for cancer.  · HIV (human immunodeficiency virus) and AIDS (acquired immunodeficiency syndrome).  · Infections, medicines, and autoimmune disorders that destroy red blood cells.  What are the signs or symptoms?  Symptoms of this condition include:  · Minor weakness.  · Dizziness.  · Headache, or difficulties concentrating and sleeping.  · Heartbeats that feel irregular or faster than normal (palpitations).  · Shortness of breath, especially with exercise.  · Pale skin, lips, and nails, or cold hands and feet.  · Indigestion and nausea.  Symptoms may occur suddenly or develop slowly. If your anemia is mild, you may not have symptoms.  How is this diagnosed?  This condition is diagnosed based on blood tests, your medical history, and a physical exam. In some cases, a test may be needed in which cells are removed from the soft tissue inside of a bone and looked at under a microscope (bone marrow biopsy). Your health care provider may also check your stool (feces) for blood and may do additional testing to look for the cause of your bleeding.  Other tests may " include:  · Imaging tests, such as a CT scan or MRI.  · A procedure to see inside your esophagus and stomach (endoscopy).  · A procedure to see inside your colon and rectum (colonoscopy).  How is this treated?  Treatment for this condition depends on the cause. If you continue to lose a lot of blood, you may need to be treated at a hospital. Treatment may include:  · Taking supplements of iron, vitamin B12, or folic acid.  · Taking a hormone medicine (erythropoietin) that can help to stimulate red blood cell growth.  · Having a blood transfusion. This may be needed if you lose a lot of blood.  · Making changes to your diet.  · Having surgery to remove your spleen.  Follow these instructions at home:  · Take over-the-counter and prescription medicines only as told by your health care provider.  · Take supplements only as told by your health care provider.  · Follow any diet instructions that you were given by your health care provider.  · Keep all follow-up visits as told by your health care provider. This is important.  Contact a health care provider if:  · You develop new bleeding anywhere in the body.  Get help right away if:  · You are very weak.  · You are short of breath.  · You have pain in your abdomen or chest.  · You are dizzy or feel faint.  · You have trouble concentrating.  · You have bloody stools, black stools, or tarry stools.  · You vomit repeatedly or you vomit up blood.  These symptoms may represent a serious problem that is an emergency. Do not wait to see if the symptoms will go away. Get medical help right away. Call your local emergency services (911 in the [place] Do not drive yourself to the hospital.  Summary  · Anemia is a condition in which you do not have enough red blood cells or enough of a substance in your red blood cells that carries oxygen (hemoglobin).  · Symptoms may occur suddenly or develop slowly.  · If your anemia is mild, you may not have symptoms.  · This condition is  diagnosed with blood tests, a medical history, and a physical exam. Other tests may be needed.  · Treatment for this condition depends on the cause of the anemia.  This information is not intended to replace advice given to you by your health care provider. Make sure you discuss any questions you have with your health care provider.  Document Revised: 11/24/2020 Document Reviewed: 11/24/2020  [email or web address] Patient Education © 2021 [employer]

## 2021-07-06 NOTE — PROGRESS NOTES
Chief Complaint   Patient presents with   • Anemia       Subjective    Jessie Jordan is a 70 y.o. female. she is here today for follow-up.    History of Present Illness  70-year-old female presents to discuss anemia.  Reports she had been doing better but has felt more tired and decreased appetite recently has attributed to the heat her weight is up 1 pound from last check denies any melena, hematochezia reports bowel movements are normal for her.  Lab work improved at last check in November EGD and colonoscopy were completed 7/6/2020.  EGD noted nonbleeding gastric erosion colonoscopy was normal.       The following portions of the patient's history were reviewed and updated as appropriate:   Past Medical History:   Diagnosis Date   • Anxiety and depression    • Coronary artery disease involving native coronary artery of native heart 12/06/2017    seen on Cardiac Cath - Left main 50-70% stenosis, 12/27/2017 repeat cath showed coronary spasm with no stenosis   • Hyperlipidemia    • Hypertension    • Kidney cysts    • Post-menopausal 1990   • Skin cancer    • Stroke (CMS/HCC) 2015   • Vascular disease      Past Surgical History:   Procedure Laterality Date   • ARTERIOGRAM N/A 9/28/2018    Procedure: aortoiliac arteriogram possible angioplasty stent atherectomy thrombolysis bilateral iliac stents;  Surgeon: Luan Ruff MD;  Location: [facility] ANGIO INVASIVE LOCATION;  Service: Interventional Radiology   • CARDIAC CATHETERIZATION  12/06/2017    Done at [facility] - Left Main 50-70% Stenosis - no stenting done, recommendation was urgent CABG.  EF 50-60%   • CERVICAL FUSION  1985    C5-6   • COLONOSCOPY N/A 6/29/2018    Procedure: COLONOSCOPY;  Surgeon: Oz Way MD;  Location: [facility] ENDOSCOPY;  Service: Gastroenterology   • COLONOSCOPY N/A 7/6/2020    Procedure: COLONOSCOPY;  Surgeon: Oz Way MD;  Location: [facility] ENDOSCOPY;  Service: Gastroenterology;  Laterality: N/A;   •  ENDOSCOPY N/A 2020    Procedure: ESOPHAGOGASTRODUODENOSCOPY ASAP;  Surgeon: Oz Way MD;  Location: [facility] ENDOSCOPY;  Service: Gastroenterology;  Laterality: N/A;   • FEMORAL POPLITEAL BYPASS Right 2018    Procedure: femoral to femoral artery bypass, RIGHT FEMORAL POPLITEAL BYPASS right lower extremity arteriogram          (C-ARM# AND C-ARM TABLE);  Surgeon: Luan Ruff MD;  Location: [facility] OR;  Service: Vascular   • FINGER SURGERY Left     third finger   • ROTATOR CUFF REPAIR Left 2011    done in [place]     Family History   Problem Relation Age of Onset   • Lung cancer Mother    • Hypertension Mother    • Diabetes Maternal Grandmother    • Heart disease Maternal Grandmother    • Hypertension Maternal Grandfather    • Heart disease Maternal Grandfather    • Heart disease Other    • Hypertension Other    • Cancer Other      OB History        2    Para   2    Term   1            AB        Living   1       SAB        TAB        Ectopic        Molar        Multiple        Live Births                  Prior to Admission medications    Medication Sig Start Date End Date Taking? Authorizing Provider   Calcium Carbonate-Vitamin D (calcium-vitamin D) 500-200 MG-UNIT tablet per tablet Take 1 tablet by mouth. 21  Yes Provider, MD Joe   amLODIPine (NORVASC) 2.5 MG tablet Take 1 tablet by mouth 2 (Two) Times a Day. 21   Julianna Gloria MD   atorvastatin (LIPITOR) 10 MG tablet Take 1 tablet by mouth Daily. 21   Julianna Gloria MD   carbamide peroxide (Debrox) 6.5 % otic solution Administer 5 drops into both ears 2 (Two) Times a Day. 21   Julianna Gloria MD   clopidogrel (PLAVIX) 75 MG tablet Take 1 tablet by mouth Daily. 21   Julianna Gloria MD   Diclofenac Sodium (VOLTAREN) 1 % gel gel Apply 4 g topically to the appropriate area as directed 4 (Four) Times a Day As Needed (pain). 21   Julianna Gloria MD   ferrous sulfate 325 (65 FE) MG tablet Take 1  tablet by mouth Daily With Breakfast. 6/4/21   Julianna Gloria MD   FLUoxetine (PROzac) 20 MG capsule Take 1 capsule by mouth Daily. 6/4/21   Julianna Gloria MD   fluticasone (FLONASE) 50 MCG/ACT nasal spray 2 sprays by Each Nare route Daily. 6/4/21   Julianna Gloria MD   folic acid (FOLVITE) 1 MG tablet Take 1 tablet by mouth Daily. 6/4/21   Julianna Gloria MD   gabapentin (NEURONTIN) 400 MG capsule Take 1 capsule by mouth 3 (Three) Times a Day. 6/22/20   Julianna Gloria MD   HYDROcodone-acetaminophen (NORCO) 7.5-325 MG per tablet Take 1 tablet by mouth Every 6 (Six) Hours As Needed for Moderate Pain . 6/22/20   Julianna Gloria MD   lisinopril (PRINIVIL,ZESTRIL) 10 MG tablet Take 1 tablet by mouth Daily. 6/4/21   Julianna Gloria MD   melatonin ([employer] Melatonin) 3 MG tablet Take 1 tablet by mouth At Night As Needed for Sleep. 6/4/21   Julianna Gloria MD   naproxen (NAPROSYN) 500 MG tablet Take 1 tablet by mouth 2 (Two) Times a Day With Meals. 6/4/21   Julianna Gloria MD   pantoprazole (PROTONIX) 40 MG EC tablet Take 1 tablet by mouth Daily. 6/4/21   Julianna Gloria MD   varenicline (Chantix) 1 MG tablet Take 1 tablet by mouth 2 (Two) Times a Day. 6/4/21   Julianna Gloria MD   vitamin D (ERGOCALCIFEROL) 1.25 MG (38218 UT) capsule capsule Take 1 capsule by mouth 1 (One) Time Per Week. 6/4/21   Julianna Gloria MD   Os-Pipe Calcium + D3 500-200 MG-UNIT tablet Take 1 tablet by mouth Daily. 6/4/21 7/6/21  Julianna Gloria MD   Prenatal Vit-Fe Fumarate-FA (prenatal vitamin 27-0.8) 27-0.8 MG tablet tablet Take 1 tablet by mouth Daily. 6/4/21 7/6/21  Julianna Gloria MD     No Known Allergies  Social History     Socioeconomic History   • Marital status: Single     Spouse name: Not on file   • Number of children: 1   • Years of education: Not on file   • Highest education level: Not on file   Tobacco Use   • Smoking status: Former Smoker     Packs/day: 0.25     Years: 48.00     Pack years: 12.00     Types: Cigarettes     Quit  "date: 2018     Years since quittin.5   • Smokeless tobacco: Never Used   • Tobacco comment: 1-2cig/day   Substance and Sexual Activity   • Alcohol use: Yes     Comment: occasional beer    • Drug use: No   • Sexual activity: Defer     Comment: .       Review of Systems  Review of Systems   Constitutional: Positive for appetite change (decreased due to heat). Negative for activity change, chills, diaphoresis, fatigue, fever and unexpected weight change.   HENT: Negative for sore throat and trouble swallowing.    Respiratory: Negative for shortness of breath.    Gastrointestinal: Negative for abdominal distention, abdominal pain, anal bleeding, blood in stool, constipation, diarrhea, nausea, rectal pain and vomiting.   Musculoskeletal: Negative for arthralgias.   Skin: Negative for pallor.   Neurological: Negative for light-headedness.        /60 (BP Location: Left arm)   Pulse 79   Ht 162.6 cm (64\")   Wt 50.8 kg (112 lb)   BMI 19.22 kg/m²     Objective    Physical Exam  Constitutional:       General: She is not in acute distress.     Appearance: Normal appearance. She is well-developed.   HENT:      Head: Normocephalic and atraumatic.   Neck:      Thyroid: No thyromegaly.   Cardiovascular:      Rate and Rhythm: Normal rate and regular rhythm.      Heart sounds: Normal heart sounds.   Pulmonary:      Effort: Pulmonary effort is normal.      Breath sounds: Normal breath sounds. No wheezing, rhonchi or rales.   Abdominal:      General: Bowel sounds are normal. There is no distension.      Palpations: Abdomen is soft. Abdomen is not rigid.      Tenderness: There is no abdominal tenderness. There is no guarding.      Hernia: No hernia is present.   Musculoskeletal:      Cervical back: Normal range of motion and neck supple.   Lymphadenopathy:      Cervical: No cervical adenopathy.   Skin:     General: Skin is warm and dry.      Coloration: Skin is not pale.      Findings: No rash. "   Neurological:      Mental Status: She is alert and oriented to person, place, and time.   Psychiatric:         Speech: Speech normal.         Behavior: Behavior is cooperative.       Hospital Outpatient Visit on 03/01/2021   Component Date Value Ref Range Status   • RIGHT DORSALIS PEDIS SYS MAX 03/01/2021 152  mmHg Final   • RIGHT POST TIBIAL SYS MAX 03/01/2021 159  mmHg Final   • RIGHT MONTY RATIO 03/01/2021 0.92   Final   • LEFT DORSALIS PEDIS SYS MAX 03/01/2021 139  mmHg Final   • LEFT POST TIBIAL SYS MAX 03/01/2021 140  mmHg Final   • LEFT MONTY RATIO 03/01/2021 0.81   Final   • Upper arterial right arm brachial * 03/01/2021 171  mmHg Final   • Upper arterial left arm brachial s* 03/01/2021 172  mmHg Final     Assessment/Plan      1. Iron deficiency anemia due to chronic blood loss    2. Malaise and fatigue    .   We will recheck lab work today plan capsule endoscopy or repeat EGD if worsening anemia.     Orders placed during this encounter include:  Orders Placed This Encounter   Procedures   • CBC (No Diff)     Order Specific Question:   Release to patient     Answer:   Immediate   • Iron Profile       * Surgery not found *    Review and/or summary of lab tests, radiology, procedures, medications. Review and summary of old records and obtaining of history. The risks and benefits of my recommendations, as well as other treatment options were discussed with the patient today. Questions were answered.    No orders of the defined types were placed in this encounter.      Follow-up: Return in about 3 months (around 10/6/2021) for Recheck.          This document has been electronically signed by HERNAN Phillips on July 6, 2021 10:21 CDT           I spent 15 minutes caring for Jessie on this date of service. This time includes time spent by me in the following activities:preparing for the visit, reviewing tests, obtaining and/or reviewing a separately obtained history, performing a medically appropriate examination  and/or evaluation , counseling and educating the patient/family/caregiver, ordering medications, tests, or procedures, referring and communicating with other health care professionals , documenting information in the medical record and care coordination    Results for orders placed or performed during the hospital encounter of 03/01/21   Doppler Ankle Brachial Index Single Level CAR   Result Value Ref Range    RIGHT DORSALIS PEDIS SYS  mmHg    RIGHT POST TIBIAL SYS  mmHg    RIGHT MONTY RATIO 0.92     LEFT DORSALIS PEDIS SYS  mmHg    LEFT POST TIBIAL SYS  mmHg    LEFT MONTY RATIO 0.81     Upper arterial right arm brachial sys max 171 mmHg    Upper arterial left arm brachial sys max 172 mmHg   Results for orders placed or performed during the hospital encounter of 03/01/21   Duplex Carotid Ultrasound CAR   Result Value Ref Range    Prox CCA .1 cm/sec    Prox CCA PSV 77.2 cm/sec    Prox CCA EDV 18.9 cm/sec    Prox CCA EDV 14.8 cm/sec    Dist CCA PSV 69.4 cm/sec    Dist CCA PSV 66.9 cm/sec    Dist CCA EDV 22.8 cm/sec    Dist CCA EDV 17.1 cm/sec    Prox ECA .8 cm/sec    Prox ECA .9 cm/sec    Prox ECA EDV 17.1 cm/sec    Prox ECA EDV 24.8 cm/sec    Prox ICA .4 cm/sec    Prox ICA PSV 87.7 cm/sec    Prox ICA EDV 33.3 cm/sec    Prox ICA EDV 33.7 cm/sec    Mid ICA PSV 85.5 cm/sec    Mid ICA .4 cm/sec    Mid ICA EDV 26.0 cm/sec    Mid ICA EDV 37.1 cm/sec    Dist ICA .4 cm/sec    Dist ICA .4 cm/sec    Dist ICA EDV 43.0 cm/sec    Dist ICA EDV 33.2 cm/sec    Vertebral A PSV 16.8 cm/sec    Vertebral A EDV 4.7 cm/sec    ICA/CCA ratio 1.9     Vertebral A PSV 69.4 cm/sec    Vertebral A EDV 20.2 cm/sec    ICA/CCA ratio 1.7     Diastolic ICA/CCA Ratio 1.9     ICA/CCA diastolic ratio 1.5    Results for orders placed or performed in visit on 08/13/20   Iron Profile    Specimen: Blood   Result Value Ref Range    Iron 38 37 - 145 mcg/dL    Iron Saturation 9 (L) 20 - 50  %    Transferrin 289 200 - 360 mg/dL    TIBC 431 298 - 536 mcg/dL   CBC (No Diff)    Specimen: Blood   Result Value Ref Range    WBC 9.52 3.40 - 10.80 10*3/mm3    RBC 4.05 3.77 - 5.28 10*6/mm3    Hemoglobin 11.1 (L) 12.0 - 15.9 g/dL    Hematocrit 33.7 (L) 34.0 - 46.6 %    MCV 83.2 79.0 - 97.0 fL    MCH 27.4 26.6 - 33.0 pg    MCHC 32.9 31.5 - 35.7 g/dL    RDW 14.7 12.3 - 15.4 %    RDW-SD 44.5 37.0 - 54.0 fl    MPV 11.8 6.0 - 12.0 fL    Platelets 362 140 - 450 10*3/mm3   Results for orders placed or performed in visit on 07/14/20   Iron Profile    Specimen: Blood   Result Value Ref Range    Iron 33 (L) 37 - 145 mcg/dL    Iron Saturation 7 (L) 20 - 50 %    Transferrin 330 200 - 360 mg/dL    TIBC 492 298 - 536 mcg/dL   CBC (No Diff)    Specimen: Blood   Result Value Ref Range    WBC 11.17 (H) 3.40 - 10.80 10*3/mm3    RBC 5.05 3.77 - 5.28 10*6/mm3    Hemoglobin 10.5 (L) 12.0 - 15.9 g/dL    Hematocrit 35.9 34.0 - 46.6 %    MCV 71.1 (L) 79.0 - 97.0 fL    MCH 20.8 (L) 26.6 - 33.0 pg    MCHC 29.2 (L) 31.5 - 35.7 g/dL    MPV 10.2 6.0 - 12.0 fL    Platelets 562 (H) 140 - 450 10*3/mm3   Results for orders placed or performed during the hospital encounter of 07/06/20   COVID [facility] Priority - Swab, Nasopharynx    Specimen: Nasopharynx; Swab   Result Value Ref Range    COVID [facility] PRIORITY Comment    COVID-19,[facility] ROUTINE, NP/OP SWAB IN TRANSPORT MEDIA OR ESWAB 72 HR TAT - Swab, Nasopharynx    Specimen: Nasopharynx; Swab   Result Value Ref Range    SARS-CoV-2, IDA Not Detected Not Detected   Tissue Pathology Exam    Specimen: A: Gastric, Antrum; Tissue    B: Large Intestine; Tissue    C: Large Intestine; Tissue   Result Value Ref Range    Case Report       Surgical Pathology Report                         Case: [id number]                                  Authorizing Provider:  Oz Way MD        Collected:           07/06/2020 09:55 AM          Ordering Location:     [facility]             Received:             07/06/2020 01:55 PM                                 [facility]                                                     Pathologist:           Rojelio Ballard MD                                                            Specimens:   1) - Gastric, Antrum                                                                                2) - Large Intestine, TI                                                                            3) - Large Intestine, colonic mucosa                                                       Final Diagnosis       See Scanned Report       Results for orders placed or performed in visit on 06/23/20   Scan Slide    Specimen: Blood   Result Value Ref Range    Acanthocytes Slight/1+ None Seen    Anisocytosis Mod/2+ None Seen    Hypochromia Mod/2+ None Seen    Microcytes Mod/2+ None Seen    Poikilocytes Mod/2+ None Seen    Target Cells Mod/2+ None Seen    WBC Morphology Normal Normal    Platelet Morphology Normal Normal   CBC Auto Differential    Specimen: Blood   Result Value Ref Range    WBC 11.22 (H) 3.40 - 10.80 10*3/mm3    RBC 4.37 3.77 - 5.28 10*6/mm3    Hemoglobin 7.5 (L) 12.0 - 15.9 g/dL    Hematocrit 27.1 (L) 34.0 - 46.6 %    MCV 62.0 (L) 79.0 - 97.0 fL    MCH 17.2 (L) 26.6 - 33.0 pg    MCHC 27.7 (L) 31.5 - 35.7 g/dL    RDW 19.9 (H) 12.3 - 15.4 %    RDW-SD 42.3 37.0 - 54.0 fl    MPV 9.6 6.0 - 12.0 fL    Platelets 475 (H) 140 - 450 10*3/mm3    Neutrophil % 65.7 42.7 - 76.0 %    Lymphocyte % 19.3 (L) 19.6 - 45.3 %    Monocyte % 11.6 5.0 - 12.0 %    Eosinophil % 2.4 0.3 - 6.2 %    Basophil % 0.6 0.0 - 1.5 %    Neutrophils, Absolute 7.37 (H) 1.70 - 7.00 10*3/mm3    Lymphocytes, Absolute 2.16 0.70 - 3.10 10*3/mm3    Monocytes, Absolute 1.30 (H) 0.10 - 0.90 10*3/mm3    Eosinophils, Absolute 0.27 0.00 - 0.40 10*3/mm3    Basophils, Absolute 0.07 0.00 - 0.20 10*3/mm3     *Note: Due to a large number of results and/or encounters for the requested time period, some results have not been  displayed. A complete set of results can be found in Results Review.

## 2021-07-09 ENCOUNTER — TELEPHONE (OUTPATIENT)
Dept: GASTROENTEROLOGY | Facility: CLINIC | Age: 71
End: 2021-07-09

## 2021-07-09 NOTE — TELEPHONE ENCOUNTER
Relayed results to patient and moved her appointment up to July 15 at 3:15pm. Patient voiced understanding.       ----- Message from HERNAN Sanchez sent at 7/8/2021  4:18 PM CDT -----  Please call patient with results. Hemoglobin and iron have dropped  Is she taking oral iron? Please move up appointment Id like to plan EGD due to past ulcer.

## 2021-07-15 ENCOUNTER — OFFICE VISIT (OUTPATIENT)
Dept: GASTROENTEROLOGY | Facility: CLINIC | Age: 71
End: 2021-07-15

## 2021-07-15 VITALS
BODY MASS INDEX: 19.33 KG/M2 | DIASTOLIC BLOOD PRESSURE: 65 MMHG | SYSTOLIC BLOOD PRESSURE: 128 MMHG | HEIGHT: 64 IN | WEIGHT: 113.2 LBS | HEART RATE: 74 BPM

## 2021-07-15 DIAGNOSIS — D50.0 IRON DEFICIENCY ANEMIA DUE TO CHRONIC BLOOD LOSS: ICD-10-CM

## 2021-07-15 DIAGNOSIS — K25.7 CHRONIC GASTRIC ULCER WITHOUT HEMORRHAGE AND WITHOUT PERFORATION: Primary | ICD-10-CM

## 2021-07-15 PROCEDURE — 99213 OFFICE O/P EST LOW 20 MIN: CPT | Performed by: NURSE PRACTITIONER

## 2021-07-15 RX ORDER — DEXTROSE AND SODIUM CHLORIDE 5; .45 G/100ML; G/100ML
30 INJECTION, SOLUTION INTRAVENOUS CONTINUOUS PRN
Status: CANCELLED | OUTPATIENT
Start: 2021-08-17

## 2021-07-15 NOTE — PATIENT INSTRUCTIONS
Upper Endoscopy, Adult  Upper endoscopy is a procedure to look inside the upper GI (gastrointestinal) tract. The upper GI tract is made up of:  · The part of the body that moves food from your mouth to your stomach (esophagus).  · The stomach.  · The first part of your small intestine (duodenum).  This procedure is also called esophagogastroduodenoscopy (EGD) or gastroscopy. In this procedure, your health care provider passes a thin, flexible tube (endoscope) through your mouth and down your esophagus into your stomach. A small camera is attached to the end of the tube. Images from the camera appear on a monitor in the exam room. During this procedure, your health care provider may also remove a small piece of tissue to be sent to a lab and examined under a microscope (biopsy).  Your health care provider may do an upper endoscopy to diagnose cancers of the upper GI tract. You may also have this procedure to find the cause of other conditions, such as:  · Stomach pain.  · Heartburn.  · Pain or problems when swallowing.  · Nausea and vomiting.  · Stomach bleeding.  · Stomach ulcers.  Tell a health care provider about:  · Any allergies you have.  · All medicines you are taking, including vitamins, herbs, eye drops, creams, and over-the-counter medicines.  · Any problems you or family members have had with anesthetic medicines.  · Any blood disorders you have.  · Any surgeries you have had.  · Any medical conditions you have.  · Whether you are pregnant or may be pregnant.  What are the risks?  Generally, this is a safe procedure. However, problems may occur, including:  · Infection.  · Bleeding.  · Allergic reactions to medicines.  · A tear or hole (perforation) in the esophagus, stomach, or duodenum.  What happens before the procedure?  Staying hydrated  Follow instructions from your health care provider about hydration, which may include:  · Up to 2 hours before the procedure - you may continue to drink clear  liquids, such as water, clear fruit juice, black coffee, and plain tea.    Eating and drinking restrictions  Follow instructions from your health care provider about eating and drinking, which may include:  · 8 hours before the procedure - stop eating heavy meals or foods, such as meat, fried foods, or fatty foods.  · 6 hours before the procedure - stop eating light meals or foods, such as toast or cereal.  · 6 hours before the procedure - stop drinking milk or drinks that contain milk.  · 2 hours before the procedure - stop drinking clear liquids.  Medicines  Ask your health care provider about:  · Changing or stopping your regular medicines. This is especially important if you are taking diabetes medicines or blood thinners.  · Taking medicines such as aspirin and ibuprofen. These medicines can thin your blood. Do not take these medicines unless your health care provider tells you to take them.  · Taking over-the-counter medicines, vitamins, herbs, and supplements.  General instructions  · Plan to have someone take you home from the hospital or clinic.  · If you will be going home right after the procedure, plan to have someone with you for 24 hours.  · Ask your health care provider what steps will be taken to help prevent infection.  What happens during the procedure?    · An IV will be inserted into one of your veins.  · You may be given one or more of the following:  ? A medicine to help you relax (sedative).  ? A medicine to numb the throat (local anesthetic).  · You will lie on your left side on an exam table.  · Your health care provider will pass the endoscope through your mouth and down your esophagus.  · Your health care provider will use the scope to check the inside of your esophagus, stomach, and duodenum. Biopsies may be taken.  · The endoscope will be removed.  The procedure may vary among health care providers and hospitals.  What happens after the procedure?  · Your blood pressure, heart rate,  breathing rate, and blood oxygen level will be monitored until you leave the hospital or clinic.  · Do not drive for 24 hours if you were given a sedative during your procedure.  · When your throat is no longer numb, you may be given some fluids to drink.  · It is up to you to get the results of your procedure. Ask your health care provider, or the department that is doing the procedure, when your results will be ready.  Summary  · Upper endoscopy is a procedure to look inside the upper GI tract.  · During the procedure, an IV will be inserted into one of your veins. You may be given a medicine to help you relax.  · A medicine will be used to numb your throat.  · The endoscope will be passed through your mouth and down your esophagus.  This information is not intended to replace advice given to you by your health care provider. Make sure you discuss any questions you have with your health care provider.  Document Revised: 06/12/2019 Document Reviewed: 05/20/2019  [employer] Patient Education © 2021 [employer]

## 2021-07-15 NOTE — PROGRESS NOTES
Chief Complaint   Patient presents with   • Anemia       Subjective    Jessie Jordan is a 70 y.o. female. she is here today for follow-up.    70-year-old female presents to discuss worsening anemia she had gastric ulcer identified on EGD November lab work had initially improved we rechecked it last check in hemoglobin dropped to 9.7.  Reports she had stopped taking her oral iron to see if her stool color was back to normal and then she forgot to restart her iron.  Denies any abdominal pain but reports still very tired easily but that is about her baseline.  States bowel movements been regular for her and her stool has been normal color    Anemia  Presents for follow-up visit. Symptoms include malaise/fatigue and pallor. There has been no abdominal pain, anorexia, bruising/bleeding easily, confusion, fever, leg swelling, light-headedness, palpitations, paresthesias, pica or weight loss. Signs of blood loss that are not present include hematemesis, hematochezia and melena.     Plan;  EGD due to history of gastric ulcer measures to control bleeding if needed we will recheck lab work and recommend she restart oral iron.       The following portions of the patient's history were reviewed and updated as appropriate:   Past Medical History:   Diagnosis Date   • Anxiety and depression    • Coronary artery disease involving native coronary artery of native heart 12/06/2017    seen on Cardiac Cath - Left main 50-70% stenosis, 12/27/2017 repeat cath showed coronary spasm with no stenosis   • Hyperlipidemia    • Hypertension    • Kidney cysts    • Post-menopausal 1990   • Skin cancer    • Stroke (CMS/HCC) 2015   • Vascular disease      Past Surgical History:   Procedure Laterality Date   • ARTERIOGRAM N/A 9/28/2018    Procedure: aortoiliac arteriogram possible angioplasty stent atherectomy thrombolysis bilateral iliac stents;  Surgeon: Luan Ruff MD;  Location: [facility] ANGIO INVASIVE LOCATION;  Service:  Interventional Radiology   • CARDIAC CATHETERIZATION  2017    Done at [facility] - Left Main 50-70% Stenosis - no stenting done, recommendation was urgent CABG.  EF 50-60%   • CERVICAL FUSION      C5-6   • COLONOSCOPY N/A 2018    Procedure: COLONOSCOPY;  Surgeon: Oz Way MD;  Location: [facility] ENDOSCOPY;  Service: Gastroenterology   • COLONOSCOPY N/A 2020    Procedure: COLONOSCOPY;  Surgeon: Oz Way MD;  Location: [facility] ENDOSCOPY;  Service: Gastroenterology;  Laterality: N/A;   • ENDOSCOPY N/A 2020    Procedure: ESOPHAGOGASTRODUODENOSCOPY ASAP;  Surgeon: Oz Way MD;  Location: [facility] ENDOSCOPY;  Service: Gastroenterology;  Laterality: N/A;   • FEMORAL POPLITEAL BYPASS Right 2018    Procedure: femoral to femoral artery bypass, RIGHT FEMORAL POPLITEAL BYPASS right lower extremity arteriogram          (C-ARM# AND C-ARM TABLE);  Surgeon: Luan Ruff MD;  Location: [facility] OR;  Service: Vascular   • FINGER SURGERY Left     third finger   • ROTATOR CUFF REPAIR Left 2011    done in [place]     Family History   Problem Relation Age of Onset   • Lung cancer Mother    • Hypertension Mother    • Diabetes Maternal Grandmother    • Heart disease Maternal Grandmother    • Hypertension Maternal Grandfather    • Heart disease Maternal Grandfather    • Heart disease Other    • Hypertension Other    • Cancer Other      OB History        2    Para   2    Term   1            AB        Living   1       SAB        TAB        Ectopic        Molar        Multiple        Live Births                  Prior to Admission medications    Medication Sig Start Date End Date Taking? Authorizing Provider   amLODIPine (NORVASC) 2.5 MG tablet Take 1 tablet by mouth 2 (Two) Times a Day. 21  Yes Julianna Gloria MD   atorvastatin (LIPITOR) 10 MG tablet Take 1 tablet by mouth Daily. 21  Yes Julianna Gloria MD   Calcium Carbonate-Vitamin D  (calcium-vitamin D) 500-200 MG-UNIT tablet per tablet Take 1 tablet by mouth. 6/4/21  Yes Joe Dixon MD   carbamide peroxide (Debrox) 6.5 % otic solution Administer 5 drops into both ears 2 (Two) Times a Day. 6/4/21  Yes Julianna Gloria MD   clopidogrel (PLAVIX) 75 MG tablet Take 1 tablet by mouth Daily. 6/4/21  Yes Julianna Gloria MD   Diclofenac Sodium (VOLTAREN) 1 % gel gel Apply 4 g topically to the appropriate area as directed 4 (Four) Times a Day As Needed (pain). 6/4/21  Yes Julianna Gloria MD   ferrous sulfate 325 (65 FE) MG tablet Take 1 tablet by mouth Daily With Breakfast. 6/4/21  Yes Julianna Gloria MD   FLUoxetine (PROzac) 20 MG capsule Take 1 capsule by mouth Daily. 6/4/21  Yes Julianna Gloria MD   fluticasone (FLONASE) 50 MCG/ACT nasal spray 2 sprays by Each Nare route Daily. 6/4/21  Yes Julianna Gloria MD   folic acid (FOLVITE) 1 MG tablet Take 1 tablet by mouth Daily. 6/4/21  Yes Julianna Gloria MD   gabapentin (NEURONTIN) 400 MG capsule Take 1 capsule by mouth 3 (Three) Times a Day. 6/22/20  Yes Julianna Gloria MD   HYDROcodone-acetaminophen (NORCO) 7.5-325 MG per tablet Take 1 tablet by mouth Every 6 (Six) Hours As Needed for Moderate Pain . 6/22/20  Yes Julianna Gloria MD   lisinopril (PRINIVIL,ZESTRIL) 10 MG tablet Take 1 tablet by mouth Daily. 6/4/21  Yes Julianna Gloria MD   melatonin ([employer] Melatonin) 3 MG tablet Take 1 tablet by mouth At Night As Needed for Sleep. 6/4/21  Yes Julianna Gloria MD   naproxen (NAPROSYN) 500 MG tablet Take 1 tablet by mouth 2 (Two) Times a Day With Meals. 6/4/21  Yes Julianna Gloria MD   pantoprazole (PROTONIX) 40 MG EC tablet Take 1 tablet by mouth Daily. 6/4/21  Yes Julianna Gloria MD   varenicline (Chantix) 1 MG tablet Take 1 tablet by mouth 2 (Two) Times a Day. 6/4/21  Yes Julianna Gloria MD   vitamin D (ERGOCALCIFEROL) 1.25 MG (78912 UT) capsule capsule Take 1 capsule by mouth 1 (One) Time Per Week. 6/4/21  Yes Julianna Gloria MD     No Known  "Allergies  Social History     Socioeconomic History   • Marital status: Single     Spouse name: Not on file   • Number of children: 1   • Years of education: Not on file   • Highest education level: Not on file   Tobacco Use   • Smoking status: Former Smoker     Packs/day: 0.25     Years: 48.00     Pack years: 12.00     Types: Cigarettes     Quit date: 2018     Years since quittin.5   • Smokeless tobacco: Never Used   • Tobacco comment: 1-2cig/day   Substance and Sexual Activity   • Alcohol use: Yes     Comment: occasional beer    • Drug use: No   • Sexual activity: Defer     Comment: .       Review of Systems  Review of Systems   Constitutional: Positive for appetite change (decreased appetite and full easily ), fatigue and malaise/fatigue. Negative for activity change, chills, diaphoresis, fever, unexpected weight change and weight loss.   HENT: Negative for sore throat and trouble swallowing.    Respiratory: Negative for shortness of breath.    Cardiovascular: Negative for palpitations.   Gastrointestinal: Negative for abdominal distention, abdominal pain, anal bleeding, anorexia, blood in stool, constipation, diarrhea, hematemesis, hematochezia, melena, nausea, rectal pain and vomiting.   Musculoskeletal: Negative for arthralgias.   Skin: Positive for pallor.   Neurological: Negative for light-headedness and paresthesias.   Hematological: Does not bruise/bleed easily.   Psychiatric/Behavioral: Negative for confusion.        /65 (BP Location: Left arm)   Pulse 74   Ht 162.6 cm (64\")   Wt 51.3 kg (113 lb 3.2 oz)   BMI 19.43 kg/m²     Objective    Physical Exam  Constitutional:       Appearance: Normal appearance. She is normal weight.   Pulmonary:      Effort: Pulmonary effort is normal.   Abdominal:      General: Bowel sounds are normal. There is no distension.      Palpations: Abdomen is soft.      Tenderness: There is no abdominal tenderness.   Skin:     Coloration: Skin is pale. "   Neurological:      Mental Status: She is alert.       Office Visit on 07/06/2021   Component Date Value Ref Range Status   • WBC 07/06/2021 9.07  3.40 - 10.80 10*3/mm3 Final   • RBC 07/06/2021 4.23  3.77 - 5.28 10*6/mm3 Final   • Hemoglobin 07/06/2021 9.7* 12.0 - 15.9 g/dL Final   • Hematocrit 07/06/2021 32.2* 34.0 - 46.6 % Final   • MCV 07/06/2021 76.1* 79.0 - 97.0 fL Final   • MCH 07/06/2021 22.9* 26.6 - 33.0 pg Final   • MCHC 07/06/2021 30.1* 31.5 - 35.7 g/dL Final   • RDW 07/06/2021 15.4  12.3 - 15.4 % Final   • RDW-SD 07/06/2021 42.1  37.0 - 54.0 fl Final   • MPV 07/06/2021 10.5  6.0 - 12.0 fL Final   • Platelets 07/06/2021 432  140 - 450 10*3/mm3 Final   • Iron 07/06/2021 14* 37 - 145 mcg/dL Final   • Iron Saturation 07/06/2021 3* 20 - 50 % Final   • Transferrin 07/06/2021 344  200 - 360 mg/dL Final   • TIBC 07/06/2021 513  298 - 536 mcg/dL Final   • Glucose 07/06/2021 87  65 - 99 mg/dL Final   • BUN 07/06/2021 13  8 - 23 mg/dL Final   • Creatinine 07/06/2021 0.80  0.57 - 1.00 mg/dL Final   • Sodium 07/06/2021 138  136 - 145 mmol/L Final   • Potassium 07/06/2021 4.1  3.5 - 5.2 mmol/L Final   • Chloride 07/06/2021 101  98 - 107 mmol/L Final   • CO2 07/06/2021 23.8  22.0 - 29.0 mmol/L Final   • Calcium 07/06/2021 9.2  8.6 - 10.5 mg/dL Final   • Total Protein 07/06/2021 6.8  6.0 - 8.5 g/dL Final   • Albumin 07/06/2021 4.30  3.50 - 5.20 g/dL Final   • ALT (SGPT) 07/06/2021 8  1 - 33 U/L Final   • AST (SGOT) 07/06/2021 11  1 - 32 U/L Final   • Alkaline Phosphatase 07/06/2021 88  39 - 117 U/L Final   • Total Bilirubin 07/06/2021 <0.2  0.0 - 1.2 mg/dL Final   • eGFR Non  Amer 07/06/2021 71  >60 mL/min/1.73 Final   • Globulin 07/06/2021 2.5  gm/dL Final   • A/G Ratio 07/06/2021 1.7  g/dL Final   • BUN/Creatinine Ratio 07/06/2021 16.3  7.0 - 25.0 Final   • Anion Gap 07/06/2021 13.2  5.0 - 15.0 mmol/L Final     Assessment/Plan      1. Chronic gastric ulcer without hemorrhage and without perforation    2. Iron  deficiency anemia due to chronic blood loss    .   Discussed with patient if symptoms persist or worsen she should seek emergency medical attention.  They verbalized understanding    Orders placed during this encounter include:  Orders Placed This Encounter   Procedures   • CBC (No Diff)     Standing Status:   Future     Standing Expiration Date:   7/15/2022     Order Specific Question:   Release to patient     Answer:   Immediate   • Iron Profile     Standing Status:   Future     Standing Expiration Date:   7/15/2022   • Follow Anesthesia Guidelines / Standing Orders     Standing Status:   Future   • Obtain Informed Consent     Standing Status:   Future     Order Specific Question:   Informed Consent Given For     Answer:   ESOPHAGOGASTRODUODENOSCOPY possible dilation       ESOPHAGOGASTRODUODENOSCOPY possible dilation (N/A)    Review and/or summary of lab tests, radiology, procedures, medications. Review and summary of old records and obtaining of history. The risks and benefits of my recommendations, as well as other treatment options were discussed with the patient today. Questions were answered.    No orders of the defined types were placed in this encounter.      Follow-up: No follow-ups on file.          This document has been electronically signed by HERNAN Phillips on July 15, 2021 15:36 CDT           I spent 16 minutes caring for Jessie on this date of service. This time includes time spent by me in the following activities:preparing for the visit, reviewing tests, obtaining and/or reviewing a separately obtained history, performing a medically appropriate examination and/or evaluation , counseling and educating the patient/family/caregiver, ordering medications, tests, or procedures, referring and communicating with other health care professionals , documenting information in the medical record and care coordination    Results for orders placed or performed in visit on 07/06/21   Iron Profile     Specimen: Blood   Result Value Ref Range    Iron 14 (L) 37 - 145 mcg/dL    Iron Saturation 3 (L) 20 - 50 %    Transferrin 344 200 - 360 mg/dL    TIBC 513 298 - 536 mcg/dL   CBC (No Diff)    Specimen: Blood   Result Value Ref Range    WBC 9.07 3.40 - 10.80 10*3/mm3    RBC 4.23 3.77 - 5.28 10*6/mm3    Hemoglobin 9.7 (L) 12.0 - 15.9 g/dL    Hematocrit 32.2 (L) 34.0 - 46.6 %    MCV 76.1 (L) 79.0 - 97.0 fL    MCH 22.9 (L) 26.6 - 33.0 pg    MCHC 30.1 (L) 31.5 - 35.7 g/dL    RDW 15.4 12.3 - 15.4 %    RDW-SD 42.1 37.0 - 54.0 fl    MPV 10.5 6.0 - 12.0 fL    Platelets 432 140 - 450 10*3/mm3   Comprehensive Metabolic Panel    Specimen: Blood   Result Value Ref Range    Glucose 87 65 - 99 mg/dL    BUN 13 8 - 23 mg/dL    Creatinine 0.80 0.57 - 1.00 mg/dL    Sodium 138 136 - 145 mmol/L    Potassium 4.1 3.5 - 5.2 mmol/L    Chloride 101 98 - 107 mmol/L    CO2 23.8 22.0 - 29.0 mmol/L    Calcium 9.2 8.6 - 10.5 mg/dL    Total Protein 6.8 6.0 - 8.5 g/dL    Albumin 4.30 3.50 - 5.20 g/dL    ALT (SGPT) 8 1 - 33 U/L    AST (SGOT) 11 1 - 32 U/L    Alkaline Phosphatase 88 39 - 117 U/L    Total Bilirubin <0.2 0.0 - 1.2 mg/dL    eGFR Non African Amer 71 >60 mL/min/1.73    Globulin 2.5 gm/dL    A/G Ratio 1.7 g/dL    BUN/Creatinine Ratio 16.3 7.0 - 25.0    Anion Gap 13.2 5.0 - 15.0 mmol/L   Results for orders placed or performed during the hospital encounter of 03/01/21   Doppler Ankle Brachial Index Single Level CAR   Result Value Ref Range    RIGHT DORSALIS PEDIS SYS  mmHg    RIGHT POST TIBIAL SYS  mmHg    RIGHT MONTY RATIO 0.92     LEFT DORSALIS PEDIS SYS  mmHg    LEFT POST TIBIAL SYS  mmHg    LEFT MONTY RATIO 0.81     Upper arterial right arm brachial sys max 171 mmHg    Upper arterial left arm brachial sys max 172 mmHg   Results for orders placed or performed during the hospital encounter of 03/01/21   Duplex Carotid Ultrasound CAR   Result Value Ref Range    Prox CCA .1 cm/sec    Prox CCA PSV 77.2 cm/sec     Prox CCA EDV 18.9 cm/sec    Prox CCA EDV 14.8 cm/sec    Dist CCA PSV 69.4 cm/sec    Dist CCA PSV 66.9 cm/sec    Dist CCA EDV 22.8 cm/sec    Dist CCA EDV 17.1 cm/sec    Prox ECA .8 cm/sec    Prox ECA .9 cm/sec    Prox ECA EDV 17.1 cm/sec    Prox ECA EDV 24.8 cm/sec    Prox ICA .4 cm/sec    Prox ICA PSV 87.7 cm/sec    Prox ICA EDV 33.3 cm/sec    Prox ICA EDV 33.7 cm/sec    Mid ICA PSV 85.5 cm/sec    Mid ICA .4 cm/sec    Mid ICA EDV 26.0 cm/sec    Mid ICA EDV 37.1 cm/sec    Dist ICA .4 cm/sec    Dist ICA .4 cm/sec    Dist ICA EDV 43.0 cm/sec    Dist ICA EDV 33.2 cm/sec    Vertebral A PSV 16.8 cm/sec    Vertebral A EDV 4.7 cm/sec    ICA/CCA ratio 1.9     Vertebral A PSV 69.4 cm/sec    Vertebral A EDV 20.2 cm/sec    ICA/CCA ratio 1.7     Diastolic ICA/CCA Ratio 1.9     ICA/CCA diastolic ratio 1.5    Results for orders placed or performed in visit on 08/13/20   Iron Profile    Specimen: Blood   Result Value Ref Range    Iron 38 37 - 145 mcg/dL    Iron Saturation 9 (L) 20 - 50 %    Transferrin 289 200 - 360 mg/dL    TIBC 431 298 - 536 mcg/dL   CBC (No Diff)    Specimen: Blood   Result Value Ref Range    WBC 9.52 3.40 - 10.80 10*3/mm3    RBC 4.05 3.77 - 5.28 10*6/mm3    Hemoglobin 11.1 (L) 12.0 - 15.9 g/dL    Hematocrit 33.7 (L) 34.0 - 46.6 %    MCV 83.2 79.0 - 97.0 fL    MCH 27.4 26.6 - 33.0 pg    MCHC 32.9 31.5 - 35.7 g/dL    RDW 14.7 12.3 - 15.4 %    RDW-SD 44.5 37.0 - 54.0 fl    MPV 11.8 6.0 - 12.0 fL    Platelets 362 140 - 450 10*3/mm3   Results for orders placed or performed in visit on 07/14/20   Iron Profile    Specimen: Blood   Result Value Ref Range    Iron 33 (L) 37 - 145 mcg/dL    Iron Saturation 7 (L) 20 - 50 %    Transferrin 330 200 - 360 mg/dL    TIBC 492 298 - 536 mcg/dL   CBC (No Diff)    Specimen: Blood   Result Value Ref Range    WBC 11.17 (H) 3.40 - 10.80 10*3/mm3    RBC 5.05 3.77 - 5.28 10*6/mm3    Hemoglobin 10.5 (L) 12.0 - 15.9 g/dL    Hematocrit 35.9 34.0 -  46.6 %    MCV 71.1 (L) 79.0 - 97.0 fL    MCH 20.8 (L) 26.6 - 33.0 pg    MCHC 29.2 (L) 31.5 - 35.7 g/dL    MPV 10.2 6.0 - 12.0 fL    Platelets 562 (H) 140 - 450 10*3/mm3     *Note: Due to a large number of results and/or encounters for the requested time period, some results have not been displayed. A complete set of results can be found in Results Review.

## 2021-08-16 ENCOUNTER — LAB (OUTPATIENT)
Dept: LAB | Facility: HOSPITAL | Age: 71
End: 2021-08-16

## 2021-08-16 DIAGNOSIS — Z01.818 PREOP TESTING: Primary | ICD-10-CM

## 2021-08-16 LAB — SARS-COV-2 N GENE RESP QL NAA+PROBE: NOT DETECTED

## 2021-08-16 PROCEDURE — 87635 SARS-COV-2 COVID-19 AMP PRB: CPT

## 2021-08-16 PROCEDURE — C9803 HOPD COVID-19 SPEC COLLECT: HCPCS

## 2021-08-17 ENCOUNTER — ANESTHESIA EVENT (OUTPATIENT)
Dept: GASTROENTEROLOGY | Facility: HOSPITAL | Age: 71
End: 2021-08-17

## 2021-08-17 ENCOUNTER — HOSPITAL ENCOUNTER (OUTPATIENT)
Facility: HOSPITAL | Age: 71
Setting detail: HOSPITAL OUTPATIENT SURGERY
Discharge: HOME | End: 2021-08-17
Attending: INTERNAL MEDICINE | Admitting: INTERNAL MEDICINE

## 2021-08-17 ENCOUNTER — ANESTHESIA (OUTPATIENT)
Dept: GASTROENTEROLOGY | Facility: HOSPITAL | Age: 71
End: 2021-08-17

## 2021-08-17 VITALS
WEIGHT: 113 LBS | TEMPERATURE: 97.8 F | HEIGHT: 64 IN | DIASTOLIC BLOOD PRESSURE: 88 MMHG | SYSTOLIC BLOOD PRESSURE: 164 MMHG | RESPIRATION RATE: 20 BRPM | HEART RATE: 61 BPM | BODY MASS INDEX: 19.29 KG/M2 | OXYGEN SATURATION: 97 %

## 2021-08-17 DIAGNOSIS — D50.0 IRON DEFICIENCY ANEMIA DUE TO CHRONIC BLOOD LOSS: ICD-10-CM

## 2021-08-17 DIAGNOSIS — K25.7 CHRONIC GASTRIC ULCER WITHOUT HEMORRHAGE AND WITHOUT PERFORATION: ICD-10-CM

## 2021-08-17 PROCEDURE — 43239 EGD BIOPSY SINGLE/MULTIPLE: CPT | Performed by: INTERNAL MEDICINE

## 2021-08-17 PROCEDURE — 88305 TISSUE EXAM BY PATHOLOGIST: CPT

## 2021-08-17 PROCEDURE — 25010000002 PROPOFOL 10 MG/ML EMULSION: Performed by: NURSE ANESTHETIST, CERTIFIED REGISTERED

## 2021-08-17 RX ORDER — PROMETHAZINE HYDROCHLORIDE 25 MG/1
25 TABLET ORAL ONCE AS NEEDED
Status: DISCONTINUED | OUTPATIENT
Start: 2021-08-17 | End: 2021-08-17 | Stop reason: HOSPADM

## 2021-08-17 RX ORDER — ONDANSETRON 2 MG/ML
4 INJECTION INTRAMUSCULAR; INTRAVENOUS ONCE AS NEEDED
Status: DISCONTINUED | OUTPATIENT
Start: 2021-08-17 | End: 2021-08-17 | Stop reason: HOSPADM

## 2021-08-17 RX ORDER — PROPOFOL 10 MG/ML
VIAL (ML) INTRAVENOUS AS NEEDED
Status: DISCONTINUED | OUTPATIENT
Start: 2021-08-17 | End: 2021-08-17 | Stop reason: SURG

## 2021-08-17 RX ORDER — MEPERIDINE HYDROCHLORIDE 25 MG/ML
12.5 INJECTION INTRAMUSCULAR; INTRAVENOUS; SUBCUTANEOUS
Status: DISCONTINUED | OUTPATIENT
Start: 2021-08-17 | End: 2021-08-17 | Stop reason: HOSPADM

## 2021-08-17 RX ORDER — PROMETHAZINE HYDROCHLORIDE 25 MG/1
25 SUPPOSITORY RECTAL ONCE AS NEEDED
Status: DISCONTINUED | OUTPATIENT
Start: 2021-08-17 | End: 2021-08-17 | Stop reason: HOSPADM

## 2021-08-17 RX ORDER — DEXTROSE AND SODIUM CHLORIDE 5; .45 G/100ML; G/100ML
30 INJECTION, SOLUTION INTRAVENOUS CONTINUOUS PRN
Status: DISCONTINUED | OUTPATIENT
Start: 2021-08-17 | End: 2021-08-17 | Stop reason: HOSPADM

## 2021-08-17 RX ORDER — LIDOCAINE HYDROCHLORIDE 20 MG/ML
INJECTION, SOLUTION INTRAVENOUS AS NEEDED
Status: DISCONTINUED | OUTPATIENT
Start: 2021-08-17 | End: 2021-08-17 | Stop reason: SURG

## 2021-08-17 RX ADMIN — PROPOFOL 80 MG: 10 INJECTION, EMULSION INTRAVENOUS at 12:53

## 2021-08-17 RX ADMIN — LIDOCAINE HYDROCHLORIDE 100 MG: 20 INJECTION, SOLUTION INTRAVENOUS at 12:53

## 2021-08-17 RX ADMIN — DEXTROSE AND SODIUM CHLORIDE 30 ML/HR: 5; 450 INJECTION, SOLUTION INTRAVENOUS at 12:16

## 2021-08-17 NOTE — ANESTHESIA PREPROCEDURE EVALUATION
Anesthesia Evaluation     NPO Solid Status: > 8 hours  NPO Liquid Status: > 8 hours           Airway   Mallampati: II  Dental - normal exam     Pulmonary - normal exam   (+) shortness of breath,   Cardiovascular - normal exam    (+) hypertension, CAD, PVD, hyperlipidemia,  carotid artery disease      Neuro/Psych  (+) CVA, psychiatric history,     GI/Hepatic/Renal/Endo    (+)  GERD, PUD,  renal disease,     Musculoskeletal     Abdominal    Substance History      OB/GYN          Other      history of cancer                      Anesthesia Plan    ASA 3     MAC     intravenous induction     Anesthetic plan, all risks, benefits, and alternatives have been provided, discussed and informed consent has been obtained with: patient.

## 2021-08-17 NOTE — ANESTHESIA POSTPROCEDURE EVALUATION
Patient: Jessie Jordan    Procedure Summary     Date: 08/17/21 Room / Location: [facility] ENDOSCOPY 1 / [facility] ENDOSCOPY    Anesthesia Start: 1251 Anesthesia Stop: 1258    Procedure: ESOPHAGOGASTRODUODENOSCOPY possible dilation (N/A ) Diagnosis:       Chronic gastric ulcer without hemorrhage and without perforation      Iron deficiency anemia due to chronic blood loss      (Chronic gastric ulcer without hemorrhage and without perforation [K25.7])      (Iron deficiency anemia due to chronic blood loss [D50.0])    Surgeons: Oz Way MD Provider: Max Mir CRNA    Anesthesia Type: MAC ASA Status: 3          Anesthesia Type: MAC    Vitals  No vitals data found for the desired time range.          Post Anesthesia Care and Evaluation    Patient location during evaluation: bedside  Patient participation: complete - patient cannot participate  Level of consciousness: awake  Pain score: 0  Pain management: adequate  Airway patency: patent  Anesthetic complications: No anesthetic complications  PONV Status: none  Cardiovascular status: acceptable  Respiratory status: acceptable  Hydration status: acceptable

## 2021-08-17 NOTE — H&P
No chief complaint on file.      Subjective    Jessie Jordan is a 70 y.o. female. she is here today for follow-up.    70-year-old female presents to discuss worsening anemia she had gastric ulcer identified on EGD November lab work had initially improved we rechecked it last check in hemoglobin dropped to 9.7.  Reports she had stopped taking her oral iron to see if her stool color was back to normal and then she forgot to restart her iron.  Denies any abdominal pain but reports still very tired easily but that is about her baseline.  States bowel movements been regular for her and her stool has been normal color    Anemia  Presents for follow-up visit. Symptoms include malaise/fatigue and pallor. There has been no abdominal pain, anorexia, bruising/bleeding easily, confusion, fever, leg swelling, light-headedness, palpitations, paresthesias, pica or weight loss. Signs of blood loss that are not present include hematemesis, hematochezia and melena.     Plan;  EGD due to history of gastric ulcer measures to control bleeding if needed we will recheck lab work and recommend she restart oral iron.       The following portions of the patient's history were reviewed and updated as appropriate:   Past Medical History:   Diagnosis Date   • Anxiety and depression    • Coronary artery disease involving native coronary artery of native heart 12/06/2017    seen on Cardiac Cath - Left main 50-70% stenosis, 12/27/2017 repeat cath showed coronary spasm with no stenosis   • Hyperlipidemia    • Hypertension    • Kidney cysts    • Post-menopausal 1990   • Skin cancer    • Stroke (CMS/HCC) 2015   • Vascular disease      Past Surgical History:   Procedure Laterality Date   • ARTERIOGRAM N/A 9/28/2018    Procedure: aortoiliac arteriogram possible angioplasty stent atherectomy thrombolysis bilateral iliac stents;  Surgeon: Luan Ruff MD;  Location: [facility] LOC[facility];  Service: Interventional Radiology   •  CARDIAC CATHETERIZATION  2017    Done at [facility] - Left Main 50-70% Stenosis - no stenting done, recommendation was urgent CABG.  EF 50-60%   • CERVICAL FUSION  1985    C5-6   • COLONOSCOPY N/A 2018    Procedure: COLONOSCOPY;  Surgeon: Oz Way MD;  Location: [facility] ENDOSCOPY;  Service: Gastroenterology   • COLONOSCOPY N/A 2020    Procedure: COLONOSCOPY;  Surgeon: Oz Way MD;  Location: [facility] ENDOSCOPY;  Service: Gastroenterology;  Laterality: N/A;   • ENDOSCOPY N/A 2020    Procedure: ESOPHAGOGASTRODUODENOSCOPY ASAP;  Surgeon: Oz Way MD;  Location: [facility] ENDOSCOPY;  Service: Gastroenterology;  Laterality: N/A;   • FEMORAL POPLITEAL BYPASS Right 2018    Procedure: femoral to femoral artery bypass, RIGHT FEMORAL POPLITEAL BYPASS right lower extremity arteriogram          (C-ARM# AND C-ARM TABLE);  Surgeon: Luan Ruff MD;  Location: [facility] OR;  Service: Vascular   • FINGER SURGERY Left     third finger   • ROTATOR CUFF REPAIR Left 2011    done in [place]     Family History   Problem Relation Age of Onset   • Lung cancer Mother    • Hypertension Mother    • Diabetes Maternal Grandmother    • Heart disease Maternal Grandmother    • Hypertension Maternal Grandfather    • Heart disease Maternal Grandfather    • Heart disease Other    • Hypertension Other    • Cancer Other      OB History        2    Para   2    Term   1            AB        Living   1       SAB        TAB        Ectopic        Molar        Multiple        Live Births                  Prior to Admission medications    Medication Sig Start Date End Date Taking? Authorizing Provider   amLODIPine (NORVASC) 2.5 MG tablet Take 1 tablet by mouth 2 (Two) Times a Day. 21  Yes Julianna Gloria MD   atorvastatin (LIPITOR) 10 MG tablet Take 1 tablet by mouth Daily. 21  Yes Julianna Gloria MD   Calcium Carbonate-Vitamin D (calcium-vitamin D) 500-200 MG-UNIT tablet  per tablet Take 1 tablet by mouth. 6/4/21  Yes Joe Dixon MD   carbamide peroxide (Debrox) 6.5 % otic solution Administer 5 drops into both ears 2 (Two) Times a Day. 6/4/21  Yes Julianna Gloria MD   clopidogrel (PLAVIX) 75 MG tablet Take 1 tablet by mouth Daily. 6/4/21  Yes Julianna Gloria MD   Diclofenac Sodium (VOLTAREN) 1 % gel gel Apply 4 g topically to the appropriate area as directed 4 (Four) Times a Day As Needed (pain). 6/4/21  Yes Julianna Gloria MD   ferrous sulfate 325 (65 FE) MG tablet Take 1 tablet by mouth Daily With Breakfast. 6/4/21  Yes Julianna Gloria MD   FLUoxetine (PROzac) 20 MG capsule Take 1 capsule by mouth Daily. 6/4/21  Yes Julianna Gloria MD   fluticasone (FLONASE) 50 MCG/ACT nasal spray 2 sprays by Each Nare route Daily. 6/4/21  Yes Julianna Gloria MD   folic acid (FOLVITE) 1 MG tablet Take 1 tablet by mouth Daily. 6/4/21  Yes Julianna Gloria MD   gabapentin (NEURONTIN) 400 MG capsule Take 1 capsule by mouth 3 (Three) Times a Day. 6/22/20  Yes Julianna Gloria MD   HYDROcodone-acetaminophen (NORCO) 7.5-325 MG per tablet Take 1 tablet by mouth Every 6 (Six) Hours As Needed for Moderate Pain . 6/22/20  Yes Julianna Gloria MD   lisinopril (PRINIVIL,ZESTRIL) 10 MG tablet Take 1 tablet by mouth Daily. 6/4/21  Yes Julianna Gloria MD   melatonin ([employer] Melatonin) 3 MG tablet Take 1 tablet by mouth At Night As Needed for Sleep. 6/4/21  Yes Julianna Gloria MD   naproxen (NAPROSYN) 500 MG tablet Take 1 tablet by mouth 2 (Two) Times a Day With Meals. 6/4/21  Yes Julianna Gloria MD   pantoprazole (PROTONIX) 40 MG EC tablet Take 1 tablet by mouth Daily. 6/4/21  Yes Julianna Gloria MD   varenicline (Chantix) 1 MG tablet Take 1 tablet by mouth 2 (Two) Times a Day. 6/4/21  Yes Julianna Gloria MD   vitamin D (ERGOCALCIFEROL) 1.25 MG (44704 UT) capsule capsule Take 1 capsule by mouth 1 (One) Time Per Week. 6/4/21  Yes Julianna Gloria MD     No Known Allergies  Social History     Socioeconomic  "History   • Marital status:      Spouse name: Not on file   • Number of children: 1   • Years of education: Not on file   • Highest education level: Not on file   Tobacco Use   • Smoking status: Light Tobacco Smoker     Packs/day: 0.25     Years: 48.00     Pack years: 12.00     Types: Cigarettes     Last attempt to quit: 2018     Years since quittin.6   • Smokeless tobacco: Never Used   • Tobacco comment: 1-2cig/day   Vaping Use   • Vaping Use: Never used   Substance and Sexual Activity   • Alcohol use: Yes     Comment: occasional beer    • Drug use: No   • Sexual activity: Defer     Comment: .       Review of Systems  Review of Systems   Constitutional: Positive for appetite change (decreased appetite and full easily ), fatigue and malaise/fatigue. Negative for activity change, chills, diaphoresis, fever, unexpected weight change and weight loss.   HENT: Negative for sore throat and trouble swallowing.    Respiratory: Negative for shortness of breath.    Cardiovascular: Negative for palpitations.   Gastrointestinal: Negative for abdominal distention, abdominal pain, anal bleeding, anorexia, blood in stool, constipation, diarrhea, hematemesis, hematochezia, melena, nausea, rectal pain and vomiting.   Musculoskeletal: Negative for arthralgias.   Skin: Positive for pallor.   Neurological: Negative for light-headedness and paresthesias.   Hematological: Does not bruise/bleed easily.   Psychiatric/Behavioral: Negative for confusion.        Ht 162.6 cm (64\")   Wt 51.3 kg (113 lb)   BMI 19.40 kg/m²     Objective    Physical Exam  Constitutional:       Appearance: Normal appearance. She is normal weight.   Pulmonary:      Effort: Pulmonary effort is normal.   Abdominal:      General: Bowel sounds are normal. There is no distension.      Palpations: Abdomen is soft.      Tenderness: There is no abdominal tenderness.   Skin:     Coloration: Skin is pale.   Neurological:      Mental Status: She is " alert.       Office Visit on 07/06/2021   Component Date Value Ref Range Status   • WBC 07/06/2021 9.07  3.40 - 10.80 10*3/mm3 Final   • RBC 07/06/2021 4.23  3.77 - 5.28 10*6/mm3 Final   • Hemoglobin 07/06/2021 9.7* 12.0 - 15.9 g/dL Final   • Hematocrit 07/06/2021 32.2* 34.0 - 46.6 % Final   • MCV 07/06/2021 76.1* 79.0 - 97.0 fL Final   • MCH 07/06/2021 22.9* 26.6 - 33.0 pg Final   • MCHC 07/06/2021 30.1* 31.5 - 35.7 g/dL Final   • RDW 07/06/2021 15.4  12.3 - 15.4 % Final   • RDW-SD 07/06/2021 42.1  37.0 - 54.0 fl Final   • MPV 07/06/2021 10.5  6.0 - 12.0 fL Final   • Platelets 07/06/2021 432  140 - 450 10*3/mm3 Final   • Iron 07/06/2021 14* 37 - 145 mcg/dL Final   • Iron Saturation 07/06/2021 3* 20 - 50 % Final   • Transferrin 07/06/2021 344  200 - 360 mg/dL Final   • TIBC 07/06/2021 513  298 - 536 mcg/dL Final   • Glucose 07/06/2021 87  65 - 99 mg/dL Final   • BUN 07/06/2021 13  8 - 23 mg/dL Final   • Creatinine 07/06/2021 0.80  0.57 - 1.00 mg/dL Final   • Sodium 07/06/2021 138  136 - 145 mmol/L Final   • Potassium 07/06/2021 4.1  3.5 - 5.2 mmol/L Final   • Chloride 07/06/2021 101  98 - 107 mmol/L Final   • CO2 07/06/2021 23.8  22.0 - 29.0 mmol/L Final   • Calcium 07/06/2021 9.2  8.6 - 10.5 mg/dL Final   • Total Protein 07/06/2021 6.8  6.0 - 8.5 g/dL Final   • Albumin 07/06/2021 4.30  3.50 - 5.20 g/dL Final   • ALT (SGPT) 07/06/2021 8  1 - 33 U/L Final   • AST (SGOT) 07/06/2021 11  1 - 32 U/L Final   • Alkaline Phosphatase 07/06/2021 88  39 - 117 U/L Final   • Total Bilirubin 07/06/2021 <0.2  0.0 - 1.2 mg/dL Final   • eGFR Non  Amer 07/06/2021 71  >60 mL/min/1.73 Final   • Globulin 07/06/2021 2.5  gm/dL Final   • A/G Ratio 07/06/2021 1.7  g/dL Final   • BUN/Creatinine Ratio 07/06/2021 16.3  7.0 - 25.0 Final   • Anion Gap 07/06/2021 13.2  5.0 - 15.0 mmol/L Final     Assessment/Plan      1. Chronic gastric ulcer without hemorrhage and without perforation    2. Iron deficiency anemia due to chronic blood loss     .   Discussed with patient if symptoms persist or worsen she should seek emergency medical attention.  They verbalized understanding    Orders placed during this encounter include:  Orders Placed This Encounter   Procedures   • Implement Anesthesia Orders Day of Procedure     Standing Status:   Standing     Number of Occurrences:   1   • Obtain Informed Consent     Standing Status:   Standing     Number of Occurrences:   1     Order Specific Question:   Informed Consent Given For     Answer:   ESOPHAGOGASTRODUODENOSCOPY   • Pulse Oximetry, Continuous     Standing Status:   Standing     Number of Occurrences:   1   • Vital signs every 5 minutes for 15 minutes, every 15 minutes thereafter.     Standing Status:   Standing     Number of Occurrences:   1   • Notify Anesthesia of Any Acute Changes in Patient Condition     Standing Status:   Standing     Number of Occurrences:   1     Order Specific Question:   Other     Answer:   Any Acute Changes in Patient Condition   • Notify Anesthesia for Unrelieved Pain     Standing Status:   Standing     Number of Occurrences:   1     Order Specific Question:   Other     Answer:   Unrelieved Pain   • Once DC criteria to floor met, follow surgeon's orders.     Standing Status:   Standing     Number of Occurrences:   1   • Discharge patient from PACU when discharge criteria is met.     Standing Status:   Standing     Number of Occurrences:   1   • Oxygen Therapy- Blow by - Humidified; Titrate for SPO2: equal to or greater than, 96%, per policy     Standing Status:   Standing     Number of Occurrences:   1     Order Specific Question:   Device     Answer:   Blow by - Humidified     Order Specific Question:   Titrate for SPO2     Answer:   equal to or greater than     Order Specific Question:   Titrate for SPO2     Answer:   96%     Order Specific Question:   Titrate for SPO2     Answer:   per policy     Order Specific Question:   Indications for Oxygen Therapy     Answer:    Immediate post-op period   • POC Glucose Once     Prior to Procedure on ALL Diabetic Patients     Standing Status:   Standing     Number of Occurrences:   1     Order Specific Question:   Release to patient     Answer:   Immediate   • Insert Peripheral IV     Standing Status:   Standing     Number of Occurrences:   1       ESOPHAGOGASTRODUODENOSCOPY possible dilation (N/A)    Review and/or summary of lab tests, radiology, procedures, medications. Review and summary of old records and obtaining of history. The risks and benefits of my recommendations, as well as other treatment options were discussed with the patient today. Questions were answered.    New Medications Ordered This Visit   Medications   • dextrose 5 % and sodium chloride 0.45 % infusion   • ondansetron (ZOFRAN) injection 4 mg   • OR Linked Order Group    • promethazine (PHENERGAN) suppository 25 mg    • promethazine (PHENERGAN) tablet 25 mg   • meperidine (DEMEROL) injection 12.5 mg       Follow-up: No follow-ups on file.          This document has been electronically signed by Oz Way MD on August 17, 2021 12:00 CDT           I spent 16 minutes caring for Jessie on this date of service. This time includes time spent by me in the following activities:preparing for the visit, reviewing tests, obtaining and/or reviewing a separately obtained history, performing a medically appropriate examination and/or evaluation , counseling and educating the patient/family/caregiver, ordering medications, tests, or procedures, referring and communicating with other health care professionals , documenting information in the medical record and care coordination    Results for orders placed or performed in visit on 08/16/21   COVID-19, [facility] IN-HOUSE, NP SWAB IN TRANSPORT MEDIA 8-10 HR TAT - Swab, Nasopharynx    Specimen: Nasopharynx; Swab   Result Value Ref Range    COVID19 Not Detected Not Detected - Ref. Range   Results for orders placed or performed in  visit on 07/06/21   Iron Profile    Specimen: Blood   Result Value Ref Range    Iron 14 (L) 37 - 145 mcg/dL    Iron Saturation 3 (L) 20 - 50 %    Transferrin 344 200 - 360 mg/dL    TIBC 513 298 - 536 mcg/dL   CBC (No Diff)    Specimen: Blood   Result Value Ref Range    WBC 9.07 3.40 - 10.80 10*3/mm3    RBC 4.23 3.77 - 5.28 10*6/mm3    Hemoglobin 9.7 (L) 12.0 - 15.9 g/dL    Hematocrit 32.2 (L) 34.0 - 46.6 %    MCV 76.1 (L) 79.0 - 97.0 fL    MCH 22.9 (L) 26.6 - 33.0 pg    MCHC 30.1 (L) 31.5 - 35.7 g/dL    RDW 15.4 12.3 - 15.4 %    RDW-SD 42.1 37.0 - 54.0 fl    MPV 10.5 6.0 - 12.0 fL    Platelets 432 140 - 450 10*3/mm3   Comprehensive Metabolic Panel    Specimen: Blood   Result Value Ref Range    Glucose 87 65 - 99 mg/dL    BUN 13 8 - 23 mg/dL    Creatinine 0.80 0.57 - 1.00 mg/dL    Sodium 138 136 - 145 mmol/L    Potassium 4.1 3.5 - 5.2 mmol/L    Chloride 101 98 - 107 mmol/L    CO2 23.8 22.0 - 29.0 mmol/L    Calcium 9.2 8.6 - 10.5 mg/dL    Total Protein 6.8 6.0 - 8.5 g/dL    Albumin 4.30 3.50 - 5.20 g/dL    ALT (SGPT) 8 1 - 33 U/L    AST (SGOT) 11 1 - 32 U/L    Alkaline Phosphatase 88 39 - 117 U/L    Total Bilirubin <0.2 0.0 - 1.2 mg/dL    eGFR Non African Amer 71 >60 mL/min/1.73    Globulin 2.5 gm/dL    A/G Ratio 1.7 g/dL    BUN/Creatinine Ratio 16.3 7.0 - 25.0    Anion Gap 13.2 5.0 - 15.0 mmol/L   Results for orders placed or performed during the hospital encounter of 03/01/21   Doppler Ankle Brachial Index Single Level CAR   Result Value Ref Range    RIGHT DORSALIS PEDIS SYS  mmHg    RIGHT POST TIBIAL SYS  mmHg    RIGHT MONTY RATIO 0.92     LEFT DORSALIS PEDIS SYS  mmHg    LEFT POST TIBIAL SYS  mmHg    LEFT MONTY RATIO 0.81     Upper arterial right arm brachial sys max 171 mmHg    Upper arterial left arm brachial sys max 172 mmHg   Results for orders placed or performed during the hospital encounter of 03/01/21   Duplex Carotid Ultrasound CAR   Result Value Ref Range    Prox CCA .1  cm/sec    Prox CCA PSV 77.2 cm/sec    Prox CCA EDV 18.9 cm/sec    Prox CCA EDV 14.8 cm/sec    Dist CCA PSV 69.4 cm/sec    Dist CCA PSV 66.9 cm/sec    Dist CCA EDV 22.8 cm/sec    Dist CCA EDV 17.1 cm/sec    Prox ECA .8 cm/sec    Prox ECA .9 cm/sec    Prox ECA EDV 17.1 cm/sec    Prox ECA EDV 24.8 cm/sec    Prox ICA .4 cm/sec    Prox ICA PSV 87.7 cm/sec    Prox ICA EDV 33.3 cm/sec    Prox ICA EDV 33.7 cm/sec    Mid ICA PSV 85.5 cm/sec    Mid ICA .4 cm/sec    Mid ICA EDV 26.0 cm/sec    Mid ICA EDV 37.1 cm/sec    Dist ICA .4 cm/sec    Dist ICA .4 cm/sec    Dist ICA EDV 43.0 cm/sec    Dist ICA EDV 33.2 cm/sec    Vertebral A PSV 16.8 cm/sec    Vertebral A EDV 4.7 cm/sec    ICA/CCA ratio 1.9     Vertebral A PSV 69.4 cm/sec    Vertebral A EDV 20.2 cm/sec    ICA/CCA ratio 1.7     Diastolic ICA/CCA Ratio 1.9     ICA/CCA diastolic ratio 1.5    Results for orders placed or performed in visit on 08/13/20   Iron Profile    Specimen: Blood   Result Value Ref Range    Iron 38 37 - 145 mcg/dL    Iron Saturation 9 (L) 20 - 50 %    Transferrin 289 200 - 360 mg/dL    TIBC 431 298 - 536 mcg/dL   CBC (No Diff)    Specimen: Blood   Result Value Ref Range    WBC 9.52 3.40 - 10.80 10*3/mm3    RBC 4.05 3.77 - 5.28 10*6/mm3    Hemoglobin 11.1 (L) 12.0 - 15.9 g/dL    Hematocrit 33.7 (L) 34.0 - 46.6 %    MCV 83.2 79.0 - 97.0 fL    MCH 27.4 26.6 - 33.0 pg    MCHC 32.9 31.5 - 35.7 g/dL    RDW 14.7 12.3 - 15.4 %    RDW-SD 44.5 37.0 - 54.0 fl    MPV 11.8 6.0 - 12.0 fL    Platelets 362 140 - 450 10*3/mm3   Results for orders placed or performed in visit on 07/14/20   Iron Profile    Specimen: Blood   Result Value Ref Range    Iron 33 (L) 37 - 145 mcg/dL    Iron Saturation 7 (L) 20 - 50 %    Transferrin 330 200 - 360 mg/dL    TIBC 492 298 - 536 mcg/dL   CBC (No Diff)    Specimen: Blood   Result Value Ref Range    WBC 11.17 (H) 3.40 - 10.80 10*3/mm3    RBC 5.05 3.77 - 5.28 10*6/mm3    Hemoglobin 10.5 (L) 12.0 -  15.9 g/dL    Hematocrit 35.9 34.0 - 46.6 %    MCV 71.1 (L) 79.0 - 97.0 fL    MCH 20.8 (L) 26.6 - 33.0 pg    MCHC 29.2 (L) 31.5 - 35.7 g/dL    MPV 10.2 6.0 - 12.0 fL    Platelets 562 (H) 140 - 450 10*3/mm3     *Note: Due to a large number of results and/or encounters for the requested time period, some results have not been displayed. A complete set of results can be found in Results Review.

## 2021-08-19 LAB
LAB AP CASE REPORT: NORMAL
PATH REPORT.FINAL DX SPEC: NORMAL

## 2021-08-24 ENCOUNTER — LAB (OUTPATIENT)
Dept: LAB | Facility: HOSPITAL | Age: 71
End: 2021-08-24

## 2021-08-24 ENCOUNTER — OFFICE VISIT (OUTPATIENT)
Dept: GASTROENTEROLOGY | Facility: CLINIC | Age: 71
End: 2021-08-24

## 2021-08-24 VITALS
HEIGHT: 64 IN | SYSTOLIC BLOOD PRESSURE: 151 MMHG | HEART RATE: 75 BPM | DIASTOLIC BLOOD PRESSURE: 88 MMHG | BODY MASS INDEX: 19.56 KG/M2 | WEIGHT: 114.6 LBS

## 2021-08-24 DIAGNOSIS — D50.0 IRON DEFICIENCY ANEMIA DUE TO CHRONIC BLOOD LOSS: Primary | ICD-10-CM

## 2021-08-24 DIAGNOSIS — R53.81 MALAISE AND FATIGUE: ICD-10-CM

## 2021-08-24 DIAGNOSIS — R53.83 MALAISE AND FATIGUE: ICD-10-CM

## 2021-08-24 DIAGNOSIS — K25.7 CHRONIC GASTRIC ULCER WITHOUT HEMORRHAGE AND WITHOUT PERFORATION: ICD-10-CM

## 2021-08-24 LAB
DEPRECATED RDW RBC AUTO: ABNORMAL FL
ERYTHROCYTE [DISTWIDTH] IN BLOOD BY AUTOMATED COUNT: ABNORMAL %
HCT VFR BLD AUTO: 30.6 % (ref 34–46.6)
HGB BLD-MCNC: 10 G/DL (ref 12–15.9)
HYPOCHROMIA BLD QL: NORMAL
MCH RBC QN AUTO: 24.8 PG (ref 26.6–33)
MCHC RBC AUTO-ENTMCNC: 32.7 G/DL (ref 31.5–35.7)
MCV RBC AUTO: 75.7 FL (ref 79–97)
PLAT MORPH BLD: NORMAL
PLATELET # BLD AUTO: 390 10*3/MM3 (ref 140–450)
PMV BLD AUTO: 9.4 FL (ref 6–12)
RBC # BLD AUTO: 4.04 10*6/MM3 (ref 3.77–5.28)
WBC # BLD AUTO: 8.72 10*3/MM3 (ref 3.4–10.8)
WBC MORPH BLD: NORMAL

## 2021-08-24 PROCEDURE — 83540 ASSAY OF IRON: CPT | Performed by: NURSE PRACTITIONER

## 2021-08-24 PROCEDURE — 84466 ASSAY OF TRANSFERRIN: CPT | Performed by: NURSE PRACTITIONER

## 2021-08-24 PROCEDURE — 85007 BL SMEAR W/DIFF WBC COUNT: CPT | Performed by: NURSE PRACTITIONER

## 2021-08-24 PROCEDURE — 85027 COMPLETE CBC AUTOMATED: CPT | Performed by: NURSE PRACTITIONER

## 2021-08-24 PROCEDURE — 99213 OFFICE O/P EST LOW 20 MIN: CPT | Performed by: NURSE PRACTITIONER

## 2021-08-24 PROCEDURE — 36415 COLL VENOUS BLD VENIPUNCTURE: CPT | Performed by: NURSE PRACTITIONER

## 2021-08-24 NOTE — PATIENT INSTRUCTIONS
"BMI for Adults  What is BMI?  Body mass index (BMI) is a number that is calculated from a person's weight and height. BMI can help estimate how much of a person's weight is composed of fat. BMI does not measure body fat directly. Rather, it is an alternative to procedures that directly measure body fat, which can be difficult and expensive.  BMI can help identify people who may be at higher risk for certain medical problems.  What are BMI measurements used for?  BMI is used as a screening tool to identify possible weight problems. It helps determine whether a person is obese, overweight, a healthy weight, or underweight.  BMI is useful for:  · Identifying a weight problem that may be related to a medical condition or may increase the risk for medical problems.  · Promoting changes, such as changes in diet and exercise, to help reach a healthy weight. BMI screening can be repeated to see if these changes are working.  How is BMI calculated?  BMI involves measuring your weight in relation to your height. Both height and weight are measured, and the BMI is calculated from those numbers. This can be done either in English ([place] or metric measurements. Note that charts and online BMI calculators are available to help you find your BMI quickly and easily without having to do these calculations yourself.  To calculate your BMI in English ([place] measurements:    1. Measure your weight in pounds (lb).  2. Multiply the number of pounds by 703.  ? For example, for a person who weighs 180 lb, multiply that number by 703, which equals 126,540.  3. Measure your height in inches. Then multiply that number by itself to get a measurement called \"inches squared.\"  ? For example, for a person who is 70 inches tall, the \"inches squared\" measurement is 70 inches x 70 inches, which equals 4,900 inches squared.  4. Divide the total from step 2 (number of lb x 703) by the total from step 3 (inches squared): 126,540 ÷ 4,900 = 25.8. This is " "your BMI.  To calculate your BMI in metric measurements:  1. Measure your weight in kilograms (kg).  2. Measure your height in meters (m). Then multiply that number by itself to get a measurement called \"meters squared.\"  ? For example, for a person who is 1.75 m tall, the \"meters squared\" measurement is 1.75 m x 1.75 m, which is equal to 3.1 meters squared.  3. Divide the number of kilograms (your weight) by the meters squared number. In this example: 70 ÷ 3.1 = 22.6. This is your BMI.  What do the results mean?  BMI charts are used to identify whether you are underweight, normal weight, overweight, or obese. The following guidelines will be used:  · Underweight: BMI less than 18.5.  · Normal weight: BMI between 18.5 and 24.9.  · Overweight: BMI between 25 and 29.9.  · Obese: BMI of 30 or above.  Keep these notes in mind:  · Weight includes both fat and muscle, so someone with a muscular build, such as an athlete, may have a BMI that is higher than 24.9. In cases like these, BMI is not an accurate measure of body fat.  · To determine if excess body fat is the cause of a BMI of 25 or higher, further assessments may need to be done by a health care provider.  · BMI is usually interpreted in the same way for men and women.  Where to find more information  For more information about BMI, including tools to quickly calculate your BMI, go to these websites:  · Centers for Disease Control and Prevention: www.cdc.gov  · American Heart Association: www.heart.org  · National Heart, Lung, and Blood [place]: www.nhlbi.nih.gov  Summary  · Body mass index (BMI) is a number that is calculated from a person's weight and height.  · BMI may help estimate how much of a person's weight is composed of fat. BMI can help identify those who may be at higher risk for certain medical problems.  · BMI can be measured using English measurements or metric measurements.  · BMI charts are used to identify whether you are underweight, normal " weight, overweight, or obese.  This information is not intended to replace advice given to you by your health care provider. Make sure you discuss any questions you have with your health care provider.  Document Revised: 09/09/2020 Document Reviewed: 07/17/2020  Elsevier Patient Education © 2021 [employer]

## 2021-08-25 ENCOUNTER — TELEPHONE (OUTPATIENT)
Dept: GASTROENTEROLOGY | Facility: CLINIC | Age: 71
End: 2021-08-25

## 2021-08-25 DIAGNOSIS — D50.0 IRON DEFICIENCY ANEMIA DUE TO CHRONIC BLOOD LOSS: Primary | ICD-10-CM

## 2021-08-25 LAB
IRON 24H UR-MRATE: 30 MCG/DL (ref 37–145)
IRON SATN MFR SERPL: 6 % (ref 20–50)
TIBC SERPL-MCNC: 463 MCG/DL (ref 298–536)
TRANSFERRIN SERPL-MCNC: 311 MG/DL (ref 200–360)

## 2021-08-25 NOTE — TELEPHONE ENCOUNTER
Relayed results to patient, patient stated she had plenty if iron on hand.       ----- Message from HERNAN Sanchez sent at 8/24/2021  4:38 PM CDT -----  Please call the patient regarding her result. Hemoglobin improved some but still anemic. Continue iron. Does she need a refill?

## 2021-08-26 ENCOUNTER — TELEPHONE (OUTPATIENT)
Dept: GASTROENTEROLOGY | Facility: CLINIC | Age: 71
End: 2021-08-26

## 2021-08-26 NOTE — TELEPHONE ENCOUNTER
Patient made aware and is agreeable to seeing Dr Nguyen.     ----- Message from HERNAN Sanchez sent at 8/25/2021  4:43 PM CDT -----  Please call patient with results. Id like to refer her to hematology to see if she is candidate for iron infusion since not much improvement with oral iron. (referral placed to Dr. Nguyen)

## 2021-08-27 NOTE — PROGRESS NOTES
Chief Complaint   Patient presents with   • Anemia       Subjective    Jessie Jordan is a 70 y.o. female. she is here today for follow-up.    History of Present Illness  70-year-old female with history of gastric ulcer presents for recheck due to persistent anemia.  States she is still taking oral iron once per day.  At last visit hemoglobin had dropped so we repeated EGD due to peptic ulcer.  EGD 8/17/2021 noted mildly severe esophagitis gastritis normal duodenum.  Antrum biopsy noted no significant histopathologic abnormality.  Antral biopsy noted reactive gastropathy negative for metaplasia dysplasia or H. Pylori.        The following portions of the patient's history were reviewed and updated as appropriate:   Past Medical History:   Diagnosis Date   • Anxiety and depression    • Coronary artery disease involving native coronary artery of native heart 12/06/2017    seen on Cardiac Cath - Left main 50-70% stenosis, 12/27/2017 repeat cath showed coronary spasm with no stenosis   • Hyperlipidemia    • Hypertension    • Kidney cysts    • Post-menopausal 1990   • Skin cancer    • Stroke (CMS/HCC) 2015   • Vascular disease      Past Surgical History:   Procedure Laterality Date   • ARTERIOGRAM N/A 9/28/2018    Procedure: aortoiliac arteriogram possible angioplasty stent atherectomy thrombolysis bilateral iliac stents;  Surgeon: Luan Ruff MD;  Location: [facility] ANGIO INVASIVE LOCATION;  Service: Interventional Radiology   • CARDIAC CATHETERIZATION  12/06/2017    Done at [facility] - Left Main 50-70% Stenosis - no stenting done, recommendation was urgent CABG.  EF 50-60%   • CERVICAL FUSION  1985    C5-6   • COLONOSCOPY N/A 6/29/2018    Procedure: COLONOSCOPY;  Surgeon: Oz Way MD;  Location: [facility] ENDOSCOPY;  Service: Gastroenterology   • COLONOSCOPY N/A 7/6/2020    Procedure: COLONOSCOPY;  Surgeon: Oz Way MD;  Location: [facility] ENDOSCOPY;  Service: Gastroenterology;   Laterality: N/A;   • ENDOSCOPY N/A 2020    Procedure: ESOPHAGOGASTRODUODENOSCOPY ASAP;  Surgeon: Oz Way MD;  Location: [facility] ENDOSCOPY;  Service: Gastroenterology;  Laterality: N/A;   • ENDOSCOPY N/A 2021    Procedure: ESOPHAGOGASTRODUODENOSCOPY possible dilation;  Surgeon: Oz Way MD;  Location: [facility] ENDOSCOPY;  Service: Gastroenterology;  Laterality: N/A;   • FEMORAL POPLITEAL BYPASS Right 2018    Procedure: femoral to femoral artery bypass, RIGHT FEMORAL POPLITEAL BYPASS right lower extremity arteriogram          (C-ARM# AND C-ARM TABLE);  Surgeon: Luan Ruff MD;  Location: [facility] OR;  Service: Vascular   • FINGER SURGERY Left     third finger   • ROTATOR CUFF REPAIR Left 2011    done in [place]     Family History   Problem Relation Age of Onset   • Lung cancer Mother    • Hypertension Mother    • Diabetes Maternal Grandmother    • Heart disease Maternal Grandmother    • Hypertension Maternal Grandfather    • Heart disease Maternal Grandfather    • Heart disease Other    • Hypertension Other    • Cancer Other      OB History        2    Para   2    Term   1            AB        Living   1       SAB        TAB        Ectopic        Molar        Multiple        Live Births                  Prior to Admission medications    Medication Sig Start Date End Date Taking? Authorizing Provider   amLODIPine (NORVASC) 2.5 MG tablet Take 1 tablet by mouth 2 (Two) Times a Day. 21  Yes Julianna Gloria MD   atorvastatin (LIPITOR) 10 MG tablet Take 1 tablet by mouth Daily. 21  Yes Julianna Gloria MD   Calcium Carbonate-Vitamin D (calcium-vitamin D) 500-200 MG-UNIT tablet per tablet Take 1 tablet by mouth. 21  Yes Joe Dixon MD   carbamide peroxide (Debrox) 6.5 % otic solution Administer 5 drops into both ears 2 (Two) Times a Day. 21  Yes Julianna Gloria MD   clopidogrel (PLAVIX) 75 MG tablet Take 1 tablet by mouth Daily. 21  Yes Faizan  MD Julianna   Diclofenac Sodium (VOLTAREN) 1 % gel gel Apply 4 g topically to the appropriate area as directed 4 (Four) Times a Day As Needed (pain). 21  Yes Julianna Gloria MD   ferrous sulfate 325 (65 FE) MG tablet Take 1 tablet by mouth Daily With Breakfast. 21  Yes Julianna Gloria MD   FLUoxetine (PROzac) 20 MG capsule Take 1 capsule by mouth Daily. 21  Yes Julianna Gloria MD   fluticasone (FLONASE) 50 MCG/ACT nasal spray 2 sprays by Each Nare route Daily. 21  Yes Julianna Gloria MD   folic acid (FOLVITE) 1 MG tablet Take 1 tablet by mouth Daily. 21  Yes Julianna Gloria MD   gabapentin (NEURONTIN) 400 MG capsule Take 1 capsule by mouth 3 (Three) Times a Day. 20  Yes Julianna Gloria MD   HYDROcodone-acetaminophen (NORCO) 7.5-325 MG per tablet Take 1 tablet by mouth Every 6 (Six) Hours As Needed for Moderate Pain . 20  Yes Julianna Gloria MD   lisinopril (PRINIVIL,ZESTRIL) 10 MG tablet Take 1 tablet by mouth Daily. 21  Yes Julianna Gloria MD   melatonin ([employer] Melatonin) 3 MG tablet Take 1 tablet by mouth At Night As Needed for Sleep. 21  Yes Julianna Gloria MD   pantoprazole (PROTONIX) 40 MG EC tablet Take 1 tablet by mouth Daily. 21  Yes Julianna Gloria MD   varenicline (Chantix) 1 MG tablet Take 1 tablet by mouth 2 (Two) Times a Day. 21  Yes Julianna Gloria MD   vitamin D (ERGOCALCIFEROL) 1.25 MG (34579 UT) capsule capsule Take 1 capsule by mouth 1 (One) Time Per Week. 21  Yes Julianna Gloria MD     No Known Allergies  Social History     Socioeconomic History   • Marital status:      Spouse name: Not on file   • Number of children: 1   • Years of education: Not on file   • Highest education level: Not on file   Tobacco Use   • Smoking status: Light Tobacco Smoker     Packs/day: 0.25     Years: 48.00     Pack years: 12.00     Types: Cigarettes     Last attempt to quit: 2018     Years since quittin.6   • Smokeless tobacco: Never Used   • Tobacco  "comment: 1-2cig/day   Vaping Use   • Vaping Use: Never used   Substance and Sexual Activity   • Alcohol use: Yes     Comment: occasional beer    • Drug use: No   • Sexual activity: Defer     Comment: .       Review of Systems  Review of Systems   Constitutional: Positive for fatigue. Negative for activity change, appetite change, chills, diaphoresis, fever and unexpected weight change.   HENT: Negative for sore throat and trouble swallowing.    Respiratory: Negative for shortness of breath.    Gastrointestinal: Positive for constipation. Negative for abdominal distention, abdominal pain, anal bleeding, blood in stool, diarrhea, nausea, rectal pain and vomiting.   Musculoskeletal: Negative for arthralgias.   Skin: Negative for pallor.   Neurological: Positive for weakness. Negative for light-headedness.        /88 (BP Location: Left arm)   Pulse 75   Ht 162.6 cm (64\")   Wt 52 kg (114 lb 9.6 oz)   BMI 19.67 kg/m²     Objective    Physical Exam  Constitutional:       Appearance: She is ill-appearing (chronically ). She is not toxic-appearing.   HENT:      Head: Normocephalic and atraumatic.   Pulmonary:      Effort: Pulmonary effort is normal.   Abdominal:      General: Bowel sounds are normal. There is no distension.      Palpations: Abdomen is soft. There is no mass.      Tenderness: There is no abdominal tenderness. There is no guarding.       Admission on 08/17/2021, Discharged on 08/17/2021   Component Date Value Ref Range Status   • Case Report 08/17/2021    Final                    Value:Surgical Pathology Report                         Case: [id number]                                  Authorizing Provider:  Oz Way MD        Collected:           08/17/2021 12:55 PM          Ordering Location:     [facility]             Received:            08/17/2021 02:03 PM                                 [facility]                                                     Pathologist:   "         Altaf Juarez MD                                                             Specimens:   1) - Small Intestine, Duodenum, small bowel   cold bx by madhuri                                         2) - Gastric, Antrum, antrum    cold bx by cj                                             • Final Diagnosis 08/17/2021    Final                    Value:This result contains rich text formatting which cannot be displayed here.     Assessment/Plan      1. Iron deficiency anemia due to chronic blood loss    2. Chronic gastric ulcer without hemorrhage and without perforation    3. Malaise and fatigue    .   Recheck lab work today discussed with patient if there is not significant improvement will refer to hematologist to see if she is a candidate for iron infusion since she has been taking oral iron for some time and still remains anemic.  Continue PPI daily due to GERD and recent gastric ulcer.  Discussed Carafate but patient cannot tolerate due to constipation    Orders placed during this encounter include:  Orders Placed This Encounter   Procedures   • CBC (No Diff)     Order Specific Question:   Release to patient     Answer:   Immediate   • Iron Profile   • Scan Slide     Order Specific Question:   Release to patient     Answer:   Immediate       * Surgery not found *    Review and/or summary of lab tests, radiology, procedures, medications. Review and summary of old records and obtaining of history. The risks and benefits of my recommendations, as well as other treatment options were discussed with the patient today. Questions were answered.    No orders of the defined types were placed in this encounter.      Follow-up: Return in about 4 weeks (around 9/21/2021) for Recheck.          This document has been electronically signed by HERNAN Phillips on August 27, 2021 10:01 CDT           I spent 15 minutes caring for Jessie on this date of service. This time includes time spent by me in the following  activities:preparing for the visit, reviewing tests, obtaining and/or reviewing a separately obtained history, performing a medically appropriate examination and/or evaluation , counseling and educating the patient/family/caregiver, ordering medications, tests, or procedures, referring and communicating with other health care professionals , documenting information in the medical record and care coordination    Results for orders placed or performed in visit on 08/24/21   Scan Slide    Specimen: Blood   Result Value Ref Range    Hypochromia Mod/2+ None Seen    WBC Morphology Normal Normal    Platelet Morphology Normal Normal   Iron Profile    Specimen: Blood   Result Value Ref Range    Iron 30 (L) 37 - 145 mcg/dL    Iron Saturation 6 (L) 20 - 50 %    Transferrin 311 200 - 360 mg/dL    TIBC 463 298 - 536 mcg/dL   CBC (No Diff)    Specimen: Blood   Result Value Ref Range    WBC 8.72 3.40 - 10.80 10*3/mm3    RBC 4.04 3.77 - 5.28 10*6/mm3    Hemoglobin 10.0 (L) 12.0 - 15.9 g/dL    Hematocrit 30.6 (L) 34.0 - 46.6 %    MCV 75.7 (L) 79.0 - 97.0 fL    MCH 24.8 (L) 26.6 - 33.0 pg    MCHC 32.7 31.5 - 35.7 g/dL    RDW      RDW-SD      MPV 9.4 6.0 - 12.0 fL    Platelets 390 140 - 450 10*3/mm3   Results for orders placed or performed during the hospital encounter of 08/17/21   Tissue Pathology Exam    Specimen: A: Small Intestine, Duodenum; Tissue    B: Gastric, Antrum; Tissue   Result Value Ref Range    Case Report       Surgical Pathology Report                         Case: [id number]                                  Authorizing Provider:  Oz Way MD        Collected:           08/17/2021 12:55 PM          Ordering Location:     [facility]             Received:            08/17/2021 02:03 PM                                 [facility]                                                     Pathologist:           Altaf Juarez MD                                                             Specimens:   1)  - Small Intestine, Duodenum, small bowel   cold bx by cj                                         2) - Gastric, Antrum, antrum    cold bx by cj                                              Final Diagnosis       See Scanned Report       Results for orders placed or performed in visit on 08/16/21   COVID-19, [facility] IN-HOUSE, NP SWAB IN TRANSPORT MEDIA 8-10 HR TAT - Swab, Nasopharynx    Specimen: Nasopharynx; Swab   Result Value Ref Range    COVID19 Not Detected Not Detected - Ref. Range   Results for orders placed or performed in visit on 07/06/21   Iron Profile    Specimen: Blood   Result Value Ref Range    Iron 14 (L) 37 - 145 mcg/dL    Iron Saturation 3 (L) 20 - 50 %    Transferrin 344 200 - 360 mg/dL    TIBC 513 298 - 536 mcg/dL   CBC (No Diff)    Specimen: Blood   Result Value Ref Range    WBC 9.07 3.40 - 10.80 10*3/mm3    RBC 4.23 3.77 - 5.28 10*6/mm3    Hemoglobin 9.7 (L) 12.0 - 15.9 g/dL    Hematocrit 32.2 (L) 34.0 - 46.6 %    MCV 76.1 (L) 79.0 - 97.0 fL    MCH 22.9 (L) 26.6 - 33.0 pg    MCHC 30.1 (L) 31.5 - 35.7 g/dL    RDW 15.4 12.3 - 15.4 %    RDW-SD 42.1 37.0 - 54.0 fl    MPV 10.5 6.0 - 12.0 fL    Platelets 432 140 - 450 10*3/mm3   Comprehensive Metabolic Panel    Specimen: Blood   Result Value Ref Range    Glucose 87 65 - 99 mg/dL    BUN 13 8 - 23 mg/dL    Creatinine 0.80 0.57 - 1.00 mg/dL    Sodium 138 136 - 145 mmol/L    Potassium 4.1 3.5 - 5.2 mmol/L    Chloride 101 98 - 107 mmol/L    CO2 23.8 22.0 - 29.0 mmol/L    Calcium 9.2 8.6 - 10.5 mg/dL    Total Protein 6.8 6.0 - 8.5 g/dL    Albumin 4.30 3.50 - 5.20 g/dL    ALT (SGPT) 8 1 - 33 U/L    AST (SGOT) 11 1 - 32 U/L    Alkaline Phosphatase 88 39 - 117 U/L    Total Bilirubin <0.2 0.0 - 1.2 mg/dL    eGFR Non African Amer 71 >60 mL/min/1.73    Globulin 2.5 gm/dL    A/G Ratio 1.7 g/dL    BUN/Creatinine Ratio 16.3 7.0 - 25.0    Anion Gap 13.2 5.0 - 15.0 mmol/L   Results for orders placed or performed during the hospital encounter of 03/01/21   Doppler  Ankle Brachial Index Single Level CAR   Result Value Ref Range    RIGHT DORSALIS PEDIS SYS  mmHg    RIGHT POST TIBIAL SYS  mmHg    RIGHT MONTY RATIO 0.92     LEFT DORSALIS PEDIS SYS  mmHg    LEFT POST TIBIAL SYS  mmHg    LEFT MONTY RATIO 0.81     Upper arterial right arm brachial sys max 171 mmHg    Upper arterial left arm brachial sys max 172 mmHg   Results for orders placed or performed during the hospital encounter of 03/01/21   Duplex Carotid Ultrasound CAR   Result Value Ref Range    Prox CCA .1 cm/sec    Prox CCA PSV 77.2 cm/sec    Prox CCA EDV 18.9 cm/sec    Prox CCA EDV 14.8 cm/sec    Dist CCA PSV 69.4 cm/sec    Dist CCA PSV 66.9 cm/sec    Dist CCA EDV 22.8 cm/sec    Dist CCA EDV 17.1 cm/sec    Prox ECA .8 cm/sec    Prox ECA .9 cm/sec    Prox ECA EDV 17.1 cm/sec    Prox ECA EDV 24.8 cm/sec    Prox ICA .4 cm/sec    Prox ICA PSV 87.7 cm/sec    Prox ICA EDV 33.3 cm/sec    Prox ICA EDV 33.7 cm/sec    Mid ICA PSV 85.5 cm/sec    Mid ICA .4 cm/sec    Mid ICA EDV 26.0 cm/sec    Mid ICA EDV 37.1 cm/sec    Dist ICA .4 cm/sec    Dist ICA .4 cm/sec    Dist ICA EDV 43.0 cm/sec    Dist ICA EDV 33.2 cm/sec    Vertebral A PSV 16.8 cm/sec    Vertebral A EDV 4.7 cm/sec    ICA/CCA ratio 1.9     Vertebral A PSV 69.4 cm/sec    Vertebral A EDV 20.2 cm/sec    ICA/CCA ratio 1.7     Diastolic ICA/CCA Ratio 1.9     ICA/CCA diastolic ratio 1.5    Results for orders placed or performed in visit on 08/13/20   Iron Profile    Specimen: Blood   Result Value Ref Range    Iron 38 37 - 145 mcg/dL    Iron Saturation 9 (L) 20 - 50 %    Transferrin 289 200 - 360 mg/dL    TIBC 431 298 - 536 mcg/dL   CBC (No Diff)    Specimen: Blood   Result Value Ref Range    WBC 9.52 3.40 - 10.80 10*3/mm3    RBC 4.05 3.77 - 5.28 10*6/mm3    Hemoglobin 11.1 (L) 12.0 - 15.9 g/dL    Hematocrit 33.7 (L) 34.0 - 46.6 %    MCV 83.2 79.0 - 97.0 fL    MCH 27.4 26.6 - 33.0 pg    MCHC 32.9 31.5 -  35.7 g/dL    RDW 14.7 12.3 - 15.4 %    RDW-SD 44.5 37.0 - 54.0 fl    MPV 11.8 6.0 - 12.0 fL    Platelets 362 140 - 450 10*3/mm3     *Note: Due to a large number of results and/or encounters for the requested time period, some results have not been displayed. A complete set of results can be found in Results Review.

## 2021-08-31 DIAGNOSIS — I10 ESSENTIAL HYPERTENSION: ICD-10-CM

## 2021-09-02 ENCOUNTER — OFFICE VISIT (OUTPATIENT)
Dept: CARDIAC SURGERY | Facility: CLINIC | Age: 71
End: 2021-09-02

## 2021-09-02 VITALS
TEMPERATURE: 97.7 F | BODY MASS INDEX: 19.91 KG/M2 | DIASTOLIC BLOOD PRESSURE: 76 MMHG | OXYGEN SATURATION: 99 % | HEIGHT: 64 IN | SYSTOLIC BLOOD PRESSURE: 136 MMHG | HEART RATE: 56 BPM | WEIGHT: 116.6 LBS

## 2021-09-02 DIAGNOSIS — I73.9 PVD (PERIPHERAL VASCULAR DISEASE) (HCC): Primary | ICD-10-CM

## 2021-09-02 DIAGNOSIS — E78.2 MIXED HYPERLIPIDEMIA: ICD-10-CM

## 2021-09-02 DIAGNOSIS — I65.23 CAROTID STENOSIS, ASYMPTOMATIC, BILATERAL: ICD-10-CM

## 2021-09-02 DIAGNOSIS — I10 ESSENTIAL HYPERTENSION: ICD-10-CM

## 2021-09-02 DIAGNOSIS — F17.218 NICOTINE DEPENDENCE, CIGARETTES, WITH OTHER NICOTINE-INDUCED DISORDERS: ICD-10-CM

## 2021-09-02 PROCEDURE — 99214 OFFICE O/P EST MOD 30 MIN: CPT | Performed by: NURSE PRACTITIONER

## 2021-09-02 RX ORDER — AMLODIPINE BESYLATE 2.5 MG/1
2.5 TABLET ORAL 2 TIMES DAILY
Qty: 180 TABLET | Refills: 0 | Status: SHIPPED | OUTPATIENT
Start: 2021-09-02 | End: 2021-09-22

## 2021-09-02 RX ORDER — AMLODIPINE BESYLATE 2.5 MG/1
2.5 TABLET ORAL 2 TIMES DAILY
Qty: 180 TABLET | Refills: 0 | Status: CANCELLED | OUTPATIENT
Start: 2021-09-02

## 2021-09-02 NOTE — PATIENT INSTRUCTIONS
The results of your vascular studies of your right leg shows mild disease with good  circulation, in the left leg shows milddisease with good circulation.      If signs and symptoms of ischemia should occur including but not limited to pale/blue discoloration of limb, increasing pain with ambulation or at rest, or a non-healing wound. Patient is to notify [facility] center for immediate evaluation.    Return 6 months filipe and carotid ultrasound

## 2021-09-02 NOTE — PROGRESS NOTES
CVTS Office Progress Note     9/2/2021    Jessie Jordan  1950    Chief Complaint:    Chief Complaint   Patient presents with   • Peripheral Vascular Disease       HPI:      PCP:  Kaela Andres MD  Cardiology:  Dr. Spence    70 y.o. female with HTN(stable, increased risk stroke, rupture), Hyperlipidemia(stable, increased risk cardiovascular events) and COPD(stable, increased risk pulmonary complications) PVD, MARY.  former smoker and quit 2018.  Right leg pain x2 years, underwent left-right fem-fem, fem-pop.  Follow up surveillance has remained stable.  Bilateral leg pain unrelated to activity or ambulation, chronic pain management.  No other associated signs, symptoms or modifying factors.    5/2018 MONTY:  RIGHT .32 bi/monophasic.  LEFT .47 biphasic.  6/2018 Carotid Duplex:  JENNIFER 0-49% antegrade vert.  LICA 0-49% antegrade vert.  6/2018 CTA Aorta runoff:  RIGHT common iliac 90%, SFA small moderate diffuse disease, tibial diffuse disease.  LEFT  Common iliac 70%, external iliac 80%, SFA occluded reconstitutes distal via profunda collaterals, diffuse tibials.  9/28/18  Agram:  PTA/Luminex Stent LEFT iliac artery:  RCIA occluded, RIIA/REIA moderate diffuse disease, RSFA occluded, RPT diffuse distal disease  10/11/18  MONTY:  RIGHT 0.50 Fem/Pop Biphasic  PT/DP Monophasic    12/19/18 LEFT to RIGHT femoral to femoral artery bypass and RIGHT FEMORAL POPLITEAL BYPASS (PTFE)  05/21/2019 Carotid Duplex: JENNIFER 0-49% with mobile plaque mPSV 120c/s Ratio 2.4, LICA 50-69% mPSV 141c/s Ratio 2.0, Hypoechoic structure left submandibular 9.3mm  05/21/2019 MONTY: Right 0.89 triphasic femoral to distal.  Left 0.75 femoral triphasic PT biphasic  05/21/2019 Graft Survey: Left groin 335, left groin anastomosis 324, proximal graft 183, mid graft 89, distal graft 89, right groin anastomosis 158, right groin 33.  All with triphasic waveforms.    12/3/19 Right 0.82 fem biphasic triphasic-distally.  Left 0.62 biphasic.   12/3/19  Carotid Duplex: JENNIFER 50-69% mPSV 117c/s Ratio 2.8, LICA 50-69% mPSV 162c/s Ratio 1.8, Antegrade vertebrals  6/2020 Carotid Duplex: JENNIFER 0-49% mPSV 122c/s Ratio 2.0, LICA 50-69% mPSV 157c/s Ratio 1.6, Antegrade vertebrals  6/2020 MONTY: Right 0.9 triphasic.  Left 0.81 triphasic.   6/2020 fem-Fem US: Patent, mPSV 439cm/s area appears widely patent.    6/2020 Right Fem-pop US: Patent, mPSV 155cm/s triphasic throughout  3/2021 Carotid Duplex: JENNIFER 50-69% mPSV 126c/s Ratio 1.9, LICA 0-49% mPSV 117c/s Ratio 1.5, Antegrade vertebrals  3/2021 MONTY: Right 0.92 triphasic.  Left 0.81 triphasic.      The following portions of the patient's history were reviewed and updated as appropriate: allergies, current medications, past family history, past medical history, past social history, past surgical history and problem list.  Recent images independently reviewed.  Available laboratory values reviewed.    PMH:  Past Medical History:   Diagnosis Date   • Anxiety and depression    • Coronary artery disease involving native coronary artery of native heart 12/06/2017    seen on Cardiac Cath - Left main 50-70% stenosis, 12/27/2017 repeat cath showed coronary spasm with no stenosis   • Hyperlipidemia    • Hypertension    • Kidney cysts    • Post-menopausal 1990   • Skin cancer    • Stroke (CMS/[facility]) 2015   • Vascular disease      Past Surgical History:   Procedure Laterality Date   • ARTERIOGRAM N/A 9/28/2018    Procedure: aortoiliac arteriogram possible angioplasty stent atherectomy thrombolysis bilateral iliac stents;  Surgeon: Luan Ruff MD;  Location: [facility] ANGIO INVASIVE LOCATION;  Service: Interventional Radiology   • CARDIAC CATHETERIZATION  12/06/2017    Done at [facility] - Left Main 50-70% Stenosis - no stenting done, recommendation was urgent CABG.  EF 50-60%   • CERVICAL FUSION  1985    C5-6   • COLONOSCOPY N/A 6/29/2018    Procedure: COLONOSCOPY;  Surgeon: Oz Way MD;  Location: [facility]  ENDOSCOPY;  Service: Gastroenterology   • COLONOSCOPY N/A 2020    Procedure: COLONOSCOPY;  Surgeon: Oz Way MD;  Location: [facility] ENDOSCOPY;  Service: Gastroenterology;  Laterality: N/A;   • ENDOSCOPY N/A 2020    Procedure: ESOPHAGOGASTRODUODENOSCOPY ASAP;  Surgeon: Oz Way MD;  Location: [facility] ENDOSCOPY;  Service: Gastroenterology;  Laterality: N/A;   • ENDOSCOPY N/A 2021    Procedure: ESOPHAGOGASTRODUODENOSCOPY possible dilation;  Surgeon: Oz Way MD;  Location: [facility] ENDOSCOPY;  Service: Gastroenterology;  Laterality: N/A;   • FEMORAL POPLITEAL BYPASS Right 2018    Procedure: femoral to femoral artery bypass, RIGHT FEMORAL POPLITEAL BYPASS right lower extremity arteriogram          (C-ARM# AND C-ARM TABLE);  Surgeon: Luan Ruff MD;  Location: [facility] OR;  Service: Vascular   • FINGER SURGERY Left     third finger   • ROTATOR CUFF REPAIR Left 2011    done in [place]     Family History   Problem Relation Age of Onset   • Lung cancer Mother    • Hypertension Mother    • Diabetes Maternal Grandmother    • Heart disease Maternal Grandmother    • Hypertension Maternal Grandfather    • Heart disease Maternal Grandfather    • Heart disease Other    • Hypertension Other    • Cancer Other      Social History     Tobacco Use   • Smoking status: Light Tobacco Smoker     Packs/day: 0.25     Years: 48.00     Pack years: 12.00     Types: Cigarettes     Last attempt to quit: 2018     Years since quittin.7   • Smokeless tobacco: Never Used   • Tobacco comment: 1-2cig/day   Vaping Use   • Vaping Use: Never used   Substance Use Topics   • Alcohol use: Yes     Comment: occasional beer    • Drug use: No       ALLERGIES:  No Known Allergies      MEDICATIONS:    Current Outpatient Medications:   •  atorvastatin (LIPITOR) 10 MG tablet, Take 1 tablet by mouth Daily., Disp: 90 tablet, Rfl: 1  •  Calcium Carbonate-Vitamin D (calcium-vitamin D) 500-200 MG-UNIT tablet per  tablet, Take 1 tablet by mouth., Disp: , Rfl:   •  carbamide peroxide (Debrox) 6.5 % otic solution, Administer 5 drops into both ears 2 (Two) Times a Day., Disp: 15 mL, Rfl: 3  •  clopidogrel (PLAVIX) 75 MG tablet, Take 1 tablet by mouth Daily., Disp: 90 tablet, Rfl: 3  •  Diclofenac Sodium (VOLTAREN) 1 % gel gel, Apply 4 g topically to the appropriate area as directed 4 (Four) Times a Day As Needed (pain)., Disp: 100 g, Rfl: 3  •  ferrous sulfate 325 (65 FE) MG tablet, Take 1 tablet by mouth Daily With Breakfast., Disp: 90 tablet, Rfl: 1  •  FLUoxetine (PROzac) 20 MG capsule, Take 1 capsule by mouth Daily., Disp: 90 capsule, Rfl: 3  •  fluticasone (FLONASE) 50 MCG/ACT nasal spray, 2 sprays by Each Nare route Daily., Disp: 48 mL, Rfl: 3  •  folic acid (FOLVITE) 1 MG tablet, Take 1 tablet by mouth Daily., Disp: 30 tablet, Rfl: 3  •  gabapentin (NEURONTIN) 400 MG capsule, Take 1 capsule by mouth 3 (Three) Times a Day., Disp: 90 capsule, Rfl: 2  •  HYDROcodone-acetaminophen (NORCO) 7.5-325 MG per tablet, Take 1 tablet by mouth Every 6 (Six) Hours As Needed for Moderate Pain ., Disp: 30 tablet, Rfl: 0  •  lisinopril (PRINIVIL,ZESTRIL) 10 MG tablet, Take 1 tablet by mouth Daily., Disp: 90 tablet, Rfl: 3  •  melatonin ([employer] Melatonin) 3 MG tablet, Take 1 tablet by mouth At Night As Needed for Sleep., Disp: 90 tablet, Rfl: 0  •  pantoprazole (PROTONIX) 40 MG EC tablet, Take 1 tablet by mouth Daily., Disp: 30 tablet, Rfl: 11  •  varenicline (Chantix) 1 MG tablet, Take 1 tablet by mouth 2 (Two) Times a Day., Disp: 56 tablet, Rfl: 2  •  vitamin D (ERGOCALCIFEROL) 1.25 MG (31027 UT) capsule capsule, Take 1 capsule by mouth 1 (One) Time Per Week., Disp: 12 capsule, Rfl: 1  •  amLODIPine (NORVASC) 2.5 MG tablet, Take 1 tablet by mouth 2 (Two) Times a Day., Disp: 180 tablet, Rfl: 0      Review of Systems   Constitutional: Negative for chills, decreased appetite, fever and weight loss.   HENT: Negative for congestion, nosebleeds  "and sore throat.    Eyes: Negative for blurred vision, visual disturbance and visual halos.   Cardiovascular: Positive for dyspnea on exertion. Negative for chest pain and leg swelling.   Respiratory: Negative for cough, shortness of breath, sputum production and wheezing.    Endocrine: Negative for cold intolerance and polyuria.   Hematologic/Lymphatic: Negative for bleeding problem. Bruises/bleeds easily.        Does not report S/S of adverse bleeding event including nose bleeds, hematuria, melena, gingival bleeding, or hematemesis.   Skin: Positive for unusual hair distribution. Negative for flushing and nail changes.   Musculoskeletal: Positive for arthritis, back pain and joint pain.   Gastrointestinal: Negative for bloating, abdominal pain, hematemesis, melena, nausea and vomiting.   Genitourinary: Negative for flank pain and hematuria.   Neurological: Positive for weakness. Negative for brief paralysis, difficulty with concentration, focal weakness, light-headedness, loss of balance, numbness and paresthesias.        No amaurosis fugax, TIA, or CVA.     Psychiatric/Behavioral: Negative for altered mental status, depression, substance abuse and suicidal ideas.   Allergic/Immunologic: Negative for hives and persistent infections.         Vitals:    09/02/21 0953   BP: 136/76   BP Location: Left arm   Pulse: 56   Temp: 97.7 °F (36.5 °C)   TempSrc: Infrared   SpO2: 99%   Weight: 52.9 kg (116 lb 9.6 oz)   Height: 162.6 cm (64\")     Physical Exam   Constitutional: She is oriented to person, place, and time. She appears well-developed.   HENT:   Head: Normocephalic and atraumatic.   Eyes: Pupils are equal, round, and reactive to light. Conjunctivae are normal.   Cardiovascular: Normal rate.   No murmur heard.  RLE +2  LLE +1  R carotid bruit   Pulmonary/Chest: Effort normal and breath sounds normal. No respiratory distress.   Abdominal: Soft. Bowel sounds are normal. She exhibits no distension.   Musculoskeletal: " Normal range of motion. Tenderness (both legs) present.   Neurological: She is alert and oriented to person, place, and time.   Skin: Skin is warm and dry. Capillary refill takes less than 2 seconds.   There is no evidence of skin breakdown of the bilateral lower extremities.  BLE pink, no evidence of ischemia.  Feet are absent of dependent rubor.   Psychiatric: Judgment normal.   Nursing note and vitals reviewed.      Assessment & Plan     Independent Review of Studies  3/2021 Carotid Duplex: JENNIFER 50-69% mPSV 126c/s Ratio 1.9, LICA 0-49% mPSV 117c/s Ratio 1.5, Antegrade vertebrals  3/2021 MONTY: Right 0.92 triphasic.  Left 0.81 triphasic.    1. PVD (peripheral vascular disease) with claudication (CMS/HCC)  Mild bilateral reduction in resting perfusion  Symptoms controlled.  Return 6 months with MONTY    Native inflow velocities stable    Medical management with statin, plavix, ACE.    - Doppler Ankle Brachial Index Single Level CAR; Future    2. Bilateral carotid artery stenosis  Moderate R ICA disease, mild L ICA.   asymptomatic  Repeat in 6 months  Medical management as above.    - Duplex Carotid Ultrasound CAR; Future    3. Mixed hyperlipidemia  Lipid-lowering therapy has been proven beneficial in patients with cardio-vascular disease. Current guidelines recommend statin treatment for all patients with PAD,CAD and carotid stenosis. Statins are beneficial in preventing cardiovascular events, increasing functional capacity and lower the risk of adverse limb loss in PAD. Statins decrease the progression of plaque formation and may improve peripheral vessel lining, and aid in reversing atherosclerosis.    Smoking cessation assistance options offered including behavioral counseling (Smoking Cessation Classes), Nicotine replacement therapy (patches or gum), pharmacologic therapy (Chantix, Wellbutrin). Understands tobacco increases risk of expanding AAA, MI, CVA, PAD, carcinoma. Discussion and question answer period 5-7  minutes.      Detailed discussion regarding risks, benefits, and treatment plan. Images independently reviewed. Patient understands, agrees, and wishes to proceed with plan.       This document has been electronically signed by ANTONINA McqueenCNP-BC @  On September 2, 2021 12:55 CDT      EMR Dragon/Transcription disclaimer:   Much of this encounter note is an electronic transcription/translation of spoken language to printed text. The electronic translation of spoken language may permit erroneous, or at times, nonsensical words or phrases to be inadvertently transcribed; Although I have reviewed the note for such errors, some may still exist.

## 2021-09-03 ENCOUNTER — OFFICE VISIT (OUTPATIENT)
Dept: FAMILY MEDICINE CLINIC | Facility: CLINIC | Age: 71
End: 2021-09-03

## 2021-09-03 VITALS
TEMPERATURE: 96 F | SYSTOLIC BLOOD PRESSURE: 130 MMHG | WEIGHT: 115.4 LBS | OXYGEN SATURATION: 99 % | HEART RATE: 58 BPM | DIASTOLIC BLOOD PRESSURE: 90 MMHG | HEIGHT: 64 IN | BODY MASS INDEX: 19.7 KG/M2

## 2021-09-03 DIAGNOSIS — I10 ESSENTIAL HYPERTENSION: Primary | ICD-10-CM

## 2021-09-03 DIAGNOSIS — D50.9 IRON DEFICIENCY ANEMIA, UNSPECIFIED IRON DEFICIENCY ANEMIA TYPE: ICD-10-CM

## 2021-09-03 PROCEDURE — 99213 OFFICE O/P EST LOW 20 MIN: CPT | Performed by: STUDENT IN AN ORGANIZED HEALTH CARE EDUCATION/TRAINING PROGRAM

## 2021-09-16 ENCOUNTER — APPOINTMENT (OUTPATIENT)
Dept: ONCOLOGY | Facility: CLINIC | Age: 71
End: 2021-09-16

## 2021-09-16 ENCOUNTER — APPOINTMENT (OUTPATIENT)
Dept: ONCOLOGY | Facility: HOSPITAL | Age: 71
End: 2021-09-16

## 2021-09-17 ENCOUNTER — CONSULT (OUTPATIENT)
Dept: ONCOLOGY | Facility: CLINIC | Age: 71
End: 2021-09-17

## 2021-09-17 ENCOUNTER — LAB (OUTPATIENT)
Dept: ONCOLOGY | Facility: HOSPITAL | Age: 71
End: 2021-09-17

## 2021-09-17 VITALS
HEART RATE: 59 BPM | DIASTOLIC BLOOD PRESSURE: 67 MMHG | OXYGEN SATURATION: 98 % | BODY MASS INDEX: 20.12 KG/M2 | WEIGHT: 117.2 LBS | SYSTOLIC BLOOD PRESSURE: 153 MMHG | TEMPERATURE: 96.8 F | RESPIRATION RATE: 18 BRPM

## 2021-09-17 DIAGNOSIS — K90.9 IRON MALABSORPTION: ICD-10-CM

## 2021-09-17 DIAGNOSIS — D64.9 ANEMIA, UNSPECIFIED TYPE: Primary | ICD-10-CM

## 2021-09-17 DIAGNOSIS — R29.6 FALLS FREQUENTLY: ICD-10-CM

## 2021-09-17 DIAGNOSIS — D50.9 IRON DEFICIENCY ANEMIA, UNSPECIFIED IRON DEFICIENCY ANEMIA TYPE: ICD-10-CM

## 2021-09-17 PROCEDURE — 99406 BEHAV CHNG SMOKING 3-10 MIN: CPT | Performed by: INTERNAL MEDICINE

## 2021-09-17 PROCEDURE — 99204 OFFICE O/P NEW MOD 45 MIN: CPT | Performed by: INTERNAL MEDICINE

## 2021-09-17 PROCEDURE — G0463 HOSPITAL OUTPT CLINIC VISIT: HCPCS | Performed by: INTERNAL MEDICINE

## 2021-09-17 RX ORDER — SODIUM CHLORIDE 9 MG/ML
250 INJECTION, SOLUTION INTRAVENOUS ONCE
Status: CANCELLED | OUTPATIENT
Start: 2021-09-27

## 2021-09-19 PROBLEM — R29.6 FALLS FREQUENTLY: Status: ACTIVE | Noted: 2021-09-19

## 2021-09-19 NOTE — PROGRESS NOTES
REASON FOR CONSULTATION: Anemia  Provide an opinion on any further workup or treatment                             REQUESTING PHYSICIAN:  HERNAN Sanchez     RECORDS OBTAINED:  Records of the patients history including those obtained from the referring provider were reviewed and summarized in detail.      History of Present Illness       Jessie Jordan is a pleasant 71-year-old female who was seen in consultation at the request of HERNAN Sanchez for evaluation of anemia.  Patient unfortunate is a very poor historian and most of the history was obtained by reviewing old medical records.  Patient has past medical history of coronary artery disease, hypertension, hyperlipidemia, iron deficiency anemia, peripheral neuropathy, depression, osteoarthritis and tobacco abuse.  Patient has known history of iron deficiency anemia on and off for last couple of years and has been taking oral iron supplements without benefit.  She has had upper and lower endoscopy performed which has not shown any bleeding source.  She was recently evaluated by gastroenterology and was noticed to have persistent anemia with hemoglobin of 10 and macrocytosis with MCV of 75.7.  Her iron saturation was markedly low at 6.  She reports compliance with oral iron.  Denies any bright red blood per rectum or melena.  Denies any prior history of IV iron or blood transfusions.  I been asked to assist with evaluation and management of her iron deficiency anemia.        Past Medical History:   Diagnosis Date   • Anxiety and depression    • Coronary artery disease involving native coronary artery of native heart 12/06/2017    seen on Cardiac Cath - Left main 50-70% stenosis, 12/27/2017 repeat cath showed coronary spasm with no stenosis   • Hyperlipidemia    • Hypertension    • Kidney cysts    • Post-menopausal 1990   • Skin cancer    • Stroke (CMS/HCC) 2015   • Vascular disease         Past Surgical History:   Procedure Laterality Date   •  ARTERIOGRAM N/A 9/28/2018    Procedure: aortoiliac arteriogram possible angioplasty stent atherectomy thrombolysis bilateral iliac stents;  Surgeon: Luan Ruff MD;  Location: [facility] ANGIO INVASIVE LOCATION;  Service: Interventional Radiology   • CARDIAC CATHETERIZATION  12/06/2017    Done at [facility] - Left Main 50-70% Stenosis - no stenting done, recommendation was urgent CABG.  EF 50-60%   • CERVICAL FUSION  1985    C5-6   • COLONOSCOPY N/A 6/29/2018    Procedure: COLONOSCOPY;  Surgeon: Oz Way MD;  Location: [facility] ENDOSCOPY;  Service: Gastroenterology   • COLONOSCOPY N/A 7/6/2020    Procedure: COLONOSCOPY;  Surgeon: Oz Way MD;  Location: [facility] ENDOSCOPY;  Service: Gastroenterology;  Laterality: N/A;   • ENDOSCOPY N/A 7/6/2020    Procedure: ESOPHAGOGASTRODUODENOSCOPY ASAP;  Surgeon: Oz Way MD;  Location: [facility] ENDOSCOPY;  Service: Gastroenterology;  Laterality: N/A;   • ENDOSCOPY N/A 8/17/2021    Procedure: ESOPHAGOGASTRODUODENOSCOPY possible dilation;  Surgeon: Oz Way MD;  Location: [facility] ENDOSCOPY;  Service: Gastroenterology;  Laterality: N/A;   • FEMORAL POPLITEAL BYPASS Right 12/19/2018    Procedure: femoral to femoral artery bypass, RIGHT FEMORAL POPLITEAL BYPASS right lower extremity arteriogram          (C-ARM# AND C-ARM TABLE);  Surgeon: Luan Ruff MD;  Location: [facility] OR;  Service: Vascular   • FINGER SURGERY Left     third finger   • ROTATOR CUFF REPAIR Left 06/30/2011    done in [place]        Current Outpatient Medications on File Prior to Visit   Medication Sig Dispense Refill   • amLODIPine (NORVASC) 2.5 MG tablet Take 1 tablet by mouth 2 (Two) Times a Day. 180 tablet 0   • atorvastatin (LIPITOR) 10 MG tablet Take 1 tablet by mouth Daily. 90 tablet 1   • Calcium Carbonate-Vitamin D (calcium-vitamin D) 500-200 MG-UNIT tablet per tablet Take 1 tablet by mouth.     • carbamide peroxide (Debrox) 6.5 % otic solution  Administer 5 drops into both ears 2 (Two) Times a Day. 15 mL 3   • clopidogrel (PLAVIX) 75 MG tablet Take 1 tablet by mouth Daily. 90 tablet 3   • Diclofenac Sodium (VOLTAREN) 1 % gel gel Apply 4 g topically to the appropriate area as directed 4 (Four) Times a Day As Needed (pain). 100 g 3   • ferrous sulfate 325 (65 FE) MG tablet Take 1 tablet by mouth Daily With Breakfast. 90 tablet 1   • FLUoxetine (PROzac) 20 MG capsule Take 1 capsule by mouth Daily. 90 capsule 3   • fluticasone (FLONASE) 50 MCG/ACT nasal spray 2 sprays by Each Nare route Daily. 48 mL 3   • folic acid (FOLVITE) 1 MG tablet Take 1 tablet by mouth Daily. 30 tablet 3   • gabapentin (NEURONTIN) 400 MG capsule Take 1 capsule by mouth 3 (Three) Times a Day. 90 capsule 2   • HYDROcodone-acetaminophen (NORCO) 7.5-325 MG per tablet Take 1 tablet by mouth Every 6 (Six) Hours As Needed for Moderate Pain . 30 tablet 0   • lisinopril (PRINIVIL,ZESTRIL) 10 MG tablet Take 1 tablet by mouth Daily. 90 tablet 3   • melatonin ([employer] Melatonin) 3 MG tablet Take 1 tablet by mouth At Night As Needed for Sleep. 90 tablet 0   • pantoprazole (PROTONIX) 40 MG EC tablet Take 1 tablet by mouth Daily. 30 tablet 11   • varenicline (Chantix) 1 MG tablet Take 1 tablet by mouth 2 (Two) Times a Day. 56 tablet 2   • vitamin D (ERGOCALCIFEROL) 1.25 MG (05412 UT) capsule capsule Take 1 capsule by mouth 1 (One) Time Per Week. 12 capsule 1     No current facility-administered medications on file prior to visit.        ALLERGIES:  No Known Allergies     Social History     Socioeconomic History   • Marital status:      Spouse name: Not on file   • Number of children: 1   • Years of education: Not on file   • Highest education level: Not on file   Tobacco Use   • Smoking status: Light Tobacco Smoker     Packs/day: 0.25     Years: 48.00     Pack years: 12.00     Types: Cigarettes     Last attempt to quit: 2018     Years since quittin.7   • Smokeless tobacco: Never Used    • Tobacco comment: 1-2cig/day   Vaping Use   • Vaping Use: Never used   Substance and Sexual Activity   • Alcohol use: Yes     Comment: occasional beer    • Drug use: No   • Sexual activity: Defer     Comment: .        Family History   Problem Relation Age of Onset   • Lung cancer Mother    • Hypertension Mother    • Diabetes Maternal Grandmother    • Heart disease Maternal Grandmother    • Hypertension Maternal Grandfather    • Heart disease Maternal Grandfather    • Heart disease Other    • Hypertension Other    • Cancer Other             Objective     Vitals:    09/17/21 1030   BP: 153/67   Pulse: 59   Resp: 18   Temp: 96.8 °F (36 °C)   SpO2: 98%   Weight: 53.2 kg (117 lb 3.2 oz)   PainSc:   8     No flowsheet data found.    Physical Exam  Vitals and nursing note reviewed.   Constitutional:       Appearance: Normal appearance.   Cardiovascular:      Rate and Rhythm: Normal rate and regular rhythm.   Pulmonary:      Effort: Pulmonary effort is normal.      Breath sounds: Normal breath sounds.   Skin:     General: Skin is dry.      Coloration: Skin is pale.      Findings: Bruising present.   Neurological:      Mental Status: She is alert.   Psychiatric:         Mood and Affect: Mood normal.         Behavior: Behavior normal.         Thought Content: Thought content normal.               RECENT LABS:Independently reviewed and summarized  Hematology WBC   Date Value Ref Range Status   08/24/2021 8.72 3.40 - 10.80 10*3/mm3 Final     RBC   Date Value Ref Range Status   08/24/2021 4.04 3.77 - 5.28 10*6/mm3 Final     Hemoglobin   Date Value Ref Range Status   08/24/2021 10.0 (L) 12.0 - 15.9 g/dL Final     Hematocrit   Date Value Ref Range Status   08/24/2021 30.6 (L) 34.0 - 46.6 % Final     Platelets   Date Value Ref Range Status   08/24/2021 390 140 - 450 10*3/mm3 Final          Lab Results   Component Value Date    GLUCOSE 87 07/06/2021    BUN 13 07/06/2021    CREATININE 0.80 07/06/2021    EGFRIFNONA 71  07/06/2021    BCR 16.3 07/06/2021    K 4.1 07/06/2021    CO2 23.8 07/06/2021    CALCIUM 9.2 07/06/2021    ALBUMIN 4.30 07/06/2021    AST 11 07/06/2021    ALT 8 07/06/2021       Result of upper GI endoscopy from August 17, 2021 reviewed.  Showed mild esophagitis and gastritis.  No bleeding source identified.    Result of colonoscopy from July 6, 2020 reviewed.  Showed external and internal hemorrhoids.  No other bleeding source identified.    Jessie Jordan reports a pain score of 8.  Given her pain assessment as noted, treatment options were discussed and the following options were decided upon as a follow-up plan to address the patient's pain: referral to Primary Care for assistance in pain treatment guidance.    Patient screened positive for depression based on a PHQ-9 score of 0 on 9/17/2021. Follow-up recommendations include: Suicide Risk Assessment performed.    Advance Care Planning   ACP discussion was declined by the patient. Patient does not have an advance directive, declines further assistance.      Diagnosis:   (1) Anemia   (2) Iron deficiency anemia   (3) Iron malabsorption   (4) Frequent falls   (5) Peripheral neuropathy   (6) Encounter for tobacco use cessation counseling      All are new diagnosis/problems for me.    Assessment/Plan     (1) Anemia  (2) Iron deficiency anemia  (3) Iron malabsorption     Patient with symptomatic anemia likely secondary to iron deficiency and iron malabsorption.  Etiology of her iron deficiency anemia is somewhat unclear however she has a GI work-up performed which has not shown any bleeding source.  She is currently taking oral iron tablets however has persistent iron deficiency anemia.  Due to this reason I believe she could benefit from IV iron treatment.      I had an extensive discussion with patient about diagnosis and treatment options. I recommend that we replace their iron with IV Venofer 200 mg x 5 infusions.    I had an extensive discussion with the  patient about risk versus benefits of IV iron treatment.    I discussed about various risks associated with IV iron such as allergic reaction, hypersensitivity reaction, headache, flushing, joint aches or pains, local IV infiltration and skin discoloration.  After our discussion the patient was in agreement in  proceeding with IV iron treatment for  anemia.    I will check CBC, ferritin, iron profile, folic acid and B12 level in 6 weeks to evaluate response to treatment.    (4) Frequent falls (5) Peripheral neuropathy     Patient with gait instability from peripheral neuropathy and frequent falls.  She is requesting prescription of Rollator walker which was given to the patient.    (6) Encounter for tobacco use cessation counseling        Jessie Jordan  reports that she has been smoking cigarettes. She has a 12.00 pack-year smoking history. She has never used smokeless tobacco.. I have educated her on the risk of diseases from using tobacco products such as cancer, COPD and heart disease.     I advised her to quit and she is willing to quit. We have discussed the following method/s for tobacco cessation:  Counseling.  Together we have set a quit date for 2 weeks from today.  She will follow up with me in 4 weeks or sooner to check on her progress.    I spent 4 minutes counseling the patient.

## 2021-09-20 ENCOUNTER — TELEPHONE (OUTPATIENT)
Dept: ONCOLOGY | Facility: CLINIC | Age: 71
End: 2021-09-20

## 2021-09-20 DIAGNOSIS — I10 ESSENTIAL HYPERTENSION: ICD-10-CM

## 2021-09-22 RX ORDER — AMLODIPINE BESYLATE 2.5 MG/1
TABLET ORAL
Qty: 180 TABLET | Refills: 0 | Status: SHIPPED | OUTPATIENT
Start: 2021-09-22 | End: 2021-12-14 | Stop reason: SDUPTHER

## 2021-09-22 NOTE — PROGRESS NOTES
Family Medicine Residency  Kaela Andres MD    Subjective:     Jessie Jordan is a 71 y.o. female who presents for follow-up of hypertension and iron deficiency anemia.    For hypertension, taking Norvasc 2.5 mg and lisinopril 10 mg.  Reports blood pressure is normal.  Denies chest pain, headaches, palpitations, nausea/vomiting.    She has severe iron deficiency anemia with iron sat 6%.  She has been taking oral iron with minimal improvement.  Work-up with GI has been unremarkable.  She has been referred to hematology.    The following portions of the patient's history were reviewed and updated as appropriate: allergies, current medications, past family history, past medical history, past social history, past surgical history and problem list.    Past Medical Hx:  Past Medical History:   Diagnosis Date   • Anxiety and depression    • Coronary artery disease involving native coronary artery of native heart 12/06/2017    seen on Cardiac Cath - Left main 50-70% stenosis, 12/27/2017 repeat cath showed coronary spasm with no stenosis   • Hyperlipidemia    • Hypertension    • Kidney cysts    • Post-menopausal 1990   • Skin cancer    • Stroke (CMS/[facility]) 2015   • Vascular disease        Past Surgical Hx:  Past Surgical History:   Procedure Laterality Date   • ARTERIOGRAM N/A 9/28/2018    Procedure: aortoiliac arteriogram possible angioplasty stent atherectomy thrombolysis bilateral iliac stents;  Surgeon: Luan Ruff MD;  Location: [facility] ANGIO INVASIVE LOCATION;  Service: Interventional Radiology   • CARDIAC CATHETERIZATION  12/06/2017    Done at [facility] - Left Main 50-70% Stenosis - no stenting done, recommendation was urgent CABG.  EF 50-60%   • CERVICAL FUSION  1985    C5-6   • COLONOSCOPY N/A 6/29/2018    Procedure: COLONOSCOPY;  Surgeon: Oz Way MD;  Location: [facility] ENDOSCOPY;  Service: Gastroenterology   • COLONOSCOPY N/A 7/6/2020    Procedure: COLONOSCOPY;  Surgeon:  Oz Way MD;  Location: [facility] ENDOSCOPY;  Service: Gastroenterology;  Laterality: N/A;   • ENDOSCOPY N/A 7/6/2020    Procedure: ESOPHAGOGASTRODUODENOSCOPY ASAP;  Surgeon: Oz Way MD;  Location: [facility] ENDOSCOPY;  Service: Gastroenterology;  Laterality: N/A;   • ENDOSCOPY N/A 8/17/2021    Procedure: ESOPHAGOGASTRODUODENOSCOPY possible dilation;  Surgeon: Oz Way MD;  Location: [facility] ENDOSCOPY;  Service: Gastroenterology;  Laterality: N/A;   • FEMORAL POPLITEAL BYPASS Right 12/19/2018    Procedure: femoral to femoral artery bypass, RIGHT FEMORAL POPLITEAL BYPASS right lower extremity arteriogram          (C-ARM# AND C-ARM TABLE);  Surgeon: Luan Ruff MD;  Location: [facility] OR;  Service: Vascular   • FINGER SURGERY Left     third finger   • ROTATOR CUFF REPAIR Left 06/30/2011    done in [place]       Current Meds:    Current Outpatient Medications:   •  atorvastatin (LIPITOR) 10 MG tablet, Take 1 tablet by mouth Daily., Disp: 90 tablet, Rfl: 1  •  Calcium Carbonate-Vitamin D (calcium-vitamin D) 500-200 MG-UNIT tablet per tablet, Take 1 tablet by mouth., Disp: , Rfl:   •  carbamide peroxide (Debrox) 6.5 % otic solution, Administer 5 drops into both ears 2 (Two) Times a Day., Disp: 15 mL, Rfl: 3  •  clopidogrel (PLAVIX) 75 MG tablet, Take 1 tablet by mouth Daily., Disp: 90 tablet, Rfl: 3  •  Diclofenac Sodium (VOLTAREN) 1 % gel gel, Apply 4 g topically to the appropriate area as directed 4 (Four) Times a Day As Needed (pain)., Disp: 100 g, Rfl: 3  •  ferrous sulfate 325 (65 FE) MG tablet, Take 1 tablet by mouth Daily With Breakfast., Disp: 90 tablet, Rfl: 1  •  FLUoxetine (PROzac) 20 MG capsule, Take 1 capsule by mouth Daily., Disp: 90 capsule, Rfl: 3  •  fluticasone (FLONASE) 50 MCG/ACT nasal spray, 2 sprays by Each Nare route Daily., Disp: 48 mL, Rfl: 3  •  folic acid (FOLVITE) 1 MG tablet, Take 1 tablet by mouth Daily., Disp: 30 tablet, Rfl: 3  •  gabapentin (NEURONTIN) 400 MG  capsule, Take 1 capsule by mouth 3 (Three) Times a Day., Disp: 90 capsule, Rfl: 2  •  HYDROcodone-acetaminophen (NORCO) 7.5-325 MG per tablet, Take 1 tablet by mouth Every 6 (Six) Hours As Needed for Moderate Pain ., Disp: 30 tablet, Rfl: 0  •  lisinopril (PRINIVIL,ZESTRIL) 10 MG tablet, Take 1 tablet by mouth Daily., Disp: 90 tablet, Rfl: 3  •  melatonin ([employer] Melatonin) 3 MG tablet, Take 1 tablet by mouth At Night As Needed for Sleep., Disp: 90 tablet, Rfl: 0  •  pantoprazole (PROTONIX) 40 MG EC tablet, Take 1 tablet by mouth Daily., Disp: 30 tablet, Rfl: 11  •  varenicline (Chantix) 1 MG tablet, Take 1 tablet by mouth 2 (Two) Times a Day., Disp: 56 tablet, Rfl: 2  •  vitamin D (ERGOCALCIFEROL) 1.25 MG (49256 UT) capsule capsule, Take 1 capsule by mouth 1 (One) Time Per Week., Disp: 12 capsule, Rfl: 1  •  amLODIPine (NORVASC) 2.5 MG tablet, TAKE 1 TABLET BY MOUTH TWICE A DAY, Disp: 180 tablet, Rfl: 0    Allergies:  No Known Allergies    Family Hx:  Family History   Problem Relation Age of Onset   • Lung cancer Mother    • Hypertension Mother    • Diabetes Maternal Grandmother    • Heart disease Maternal Grandmother    • Hypertension Maternal Grandfather    • Heart disease Maternal Grandfather    • Heart disease Other    • Hypertension Other    • Cancer Other         Social History:  Social History     Socioeconomic History   • Marital status:      Spouse name: Not on file   • Number of children: 1   • Years of education: Not on file   • Highest education level: Not on file   Tobacco Use   • Smoking status: Light Tobacco Smoker     Packs/day: 0.25     Years: 48.00     Pack years: 12.00     Types: Cigarettes     Last attempt to quit: 2018     Years since quittin.7   • Smokeless tobacco: Never Used   • Tobacco comment: 1-2cig/day   Vaping Use   • Vaping Use: Never used   Substance and Sexual Activity   • Alcohol use: Yes     Comment: occasional beer    • Drug use: No   • Sexual activity: Defer      "Comment: .       Review of Systems  Review of Systems   Constitutional: Positive for fatigue. Negative for activity change, appetite change and fever.   HENT: Negative for congestion, rhinorrhea, sinus pain and sore throat.    Eyes: Negative for pain, redness and visual disturbance.   Respiratory: Negative for cough, shortness of breath and wheezing.    Cardiovascular: Negative for chest pain, palpitations and leg swelling.   Gastrointestinal: Negative for abdominal pain, blood in stool, constipation, diarrhea, nausea and vomiting.   Genitourinary: Negative for dysuria and flank pain.   Musculoskeletal: Negative for arthralgias, back pain, joint swelling and myalgias.   Skin: Negative for color change, pallor and rash.   Neurological: Negative for dizziness, light-headedness and headaches.       Objective:     /90   Pulse 58   Temp 96 °F (35.6 °C)   Ht 162.6 cm (64\")   Wt 52.3 kg (115 lb 6.4 oz)   SpO2 99%   BMI 19.81 kg/m²   Physical Exam  Vitals and nursing note reviewed.   Constitutional:       General: She is not in acute distress.     Appearance: Normal appearance. She is well-developed. She is not diaphoretic.   HENT:      Head: Normocephalic and atraumatic.      Right Ear: Hearing normal.      Left Ear: Hearing normal.   Eyes:      General: Lids are normal.      Conjunctiva/sclera: Conjunctivae normal.      Pupils: Pupils are equal, round, and reactive to light.   Cardiovascular:      Rate and Rhythm: Normal rate and regular rhythm.      Heart sounds: Normal heart sounds.   Pulmonary:      Effort: Pulmonary effort is normal. No respiratory distress.      Breath sounds: Normal breath sounds. No wheezing or rales.   Abdominal:      General: Bowel sounds are normal. There is no distension.      Palpations: Abdomen is soft.      Tenderness: There is no abdominal tenderness.   Musculoskeletal:         General: No tenderness or deformity. Normal range of motion.      Right lower leg: No edema. "      Left lower leg: No edema.   Skin:     General: Skin is warm and dry.      Coloration: Skin is not pale.      Findings: No erythema or rash.   Neurological:      Mental Status: She is alert and oriented to person, place, and time. She is not disoriented.          Assessment/Plan:     Diagnoses and all orders for this visit:    1. Essential hypertension (Primary)    2. Iron deficiency anemia, unspecified iron deficiency anemia type    1. Chronic, well controlled. Continue current treatment.    2. Chronic, mildly improved with oral iron. Has appointment with heme-onc on 9/16.     · Rx changes: none  · Patient Education: see a/p  · Compliance at present is estimated to be good.     Follow-up:     Return in about 3 months (around 12/3/2021) for Recheck.    Preventative:  Health Maintenance   Topic Date Due   • TDAP/TD VACCINES (1 - Tdap) Never done   • ZOSTER VACCINE (1 of 2) Never done   • Pneumococcal Vaccine 65+ (1 of 2 - PPSV23) 05/31/2018   • DXA SCAN  04/25/2020   • MAMMOGRAM  05/04/2020   • LIPID PANEL  06/22/2021   • INFLUENZA VACCINE  10/01/2021   • ANNUAL WELLNESS VISIT  01/15/2022   • COLORECTAL CANCER SCREENING  07/06/2025   • HEPATITIS C SCREENING  Completed   • COVID-19 Vaccine  Completed     Female Preventative: Mammogram is due  Cholesterol screening is due  DXA scan is due  Recommended: DTaP, Varicella and Pneumovax  Vaccine Counseling: N/A    Weight  -Class: Normal: 18.5-24.9kg/m2  -Patient's Body mass index is 19.81 kg/m². indicating that she is within normal range (BMI 18.5-24.9). No BMI management plan needed..   eat more fruits and vegetables, decrease soda or juice intake, increase water intake, increase physical activity, reduce screen time, reduce portion size and cut out extra servings    Alcohol use:  reports current alcohol use.  Nicotine status  reports that she has been smoking cigarettes. She has a 12.00 pack-year smoking history. She has never used smokeless tobacco.    Goals     •   Pain control       Barriers: severe PAD, pt continues to smoke      •  Quit smoking / using tobacco (pt-stated)       Barriers: compliance with cessation medications            RISK SCORE: 3      Kaela Andres M.D. PGY3  [facility] Medicine Residency  [address] [place] [address]  Office: [phone]  This document has been electronically signed by Kaela Andres MD on September 22, 2021 13:31 CDT

## 2021-09-23 NOTE — PROGRESS NOTES
I have reviewed the notes, assessments, and/or procedures performed by Dr. Kaela Andres, I concur with his  documentation and assessment and plan for Jessie Jordan        This document has been electronically signed by Pancho Reyna MD on September 23, 2021 13:50 CDT

## 2021-09-24 NOTE — TELEPHONE ENCOUNTER
Pharmacy called about patients medication.  Wants to know if patient is supposed be taking both her cilostazol (PLETAL) 100 MG tablet and her clopidogrel (PLAVIX) 75 MG tablet.  If it is supposed to be the plavix she needs a 90 pill supply.  
Detail Level: Detailed
Quality 226: Preventive Care And Screening: Tobacco Use: Screening And Cessation Intervention: Patient screened for tobacco use, is a smoker AND received Cessation Counseling

## 2021-09-27 ENCOUNTER — INFUSION (OUTPATIENT)
Dept: ONCOLOGY | Facility: HOSPITAL | Age: 71
End: 2021-09-27

## 2021-09-27 VITALS
DIASTOLIC BLOOD PRESSURE: 72 MMHG | RESPIRATION RATE: 18 BRPM | HEART RATE: 66 BPM | TEMPERATURE: 97.4 F | SYSTOLIC BLOOD PRESSURE: 148 MMHG

## 2021-09-27 DIAGNOSIS — K90.9 IRON MALABSORPTION: ICD-10-CM

## 2021-09-27 DIAGNOSIS — D50.9 IRON DEFICIENCY ANEMIA, UNSPECIFIED IRON DEFICIENCY ANEMIA TYPE: Primary | ICD-10-CM

## 2021-09-27 PROCEDURE — 96365 THER/PROPH/DIAG IV INF INIT: CPT | Performed by: INTERNAL MEDICINE

## 2021-09-27 PROCEDURE — 25010000002 IRON SUCROSE PER 1 MG: Performed by: INTERNAL MEDICINE

## 2021-09-27 RX ORDER — SODIUM CHLORIDE 9 MG/ML
250 INJECTION, SOLUTION INTRAVENOUS ONCE
Status: CANCELLED | OUTPATIENT
Start: 2021-09-28

## 2021-09-27 RX ORDER — SODIUM CHLORIDE 9 MG/ML
250 INJECTION, SOLUTION INTRAVENOUS ONCE
Status: COMPLETED | OUTPATIENT
Start: 2021-09-27 | End: 2021-09-27

## 2021-09-27 RX ADMIN — SODIUM CHLORIDE 250 ML: 9 INJECTION, SOLUTION INTRAVENOUS at 14:19

## 2021-09-27 RX ADMIN — IRON SUCROSE 200 MG: 20 INJECTION, SOLUTION INTRAVENOUS at 14:20

## 2021-09-29 ENCOUNTER — INFUSION (OUTPATIENT)
Dept: ONCOLOGY | Facility: HOSPITAL | Age: 71
End: 2021-09-29

## 2021-09-29 VITALS
HEART RATE: 73 BPM | SYSTOLIC BLOOD PRESSURE: 170 MMHG | RESPIRATION RATE: 20 BRPM | TEMPERATURE: 98.7 F | DIASTOLIC BLOOD PRESSURE: 75 MMHG

## 2021-09-29 DIAGNOSIS — K90.9 IRON MALABSORPTION: ICD-10-CM

## 2021-09-29 DIAGNOSIS — D50.9 IRON DEFICIENCY ANEMIA, UNSPECIFIED IRON DEFICIENCY ANEMIA TYPE: Primary | ICD-10-CM

## 2021-09-29 PROCEDURE — 96365 THER/PROPH/DIAG IV INF INIT: CPT | Performed by: INTERNAL MEDICINE

## 2021-09-29 PROCEDURE — 25010000002 IRON SUCROSE PER 1 MG: Performed by: INTERNAL MEDICINE

## 2021-09-29 RX ORDER — SODIUM CHLORIDE 9 MG/ML
250 INJECTION, SOLUTION INTRAVENOUS ONCE
Status: CANCELLED | OUTPATIENT
Start: 2021-09-30

## 2021-09-29 RX ORDER — SODIUM CHLORIDE 9 MG/ML
250 INJECTION, SOLUTION INTRAVENOUS ONCE
Status: COMPLETED | OUTPATIENT
Start: 2021-09-29 | End: 2021-09-29

## 2021-09-29 RX ADMIN — SODIUM CHLORIDE 250 ML: 9 INJECTION, SOLUTION INTRAVENOUS at 14:02

## 2021-09-29 RX ADMIN — IRON SUCROSE 200 MG: 20 INJECTION, SOLUTION INTRAVENOUS at 14:03

## 2021-10-01 ENCOUNTER — INFUSION (OUTPATIENT)
Dept: ONCOLOGY | Facility: HOSPITAL | Age: 71
End: 2021-10-01

## 2021-10-01 VITALS — SYSTOLIC BLOOD PRESSURE: 97 MMHG | TEMPERATURE: 97.8 F | HEART RATE: 76 BPM | DIASTOLIC BLOOD PRESSURE: 76 MMHG

## 2021-10-01 DIAGNOSIS — K90.9 IRON MALABSORPTION: ICD-10-CM

## 2021-10-01 DIAGNOSIS — D50.9 IRON DEFICIENCY ANEMIA, UNSPECIFIED IRON DEFICIENCY ANEMIA TYPE: Primary | ICD-10-CM

## 2021-10-01 PROCEDURE — 96365 THER/PROPH/DIAG IV INF INIT: CPT | Performed by: INTERNAL MEDICINE

## 2021-10-01 PROCEDURE — 25010000002 IRON SUCROSE PER 1 MG: Performed by: INTERNAL MEDICINE

## 2021-10-01 RX ORDER — SODIUM CHLORIDE 9 MG/ML
250 INJECTION, SOLUTION INTRAVENOUS ONCE
Status: COMPLETED | OUTPATIENT
Start: 2021-10-01 | End: 2021-10-01

## 2021-10-01 RX ORDER — SODIUM CHLORIDE 9 MG/ML
250 INJECTION, SOLUTION INTRAVENOUS ONCE
Status: CANCELLED | OUTPATIENT
Start: 2021-10-02

## 2021-10-01 RX ADMIN — SODIUM CHLORIDE 250 ML: 9 INJECTION, SOLUTION INTRAVENOUS at 13:55

## 2021-10-01 RX ADMIN — IRON SUCROSE 200 MG: 20 INJECTION, SOLUTION INTRAVENOUS at 13:55

## 2021-10-04 ENCOUNTER — INFUSION (OUTPATIENT)
Dept: ONCOLOGY | Facility: HOSPITAL | Age: 71
End: 2021-10-04

## 2021-10-04 VITALS
TEMPERATURE: 97.9 F | SYSTOLIC BLOOD PRESSURE: 135 MMHG | HEART RATE: 58 BPM | RESPIRATION RATE: 18 BRPM | DIASTOLIC BLOOD PRESSURE: 65 MMHG

## 2021-10-04 DIAGNOSIS — D50.9 IRON DEFICIENCY ANEMIA, UNSPECIFIED IRON DEFICIENCY ANEMIA TYPE: Primary | ICD-10-CM

## 2021-10-04 DIAGNOSIS — K90.9 IRON MALABSORPTION: ICD-10-CM

## 2021-10-04 PROCEDURE — 96365 THER/PROPH/DIAG IV INF INIT: CPT | Performed by: INTERNAL MEDICINE

## 2021-10-04 PROCEDURE — 25010000002 IRON SUCROSE PER 1 MG: Performed by: INTERNAL MEDICINE

## 2021-10-04 RX ORDER — SODIUM CHLORIDE 9 MG/ML
250 INJECTION, SOLUTION INTRAVENOUS ONCE
Status: COMPLETED | OUTPATIENT
Start: 2021-10-04 | End: 2021-10-04

## 2021-10-04 RX ORDER — SODIUM CHLORIDE 9 MG/ML
250 INJECTION, SOLUTION INTRAVENOUS ONCE
Status: CANCELLED | OUTPATIENT
Start: 2021-10-05

## 2021-10-04 RX ADMIN — IRON SUCROSE 200 MG: 20 INJECTION, SOLUTION INTRAVENOUS at 14:17

## 2021-10-04 RX ADMIN — SODIUM CHLORIDE 250 ML: 9 INJECTION, SOLUTION INTRAVENOUS at 14:16

## 2021-10-06 ENCOUNTER — INFUSION (OUTPATIENT)
Dept: ONCOLOGY | Facility: HOSPITAL | Age: 71
End: 2021-10-06

## 2021-10-06 VITALS
TEMPERATURE: 98.1 F | SYSTOLIC BLOOD PRESSURE: 131 MMHG | DIASTOLIC BLOOD PRESSURE: 68 MMHG | HEART RATE: 67 BPM | RESPIRATION RATE: 20 BRPM

## 2021-10-06 DIAGNOSIS — K90.9 IRON MALABSORPTION: ICD-10-CM

## 2021-10-06 DIAGNOSIS — D50.9 IRON DEFICIENCY ANEMIA, UNSPECIFIED IRON DEFICIENCY ANEMIA TYPE: Primary | ICD-10-CM

## 2021-10-06 PROCEDURE — 96365 THER/PROPH/DIAG IV INF INIT: CPT | Performed by: INTERNAL MEDICINE

## 2021-10-06 PROCEDURE — 25010000002 IRON SUCROSE PER 1 MG: Performed by: INTERNAL MEDICINE

## 2021-10-06 RX ORDER — SODIUM CHLORIDE 9 MG/ML
250 INJECTION, SOLUTION INTRAVENOUS ONCE
Status: COMPLETED | OUTPATIENT
Start: 2021-10-06 | End: 2021-10-06

## 2021-10-06 RX ORDER — SODIUM CHLORIDE 9 MG/ML
250 INJECTION, SOLUTION INTRAVENOUS ONCE
Status: CANCELLED | OUTPATIENT
Start: 2021-10-06

## 2021-10-06 RX ADMIN — SODIUM CHLORIDE 250 ML: 9 INJECTION, SOLUTION INTRAVENOUS at 14:30

## 2021-10-06 RX ADMIN — IRON SUCROSE 200 MG: 20 INJECTION, SOLUTION INTRAVENOUS at 14:33

## 2021-10-29 ENCOUNTER — LAB (OUTPATIENT)
Dept: ONCOLOGY | Facility: HOSPITAL | Age: 71
End: 2021-10-29

## 2021-10-29 ENCOUNTER — OFFICE VISIT (OUTPATIENT)
Dept: ONCOLOGY | Facility: CLINIC | Age: 71
End: 2021-10-29

## 2021-10-29 VITALS
DIASTOLIC BLOOD PRESSURE: 66 MMHG | RESPIRATION RATE: 18 BRPM | OXYGEN SATURATION: 96 % | WEIGHT: 117.6 LBS | HEART RATE: 61 BPM | TEMPERATURE: 96.4 F | BODY MASS INDEX: 20.19 KG/M2 | SYSTOLIC BLOOD PRESSURE: 151 MMHG

## 2021-10-29 DIAGNOSIS — M25.561 CHRONIC PAIN OF RIGHT KNEE: ICD-10-CM

## 2021-10-29 DIAGNOSIS — D50.9 IRON DEFICIENCY ANEMIA, UNSPECIFIED IRON DEFICIENCY ANEMIA TYPE: Primary | ICD-10-CM

## 2021-10-29 DIAGNOSIS — D64.9 ANEMIA, UNSPECIFIED TYPE: ICD-10-CM

## 2021-10-29 DIAGNOSIS — D50.9 IRON DEFICIENCY ANEMIA, UNSPECIFIED IRON DEFICIENCY ANEMIA TYPE: ICD-10-CM

## 2021-10-29 DIAGNOSIS — K90.9 IRON MALABSORPTION: ICD-10-CM

## 2021-10-29 DIAGNOSIS — G89.29 CHRONIC PAIN OF RIGHT KNEE: ICD-10-CM

## 2021-10-29 LAB
DEPRECATED RDW RBC AUTO: 54.5 FL (ref 37–54)
ERYTHROCYTE [DISTWIDTH] IN BLOOD BY AUTOMATED COUNT: 17.9 % (ref 12.3–15.4)
FERRITIN SERPL-MCNC: 397.3 NG/ML (ref 13–150)
FOLATE SERPL-MCNC: >20 NG/ML (ref 4.78–24.2)
HCT VFR BLD AUTO: 36.9 % (ref 34–46.6)
HGB BLD-MCNC: 12.1 G/DL (ref 12–15.9)
IRON 24H UR-MRATE: 54 MCG/DL (ref 37–145)
IRON SATN MFR SERPL: 18 % (ref 20–50)
MCH RBC QN AUTO: 27.8 PG (ref 26.6–33)
MCHC RBC AUTO-ENTMCNC: 32.8 G/DL (ref 31.5–35.7)
MCV RBC AUTO: 84.6 FL (ref 79–97)
PLATELET # BLD AUTO: 313 10*3/MM3 (ref 140–450)
PMV BLD AUTO: 10.2 FL (ref 6–12)
RBC # BLD AUTO: 4.36 10*6/MM3 (ref 3.77–5.28)
TIBC SERPL-MCNC: 307 MCG/DL (ref 298–536)
TRANSFERRIN SERPL-MCNC: 206 MG/DL (ref 200–360)
VIT B12 BLD-MCNC: 304 PG/ML (ref 211–946)
WBC # BLD AUTO: 11.37 10*3/MM3 (ref 3.4–10.8)

## 2021-10-29 PROCEDURE — 82746 ASSAY OF FOLIC ACID SERUM: CPT

## 2021-10-29 PROCEDURE — 82607 VITAMIN B-12: CPT

## 2021-10-29 PROCEDURE — 82728 ASSAY OF FERRITIN: CPT

## 2021-10-29 PROCEDURE — G0463 HOSPITAL OUTPT CLINIC VISIT: HCPCS | Performed by: INTERNAL MEDICINE

## 2021-10-29 PROCEDURE — 83540 ASSAY OF IRON: CPT

## 2021-10-29 PROCEDURE — 84466 ASSAY OF TRANSFERRIN: CPT

## 2021-10-29 PROCEDURE — 85027 COMPLETE CBC AUTOMATED: CPT

## 2021-10-29 PROCEDURE — 99213 OFFICE O/P EST LOW 20 MIN: CPT | Performed by: INTERNAL MEDICINE

## 2021-10-31 NOTE — PROGRESS NOTES
Chief Complaint  Follow-up  - iron deficiency anemia     Subjective          Jessie Jordan presents to [facility]  History of Present Illness     Jessie Jordan was seen in follow up.   Status post IV iron treatment. Feels better.   Struggling with right knee pain.   Chronic issue.   Referral to ortho discussed.   She was in agreement.     Objective   Vital Signs:   /66   Pulse 61   Temp 96.4 °F (35.8 °C)   Resp 18   Wt 53.3 kg (117 lb 9.6 oz)   SpO2 96%   BMI 20.19 kg/m²     Physical Exam   Result Review :   The following data was reviewed by: Zaria Cummings MD on 10/29/2021:  Common labs    Common Labsle 7/6/21 7/6/21 8/24/21 10/29/21    1034 1034     Glucose  87     BUN  13     Creatinine  0.80     eGFR Non African Am  71     Sodium  138     Potassium  4.1     Chloride  101     Calcium  9.2     Albumin  4.30     Total Bilirubin  <0.2     Alkaline Phosphatase  88     AST (SGOT)  11     ALT (SGPT)  8     WBC 9.07  8.72 11.37 (A)   Hemoglobin 9.7 (A)  10.0 (A) 12.1   Hematocrit 32.2 (A)  30.6 (A) 36.9   Platelets 432  390 313   (A) Abnormal value            CMP    CMP 7/6/21   Glucose 87   BUN 13   Creatinine 0.80   eGFR Non African Am 71   Sodium 138   Potassium 4.1   Chloride 101   Calcium 9.2   Albumin 4.30   Total Bilirubin <0.2   Alkaline Phosphatase 88   AST (SGOT) 11   ALT (SGPT) 8           CBC    CBC 7/6/21 8/24/21 10/29/21   WBC 9.07 8.72 11.37 (A)   RBC 4.23 4.04 4.36   Hemoglobin 9.7 (A) 10.0 (A) 12.1   Hematocrit 32.2 (A) 30.6 (A) 36.9   MCV 76.1 (A) 75.7 (A) 84.6   MCH 22.9 (A) 24.8 (A) 27.8   MCHC 30.1 (A) 32.7 32.8   RDW 15.4  17.9 (A)   Platelets 432 390 313   (A) Abnormal value       Comments are available for some flowsheets but are not being displayed.           CBC w/diff    CBC w/Diff 7/6/21 8/24/21 10/29/21   WBC 9.07 8.72 11.37 (A)   RBC 4.23 4.04 4.36   Hemoglobin 9.7 (A) 10.0 (A) 12.1   Hematocrit 32.2 (A) 30.6  (A) 36.9   MCV 76.1 (A) 75.7 (A) 84.6   MCH 22.9 (A) 24.8 (A) 27.8   MCHC 30.1 (A) 32.7 32.8   RDW 15.4  17.9 (A)   Platelets 432 390 313   (A) Abnormal value       Comments are available for some flowsheets but are not being displayed.           Anemia labs:      Lab 10/29/21  1249   IRON 54   IRON SATURATION 18*   TIBC 307   TRANSFERRIN 206   FERRITIN 397.30*   FOLATE >20.00   VITAMIN B 12 304        Jessie Jordan reports a pain score of 8.  Given her pain assessment as noted, treatment options were discussed and the following options were decided upon as a follow-up plan to address the patient's pain: referral to orthopedic. .    Patient screened positive for depression based on a PHQ-9 score of 1 on 10/29/2021. Follow-up recommendations include: Suicide Risk Assessment performed.    Advance Care Planning   ACP discussion was declined by the patient. Patient does not have an advance directive, declines further assistance.      Assessment and Plan    Diagnoses and all orders for this visit:    1. Iron deficiency anemia, unspecified iron deficiency anemia type (Primary)  -     CBC (No Diff); Future  -     Ferritin; Future  -     Iron Profile; Future    2. Iron malabsorption  -     CBC (No Diff); Future  -     Ferritin; Future  -     Iron Profile; Future    Chronic, stable.   S/p IV iron treatment.   HG and iron studies have improved.   Continue to monitor.   CBC, ferritin, iron profile in 3 months.     3. Chronic pain of right knee  -     Ambulatory Referral to Orthopedic Surgery    New issue for me.   Referral to ortho sent.       Follow Up   No follow-ups on file.  Patient was given instructions and counseling regarding her condition or for health maintenance advice. Please see specific information pulled into the AVS if appropriate.

## 2021-11-02 DIAGNOSIS — M25.561 ACUTE PAIN OF RIGHT KNEE: Primary | ICD-10-CM

## 2021-11-03 ENCOUNTER — OFFICE VISIT (OUTPATIENT)
Dept: ORTHOPEDIC SURGERY | Facility: CLINIC | Age: 71
End: 2021-11-03

## 2021-11-03 VITALS — BODY MASS INDEX: 19.97 KG/M2 | HEIGHT: 64 IN | WEIGHT: 117 LBS

## 2021-11-03 DIAGNOSIS — M25.561 ACUTE PAIN OF RIGHT KNEE: Primary | ICD-10-CM

## 2021-11-03 PROCEDURE — 99203 OFFICE O/P NEW LOW 30 MIN: CPT | Performed by: ORTHOPAEDIC SURGERY

## 2021-11-03 NOTE — PROGRESS NOTES
Jessie Jordan is a 71 y.o. female returns for     Chief Complaint   Patient presents with   • Right Knee - Initial Evaluation       HISTORY OF PRESENT ILLNESS:  Patient in for initial eval of right knee pain  Xray completed upon arrival.   Patient's pain is 10/10.  She fell in 2012 and fractured her patella.       71 F 9 years s/p R patella fx complains of several weeks acute on chronic R anterior knee pain.  Patella fx in 2012 was treated conservatively.  Pt has been managed by pain mgmt physician and had been receiving long-term narcotics, but recently her dose was cut in half due to concerns about increasing dependence on the medications.  Pain is worse with activity.  No swelling.  +Voltaren gel.  No oral NSAIDs.  +PT.  +CSI 6/2021.  No sx.       CONCURRENT MEDICAL HISTORY:    The following portions of the patient's history were reviewed and updated as appropriate: allergies, current medications, past family history, past medical history, past social history, past surgical history and problem list.     ROS  No fevers or chills.  No chest pain or shortness of air.  No GI or  disturbances.    PHYSICAL EXAMINATION:       There were no vitals taken for this visit.    Physical Exam    GAIT:     []  Normal  []  Antalgic    Assistive device: []  None  [x]  Walker     []  Crutches  []  Cane     []  Wheelchair  []  Stretcher    Ortho Exam  NAD  Antalgic gait  Ambulates with a walker  R knee:  No effusion.  +MJLT.  +TTP peripatellar region.  ROM 0-120.  Stable to v/v stress.  Negative Stacy.            ASSESSMENT:    Diagnoses and all orders for this visit:    Acute pain of right knee          PLAN  71 F with long-standing R anterior knee pain after a patella fx.  XRs show mild arthritic changes, but joint space appears to be still relatively well-preserved.  Rec conservative mgmt with ice 20 min BID-TID, NSAIDs, and PT for ROM, short-arc quad strengthening exercises, and modalities.  If symptoms persist, may  consider another CSI.  RTC prn.    No follow-ups on file.    Sofía Rivera MA

## 2021-12-14 ENCOUNTER — OFFICE VISIT (OUTPATIENT)
Dept: FAMILY MEDICINE CLINIC | Facility: CLINIC | Age: 71
End: 2021-12-14

## 2021-12-14 VITALS
HEART RATE: 67 BPM | BODY MASS INDEX: 20.2 KG/M2 | OXYGEN SATURATION: 98 % | DIASTOLIC BLOOD PRESSURE: 80 MMHG | WEIGHT: 118.3 LBS | SYSTOLIC BLOOD PRESSURE: 120 MMHG | TEMPERATURE: 96.9 F | HEIGHT: 64 IN

## 2021-12-14 DIAGNOSIS — I10 ESSENTIAL HYPERTENSION: ICD-10-CM

## 2021-12-14 PROCEDURE — 99213 OFFICE O/P EST LOW 20 MIN: CPT | Performed by: STUDENT IN AN ORGANIZED HEALTH CARE EDUCATION/TRAINING PROGRAM

## 2021-12-14 RX ORDER — AMLODIPINE BESYLATE 2.5 MG/1
5 TABLET ORAL DAILY
Qty: 180 TABLET | Refills: 3 | Status: SHIPPED | OUTPATIENT
Start: 2021-12-14 | End: 2022-07-13

## 2021-12-14 RX ORDER — LISINOPRIL 10 MG/1
10 TABLET ORAL DAILY
Qty: 90 TABLET | Refills: 3 | Status: SHIPPED | OUTPATIENT
Start: 2021-12-14 | End: 2023-02-08

## 2021-12-17 RX ORDER — FOLIC ACID 1 MG/1
1 TABLET ORAL DAILY
Qty: 30 TABLET | Refills: 3 | Status: SHIPPED | OUTPATIENT
Start: 2021-12-17 | End: 2022-02-18

## 2021-12-22 ENCOUNTER — IMMUNIZATION (OUTPATIENT)
Dept: FAMILY MEDICINE CLINIC | Facility: CLINIC | Age: 71
End: 2021-12-22

## 2021-12-22 PROCEDURE — 0004A COVID-19 (PFIZER): CPT | Performed by: SURGERY

## 2021-12-22 PROCEDURE — 91300 COVID-19 (PFIZER): CPT | Performed by: SURGERY

## 2021-12-23 DIAGNOSIS — Z13.220 SCREENING FOR HYPERLIPIDEMIA: ICD-10-CM

## 2021-12-27 RX ORDER — ATORVASTATIN CALCIUM 10 MG/1
10 TABLET, FILM COATED ORAL DAILY
Qty: 90 TABLET | Refills: 1 | Status: SHIPPED | OUTPATIENT
Start: 2021-12-27 | End: 2022-06-10

## 2022-01-04 NOTE — PROGRESS NOTES
Family Medicine Residency  Kaela Andres MD    Subjective:     Jessie Jordan is a 71 y.o. female who presents for follow-up of HTN. Currently takes Amlodipine 2.5mg BID and Lisinopril 10mg. Does not check BP at home but 120/80 in office today. Denies chest pain, palpitations, headaches, N/V.     The following portions of the patient's history were reviewed and updated as appropriate: allergies, current medications, past family history, past medical history, past social history, past surgical history and problem list.    Past Medical Hx:  Past Medical History:   Diagnosis Date   • Anxiety and depression    • Coronary artery disease involving native coronary artery of native heart 12/06/2017    seen on Cardiac Cath - Left main 50-70% stenosis, 12/27/2017 repeat cath showed coronary spasm with no stenosis   • Hyperlipidemia    • Hypertension    • Kidney cysts    • Post-menopausal 1990   • Skin cancer    • Stroke (HCC) 2015   • Vascular disease        Past Surgical Hx:  Past Surgical History:   Procedure Laterality Date   • ARTERIOGRAM N/A 9/28/2018    Procedure: aortoiliac arteriogram possible angioplasty stent atherectomy thrombolysis bilateral iliac stents;  Surgeon: Luan Ruff MD;  Location: [facility] ANGIO INVASIVE LOCATION;  Service: Interventional Radiology   • CARDIAC CATHETERIZATION  12/06/2017    Done at [facility] - Left Main 50-70% Stenosis - no stenting done, recommendation was urgent CABG.  EF 50-60%   • CERVICAL FUSION  1985    C5-6   • COLONOSCOPY N/A 6/29/2018    Procedure: COLONOSCOPY;  Surgeon: Oz Way MD;  Location: [facility] ENDOSCOPY;  Service: Gastroenterology   • COLONOSCOPY N/A 7/6/2020    Procedure: COLONOSCOPY;  Surgeon: Oz Way MD;  Location: [facility] ENDOSCOPY;  Service: Gastroenterology;  Laterality: N/A;   • ENDOSCOPY N/A 7/6/2020    Procedure: ESOPHAGOGASTRODUODENOSCOPY ASAP;  Surgeon: Oz Way MD;  Location: [facility] ENDOSCOPY;   Service: Gastroenterology;  Laterality: N/A;   • ENDOSCOPY N/A 8/17/2021    Procedure: ESOPHAGOGASTRODUODENOSCOPY possible dilation;  Surgeon: Oz Way MD;  Location: [facility] [place] ENDOSCOPY;  Service: Gastroenterology;  Laterality: N/A;   • FEMORAL POPLITEAL BYPASS Right 12/19/2018    Procedure: femoral to femoral artery bypass, RIGHT FEMORAL POPLITEAL BYPASS right lower extremity arteriogram          (C-ARM# AND C-ARM TABLE);  Surgeon: Luan Ruff MD;  Location: [facility] [place] OR;  Service: Vascular   • FINGER SURGERY Left     third finger   • ROTATOR CUFF REPAIR Left 06/30/2011    done in [place]       Current Meds:    Current Outpatient Medications:   •  amLODIPine (NORVASC) 2.5 MG tablet, Take 2 tablets by mouth Daily., Disp: 180 tablet, Rfl: 3  •  Calcium Carbonate-Vitamin D (calcium-vitamin D) 500-200 MG-UNIT tablet per tablet, Take 1 tablet by mouth., Disp: , Rfl:   •  carbamide peroxide (Debrox) 6.5 % otic solution, Administer 5 drops into both ears 2 (Two) Times a Day., Disp: 15 mL, Rfl: 3  •  clopidogrel (PLAVIX) 75 MG tablet, Take 1 tablet by mouth Daily., Disp: 90 tablet, Rfl: 3  •  Diclofenac Sodium (VOLTAREN) 1 % gel gel, Apply 4 g topically to the appropriate area as directed 4 (Four) Times a Day As Needed (pain)., Disp: 100 g, Rfl: 3  •  ferrous sulfate 325 (65 FE) MG tablet, Take 1 tablet by mouth Daily With Breakfast., Disp: 90 tablet, Rfl: 1  •  FLUoxetine (PROzac) 20 MG capsule, Take 1 capsule by mouth Daily., Disp: 90 capsule, Rfl: 3  •  fluticasone (FLONASE) 50 MCG/ACT nasal spray, 2 sprays by Each Nare route Daily., Disp: 48 mL, Rfl: 3  •  gabapentin (NEURONTIN) 400 MG capsule, Take 1 capsule by mouth 3 (Three) Times a Day., Disp: 90 capsule, Rfl: 2  •  HYDROcodone-acetaminophen (NORCO) 7.5-325 MG per tablet, Take 1 tablet by mouth Every 6 (Six) Hours As Needed for Moderate Pain . (Patient taking differently: Take 1 tablet by mouth Every 6 (Six) Hours As Needed for Moderate Pain . Moved up to  10 mg), Disp: 30 tablet, Rfl: 0  •  lisinopril (PRINIVIL,ZESTRIL) 10 MG tablet, Take 1 tablet by mouth Daily., Disp: 90 tablet, Rfl: 3  •  melatonin ([employer] Melatonin) 3 MG tablet, Take 1 tablet by mouth At Night As Needed for Sleep., Disp: 90 tablet, Rfl: 0  •  pantoprazole (PROTONIX) 40 MG EC tablet, Take 1 tablet by mouth Daily., Disp: 30 tablet, Rfl: 11  •  varenicline (Chantix) 1 MG tablet, Take 1 tablet by mouth 2 (Two) Times a Day., Disp: 56 tablet, Rfl: 2  •  vitamin D (ERGOCALCIFEROL) 1.25 MG (05831 UT) capsule capsule, Take 1 capsule by mouth 1 (One) Time Per Week., Disp: 12 capsule, Rfl: 1  •  atorvastatin (LIPITOR) 10 MG tablet, Take 1 tablet by mouth Daily., Disp: 90 tablet, Rfl: 1  •  folic acid (FOLVITE) 1 MG tablet, Take 1 tablet by mouth Daily., Disp: 30 tablet, Rfl: 3    Allergies:  No Known Allergies    Family Hx:  Family History   Problem Relation Age of Onset   • Lung cancer Mother    • Hypertension Mother    • Diabetes Maternal Grandmother    • Heart disease Maternal Grandmother    • Hypertension Maternal Grandfather    • Heart disease Maternal Grandfather    • Heart disease Other    • Hypertension Other    • Cancer Other         Social History:  Social History     Socioeconomic History   • Marital status:    • Number of children: 1   Tobacco Use   • Smoking status: Light Tobacco Smoker     Packs/day: 0.25     Years: 48.00     Pack years: 12.00     Types: Cigarettes     Last attempt to quit: 12/18/2018     Years since quitting: 3.0   • Smokeless tobacco: Never Used   • Tobacco comment: 1-2cig/day   Vaping Use   • Vaping Use: Never used   Substance and Sexual Activity   • Alcohol use: Yes     Comment: occasional beer    • Drug use: No   • Sexual activity: Defer     Comment: .       Review of Systems  Review of Systems   Constitutional: Negative for activity change, appetite change, fatigue and fever.   HENT: Negative for congestion, rhinorrhea, sinus pain and sore throat.    Eyes:  "Negative for pain, redness and visual disturbance.   Respiratory: Negative for cough, shortness of breath and wheezing.    Cardiovascular: Negative for chest pain, palpitations and leg swelling.   Gastrointestinal: Negative for abdominal pain, constipation, diarrhea, nausea and vomiting.   Genitourinary: Negative for dysuria and flank pain.   Musculoskeletal: Negative for arthralgias, back pain, joint swelling and myalgias.   Skin: Negative for color change, pallor and rash.   Neurological: Negative for dizziness, light-headedness and headaches.       Objective:     /80   Pulse 67   Temp 96.9 °F (36.1 °C)   Ht 162.6 cm (64\")   Wt 53.7 kg (118 lb 4.8 oz)   SpO2 98%   BMI 20.31 kg/m²   Physical Exam  Vitals and nursing note reviewed.   Constitutional:       General: She is not in acute distress.     Appearance: Normal appearance. She is well-developed. She is not diaphoretic.   HENT:      Head: Normocephalic and atraumatic.      Right Ear: Hearing normal.      Left Ear: Hearing normal.   Eyes:      General: Lids are normal.      Conjunctiva/sclera: Conjunctivae normal.      Pupils: Pupils are equal, round, and reactive to light.   Cardiovascular:      Rate and Rhythm: Normal rate and regular rhythm.      Heart sounds: Normal heart sounds.   Pulmonary:      Effort: Pulmonary effort is normal. No respiratory distress.      Breath sounds: Normal breath sounds. No wheezing or rales.   Abdominal:      General: Bowel sounds are normal. There is no distension.      Palpations: Abdomen is soft.      Tenderness: There is no abdominal tenderness.   Musculoskeletal:         General: No tenderness or deformity. Normal range of motion.      Right lower leg: No edema.      Left lower leg: No edema.   Skin:     General: Skin is warm and dry.      Coloration: Skin is not pale.      Findings: No erythema or rash.   Neurological:      Mental Status: She is alert and oriented to person, place, and time. She is not " disoriented.          Assessment/Plan:     Diagnoses and all orders for this visit:    1. Essential hypertension  -     amLODIPine (NORVASC) 2.5 MG tablet; Take 2 tablets by mouth Daily.  Dispense: 180 tablet; Refill: 3  -     lisinopril (PRINIVIL,ZESTRIL) 10 MG tablet; Take 1 tablet by mouth Daily.  Dispense: 90 tablet; Refill: 3    Other orders  -     carbamide peroxide (Debrox) 6.5 % otic solution; Administer 5 drops into both ears 2 (Two) Times a Day.  Dispense: 15 mL; Refill: 3    Chronic, well controlled, stable. Patient can utilize 5mg Norvasc in the morning but can go back to 2.5mg BID if not tolerated. Continue Lisinopril 10mg. Advised to test blood pressure at least a few times a week. Can increase Norvasc or Lisinopril if needed.    · Rx changes: see a/p  · Patient Education: see a/p  · Compliance at present is estimated to be good.     Depression screening: Up to date; last screen 10/29/2021     Follow-up:     Return in about 3 months (around 3/14/2022) for Recheck.    Preventative:  Health Maintenance   Topic Date Due   • TDAP/TD VACCINES (1 - Tdap) Never done   • ZOSTER VACCINE (1 of 2) Never done   • Pneumococcal Vaccine 65+ (1 of 2 - PPSV23) 05/31/2018   • DXA SCAN  04/25/2020   • MAMMOGRAM  05/04/2020   • LIPID PANEL  06/22/2021   • INFLUENZA VACCINE  12/14/2022 (Originally 8/1/2021)   • ANNUAL WELLNESS VISIT  01/15/2022   • COLORECTAL CANCER SCREENING  07/06/2025   • HEPATITIS C SCREENING  Completed   • COVID-19 Vaccine  Completed     Female Preventative: Mammogram is due  DEXA is due  Recommended: DTaP, Varicella and Pneumovax  Vaccine Counseling: N/A    Weight  -Class: Normal: 18.5-24.9kg/m2  -Patient's Body mass index is 20.31 kg/m². indicating that she is within normal range (BMI 18.5-24.9). No BMI management plan needed..   eat more fruits and vegetables, decrease soda or juice intake, increase water intake, increase physical activity, reduce screen time, reduce portion size and cut out extra  servings    Alcohol use:  reports current alcohol use.  Nicotine status  reports that she has been smoking cigarettes. She has a 12.00 pack-year smoking history. She has never used smokeless tobacco.    Goals     •  Pain control       Barriers: severe PAD, pt continues to smoke      •  Quit smoking / using tobacco (pt-stated)       Barriers: compliance with cessation medications            RISK SCORE: 3      Kaela Andres M.D. PGY3  [facility] Medicine Residency  [address] [place] [address]  Office: [phone]  This document has been electronically signed by Kaela Andres MD on January 4, 2022 10:22 CST

## 2022-01-04 NOTE — PROGRESS NOTES
I have reviewed the notes, assessments, and/or procedures performed by Dr. Kaela Andres, I concur with his  documentation and assessment and plan for Jessie Jordan        This document has been electronically signed by Pancho Reyna MD on January 4, 2022 11:45 CST

## 2022-01-11 RX ORDER — ERGOCALCIFEROL 1.25 MG/1
50000 CAPSULE ORAL WEEKLY
Qty: 12 CAPSULE | Refills: 1 | Status: SHIPPED | OUTPATIENT
Start: 2022-01-11 | End: 2022-07-11

## 2022-01-28 ENCOUNTER — LAB (OUTPATIENT)
Dept: ONCOLOGY | Facility: HOSPITAL | Age: 72
End: 2022-01-28

## 2022-01-28 ENCOUNTER — OFFICE VISIT (OUTPATIENT)
Dept: ONCOLOGY | Facility: CLINIC | Age: 72
End: 2022-01-28

## 2022-01-28 VITALS
BODY MASS INDEX: 20.87 KG/M2 | OXYGEN SATURATION: 98 % | TEMPERATURE: 97.1 F | RESPIRATION RATE: 18 BRPM | HEART RATE: 56 BPM | SYSTOLIC BLOOD PRESSURE: 145 MMHG | DIASTOLIC BLOOD PRESSURE: 69 MMHG | WEIGHT: 121.6 LBS

## 2022-01-28 DIAGNOSIS — D50.9 IRON DEFICIENCY ANEMIA, UNSPECIFIED IRON DEFICIENCY ANEMIA TYPE: ICD-10-CM

## 2022-01-28 DIAGNOSIS — K90.9 IRON MALABSORPTION: ICD-10-CM

## 2022-01-28 LAB
DEPRECATED RDW RBC AUTO: 46.6 FL (ref 37–54)
ERYTHROCYTE [DISTWIDTH] IN BLOOD BY AUTOMATED COUNT: 14.5 % (ref 12.3–15.4)
FERRITIN SERPL-MCNC: 167.6 NG/ML (ref 13–150)
HCT VFR BLD AUTO: 37.4 % (ref 34–46.6)
HGB BLD-MCNC: 12.6 G/DL (ref 12–15.9)
HOLD SPECIMEN: NORMAL
IRON 24H UR-MRATE: 87 MCG/DL (ref 37–145)
IRON SATN MFR SERPL: 24 % (ref 20–50)
MCH RBC QN AUTO: 29.4 PG (ref 26.6–33)
MCHC RBC AUTO-ENTMCNC: 33.7 G/DL (ref 31.5–35.7)
MCV RBC AUTO: 87.2 FL (ref 79–97)
PLATELET # BLD AUTO: 364 10*3/MM3 (ref 140–450)
PMV BLD AUTO: 10.2 FL (ref 6–12)
RBC # BLD AUTO: 4.29 10*6/MM3 (ref 3.77–5.28)
TIBC SERPL-MCNC: 368 MCG/DL (ref 298–536)
TRANSFERRIN SERPL-MCNC: 247 MG/DL (ref 200–360)
WBC NRBC COR # BLD: 9.43 10*3/MM3 (ref 3.4–10.8)

## 2022-01-28 PROCEDURE — G0463 HOSPITAL OUTPT CLINIC VISIT: HCPCS | Performed by: NURSE PRACTITIONER

## 2022-01-28 PROCEDURE — 85027 COMPLETE CBC AUTOMATED: CPT

## 2022-01-28 PROCEDURE — 99212 OFFICE O/P EST SF 10 MIN: CPT | Performed by: NURSE PRACTITIONER

## 2022-01-28 PROCEDURE — 82728 ASSAY OF FERRITIN: CPT

## 2022-01-28 PROCEDURE — 83540 ASSAY OF IRON: CPT

## 2022-01-28 PROCEDURE — 84466 ASSAY OF TRANSFERRIN: CPT

## 2022-02-08 NOTE — PROGRESS NOTES
DATE OF VISIT: 1/28/2022      REASON FOR VISIT:  Follow-up for iron deficiency      HISTORY OF PRESENT ILLNESS:   Pt being seen today in Dr. Neri vu  She has received IV iron in past; here today to reassess iron levels  Denies any bleeding      Past Medical History, Past Surgical History, Social History, Family History have been reviewed and are without significant changes except as mentioned.    Review of Systems   A comprehensive 14 point review of systems was performed and was negative except as mentioned.    Medications:  The current medication list was reviewed in the EMR    ALLERGIES:  No Known Allergies    Objective      Vitals:    01/28/22 1408   BP: 145/69   Pulse: 56   Resp: 18   Temp: 97.1 °F (36.2 °C)   SpO2: 98%   Weight: 55.2 kg (121 lb 9.6 oz)   PainSc: 0-No pain     Current Status 10/6/2021   ECOG score 2       Physical Exam  Lungs: CTAB  Card: RRR no murmur  Abd; soft non tender non distended  Ext; no edema    RECENT LABS:  Glucose   Date Value Ref Range Status   07/06/2021 87 65 - 99 mg/dL Final     Sodium   Date Value Ref Range Status   07/06/2021 138 136 - 145 mmol/L Final     Potassium   Date Value Ref Range Status   07/06/2021 4.1 3.5 - 5.2 mmol/L Final     CO2   Date Value Ref Range Status   07/06/2021 23.8 22.0 - 29.0 mmol/L Final     Chloride   Date Value Ref Range Status   07/06/2021 101 98 - 107 mmol/L Final     Anion Gap   Date Value Ref Range Status   07/06/2021 13.2 5.0 - 15.0 mmol/L Final     Creatinine   Date Value Ref Range Status   07/06/2021 0.80 0.57 - 1.00 mg/dL Final     BUN   Date Value Ref Range Status   07/06/2021 13 8 - 23 mg/dL Final     BUN/Creatinine Ratio   Date Value Ref Range Status   07/06/2021 16.3 7.0 - 25.0 Final     Calcium   Date Value Ref Range Status   07/06/2021 9.2 8.6 - 10.5 mg/dL Final     eGFR Non  Amer   Date Value Ref Range Status   07/06/2021 71 >60 mL/min/1.73 Final     Alkaline Phosphatase   Date Value Ref Range Status   07/06/2021 88 39  - 117 U/L Final     Total Protein   Date Value Ref Range Status   07/06/2021 6.8 6.0 - 8.5 g/dL Final     ALT (SGPT)   Date Value Ref Range Status   07/06/2021 8 1 - 33 U/L Final     AST (SGOT)   Date Value Ref Range Status   07/06/2021 11 1 - 32 U/L Final     Total Bilirubin   Date Value Ref Range Status   07/06/2021 <0.2 0.0 - 1.2 mg/dL Final     Albumin   Date Value Ref Range Status   07/06/2021 4.30 3.50 - 5.20 g/dL Final     Globulin   Date Value Ref Range Status   07/06/2021 2.5 gm/dL Final     Lab Results   Component Value Date    WBC 9.43 01/28/2022    HGB 12.6 01/28/2022    HCT 37.4 01/28/2022    MCV 87.2 01/28/2022     01/28/2022     Lab Results   Component Value Date    NEUTROABS 7.37 (H) 06/25/2020    IRON 87 01/28/2022    IRON 54 10/29/2021    IRON 30 (L) 08/24/2021    TIBC 368 01/28/2022    TIBC 307 10/29/2021    TIBC 463 08/24/2021    LABIRON 24 01/28/2022    LABIRON 18 (L) 10/29/2021    LABIRON 6 (L) 08/24/2021    FERRITIN 167.60 (H) 01/28/2022    FERRITIN 397.30 (H) 10/29/2021    AHQDBZEW23 304 10/29/2021    VTMASQLW84 253 06/22/2020    FOLATE >20.00 10/29/2021    FOLATE 3.39 (L) 06/22/2020     No results found for: , LABCA2, AFPTM, HCGQUANT, , CHROMGRNA, [id number], CEA, REFLABREPO      PATHOLOGY:  * Cannot find OR log *         RADIOLOGY DATA :  No radiology results for the last 7 days        Assessment/Plan     Iron deficiency anemia;status post iron infusions.  Labs reviewed today show Hgb 12.6 with stable iron stores  Will plan to recheck labs again in 4 mos.               PHQ-9 Total Score: 1 pt is not suicidal or homicidal    Jessie Jordan reports a pain score of 0.  Given her pain assessment as noted, treatment options were discussed and the following options were decided upon as a follow-up plan to address the patient's pain: no pain today.         Carmencita Holder, APRN  2/8/2022  10:15 CST        Part of this note may be an electronic transcription/translation of spoken  language to printed text using the Dragon Dictation System.          CC:

## 2022-02-18 RX ORDER — FOLIC ACID 1 MG/1
TABLET ORAL
Qty: 90 TABLET | Refills: 1 | Status: SHIPPED | OUTPATIENT
Start: 2022-02-18 | End: 2022-10-10 | Stop reason: SDUPTHER

## 2022-02-23 DIAGNOSIS — F51.01 PRIMARY INSOMNIA: ICD-10-CM

## 2022-02-23 RX ORDER — LANOLIN ALCOHOL/MO/W.PET/CERES
3 CREAM (GRAM) TOPICAL NIGHTLY PRN
Qty: 90 TABLET | Refills: 0 | Status: SHIPPED | OUTPATIENT
Start: 2022-02-23 | End: 2022-06-17

## 2022-03-10 ENCOUNTER — OFFICE VISIT (OUTPATIENT)
Dept: FAMILY MEDICINE CLINIC | Facility: CLINIC | Age: 72
End: 2022-03-10

## 2022-03-10 VITALS
TEMPERATURE: 97.5 F | DIASTOLIC BLOOD PRESSURE: 78 MMHG | OXYGEN SATURATION: 99 % | BODY MASS INDEX: 21.19 KG/M2 | SYSTOLIC BLOOD PRESSURE: 140 MMHG | HEART RATE: 57 BPM | HEIGHT: 64 IN | WEIGHT: 124.1 LBS

## 2022-03-10 DIAGNOSIS — I10 ESSENTIAL HYPERTENSION: Primary | ICD-10-CM

## 2022-03-10 PROCEDURE — 99213 OFFICE O/P EST LOW 20 MIN: CPT | Performed by: STUDENT IN AN ORGANIZED HEALTH CARE EDUCATION/TRAINING PROGRAM

## 2022-03-10 RX ORDER — VARENICLINE TARTRATE 1 MG/1
1 TABLET, FILM COATED ORAL 2 TIMES DAILY
Qty: 56 TABLET | Refills: 2 | Status: SHIPPED | OUTPATIENT
Start: 2022-03-10 | End: 2022-05-19 | Stop reason: SDUPTHER

## 2022-03-10 NOTE — PROGRESS NOTES
Family Medicine Residency  Kaela Andres MD    Subjective:     Jessie Jordan is a 71 y.o. female who presents for follow-up of HTN. Currently on Norvasc 5mg and Lisinopril 10mg. Does not check blood pressure at home. Denies chest pain, headache, palpitations.    The following portions of the patient's history were reviewed and updated as appropriate: allergies, current medications, past family history, past medical history, past social history, past surgical history and problem list.    Past Medical Hx:  Past Medical History:   Diagnosis Date   • Anxiety and depression    • Coronary artery disease involving native coronary artery of native heart 12/06/2017    seen on Cardiac Cath - Left main 50-70% stenosis, 12/27/2017 repeat cath showed coronary spasm with no stenosis   • Hyperlipidemia    • Hypertension    • Kidney cysts    • Post-menopausal 1990   • Skin cancer    • Stroke (HCC) 2015   • Vascular disease        Past Surgical Hx:  Past Surgical History:   Procedure Laterality Date   • ARTERIOGRAM N/A 9/28/2018    Procedure: aortoiliac arteriogram possible angioplasty stent atherectomy thrombolysis bilateral iliac stents;  Surgeon: Luan Ruff MD;  Location: [facility] ANGIO INVASIVE LOCATION;  Service: Interventional Radiology   • CARDIAC CATHETERIZATION  12/06/2017    Done at [facility] - Left Main 50-70% Stenosis - no stenting done, recommendation was urgent CABG.  EF 50-60%   • CERVICAL FUSION  1985    C5-6   • COLONOSCOPY N/A 6/29/2018    Procedure: COLONOSCOPY;  Surgeon: Oz Way MD;  Location: [facility] ENDOSCOPY;  Service: Gastroenterology   • COLONOSCOPY N/A 7/6/2020    Procedure: COLONOSCOPY;  Surgeon: zO Way MD;  Location: [facility] ENDOSCOPY;  Service: Gastroenterology;  Laterality: N/A;   • ENDOSCOPY N/A 7/6/2020    Procedure: ESOPHAGOGASTRODUODENOSCOPY ASAP;  Surgeon: Oz Way MD;  Location: [facility] ENDOSCOPY;  Service: Gastroenterology;  Laterality:  N/A;   • ENDOSCOPY N/A 8/17/2021    Procedure: ESOPHAGOGASTRODUODENOSCOPY possible dilation;  Surgeon: Oz Way MD;  Location: [facility] ENDOSCOPY;  Service: Gastroenterology;  Laterality: N/A;   • FEMORAL POPLITEAL BYPASS Right 12/19/2018    Procedure: femoral to femoral artery bypass, RIGHT FEMORAL POPLITEAL BYPASS right lower extremity arteriogram          (C-ARM# AND C-ARM TABLE);  Surgeon: Luan Ruff MD;  Location: [facility] OR;  Service: Vascular   • FINGER SURGERY Left     third finger   • ROTATOR CUFF REPAIR Left 06/30/2011    done in [place]       Current Meds:    Current Outpatient Medications:   •  amLODIPine (NORVASC) 2.5 MG tablet, Take 2 tablets by mouth Daily., Disp: 180 tablet, Rfl: 3  •  atorvastatin (LIPITOR) 10 MG tablet, Take 1 tablet by mouth Daily., Disp: 90 tablet, Rfl: 1  •  Calcium Carbonate-Vitamin D (calcium-vitamin D) 500-200 MG-UNIT tablet per tablet, Take 1 tablet by mouth Daily., Disp: 30 tablet, Rfl: 11  •  carbamide peroxide (Debrox) 6.5 % otic solution, Administer 5 drops into both ears 2 (Two) Times a Day., Disp: 15 mL, Rfl: 3  •  clopidogrel (PLAVIX) 75 MG tablet, Take 1 tablet by mouth Daily., Disp: 90 tablet, Rfl: 3  •  Diclofenac Sodium (VOLTAREN) 1 % gel gel, Apply 4 g topically to the appropriate area as directed 4 (Four) Times a Day As Needed (pain)., Disp: 100 g, Rfl: 3  •  ferrous sulfate 325 (65 FE) MG tablet, Take 1 tablet by mouth Daily With Breakfast., Disp: 90 tablet, Rfl: 1  •  FLUoxetine (PROzac) 20 MG capsule, Take 1 capsule by mouth Daily., Disp: 90 capsule, Rfl: 3  •  fluticasone (FLONASE) 50 MCG/ACT nasal spray, 2 sprays by Each Nare route Daily., Disp: 48 mL, Rfl: 3  •  folic acid (FOLVITE) 1 MG tablet, TAKE 1 TABLET BY MOUTH EVERY DAY, Disp: 90 tablet, Rfl: 1  •  gabapentin (NEURONTIN) 400 MG capsule, Take 1 capsule by mouth 3 (Three) Times a Day., Disp: 90 capsule, Rfl: 2  •  HYDROcodone-acetaminophen (NORCO) 7.5-325 MG per tablet, Take 1 tablet by  mouth Every 6 (Six) Hours As Needed for Moderate Pain . (Patient taking differently: Take 1 tablet by mouth Every 6 (Six) Hours As Needed for Moderate Pain . Moved up to 10 mg), Disp: 30 tablet, Rfl: 0  •  lisinopril (PRINIVIL,ZESTRIL) 10 MG tablet, Take 1 tablet by mouth Daily., Disp: 90 tablet, Rfl: 3  •  melatonin ([employer] Melatonin) 3 MG tablet, Take 1 tablet by mouth At Night As Needed for Sleep., Disp: 90 tablet, Rfl: 0  •  pantoprazole (PROTONIX) 40 MG EC tablet, Take 1 tablet by mouth Daily., Disp: 30 tablet, Rfl: 11  •  varenicline (Chantix) 1 MG tablet, Take 1 tablet by mouth 2 (Two) Times a Day., Disp: 56 tablet, Rfl: 2  •  vitamin D (ERGOCALCIFEROL) 1.25 MG (56596 UT) capsule capsule, Take 1 capsule by mouth 1 (One) Time Per Week., Disp: 12 capsule, Rfl: 1    Allergies:  No Known Allergies    Family Hx:  Family History   Problem Relation Age of Onset   • Lung cancer Mother    • Hypertension Mother    • Diabetes Maternal Grandmother    • Heart disease Maternal Grandmother    • Hypertension Maternal Grandfather    • Heart disease Maternal Grandfather    • Heart disease Other    • Hypertension Other    • Cancer Other         Social History:  Social History     Socioeconomic History   • Marital status:    • Number of children: 1   Tobacco Use   • Smoking status: Light Tobacco Smoker     Packs/day: 0.25     Years: 48.00     Pack years: 12.00     Types: Cigarettes     Last attempt to quit: 12/18/2018     Years since quitting: 3.2   • Smokeless tobacco: Never Used   • Tobacco comment: 1-2cig/day   Vaping Use   • Vaping Use: Never used   Substance and Sexual Activity   • Alcohol use: Yes     Comment: occasional beer    • Drug use: No   • Sexual activity: Defer     Comment: .       Review of Systems  Review of Systems   Constitutional: Negative for activity change, appetite change, fatigue and fever.   HENT: Negative for congestion, rhinorrhea, sinus pain and sore throat.    Eyes: Negative for pain,  "redness and visual disturbance.   Respiratory: Negative for cough, shortness of breath and wheezing.    Cardiovascular: Negative for chest pain, palpitations and leg swelling.   Gastrointestinal: Negative for abdominal pain, constipation, diarrhea, nausea and vomiting.   Genitourinary: Negative for dysuria and flank pain.   Musculoskeletal: Negative for arthralgias, back pain, joint swelling and myalgias.   Skin: Negative for color change, pallor and rash.   Neurological: Negative for dizziness, light-headedness and headaches.       Objective:     /78   Pulse 57   Temp 97.5 °F (36.4 °C)   Ht 162.6 cm (64\")   Wt 56.3 kg (124 lb 1.6 oz)   SpO2 99%   BMI 21.30 kg/m²   Physical Exam  Vitals and nursing note reviewed.   Constitutional:       General: She is not in acute distress.     Appearance: Normal appearance. She is well-developed. She is not diaphoretic.   HENT:      Head: Normocephalic and atraumatic.      Right Ear: Hearing normal.      Left Ear: Hearing normal.   Eyes:      General: Lids are normal.      Conjunctiva/sclera: Conjunctivae normal.   Cardiovascular:      Rate and Rhythm: Normal rate and regular rhythm.      Heart sounds: Normal heart sounds.   Pulmonary:      Effort: Pulmonary effort is normal. No respiratory distress.      Breath sounds: Normal breath sounds. No wheezing or rales.   Abdominal:      General: Bowel sounds are normal. There is no distension.      Palpations: Abdomen is soft.      Tenderness: There is no abdominal tenderness.   Musculoskeletal:         General: No tenderness or deformity. Normal range of motion.      Right lower leg: No edema.      Left lower leg: No edema.   Skin:     General: Skin is warm and dry.      Coloration: Skin is not pale.      Findings: No erythema or rash.   Neurological:      Mental Status: She is alert and oriented to person, place, and time. She is not disoriented.          Assessment/Plan:     Diagnoses and all orders for this visit:    1. " Essential hypertension (Primary)    Other orders  -     varenicline (Chantix) 1 MG tablet; Take 1 tablet by mouth 2 (Two) Times a Day.  Dispense: 56 tablet; Refill: 2    Chronic, well controlled. Continue Lisinopril 10mg and Norvasc 5mg. RTC 6 months.    · Rx changes: see a/p  · Patient Education: see a/p  · Compliance at present is estimated to be good.      Follow-up:     Return in about 6 months (around 9/10/2022) for Recheck.    Preventative:  Health Maintenance   Topic Date Due   • TDAP/TD VACCINES (1 - Tdap) Never done   • ZOSTER VACCINE (1 of 2) Never done   • Pneumococcal Vaccine 65+ (1 of 1 - PPSV23) 04/05/2019   • DXA SCAN  04/25/2020   • MAMMOGRAM  05/04/2020   • LIPID PANEL  06/22/2021   • ANNUAL WELLNESS VISIT  01/15/2022   • INFLUENZA VACCINE  12/14/2022 (Originally 8/1/2021)   • COLORECTAL CANCER SCREENING  07/06/2025   • HEPATITIS C SCREENING  Completed   • COVID-19 Vaccine  Completed     Female Preventative: Mammogram is due  DEXA is due  Recommended: Varicella and Pneumovax  Vaccine Counseling: N/A    Weight  -Class: Normal: 18.5-24.9kg/m2  -Patient's Body mass index is 21.3 kg/m². indicating that she is within normal range (BMI 18.5-24.9). No BMI management plan needed..   eat more fruits and vegetables, decrease soda or juice intake, increase water intake, increase physical activity, reduce screen time and reduce portion size    Alcohol use:  reports current alcohol use.  Nicotine status  reports that she has been smoking cigarettes. She has a 12.00 pack-year smoking history. She has never used smokeless tobacco.     Goals     •  Pain control       Barriers: severe PAD, pt continues to smoke        •  Quit smoking / using tobacco (pt-stated)       Barriers: compliance with cessation medications              RISK SCORE: 3      Kaela Andres M.D. PGY3  [facility] Family Medicine Residency  [address] [place] [address]  Office: [phone]  This document  has been electronically signed by Kaela Andres MD on March 10, 2022 15:49 CST

## 2022-03-14 NOTE — PROGRESS NOTES
I have reviewed the notes, assessments, and/or procedures performed by Kaela Andres MD, I concur with her/his documentation and assessment and plan for Jessie Jordan.                This document has been electronically signed by Justice Bonilla MD on March 14, 2022 10:08 CDT

## 2022-03-29 NOTE — PROGRESS NOTES
I have reviewed the notes, assessments, and/or procedures performed by Dr. Julianna Gloria, I concur with her  documentation and assessment and plan for Jessie Jordan        This document has been electronically signed by Pancho Reyna MD on September 30, 2020 09:56 CDT               Bi-Rhombic Flap Text: The defect edges were debeveled with a #15 scalpel blade.  Given the location of the defect and the proximity to free margins a bi-rhombic flap was deemed most appropriate.  Using a sterile surgical marker, an appropriate rhombic flap was drawn incorporating the defect. The area thus outlined was incised deep to adipose tissue with a #15 scalpel blade.  The skin margins were undermined to an appropriate distance in all directions utilizing iris scissors.

## 2022-04-12 ENCOUNTER — OFFICE VISIT (OUTPATIENT)
Dept: CARDIAC SURGERY | Facility: CLINIC | Age: 72
End: 2022-04-12

## 2022-04-12 VITALS
OXYGEN SATURATION: 98 % | HEIGHT: 64 IN | DIASTOLIC BLOOD PRESSURE: 80 MMHG | BODY MASS INDEX: 21.17 KG/M2 | SYSTOLIC BLOOD PRESSURE: 128 MMHG | WEIGHT: 124 LBS | HEART RATE: 61 BPM

## 2022-04-12 DIAGNOSIS — I73.9 PVD (PERIPHERAL VASCULAR DISEASE): Primary | ICD-10-CM

## 2022-04-12 DIAGNOSIS — I65.23 CAROTID STENOSIS, ASYMPTOMATIC, BILATERAL: ICD-10-CM

## 2022-04-12 DIAGNOSIS — E78.2 MIXED HYPERLIPIDEMIA: ICD-10-CM

## 2022-04-12 DIAGNOSIS — F17.200 SMOKER: ICD-10-CM

## 2022-04-12 PROCEDURE — 99214 OFFICE O/P EST MOD 30 MIN: CPT | Performed by: NURSE PRACTITIONER

## 2022-04-12 RX ORDER — HYDROCODONE BITARTRATE AND ACETAMINOPHEN 10; 325 MG/1; MG/1
1 TABLET ORAL EVERY 6 HOURS PRN
COMMUNITY

## 2022-04-12 NOTE — PATIENT INSTRUCTIONS
The results of your vascular studies of your right leg shows mild disease with good  circulation, in the left leg shows milddisease with good  circulation.      If signs and symptoms of ischemia should occur including but not limited to pale/blue discoloration of limb, increasing pain with ambulation or at rest, or a non-healing wound. Patient is to notify Heart and Vascular center for immediate evaluation.    Return 6 months MONTY and graft ultrasound    Carotids with mild blockages on both sides, recheck in 2 years

## 2022-04-14 NOTE — PROGRESS NOTES
CVTS Office Progress Note     4/14/2022    Jessie Jordan  1950    Chief Complaint:    Chief Complaint   Patient presents with   • Peripheral Vascular Disease   • Carotid Artery Disease       HPI:      PCP:  Kaela Andres MD  Cardiology:  Dr. Spence    71 y.o. female with HTN(stable, increased risk stroke, rupture), Hyperlipidemia(stable, increased risk cardiovascular events) and COPD(stable, increased risk pulmonary complications) PVD, MARY.  former smoker and quit 2018.  Right leg pain x2 years, underwent left-right fem-fem, fem-pop.  Follow up surveillance has remained stable.  Bilateral leg pain unrelated to activity or ambulation, chronic pain management.  Mild venous insuffiency.  Ambulation distance limited by breathing rather than claudication. No other associated signs, symptoms or modifying factors.    5/2018 MONTY:  RIGHT .32 bi/monophasic.  LEFT .47 biphasic.  6/2018 Carotid Duplex:  JENNIFER 0-49% antegrade vert.  LICA 0-49% antegrade vert.  6/2018 CTA Aorta runoff:  RIGHT common iliac 90%, SFA small moderate diffuse disease, tibial diffuse disease.  LEFT  Common iliac 70%, external iliac 80%, SFA occluded reconstitutes distal via profunda collaterals, diffuse tibials.  9/28/18  Agram:  PTA/Luminex Stent LEFT iliac artery:  RCIA occluded, RIIA/REIA moderate diffuse disease, RSFA occluded, RPT diffuse distal disease  10/11/18  MONTY:  RIGHT 0.50 Fem/Pop Biphasic  PT/DP Monophasic    12/19/18 LEFT to RIGHT femoral to femoral artery bypass and RIGHT FEMORAL POPLITEAL BYPASS (PTFE)  05/21/2019 Carotid Duplex: JENNIFER 0-49% with mobile plaque mPSV 120c/s Ratio 2.4, LICA 50-69% mPSV 141c/s Ratio 2.0, Hypoechoic structure left submandibular 9.3mm  05/21/2019 MONTY: Right 0.89 triphasic femoral to distal.  Left 0.75 femoral triphasic PT biphasic  05/21/2019 Graft Survey: Left groin 335, left groin anastomosis 324, proximal graft 183, mid graft 89, distal graft 89, right groin anastomosis 158, right groin  33.  All with triphasic waveforms.    12/3/19 Right 0.82 fem biphasic triphasic-distally.  Left 0.62 biphasic.   12/3/19 Carotid Duplex: JENNIFER 50-69% mPSV 117c/s Ratio 2.8, LICA 50-69% mPSV 162c/s Ratio 1.8, Antegrade vertebrals  6/2020 Carotid Duplex: JENNIFER 0-49% mPSV 122c/s Ratio 2.0, LICA 50-69% mPSV 157c/s Ratio 1.6, Antegrade vertebrals  6/2020 MONTY: Right 0.9 triphasic.  Left 0.81 triphasic.   6/2020 fem-Fem US: Patent, mPSV 439cm/s area appears widely patent.    6/2020 Right Fem-pop US: Patent, mPSV 155cm/s triphasic throughout  3/2021 Carotid Duplex: JENNIFER 50-69% mPSV 126c/s Ratio 1.9, LICA 0-49% mPSV 117c/s Ratio 1.5, Antegrade vertebrals  3/2021 MONTY: Right 0.92 triphasic.  Left 0.81 triphasic.\  4/2022 MONTY: Right 0.84 triphasic.  Left 0.73 triphasic  4/2022 Carotid Duplex: JENNIFER 0-49% mPSV 98c/s Ratio 1.7, LICA 0-49% mPSV 104c/s Ratio 1.6, Antegrade vertebrals      The following portions of the patient's history were reviewed and updated as appropriate: allergies, current medications, past family history, past medical history, past social history, past surgical history and problem list.  Recent images independently reviewed.  Available laboratory values reviewed.    PMH:  Past Medical History:   Diagnosis Date   • Anxiety and depression    • Coronary artery disease involving native coronary artery of native heart 12/06/2017    seen on Cardiac Cath - Left main 50-70% stenosis, 12/27/2017 repeat cath showed coronary spasm with no stenosis   • Hyperlipidemia    • Hypertension    • Kidney cysts    • Post-menopausal 1990   • Skin cancer    • Stroke (HCC) 2015   • Vascular disease      Past Surgical History:   Procedure Laterality Date   • ARTERIOGRAM N/A 9/28/2018    Procedure: aortoiliac arteriogram possible angioplasty stent atherectomy thrombolysis bilateral iliac stents;  Surgeon: Luan Ruff MD;  Location: [facility] ANGIO INVASIVE LOCATION;  Service: Interventional Radiology   • CARDIAC CATHETERIZATION   12/06/2017    Done at [facility] - Left Main 50-70% Stenosis - no stenting done, recommendation was urgent CABG.  EF 50-60%   • CERVICAL FUSION  1985    C5-6   • COLONOSCOPY N/A 6/29/2018    Procedure: COLONOSCOPY;  Surgeon: Oz Way MD;  Location: [facility] ENDOSCOPY;  Service: Gastroenterology   • COLONOSCOPY N/A 7/6/2020    Procedure: COLONOSCOPY;  Surgeon: Oz Way MD;  Location: [facility] ENDOSCOPY;  Service: Gastroenterology;  Laterality: N/A;   • ENDOSCOPY N/A 7/6/2020    Procedure: ESOPHAGOGASTRODUODENOSCOPY ASAP;  Surgeon: Oz Way MD;  Location: [facility] ENDOSCOPY;  Service: Gastroenterology;  Laterality: N/A;   • ENDOSCOPY N/A 8/17/2021    Procedure: ESOPHAGOGASTRODUODENOSCOPY possible dilation;  Surgeon: Oz Way MD;  Location: [facility] ENDOSCOPY;  Service: Gastroenterology;  Laterality: N/A;   • FEMORAL POPLITEAL BYPASS Right 12/19/2018    Procedure: femoral to femoral artery bypass, RIGHT FEMORAL POPLITEAL BYPASS right lower extremity arteriogram          (C-ARM# AND C-ARM TABLE);  Surgeon: Luan Ruff MD;  Location: [facility] OR;  Service: Vascular   • FINGER SURGERY Left     third finger   • ROTATOR CUFF REPAIR Left 06/30/2011    done in [place]     Family History   Problem Relation Age of Onset   • Lung cancer Mother    • Hypertension Mother    • Diabetes Maternal Grandmother    • Heart disease Maternal Grandmother    • Hypertension Maternal Grandfather    • Heart disease Maternal Grandfather    • Heart disease Other    • Hypertension Other    • Cancer Other      Social History     Tobacco Use   • Smoking status: Light Tobacco Smoker     Packs/day: 0.25     Years: 48.00     Pack years: 12.00     Types: Cigarettes     Last attempt to quit: 12/18/2018     Years since quitting: 3.3   • Smokeless tobacco: Never Used   • Tobacco comment: 1-2cig/day   Vaping Use   • Vaping Use: Never used   Substance Use Topics   • Alcohol use: Yes     Comment: occasional beer     • Drug use: No       ALLERGIES:  No Known Allergies      MEDICATIONS:    Current Outpatient Medications:   •  amLODIPine (NORVASC) 2.5 MG tablet, Take 2 tablets by mouth Daily., Disp: 180 tablet, Rfl: 3  •  atorvastatin (LIPITOR) 10 MG tablet, Take 1 tablet by mouth Daily., Disp: 90 tablet, Rfl: 1  •  Calcium Carbonate-Vitamin D (calcium-vitamin D) 500-200 MG-UNIT tablet per tablet, Take 1 tablet by mouth Daily., Disp: 30 tablet, Rfl: 11  •  carbamide peroxide (Debrox) 6.5 % otic solution, Administer 5 drops into both ears 2 (Two) Times a Day., Disp: 15 mL, Rfl: 3  •  clopidogrel (PLAVIX) 75 MG tablet, Take 1 tablet by mouth Daily., Disp: 90 tablet, Rfl: 3  •  Diclofenac Sodium (VOLTAREN) 1 % gel gel, Apply 4 g topically to the appropriate area as directed 4 (Four) Times a Day As Needed (pain)., Disp: 100 g, Rfl: 3  •  ferrous sulfate 325 (65 FE) MG tablet, Take 1 tablet by mouth Daily With Breakfast., Disp: 90 tablet, Rfl: 1  •  FLUoxetine (PROzac) 20 MG capsule, Take 1 capsule by mouth Daily., Disp: 90 capsule, Rfl: 3  •  fluticasone (FLONASE) 50 MCG/ACT nasal spray, 2 sprays by Each Nare route Daily., Disp: 48 mL, Rfl: 3  •  folic acid (FOLVITE) 1 MG tablet, TAKE 1 TABLET BY MOUTH EVERY DAY, Disp: 90 tablet, Rfl: 1  •  gabapentin (NEURONTIN) 400 MG capsule, Take 1 capsule by mouth 3 (Three) Times a Day., Disp: 90 capsule, Rfl: 2  •  HYDROcodone-acetaminophen (NORCO)  MG per tablet, Take 1 tablet by mouth Every 6 (Six) Hours As Needed., Disp: , Rfl:   •  lisinopril (PRINIVIL,ZESTRIL) 10 MG tablet, Take 1 tablet by mouth Daily., Disp: 90 tablet, Rfl: 3  •  melatonin ([employer] Melatonin) 3 MG tablet, Take 1 tablet by mouth At Night As Needed for Sleep., Disp: 90 tablet, Rfl: 0  •  pantoprazole (PROTONIX) 40 MG EC tablet, Take 1 tablet by mouth Daily., Disp: 30 tablet, Rfl: 11  •  varenicline (Chantix) 1 MG tablet, Take 1 tablet by mouth 2 (Two) Times a Day., Disp: 56 tablet, Rfl: 2  •  vitamin D (ERGOCALCIFEROL)  "1.25 MG (73170 UT) capsule capsule, Take 1 capsule by mouth 1 (One) Time Per Week., Disp: 12 capsule, Rfl: 1      Review of Systems   Constitutional: Negative for chills, decreased appetite, fever and weight loss.   HENT: Negative for congestion, nosebleeds and sore throat.    Eyes: Negative for blurred vision, visual disturbance and visual halos.   Cardiovascular: Positive for dyspnea on exertion and leg swelling. Negative for chest pain.   Respiratory: Negative for cough, shortness of breath, sputum production and wheezing.    Endocrine: Negative for cold intolerance and polyuria.   Hematologic/Lymphatic: Negative for bleeding problem. Bruises/bleeds easily.        Does not report S/S of adverse bleeding event including nose bleeds, hematuria, melena, gingival bleeding, or hematemesis.   Skin: Positive for unusual hair distribution. Negative for flushing and nail changes.   Musculoskeletal: Positive for arthritis, back pain and joint pain.   Gastrointestinal: Negative for bloating, abdominal pain, hematemesis, melena, nausea and vomiting.   Genitourinary: Negative for flank pain and hematuria.   Neurological: Positive for weakness. Negative for brief paralysis, difficulty with concentration, focal weakness, light-headedness, loss of balance, numbness and paresthesias.        No amaurosis fugax, TIA, or CVA.     Psychiatric/Behavioral: Negative for altered mental status, depression, substance abuse and suicidal ideas.   Allergic/Immunologic: Negative for hives and persistent infections.         Vitals:    04/12/22 1435   BP: 128/80   BP Location: Left arm   Patient Position: Sitting   Cuff Size: Adult   Pulse: 61   SpO2: 98%   Weight: 56.2 kg (124 lb)   Height: 162.6 cm (64\")     Physical Exam   Constitutional: She is oriented to person, place, and time. She appears well-developed.   HENT:   Head: Normocephalic and atraumatic.   Eyes: Pupils are equal, round, and reactive to light. Conjunctivae are normal. "   Cardiovascular: Normal rate.   No murmur heard.  RLE +2  LLE +1  R carotid bruit   Pulmonary/Chest: Effort normal and breath sounds normal. No respiratory distress.   Abdominal: Soft. Bowel sounds are normal. She exhibits no distension.   Musculoskeletal: Normal range of motion. Tenderness (both legs) present.   Neurological: She is alert and oriented to person, place, and time.   Skin: Skin is warm and dry. Capillary refill takes less than 2 seconds.   There is no evidence of skin breakdown of the bilateral lower extremities.  BLE pink, no evidence of ischemia.  Feet are absent of dependent rubor.   Psychiatric: Judgment normal.   Nursing note and vitals reviewed.      Assessment & Plan     Independent Review of Studies  4/2022 MONTY: Right 0.84 triphasic.  Left 0.73 triphasic  4/2022 Carotid Duplex: JENNIFER 0-49% mPSV 98c/s Ratio 1.7, LICA 0-49% mPSV 104c/s Ratio 1.6, Antegrade vertebrals    1. PVD (peripheral vascular disease) with claudication (CMS/HCC)  Mild bilateral reduction in resting perfusion  Symptoms controlled.  Return 6 months with MONTY    Native inflow velocities stable previously repeat next visit    Medical management with statin, plavix, ACE.    - Doppler Ankle Brachial Index Single Level CAR; Future    2. Bilateral carotid artery stenosis  Mild bilateral ICA disease  asymptomatic  Repeat in 12 months  Medical management as above.      3. Mixed hyperlipidemia  Lipid-lowering therapy has been proven beneficial in patients with cardio-vascular disease. Current guidelines recommend statin treatment for all patients with PAD,CAD and carotid stenosis. Statins are beneficial in preventing cardiovascular events, increasing functional capacity and lower the risk of adverse limb loss in PAD. Statins decrease the progression of plaque formation and may improve peripheral vessel lining, and aid in reversing atherosclerosis.    Smoking cessation assistance options offered including behavioral counseling (Smoking  Cessation Classes), Nicotine replacement therapy (patches or gum), pharmacologic therapy (Chantix, Wellbutrin). Understands tobacco increases risk of expanding AAA, MI, CVA, PAD, carcinoma. Discussion and question answer period 5-7 minutes.      Detailed discussion regarding risks, benefits, and treatment plan. Images independently reviewed. Patient understands, agrees, and wishes to proceed with plan.       This document has been electronically signed by ANTONINA McqueenCNP-BC Vaibhav  On April 14, 2022 11:37 CDT

## 2022-04-29 ENCOUNTER — DOCUMENTATION (OUTPATIENT)
Dept: ONCOLOGY | Facility: CLINIC | Age: 72
End: 2022-04-29

## 2022-04-29 ENCOUNTER — LAB (OUTPATIENT)
Dept: ONCOLOGY | Facility: HOSPITAL | Age: 72
End: 2022-04-29

## 2022-04-29 ENCOUNTER — OFFICE VISIT (OUTPATIENT)
Dept: ONCOLOGY | Facility: CLINIC | Age: 72
End: 2022-04-29

## 2022-04-29 VITALS
BODY MASS INDEX: 21.46 KG/M2 | OXYGEN SATURATION: 95 % | WEIGHT: 125 LBS | RESPIRATION RATE: 18 BRPM | TEMPERATURE: 97.1 F | HEART RATE: 64 BPM | SYSTOLIC BLOOD PRESSURE: 148 MMHG | DIASTOLIC BLOOD PRESSURE: 68 MMHG

## 2022-04-29 DIAGNOSIS — K90.9 IRON MALABSORPTION: ICD-10-CM

## 2022-04-29 DIAGNOSIS — D50.9 IRON DEFICIENCY ANEMIA, UNSPECIFIED IRON DEFICIENCY ANEMIA TYPE: Primary | ICD-10-CM

## 2022-04-29 DIAGNOSIS — D50.9 IRON DEFICIENCY ANEMIA, UNSPECIFIED IRON DEFICIENCY ANEMIA TYPE: ICD-10-CM

## 2022-04-29 DIAGNOSIS — I63.9 CEREBROVASCULAR ACCIDENT (CVA), UNSPECIFIED MECHANISM: ICD-10-CM

## 2022-04-29 DIAGNOSIS — R29.6 FREQUENT FALLS: ICD-10-CM

## 2022-04-29 LAB
DEPRECATED RDW RBC AUTO: 41.8 FL (ref 37–54)
EOSINOPHIL # BLD MANUAL: 0.2 10*3/MM3 (ref 0–0.4)
EOSINOPHIL NFR BLD MANUAL: 2 % (ref 0.3–6.2)
ERYTHROCYTE [DISTWIDTH] IN BLOOD BY AUTOMATED COUNT: 13.2 % (ref 12.3–15.4)
FERRITIN SERPL-MCNC: 51.68 NG/ML (ref 13–150)
HCT VFR BLD AUTO: 35.1 % (ref 34–46.6)
HGB BLD-MCNC: 11.8 G/DL (ref 12–15.9)
IRON 24H UR-MRATE: 33 MCG/DL (ref 37–145)
IRON SATN MFR SERPL: 10 % (ref 20–50)
LYMPHOCYTES # BLD MANUAL: 2.53 10*3/MM3 (ref 0.7–3.1)
LYMPHOCYTES NFR BLD MANUAL: 8 % (ref 5–12)
MCH RBC QN AUTO: 29.4 PG (ref 26.6–33)
MCHC RBC AUTO-ENTMCNC: 33.6 G/DL (ref 31.5–35.7)
MCV RBC AUTO: 87.3 FL (ref 79–97)
MONOCYTES # BLD: 0.81 10*3/MM3 (ref 0.1–0.9)
NEUTROPHILS # BLD AUTO: 6.58 10*3/MM3 (ref 1.7–7)
NEUTROPHILS NFR BLD MANUAL: 62 % (ref 42.7–76)
NEUTS BAND NFR BLD MANUAL: 3 % (ref 0–5)
PLATELET # BLD AUTO: 325 10*3/MM3 (ref 140–450)
PMV BLD AUTO: 10.3 FL (ref 6–12)
RBC # BLD AUTO: 4.02 10*6/MM3 (ref 3.77–5.28)
RBC MORPH BLD: NORMAL
SMALL PLATELETS BLD QL SMEAR: ADEQUATE
TIBC SERPL-MCNC: 338 MCG/DL (ref 298–536)
TRANSFERRIN SERPL-MCNC: 227 MG/DL (ref 200–360)
VARIANT LYMPHS NFR BLD MANUAL: 25 % (ref 19.6–45.3)
WBC MORPH BLD: NORMAL
WBC NRBC COR # BLD: 10.12 10*3/MM3 (ref 3.4–10.8)

## 2022-04-29 PROCEDURE — 99214 OFFICE O/P EST MOD 30 MIN: CPT | Performed by: INTERNAL MEDICINE

## 2022-04-29 PROCEDURE — 84466 ASSAY OF TRANSFERRIN: CPT

## 2022-04-29 PROCEDURE — G0463 HOSPITAL OUTPT CLINIC VISIT: HCPCS | Performed by: INTERNAL MEDICINE

## 2022-04-29 PROCEDURE — 85025 COMPLETE CBC W/AUTO DIFF WBC: CPT

## 2022-04-29 PROCEDURE — 83540 ASSAY OF IRON: CPT

## 2022-04-29 PROCEDURE — 82728 ASSAY OF FERRITIN: CPT

## 2022-04-29 PROCEDURE — 85007 BL SMEAR W/DIFF WBC COUNT: CPT

## 2022-04-29 RX ORDER — SODIUM CHLORIDE 9 MG/ML
250 INJECTION, SOLUTION INTRAVENOUS ONCE
Status: CANCELLED | OUTPATIENT
Start: 2022-05-09

## 2022-04-29 NOTE — PROGRESS NOTES
SEVERINOW met in the exam room with patient and her granddaughter subsequent to advisement from MD of pt wish and need for a lift chair.  Pt. Presents for hematology follow up..  SW offered feedback as to process and insurance barriers with lift chair, discussed potential insurance support for part of chair, also, discussed other means such as re purposed chair bout out of pocket.  Pt with financial barriers that would prevent her from obtaining such an expensive item and paying out of pocket.  Physician provided a script for lift chair and pt / grand daughter encouraged to take to DME of choice ([facility]) to ask them to run thought insurance and advise as to cost and options. Both stated understanding of info and plan.  Ongoing support of SW and team reinforced.

## 2022-04-29 NOTE — PROGRESS NOTES
Chief Complaint  Follow-up - anemia     Subjective          Jessieloren Jordan presents to [facility] HEMATOLOGY & ONCOLOGY  History of Present Illness     No new health issues since last visit.   No new medications.   No new hospitalization.   Patient with history of stroke with right-sided weakness, osteoarthritis and frequent falls.  She is requesting new prescription of Lift chair which was given to the patient.  Discussed with patient that [employer] company might not pay for it.  She understand and was agreeable.        Objective   Vital Signs:   /68   Pulse 64   Temp 97.1 °F (36.2 °C)   Resp 18   Wt 56.7 kg (125 lb)   SpO2 95%   BMI 21.46 kg/m²     Physical Exam  Vitals and nursing note reviewed.   Constitutional:       Appearance: Normal appearance.   Neurological:      General: No focal deficit present.      Mental Status: She is alert and oriented to person, place, and time. Mental status is at baseline.   Psychiatric:         Mood and Affect: Mood normal.         Behavior: Behavior normal.         Thought Content: Thought content normal.        Result Review :   The following data was reviewed by: Zaria Cummings MD on 04/29/2022:  Common labs    Common Labsle 10/29/21 1/28/22 4/29/22   WBC 11.37 (A) 9.43 10.12   Hemoglobin 12.1 12.6 11.8 (A)   Hematocrit 36.9 37.4 35.1   Platelets 313 364 325   (A) Abnormal value            CMP    CMP 7/6/21   Glucose 87   BUN 13   Creatinine 0.80   eGFR Non African Am 71   Sodium 138   Potassium 4.1   Chloride 101   Calcium 9.2   Albumin 4.30   Total Bilirubin <0.2   Alkaline Phosphatase 88   AST (SGOT) 11   ALT (SGPT) 8           CBC    CBC 10/29/21 1/28/22 4/29/22   WBC 11.37 (A) 9.43 10.12   RBC 4.36 4.29 4.02   Hemoglobin 12.1 12.6 11.8 (A)   Hematocrit 36.9 37.4 35.1   MCV 84.6 87.2 87.3   MCH 27.8 29.4 29.4   MCHC 32.8 33.7 33.6   RDW 17.9 (A) 14.5 13.2   Platelets 313 364 325   (A) Abnormal value            CBC w/diff     CBC w/Diff 10/29/21 1/28/22 4/29/22   WBC 11.37 (A) 9.43 10.12   RBC 4.36 4.29 4.02   Hemoglobin 12.1 12.6 11.8 (A)   Hematocrit 36.9 37.4 35.1   MCV 84.6 87.2 87.3   MCH 27.8 29.4 29.4   MCHC 32.8 33.7 33.6   RDW 17.9 (A) 14.5 13.2   Platelets 313 364 325   (A) Abnormal value              Anemia labs:      Lab 04/29/22  1013   IRON 33*   IRON SATURATION 10*   TIBC 338   TRANSFERRIN 227   FERRITIN 51.68       Jessie Jordan reports a pain score of 6.  Given her pain assessment as noted, treatment options were discussed and the following options were decided upon as a follow-up plan to address the patient's pain: referral to Primary Care for assistance in pain treatment guidance.    Patient screened negative for depression based on a PHQ-9 score of 1 on 4/29/2022.    Advance Care Planning   ACP discussion was declined by the patient. Patient does not have an advance directive, declines further assistance.         Assessment and Plan    Diagnoses and all orders for this visit:    1. Iron deficiency anemia, unspecified iron deficiency anemia type (Primary) 2. Iron malabsorption  -     CBC (No Diff); Future  -     Ferritin; Future  -     Iron Profile; Future      Chronic issue with exacerbation.  Anemia and iron studies are worse.  She is unable to tolerate oral iron due to GI side effects.  Due to this reason I believe she would benefit from IV iron treatment.    I had an extensive discussion with patient about diagnosis and treatment options. I recommend that we replace their iron with IV venofer - 200 mg x 3 infusions.     I had an extensive discussion with the patient about risk versus benefits of IV iron treatment.    I discussed about various risks associated with IV iron such as allergic reaction, hypersensitivity reaction, headache, flushing, joint aches or pains, local IV infiltration and skin discoloration.  After our discussion the patient was in agreement in  proceeding with IV iron treatment for   Anemia.      I will recheck her CBC, ferritin, iron profile in 4 months.      3. Cerebrovascular accident (CVA), unspecified mechanism (HCC) 4. Frequent falls    Patient with history of stroke with right-sided weakness, osteoarthritis and frequent falls.  She is requesting new prescription of Lift chair which was given to the patient.  Discussed with patient that [employer] company might not pay for it.  She understand and was agreeable.          Follow Up   No follow-ups on file.  Patient was given instructions and counseling regarding her condition or for health maintenance advice. Please see specific information pulled into the AVS if appropriate.

## 2022-05-10 ENCOUNTER — INFUSION (OUTPATIENT)
Dept: ONCOLOGY | Facility: HOSPITAL | Age: 72
End: 2022-05-10

## 2022-05-10 VITALS — SYSTOLIC BLOOD PRESSURE: 129 MMHG | DIASTOLIC BLOOD PRESSURE: 59 MMHG | HEART RATE: 56 BPM | TEMPERATURE: 98.2 F

## 2022-05-10 DIAGNOSIS — K90.9 IRON MALABSORPTION: ICD-10-CM

## 2022-05-10 DIAGNOSIS — D50.9 IRON DEFICIENCY ANEMIA, UNSPECIFIED IRON DEFICIENCY ANEMIA TYPE: Primary | ICD-10-CM

## 2022-05-10 PROCEDURE — 96365 THER/PROPH/DIAG IV INF INIT: CPT | Performed by: INTERNAL MEDICINE

## 2022-05-10 PROCEDURE — 25010000002 IRON SUCROSE PER 1 MG: Performed by: INTERNAL MEDICINE

## 2022-05-10 RX ORDER — SODIUM CHLORIDE 9 MG/ML
250 INJECTION, SOLUTION INTRAVENOUS ONCE
Status: CANCELLED | OUTPATIENT
Start: 2022-05-11

## 2022-05-10 RX ORDER — SODIUM CHLORIDE 9 MG/ML
250 INJECTION, SOLUTION INTRAVENOUS ONCE
Status: COMPLETED | OUTPATIENT
Start: 2022-05-10 | End: 2022-05-10

## 2022-05-10 RX ADMIN — IRON SUCROSE 200 MG: 20 INJECTION, SOLUTION INTRAVENOUS at 14:11

## 2022-05-10 RX ADMIN — SODIUM CHLORIDE 250 ML: 9 INJECTION, SOLUTION INTRAVENOUS at 14:11

## 2022-05-11 ENCOUNTER — INFUSION (OUTPATIENT)
Dept: ONCOLOGY | Facility: HOSPITAL | Age: 72
End: 2022-05-11

## 2022-05-11 VITALS
HEART RATE: 54 BPM | TEMPERATURE: 97.1 F | RESPIRATION RATE: 18 BRPM | DIASTOLIC BLOOD PRESSURE: 64 MMHG | SYSTOLIC BLOOD PRESSURE: 117 MMHG

## 2022-05-11 DIAGNOSIS — D50.9 IRON DEFICIENCY ANEMIA, UNSPECIFIED IRON DEFICIENCY ANEMIA TYPE: Primary | ICD-10-CM

## 2022-05-11 DIAGNOSIS — K90.9 IRON MALABSORPTION: ICD-10-CM

## 2022-05-11 PROCEDURE — 96365 THER/PROPH/DIAG IV INF INIT: CPT | Performed by: INTERNAL MEDICINE

## 2022-05-11 PROCEDURE — 25010000002 IRON SUCROSE PER 1 MG: Performed by: INTERNAL MEDICINE

## 2022-05-11 RX ORDER — SODIUM CHLORIDE 9 MG/ML
250 INJECTION, SOLUTION INTRAVENOUS ONCE
Status: COMPLETED | OUTPATIENT
Start: 2022-05-11 | End: 2022-05-11

## 2022-05-11 RX ORDER — SODIUM CHLORIDE 9 MG/ML
250 INJECTION, SOLUTION INTRAVENOUS ONCE
Status: CANCELLED | OUTPATIENT
Start: 2022-05-12

## 2022-05-11 RX ADMIN — SODIUM CHLORIDE 250 ML: 9 INJECTION, SOLUTION INTRAVENOUS at 14:04

## 2022-05-11 RX ADMIN — IRON SUCROSE 200 MG: 20 INJECTION, SOLUTION INTRAVENOUS at 14:05

## 2022-05-12 ENCOUNTER — INFUSION (OUTPATIENT)
Dept: ONCOLOGY | Facility: HOSPITAL | Age: 72
End: 2022-05-12

## 2022-05-12 VITALS
DIASTOLIC BLOOD PRESSURE: 59 MMHG | SYSTOLIC BLOOD PRESSURE: 120 MMHG | HEART RATE: 61 BPM | TEMPERATURE: 98.5 F | RESPIRATION RATE: 18 BRPM

## 2022-05-12 DIAGNOSIS — D50.9 IRON DEFICIENCY ANEMIA, UNSPECIFIED IRON DEFICIENCY ANEMIA TYPE: Primary | ICD-10-CM

## 2022-05-12 DIAGNOSIS — K90.9 IRON MALABSORPTION: ICD-10-CM

## 2022-05-12 PROCEDURE — 25010000002 IRON SUCROSE PER 1 MG: Performed by: INTERNAL MEDICINE

## 2022-05-12 PROCEDURE — 96365 THER/PROPH/DIAG IV INF INIT: CPT | Performed by: INTERNAL MEDICINE

## 2022-05-12 RX ORDER — SODIUM CHLORIDE 9 MG/ML
250 INJECTION, SOLUTION INTRAVENOUS ONCE
Status: CANCELLED | OUTPATIENT
Start: 2022-05-12

## 2022-05-12 RX ORDER — SODIUM CHLORIDE 9 MG/ML
250 INJECTION, SOLUTION INTRAVENOUS ONCE
Status: COMPLETED | OUTPATIENT
Start: 2022-05-12 | End: 2022-05-12

## 2022-05-12 RX ADMIN — IRON SUCROSE 200 MG: 20 INJECTION, SOLUTION INTRAVENOUS at 13:56

## 2022-05-12 RX ADMIN — SODIUM CHLORIDE 250 ML: 9 INJECTION, SOLUTION INTRAVENOUS at 13:56

## 2022-05-19 ENCOUNTER — OFFICE VISIT (OUTPATIENT)
Dept: FAMILY MEDICINE CLINIC | Facility: CLINIC | Age: 72
End: 2022-05-19

## 2022-05-19 VITALS
DIASTOLIC BLOOD PRESSURE: 72 MMHG | TEMPERATURE: 98.2 F | SYSTOLIC BLOOD PRESSURE: 122 MMHG | WEIGHT: 122.7 LBS | BODY MASS INDEX: 20.95 KG/M2 | OXYGEN SATURATION: 98 % | HEART RATE: 68 BPM | HEIGHT: 64 IN

## 2022-05-19 DIAGNOSIS — Z13.820 SCREENING FOR OSTEOPOROSIS: ICD-10-CM

## 2022-05-19 DIAGNOSIS — Z12.31 BREAST CANCER SCREENING BY MAMMOGRAM: ICD-10-CM

## 2022-05-19 DIAGNOSIS — M81.0 AGE-RELATED OSTEOPOROSIS WITHOUT CURRENT PATHOLOGICAL FRACTURE: ICD-10-CM

## 2022-05-19 DIAGNOSIS — F17.218 NICOTINE DEPENDENCE, CIGARETTES, WITH OTHER NICOTINE-INDUCED DISORDERS: ICD-10-CM

## 2022-05-19 DIAGNOSIS — Z00.00 ENCOUNTER FOR SUBSEQUENT ANNUAL WELLNESS VISIT (AWV) IN MEDICARE PATIENT: Primary | ICD-10-CM

## 2022-05-19 PROCEDURE — 1170F FXNL STATUS ASSESSED: CPT | Performed by: STUDENT IN AN ORGANIZED HEALTH CARE EDUCATION/TRAINING PROGRAM

## 2022-05-19 PROCEDURE — 1159F MED LIST DOCD IN RCRD: CPT | Performed by: STUDENT IN AN ORGANIZED HEALTH CARE EDUCATION/TRAINING PROGRAM

## 2022-05-19 PROCEDURE — G0439 PPPS, SUBSEQ VISIT: HCPCS | Performed by: STUDENT IN AN ORGANIZED HEALTH CARE EDUCATION/TRAINING PROGRAM

## 2022-05-19 RX ORDER — VARENICLINE TARTRATE 1 MG/1
1 TABLET, FILM COATED ORAL 2 TIMES DAILY
Qty: 56 TABLET | Refills: 2 | Status: SHIPPED | OUTPATIENT
Start: 2022-05-19 | End: 2022-09-19

## 2022-05-25 NOTE — PROGRESS NOTES
The ABCs of the Annual Wellness Visit  Subsequent Medicare Wellness Visit    Chief Complaint   Patient presents with   • Medicare Wellness-subsequent      Subjective    History of Present Illness:  Jessie Jordan is a 71 y.o. female who presents for a Subsequent Medicare Wellness Visit.    The following portions of the patient's history were reviewed and   updated as appropriate: allergies, current medications, past family history, past medical history, past social history, past surgical history and problem list.    Compared to one year ago, the patient feels her physical   health is the same.    Compared to one year ago, the patient feels her mental   health is the same.    Recent Hospitalizations:  She was not admitted to the hospital during the last year.       Current Medical Providers:  Patient Care Team:  Kaela Andres MD as PCP - General (Family Medicine)  Estela Mesa APRN as Nurse Practitioner (Gastroenterology)  Zaria Cummings MD as Consulting Physician (Hematology and Oncology)  Carmencita Holder APRN as Nurse Practitioner (Oncology)    Outpatient Medications Prior to Visit   Medication Sig Dispense Refill   • amLODIPine (NORVASC) 2.5 MG tablet Take 2 tablets by mouth Daily. 180 tablet 3   • atorvastatin (LIPITOR) 10 MG tablet Take 1 tablet by mouth Daily. 90 tablet 1   • Calcium Carbonate-Vitamin D (calcium-vitamin D) 500-200 MG-UNIT tablet per tablet Take 1 tablet by mouth Daily. 30 tablet 11   • carbamide peroxide (Debrox) 6.5 % otic solution Administer 5 drops into both ears 2 (Two) Times a Day. 15 mL 3   • clopidogrel (PLAVIX) 75 MG tablet Take 1 tablet by mouth Daily. 90 tablet 3   • Diclofenac Sodium (VOLTAREN) 1 % gel gel Apply 4 g topically to the appropriate area as directed 4 (Four) Times a Day As Needed (pain). 100 g 3   • ferrous sulfate 325 (65 FE) MG tablet Take 1 tablet by mouth Daily With Breakfast. 90 tablet 1   • FLUoxetine (PROzac) 20 MG capsule Take 1 capsule by mouth  Daily. 90 capsule 3   • fluticasone (FLONASE) 50 MCG/ACT nasal spray 2 sprays by Each Nare route Daily. 48 mL 3   • folic acid (FOLVITE) 1 MG tablet TAKE 1 TABLET BY MOUTH EVERY DAY 90 tablet 1   • gabapentin (NEURONTIN) 400 MG capsule Take 1 capsule by mouth 3 (Three) Times a Day. 90 capsule 2   • HYDROcodone-acetaminophen (NORCO)  MG per tablet Take 1 tablet by mouth Every 6 (Six) Hours As Needed.     • lisinopril (PRINIVIL,ZESTRIL) 10 MG tablet Take 1 tablet by mouth Daily. 90 tablet 3   • melatonin ([employer] Melatonin) 3 MG tablet Take 1 tablet by mouth At Night As Needed for Sleep. 90 tablet 0   • pantoprazole (PROTONIX) 40 MG EC tablet Take 1 tablet by mouth Daily. 30 tablet 11   • vitamin D (ERGOCALCIFEROL) 1.25 MG (13694 UT) capsule capsule Take 1 capsule by mouth 1 (One) Time Per Week. 12 capsule 1   • varenicline (Chantix) 1 MG tablet Take 1 tablet by mouth 2 (Two) Times a Day. 56 tablet 2     No facility-administered medications prior to visit.       Opioid medication/s are on active medication list.  and I have evaluated her active treatment plan and pain score trends (see table).  Vitals:    05/19/22 0903   PainSc:   6   PainLoc: Leg     I have reviewed the chart for potential of high risk medication and harmful drug interactions in the elderly.            Aspirin is not on active medication list.  Aspirin use is contraindicated for this patient due to: history of bleeding.  .    Patient Active Problem List   Diagnosis   • Essential hypertension   • Heavy tobacco smoker >10 cigarettes per day   • Chronic pain syndrome   • BMI less than 19,adult   • Coronary artery disease involving native coronary artery of native heart   • PVD (peripheral vascular disease) (HCC)   • Decreased pulses in feet   • Abnormal finding on radiology exam   • Nicotine dependence, cigarettes, with other nicotine-induced disorders   • SOB (shortness of breath)   • Palpitations   • Carotid stenosis, asymptomatic, bilateral   •  "Follow-up surgery care   • Post-operative pain   • Mass of left side of neck   • Encounter for screening for lung cancer   • Mixed hyperlipidemia   • Iron deficiency anemia due to chronic blood loss   • Malaise and fatigue   • Gastroesophageal reflux disease   • Chronic gastric ulcer without hemorrhage and without perforation   • Anemia   • Iron deficiency anemia   • Iron malabsorption   • Frequent falls   • Chronic pain of right knee   • Cerebrovascular accident (CVA) (HCC)     Advance Care Planning  Advance Directive is not on file.  ACP discussion was held with the patient during this visit. Patient does not have an advance directive, information provided.    Review of Systems   Constitutional: Negative for chills, fatigue and fever.   Respiratory: Negative for cough, shortness of breath and wheezing.    Cardiovascular: Negative for chest pain, palpitations and leg swelling.   Gastrointestinal: Negative for abdominal pain, constipation, diarrhea, nausea and vomiting.   Genitourinary: Negative for dysuria and flank pain.   Musculoskeletal: Negative for arthralgias, back pain and joint swelling.   Skin: Negative for color change, pallor and rash.   Neurological: Negative for dizziness and light-headedness.        Objective    Vitals:    05/19/22 0903   BP: 122/72   Pulse: 68   Temp: 98.2 °F (36.8 °C)   SpO2: 98%   Weight: 55.7 kg (122 lb 11.2 oz)   Height: 162.6 cm (64\")   PainSc:   6   PainLoc: Leg     Body mass index is 21.06 kg/m².      Does the patient have evidence of cognitive impairment? No    Physical Exam  Vitals and nursing note reviewed.   Constitutional:       General: She is not in acute distress.     Appearance: Normal appearance. She is well-developed. She is not diaphoretic.   HENT:      Head: Normocephalic and atraumatic.      Right Ear: Hearing normal.      Left Ear: Hearing normal.   Eyes:      General: Lids are normal.      Conjunctiva/sclera: Conjunctivae normal.      Pupils: Pupils are equal, " round, and reactive to light.   Cardiovascular:      Rate and Rhythm: Normal rate and regular rhythm.      Heart sounds: Normal heart sounds.   Pulmonary:      Effort: Pulmonary effort is normal. No respiratory distress.      Breath sounds: Normal breath sounds. No wheezing or rales.   Abdominal:      General: Bowel sounds are normal. There is no distension.      Palpations: Abdomen is soft.      Tenderness: There is no abdominal tenderness.   Musculoskeletal:         General: No tenderness or deformity. Normal range of motion.      Right lower leg: No edema.      Left lower leg: No edema.   Skin:     General: Skin is warm and dry.      Coloration: Skin is not pale.      Findings: No erythema or rash.   Neurological:      Mental Status: She is alert and oriented to person, place, and time. She is not disoriented.                 HEALTH RISK ASSESSMENT    Smoking Status:  Social History     Tobacco Use   Smoking Status Light Tobacco Smoker   • Packs/day: 0.25   • Years: 48.00   • Pack years: 12.00   • Types: Cigarettes   • Last attempt to quit: 12/18/2018   • Years since quitting: 3.4   Smokeless Tobacco Never Used   Tobacco Comment    1-2cig/day     Alcohol Consumption:  Social History     Substance and Sexual Activity   Alcohol Use Yes    Comment: occasional beer      Fall Risk Screen:    [facility] Fall Risk Assessment was completed, and patient is at MODERATE risk for falls. Assessment completed on:5/19/2022    Depression Screening:  PHQ-2/PHQ-9 Depression Screening 5/19/2022   Retired PHQ-9 Total Score -   Retired Total Score -   Little Interest or Pleasure in Doing Things 2-->more than half the days   Feeling Down, Depressed or Hopeless 0-->not at all   Trouble Falling or Staying Asleep, or Sleeping Too Much 1-->several days   Feeling Tired or Having Little Energy 2-->more than half the days   Poor Appetite or Overeating 3-->nearly every day   Feeling Bad about Yourself - or that You are a Failure or Have Let  Yourself or Your Family Down 0-->not at all   Trouble Concentrating on Things, Such as Reading the Newspaper or Watching Television 1-->several days   Moving or Speaking So Slowly that Other People Could Have Noticed? Or the Opposite - Being So Fidgety 0-->not at all   Thoughts that You Would be Better Off Dead or of Hurting Yourself in Some Way 0-->not at all   PHQ-9: Brief Depression Severity Measure Score 9   If You Checked Off Any Problems, How Difficult Have These Problems Made It For You to Do Your Work, Take Care of Things at Home, or Get Along with Other People? somewhat difficult       Health Habits and Functional and Cognitive Screening:  Functional & Cognitive Status 5/19/2022   Do you have difficulty preparing food and eating? No   Do you have difficulty bathing yourself, getting dressed or grooming yourself? No   Do you have difficulty using the toilet? No   Do you have difficulty moving around from place to place? No   Do you have trouble with steps or getting out of a bed or a chair? Yes   Current Diet Well Balanced Diet   Dental Exam Not up to date   Eye Exam Not up to date   Exercise (times per week) 5 times per week   Current Exercises Include Walking   Do you need help using the phone?  No   Are you deaf or do you have serious difficulty hearing?  No   Do you need help with transportation? Yes   Do you need help shopping? Yes   Do you need help preparing meals?  No   Do you need help with housework?  No   Do you need help with laundry? No   Do you need help taking your medications? No   Do you need help managing money? No   Do you ever drive or ride in a car without wearing a seat belt? No   Have you felt unusual stress, anger or loneliness in the last month? Yes   Who do you live with? Alone   If you need help, do you have trouble finding someone available to you? Yes   Have you been bothered in the last four weeks by sexual problems? No   Do you have difficulty concentrating, remembering or  making decisions? No       Age-appropriate Screening Schedule:  Refer to the list below for future screening recommendations based on patient's age, sex and/or medical conditions. Orders for these recommended tests are listed in the plan section. The patient has been provided with a written plan.    Health Maintenance   Topic Date Due   • TDAP/TD VACCINES (1 - Tdap) Never done   • ZOSTER VACCINE (1 of 2) Never done   • DXA SCAN  04/25/2020   • MAMMOGRAM  05/04/2020   • LIPID PANEL  06/22/2021   • INFLUENZA VACCINE  08/01/2022              Assessment & Plan   CMS Preventative Services Quick Reference  Risk Factors Identified During Encounter  Chronic Pain   Fall Risk-High or Moderate  Immunizations Discussed/Encouraged (specific Immunizations; Pneumococcal 23  The above risks/problems have been discussed with the patient.  Follow up actions/plans if indicated are seen below in the Assessment/Plan Section.  Pertinent information has been shared with the patient in the After Visit Summary.    Diagnoses and all orders for this visit:    1. Encounter for subsequent annual wellness visit (AWV) in Medicare patient (Primary)    2. Nicotine dependence, cigarettes, with other nicotine-induced disorders  -     varenicline (Chantix) 1 MG tablet; Take 1 tablet by mouth 2 (Two) Times a Day.  Dispense: 56 tablet; Refill: 2  -     pneumococcal polysaccharide 23-valent (PNEUMOVAX-23) vaccine 0.5 mL    3. Breast cancer screening by mammogram  -     Mammo screening digital tomosynthesis bilateral w CAD; Future    4. Screening for osteoporosis  -     DEXA Bone Density Axial    5. Age-related osteoporosis without current pathological fracture   -     DEXA Bone Density Axial        Follow Up:   No follow-ups on file.     An After Visit Summary and PPPS were made available to the patient.    Encourage healthy diet and exercise.  Encourage patient to stay up to date on screening examinations as indicated based on age and risk  factors.      Kaela Andres M.D. PGY3  [facility]  [address] [place] [address]  Office: [phone]  This document has been electronically signed by Kaela Andres MD on May 25, 2022 10:23 CDT

## 2022-06-10 DIAGNOSIS — Z13.220 SCREENING FOR HYPERLIPIDEMIA: ICD-10-CM

## 2022-06-10 RX ORDER — ATORVASTATIN CALCIUM 10 MG/1
TABLET, FILM COATED ORAL
Qty: 90 TABLET | Refills: 1 | Status: SHIPPED | OUTPATIENT
Start: 2022-06-10 | End: 2022-10-10 | Stop reason: SDUPTHER

## 2022-06-13 RX ORDER — PANTOPRAZOLE SODIUM 40 MG/1
40 TABLET, DELAYED RELEASE ORAL DAILY
Qty: 30 TABLET | Refills: 11 | Status: SHIPPED | OUTPATIENT
Start: 2022-06-13 | End: 2022-06-15 | Stop reason: SDUPTHER

## 2022-06-15 ENCOUNTER — OFFICE VISIT (OUTPATIENT)
Dept: FAMILY MEDICINE CLINIC | Facility: CLINIC | Age: 72
End: 2022-06-15

## 2022-06-15 VITALS — BODY MASS INDEX: 20.83 KG/M2 | WEIGHT: 122 LBS | HEIGHT: 64 IN

## 2022-06-15 DIAGNOSIS — M81.0 OSTEOPOROSIS WITHOUT CURRENT PATHOLOGICAL FRACTURE, UNSPECIFIED OSTEOPOROSIS TYPE: Primary | ICD-10-CM

## 2022-06-15 DIAGNOSIS — M89.8X9 BONE PAIN: ICD-10-CM

## 2022-06-15 DIAGNOSIS — Z13.220 SCREENING FOR HYPERLIPIDEMIA: ICD-10-CM

## 2022-06-15 DIAGNOSIS — D50.9 IRON DEFICIENCY ANEMIA, UNSPECIFIED IRON DEFICIENCY ANEMIA TYPE: ICD-10-CM

## 2022-06-15 PROCEDURE — 99441 PR PHYS/QHP TELEPHONE EVALUATION 5-10 MIN: CPT | Performed by: STUDENT IN AN ORGANIZED HEALTH CARE EDUCATION/TRAINING PROGRAM

## 2022-06-15 RX ORDER — PANTOPRAZOLE SODIUM 40 MG/1
40 TABLET, DELAYED RELEASE ORAL DAILY
Qty: 30 TABLET | Refills: 11 | Status: SHIPPED | OUTPATIENT
Start: 2022-06-15

## 2022-06-15 RX ORDER — CALCITONIN SALMON 200 [IU]/.09ML
1 SPRAY, METERED NASAL DAILY
Qty: 2.7 ML | Refills: 0 | Status: SHIPPED | OUTPATIENT
Start: 2022-06-15 | End: 2022-07-15

## 2022-06-15 NOTE — PROGRESS NOTES
Family Medicine Residency  Peg Ledbetter MD   Telephone Visit    Subjective:   You have chosen to receive care through a telephone visit. Do you consent to use a telephone visit for your medical care today? Yes    Jessie Jordan is a 71 y.o. female who completed telephone visit today to discuss labs and imaging.  Patient has iron deficiency anemia.  She had Venofer infusion with the hematologist.  Patient never completed follow-up labs that were ordered for her.     DEXA scan showed osteoporosis, high fracture risk, diminished mean total hip bone density from prior study.     Screening mammogram was normal.      The following portions of the patient's history were reviewed and updated as appropriate: allergies, current medications, past family history, past medical history, past social history, past surgical history and problem list.    Past Medical Hx:  Past Medical History:   Diagnosis Date   • Anxiety and depression    • Coronary artery disease involving native coronary artery of native heart 12/06/2017    seen on Cardiac Cath - Left main 50-70% stenosis, 12/27/2017 repeat cath showed coronary spasm with no stenosis   • Hyperlipidemia    • Hypertension    • Kidney cysts    • Post-menopausal 1990   • Skin cancer    • Stroke (HCC) 2015   • Vascular disease        Past Surgical Hx:  Past Surgical History:   Procedure Laterality Date   • ARTERIOGRAM N/A 9/28/2018    Procedure: aortoiliac arteriogram possible angioplasty stent atherectomy thrombolysis bilateral iliac stents;  Surgeon: Luan Ruff MD;  Location: [facility] ANGIO INVASIVE LOCATION;  Service: Interventional Radiology   • CARDIAC CATHETERIZATION  12/06/2017    Done at [facility] - Left Main 50-70% Stenosis - no stenting done, recommendation was urgent CABG.  EF 50-60%   • CERVICAL FUSION  1985    C5-6   • COLONOSCOPY N/A 6/29/2018    Procedure: COLONOSCOPY;  Surgeon: Oz Way MD;  Location: [facility] ENDOSCOPY;   Service: Gastroenterology   • COLONOSCOPY N/A 7/6/2020    Procedure: COLONOSCOPY;  Surgeon: Oz Way MD;  Location: [facility] ENDOSCOPY;  Service: Gastroenterology;  Laterality: N/A;   • ENDOSCOPY N/A 7/6/2020    Procedure: ESOPHAGOGASTRODUODENOSCOPY ASAP;  Surgeon: Oz Way MD;  Location: [facility] ENDOSCOPY;  Service: Gastroenterology;  Laterality: N/A;   • ENDOSCOPY N/A 8/17/2021    Procedure: ESOPHAGOGASTRODUODENOSCOPY possible dilation;  Surgeon: Oz Way MD;  Location: [facility] ENDOSCOPY;  Service: Gastroenterology;  Laterality: N/A;   • FEMORAL POPLITEAL BYPASS Right 12/19/2018    Procedure: femoral to femoral artery bypass, RIGHT FEMORAL POPLITEAL BYPASS right lower extremity arteriogram          (C-ARM# AND C-ARM TABLE);  Surgeon: Luan Ruff MD;  Location: [facility] OR;  Service: Vascular   • FINGER SURGERY Left     third finger   • ROTATOR CUFF REPAIR Left 06/30/2011    done in [place]       Current Meds:    Current Outpatient Medications:   •  amLODIPine (NORVASC) 2.5 MG tablet, Take 2 tablets by mouth Daily., Disp: 180 tablet, Rfl: 3  •  atorvastatin (LIPITOR) 10 MG tablet, TAKE 1 TABLET BY MOUTH EVERY DAY, Disp: 90 tablet, Rfl: 1  •  Calcium Carbonate-Vitamin D (calcium-vitamin D) 500-200 MG-UNIT tablet per tablet, Take 1 tablet by mouth Daily., Disp: 30 tablet, Rfl: 11  •  carbamide peroxide (Debrox) 6.5 % otic solution, Administer 5 drops into both ears 2 (Two) Times a Day., Disp: 15 mL, Rfl: 3  •  clopidogrel (PLAVIX) 75 MG tablet, Take 1 tablet by mouth Daily., Disp: 90 tablet, Rfl: 3  •  Diclofenac Sodium (VOLTAREN) 1 % gel gel, Apply 4 g topically to the appropriate area as directed 4 (Four) Times a Day As Needed (pain)., Disp: 100 g, Rfl: 3  •  ferrous sulfate 325 (65 FE) MG tablet, Take 1 tablet by mouth Daily With Breakfast., Disp: 90 tablet, Rfl: 1  •  FLUoxetine (PROzac) 20 MG capsule, Take 1 capsule by mouth Daily., Disp: 90 capsule, Rfl: 3  •  fluticasone (FLONASE) 50  MCG/ACT nasal spray, 2 sprays by Each Nare route Daily., Disp: 48 mL, Rfl: 3  •  folic acid (FOLVITE) 1 MG tablet, TAKE 1 TABLET BY MOUTH EVERY DAY, Disp: 90 tablet, Rfl: 1  •  HYDROcodone-acetaminophen (NORCO)  MG per tablet, Take 1 tablet by mouth Every 6 (Six) Hours As Needed., Disp: , Rfl:   •  lisinopril (PRINIVIL,ZESTRIL) 10 MG tablet, Take 1 tablet by mouth Daily., Disp: 90 tablet, Rfl: 3  •  pantoprazole (PROTONIX) 40 MG EC tablet, Take 1 tablet by mouth Daily., Disp: 30 tablet, Rfl: 11  •  varenicline (Chantix) 1 MG tablet, Take 1 tablet by mouth 2 (Two) Times a Day., Disp: 56 tablet, Rfl: 2  •  vitamin D (ERGOCALCIFEROL) 1.25 MG (65615 UT) capsule capsule, Take 1 capsule by mouth 1 (One) Time Per Week., Disp: 12 capsule, Rfl: 1  •  calcitonin, salmon, (Miacalcin) 200 UNIT/ACT nasal spray, 1 spray by Alternating Nares route Daily for 30 days., Disp: 2.7 mL, Rfl: 0  •  gabapentin (NEURONTIN) 400 MG capsule, Take 1 capsule by mouth 3 (Three) Times a Day., Disp: 90 capsule, Rfl: 2  •  melatonin ([employer] Melatonin) 3 MG tablet, Take 1 tablet by mouth At Night As Needed for Sleep., Disp: 90 tablet, Rfl: 0    Allergies:  No Known Allergies    Family Hx:  Family History   Problem Relation Age of Onset   • Lung cancer Mother    • Hypertension Mother    • Diabetes Maternal Grandmother    • Heart disease Maternal Grandmother    • Hypertension Maternal Grandfather    • Heart disease Maternal Grandfather    • Heart disease Other    • Hypertension Other    • Cancer Other         Social History:  Social History     Socioeconomic History   • Marital status:    • Number of children: 1   Tobacco Use   • Smoking status: Light Tobacco Smoker     Packs/day: 0.25     Years: 48.00     Pack years: 12.00     Types: Cigarettes     Last attempt to quit: 12/18/2018     Years since quitting: 3.4   • Smokeless tobacco: Never Used   • Tobacco comment: 1-2cig/day   Vaping Use   • Vaping Use: Never used   Substance and Sexual  "Activity   • Alcohol use: Yes     Comment: occasional beer    • Drug use: No   • Sexual activity: Defer     Comment: .       Review of Systems  Review of Systems   Constitutional: Negative for chills and fever.   HENT: Negative for facial swelling and trouble swallowing.    Eyes: Negative for photophobia and visual disturbance.   Respiratory: Negative for apnea, chest tightness and shortness of breath.    Cardiovascular: Negative for chest pain.   Gastrointestinal: Negative for abdominal distention, diarrhea, nausea and vomiting.   Genitourinary: Negative for pelvic pain.   Musculoskeletal: Positive for arthralgias. Negative for myalgias.   Neurological: Negative for seizures and speech difficulty.   Psychiatric/Behavioral: Negative for confusion and hallucinations.       Objective:     Ht 162.6 cm (64\")   Wt 55.3 kg (122 lb)   BMI 20.94 kg/m²   Physical Exam  Not performed due to nature of telephone visit  Assessment/Plan:     Diagnoses and all orders for this visit:    1. Osteoporosis without current pathological fracture, unspecified osteoporosis type (Primary)  -     calcitonin, salmon, (Miacalcin) 200 UNIT/ACT nasal spray; 1 spray by Alternating Nares route Daily for 30 days.  Dispense: 2.7 mL; Refill: 0    2. Screening for hyperlipidemia  -     Lipid panel; Future    3. Iron deficiency anemia, unspecified iron deficiency anemia type    4. Bone pain  -     calcitonin, salmon, (Miacalcin) 200 UNIT/ACT nasal spray; 1 spray by Alternating Nares route Daily for 30 days.  Dispense: 2.7 mL; Refill: 0      -Patient agrees to proceed with Prolia infusion q 6 months. Form completed and faxed to [facility]. Can utilize calcitonin salmon as needed for pain.   -Counseled patient to come by our lab and complete iron profile and ferritin.  We will also complete lipid panel.     Follow-up:     Return in about 4 weeks (around 7/13/2022) for Next scheduled follow up.    Preventative:  Health Maintenance   Topic Date " Due   • TDAP/TD VACCINES (1 - Tdap) Never done   • ZOSTER VACCINE (1 of 2) Never done   • LIPID PANEL  06/22/2021   • COVID-19 Vaccine (4 - Booster for Pfizer series) 04/22/2022   • INFLUENZA VACCINE  10/01/2022   • ANNUAL WELLNESS VISIT  05/19/2023   • MAMMOGRAM  06/10/2024   • DXA SCAN  06/10/2024   • COLORECTAL CANCER SCREENING  07/06/2025   • HEPATITIS C SCREENING  Completed   • Pneumococcal Vaccine 65+  Completed     Alcohol use:  reports current alcohol use.  Nicotine status  reports that she has been smoking cigarettes. She has a 12.00 pack-year smoking history. She has never used smokeless tobacco.     Goals     •  Pain control       Barriers: severe PAD, pt continues to smoke        •  Quit smoking / using tobacco (pt-stated)       Barriers: compliance with cessation medications              RISK SCORE: 3          PGY-3  [facility] Family Medicine Residency  This document has been electronically signed by Peg Ledbetter MD on Pau 15, 2022 18:14 CDT      Total Telephone Time- 8 minutes

## 2022-06-17 DIAGNOSIS — D50.9 IRON DEFICIENCY ANEMIA, UNSPECIFIED IRON DEFICIENCY ANEMIA TYPE: ICD-10-CM

## 2022-06-17 DIAGNOSIS — M81.0 OSTEOPOROSIS, POST-MENOPAUSAL: Primary | ICD-10-CM

## 2022-06-17 DIAGNOSIS — F51.01 PRIMARY INSOMNIA: ICD-10-CM

## 2022-06-17 DIAGNOSIS — K90.9 IRON MALABSORPTION: ICD-10-CM

## 2022-06-17 RX ORDER — OMEPRAZOLE MAGNESIUM 20 MG
CAPSULE,DELAYED RELEASE (ENTERIC COATED) ORAL
Qty: 120 TABLET | Refills: 0 | Status: SHIPPED | OUTPATIENT
Start: 2022-06-17 | End: 2022-07-13

## 2022-07-05 ENCOUNTER — LAB (OUTPATIENT)
Dept: LAB | Facility: HOSPITAL | Age: 72
End: 2022-07-05

## 2022-07-05 DIAGNOSIS — M81.0 OSTEOPOROSIS, POST-MENOPAUSAL: ICD-10-CM

## 2022-07-05 DIAGNOSIS — K90.9 IRON MALABSORPTION: ICD-10-CM

## 2022-07-05 DIAGNOSIS — D50.9 IRON DEFICIENCY ANEMIA, UNSPECIFIED IRON DEFICIENCY ANEMIA TYPE: ICD-10-CM

## 2022-07-05 LAB
ALBUMIN SERPL-MCNC: 4.1 G/DL (ref 3.5–5.2)
ALBUMIN/GLOB SERPL: 1.6 G/DL
ALP SERPL-CCNC: 82 U/L (ref 39–117)
ALT SERPL W P-5'-P-CCNC: 13 U/L (ref 1–33)
ANION GAP SERPL CALCULATED.3IONS-SCNC: 11 MMOL/L (ref 5–15)
AST SERPL-CCNC: 26 U/L (ref 1–32)
BILIRUB SERPL-MCNC: 0.3 MG/DL (ref 0–1.2)
BUN SERPL-MCNC: 9 MG/DL (ref 8–23)
BUN/CREAT SERPL: 11.7 (ref 7–25)
CALCIUM SPEC-SCNC: 9.2 MG/DL (ref 8.6–10.5)
CHLORIDE SERPL-SCNC: 103 MMOL/L (ref 98–107)
CO2 SERPL-SCNC: 26 MMOL/L (ref 22–29)
CREAT SERPL-MCNC: 0.77 MG/DL (ref 0.57–1)
EGFRCR SERPLBLD CKD-EPI 2021: 82.6 ML/MIN/1.73
GLOBULIN UR ELPH-MCNC: 2.6 GM/DL
GLUCOSE SERPL-MCNC: 168 MG/DL (ref 65–99)
MAGNESIUM SERPL-MCNC: 1.9 MG/DL (ref 1.6–2.4)
PHOSPHATE SERPL-MCNC: 3.3 MG/DL (ref 2.5–4.5)
POTASSIUM SERPL-SCNC: 4 MMOL/L (ref 3.5–5.2)
PROT SERPL-MCNC: 6.7 G/DL (ref 6–8.5)
SODIUM SERPL-SCNC: 140 MMOL/L (ref 136–145)

## 2022-07-05 PROCEDURE — 84100 ASSAY OF PHOSPHORUS: CPT

## 2022-07-05 PROCEDURE — 83735 ASSAY OF MAGNESIUM: CPT

## 2022-07-05 PROCEDURE — 80053 COMPREHEN METABOLIC PANEL: CPT

## 2022-07-11 RX ORDER — ERGOCALCIFEROL 1.25 MG/1
CAPSULE ORAL
Qty: 12 CAPSULE | Refills: 1 | Status: SHIPPED | OUTPATIENT
Start: 2022-07-11 | End: 2022-10-10 | Stop reason: SDUPTHER

## 2022-07-12 ENCOUNTER — INFUSION (OUTPATIENT)
Dept: ONCOLOGY | Facility: HOSPITAL | Age: 72
End: 2022-07-12

## 2022-07-12 VITALS
RESPIRATION RATE: 20 BRPM | TEMPERATURE: 97.9 F | DIASTOLIC BLOOD PRESSURE: 57 MMHG | HEART RATE: 55 BPM | SYSTOLIC BLOOD PRESSURE: 136 MMHG

## 2022-07-12 DIAGNOSIS — M81.0 OSTEOPOROSIS, POST-MENOPAUSAL: Primary | ICD-10-CM

## 2022-07-12 DIAGNOSIS — K90.9 IRON MALABSORPTION: ICD-10-CM

## 2022-07-12 DIAGNOSIS — D50.9 IRON DEFICIENCY ANEMIA, UNSPECIFIED IRON DEFICIENCY ANEMIA TYPE: ICD-10-CM

## 2022-07-12 PROCEDURE — 96372 THER/PROPH/DIAG INJ SC/IM: CPT | Performed by: NURSE PRACTITIONER

## 2022-07-12 PROCEDURE — 25010000002 DENOSUMAB 60 MG/ML SOLUTION PREFILLED SYRINGE: Performed by: NURSE PRACTITIONER

## 2022-07-12 RX ADMIN — DENOSUMAB 60 MG: 60 INJECTION SUBCUTANEOUS at 13:40

## 2022-07-13 ENCOUNTER — OFFICE VISIT (OUTPATIENT)
Dept: FAMILY MEDICINE CLINIC | Facility: CLINIC | Age: 72
End: 2022-07-13

## 2022-07-13 VITALS
TEMPERATURE: 97.3 F | OXYGEN SATURATION: 98 % | DIASTOLIC BLOOD PRESSURE: 68 MMHG | SYSTOLIC BLOOD PRESSURE: 120 MMHG | HEIGHT: 64 IN | BODY MASS INDEX: 20.95 KG/M2 | HEART RATE: 56 BPM | WEIGHT: 122.7 LBS

## 2022-07-13 DIAGNOSIS — M81.0 OSTEOPOROSIS WITHOUT CURRENT PATHOLOGICAL FRACTURE, UNSPECIFIED OSTEOPOROSIS TYPE: Primary | ICD-10-CM

## 2022-07-13 DIAGNOSIS — F51.01 PRIMARY INSOMNIA: ICD-10-CM

## 2022-07-13 DIAGNOSIS — I10 ESSENTIAL HYPERTENSION: ICD-10-CM

## 2022-07-13 PROCEDURE — 99213 OFFICE O/P EST LOW 20 MIN: CPT | Performed by: STUDENT IN AN ORGANIZED HEALTH CARE EDUCATION/TRAINING PROGRAM

## 2022-07-18 NOTE — PROGRESS NOTES
Family Medicine Residency  Elsa Fenton MD    Subjective:     Jessie Jordan is a 71 y.o. female who presents for follow up of osteoporosis. She had a DEXA scan done 05/2022 which showed osteoporosis, high fracture risk, diminished mean total hip bone density. She was started on Prolia on 7/12. Next infusion in 6 months.     She would like to increase her melatonin to 5 mg as she believes the 3mg dose is wearing off.     She is on amlodipine as well as lisinopril 10mg for hypertension therapy. Denies chest pain, soa, palpations, headaches.     The following portions of the patient's history were reviewed and updated as appropriate: past family history, past medical history, past social history and past surgical history.    Past Medical Hx:  Past Medical History:   Diagnosis Date   • Anxiety and depression    • Coronary artery disease involving native coronary artery of native heart 12/06/2017    seen on Cardiac Cath - Left main 50-70% stenosis, 12/27/2017 repeat cath showed coronary spasm with no stenosis   • Hyperlipidemia    • Hypertension    • Kidney cysts    • Post-menopausal 1990   • Skin cancer    • Stroke (HCC) 2015   • Vascular disease        Past Surgical Hx:  Past Surgical History:   Procedure Laterality Date   • ARTERIOGRAM N/A 9/28/2018    Procedure: aortoiliac arteriogram possible angioplasty stent atherectomy thrombolysis bilateral iliac stents;  Surgeon: Luan Ruff MD;  Location: [facility] ANGIO INVASIVE LOCATION;  Service: Interventional Radiology   • CARDIAC CATHETERIZATION  12/06/2017    Done at [facility] - Left Main 50-70% Stenosis - no stenting done, recommendation was urgent CABG.  EF 50-60%   • CERVICAL FUSION  1985    C5-6   • COLONOSCOPY N/A 6/29/2018    Procedure: COLONOSCOPY;  Surgeon: Oz Way MD;  Location: [facility] ENDOSCOPY;  Service: Gastroenterology   • COLONOSCOPY N/A 7/6/2020    Procedure: COLONOSCOPY;  Surgeon: Oz Way MD;  Location:  [facility] ENDOSCOPY;  Service: Gastroenterology;  Laterality: N/A;   • ENDOSCOPY N/A 7/6/2020    Procedure: ESOPHAGOGASTRODUODENOSCOPY ASAP;  Surgeon: Oz Way MD;  Location: [facility] ENDOSCOPY;  Service: Gastroenterology;  Laterality: N/A;   • ENDOSCOPY N/A 8/17/2021    Procedure: ESOPHAGOGASTRODUODENOSCOPY possible dilation;  Surgeon: Oz Way MD;  Location: [facility] ENDOSCOPY;  Service: Gastroenterology;  Laterality: N/A;   • FEMORAL POPLITEAL BYPASS Right 12/19/2018    Procedure: femoral to femoral artery bypass, RIGHT FEMORAL POPLITEAL BYPASS right lower extremity arteriogram          (C-ARM# AND C-ARM TABLE);  Surgeon: Luan Ruff MD;  Location: [facility] OR;  Service: Vascular   • FINGER SURGERY Left     third finger   • ROTATOR CUFF REPAIR Left 06/30/2011    done in [place]       Current Meds:    Current Outpatient Medications:   •  atorvastatin (LIPITOR) 10 MG tablet, TAKE 1 TABLET BY MOUTH EVERY DAY, Disp: 90 tablet, Rfl: 1  •  calcitonin, salmon, (Miacalcin) 200 UNIT/ACT nasal spray, 1 spray by Alternating Nares route Daily for 30 days., Disp: 2.7 mL, Rfl: 0  •  Calcium Carbonate-Vitamin D (calcium-vitamin D) 500-200 MG-UNIT tablet per tablet, Take 1 tablet by mouth Daily., Disp: 30 tablet, Rfl: 11  •  carbamide peroxide (Debrox) 6.5 % otic solution, Administer 5 drops into both ears 2 (Two) Times a Day., Disp: 15 mL, Rfl: 3  •  clopidogrel (PLAVIX) 75 MG tablet, Take 1 tablet by mouth Daily., Disp: 90 tablet, Rfl: 3  •  Diclofenac Sodium (VOLTAREN) 1 % gel gel, Apply 4 g topically to the appropriate area as directed 4 (Four) Times a Day As Needed (pain)., Disp: 100 g, Rfl: 3  •  ferrous sulfate 325 (65 FE) MG tablet, Take 1 tablet by mouth Daily With Breakfast., Disp: 90 tablet, Rfl: 1  •  FLUoxetine (PROzac) 20 MG capsule, Take 1 capsule by mouth Daily., Disp: 90 capsule, Rfl: 3  •  fluticasone (FLONASE) 50 MCG/ACT nasal spray, 2 sprays by Each Nare route Daily., Disp: 48 mL, Rfl: 3  •   folic acid (FOLVITE) 1 MG tablet, TAKE 1 TABLET BY MOUTH EVERY DAY, Disp: 90 tablet, Rfl: 1  •  gabapentin (NEURONTIN) 400 MG capsule, Take 1 capsule by mouth 3 (Three) Times a Day., Disp: 90 capsule, Rfl: 2  •  HYDROcodone-acetaminophen (NORCO)  MG per tablet, Take 1 tablet by mouth Every 6 (Six) Hours As Needed., Disp: , Rfl:   •  lisinopril (PRINIVIL,ZESTRIL) 10 MG tablet, Take 1 tablet by mouth Daily., Disp: 90 tablet, Rfl: 3  •  pantoprazole (PROTONIX) 40 MG EC tablet, Take 1 tablet by mouth Daily., Disp: 30 tablet, Rfl: 11  •  varenicline (Chantix) 1 MG tablet, Take 1 tablet by mouth 2 (Two) Times a Day., Disp: 56 tablet, Rfl: 2  •  vitamin D (ERGOCALCIFEROL) 1.25 MG (25219 UT) capsule capsule, TAKE 1 CAPSULE BY MOUTH 1 TIME PER WEEK., Disp: 12 capsule, Rfl: 1  •  Melatonin 5 MG capsule, Take 5 mg by mouth Every Night., Disp: 30 each, Rfl: 3    Allergies:  No Known Allergies    Family Hx:  Family History   Problem Relation Age of Onset   • Lung cancer Mother    • Hypertension Mother    • Diabetes Maternal Grandmother    • Heart disease Maternal Grandmother    • Hypertension Maternal Grandfather    • Heart disease Maternal Grandfather    • Heart disease Other    • Hypertension Other    • Cancer Other         Social History:  Social History     Socioeconomic History   • Marital status:    • Number of children: 1   Tobacco Use   • Smoking status: Light Tobacco Smoker     Packs/day: 0.25     Years: 48.00     Pack years: 12.00     Types: Cigarettes     Last attempt to quit: 12/18/2018     Years since quitting: 3.5   • Smokeless tobacco: Never Used   • Tobacco comment: 1-2cig/day   Vaping Use   • Vaping Use: Never used   Substance and Sexual Activity   • Alcohol use: Yes     Comment: occasional beer    • Drug use: No   • Sexual activity: Defer     Comment: .       Review of Systems  Review of Systems   Constitutional: Negative.    Respiratory: Negative.    Cardiovascular: Negative.   "  Gastrointestinal: Negative.    Endocrine: Negative.    Musculoskeletal: Negative for arthralgias and myalgias.   Skin: Negative.    Neurological: Negative.  Negative for headaches.   Psychiatric/Behavioral: Negative.  Negative for suicidal ideas.       Objective:     /68   Pulse 56   Temp 97.3 °F (36.3 °C)   Ht 162.6 cm (64\")   Wt 55.7 kg (122 lb 11.2 oz)   SpO2 98%   BMI 21.06 kg/m²   Physical Exam  Constitutional:       Appearance: Normal appearance. She is normal weight.   HENT:      Head: Normocephalic and atraumatic.      Nose: Nose normal.      Mouth/Throat:      Mouth: Mucous membranes are moist.   Cardiovascular:      Rate and Rhythm: Normal rate and regular rhythm.      Pulses: Normal pulses.      Heart sounds: Normal heart sounds.   Pulmonary:      Effort: Pulmonary effort is normal.      Breath sounds: Normal breath sounds.   Abdominal:      General: Bowel sounds are normal.      Palpations: Abdomen is soft.   Musculoskeletal:         General: Normal range of motion.      Cervical back: Normal range of motion.   Skin:     General: Skin is warm and dry.      Capillary Refill: Capillary refill takes less than 2 seconds.   Neurological:      General: No focal deficit present.      Mental Status: She is alert.   Psychiatric:         Mood and Affect: Mood normal.         Behavior: Behavior normal.          Assessment/Plan:     Diagnoses and all orders for this visit:    1. Osteoporosis without current pathological fracture, unspecified osteoporosis type (Primary)    2. Essential hypertension    3. Primary insomnia    Other orders  -     Melatonin 5 MG capsule; Take 5 mg by mouth Every Night.  Dispense: 30 each; Refill: 3      -  DEXA scan done 05/2022 which showed osteoporosis, high fracture risk, diminished mean total hip bone density. She was started on Prolia on 7/12. Next infusion in 6 months. Continue to utilzie calcitonin salmon (nasal spray) as needed for pain.     - HTN: on amlodipine and " lisinopril. Her BP have been within acceptable range for her age. She likely does not need two agents which could precipitate hypotension and increase risk of fall. Will discontinue amlodipine today. Advised her to continue monitoring her blood pressure at home.     · Rx changes: stop amlodipine   · Patient Education: monitor blood pressure and keep log   · Compliance at present is estimated to be good.   · Efforts to improve compliance (if necessary) will be directed at increased exercise.    Depression screening: Up to date; last screen 5/19/2022     Follow-up:     Return in about 3 months (around 10/13/2022).    Preventative:  Health Maintenance   Topic Date Due   • TDAP/TD VACCINES (1 - Tdap) Never done   • ZOSTER VACCINE (1 of 2) Never done   • LIPID PANEL  06/22/2021   • COVID-19 Vaccine (4 - Booster for Pfizer series) 04/22/2022   • INFLUENZA VACCINE  10/01/2022   • ANNUAL WELLNESS VISIT  05/19/2023   • MAMMOGRAM  06/10/2024   • DXA SCAN  06/10/2024   • COLORECTAL CANCER SCREENING  07/06/2025   • HEPATITIS C SCREENING  Completed   • Pneumococcal Vaccine 65+  Completed     Female Preventative: Exercises regularly      Weight  -Class: Normal: 18.5-24.9kg/m2  -BMI is within normal parameters. No other follow-up for BMI required.   increase water intake and increase physical activity    Alcohol use:  reports current alcohol use.  Nicotine status  reports that she has been smoking cigarettes. She has a 12.00 pack-year smoking history. She has never used smokeless tobacco.     Goals     •  Pain control       Barriers: severe PAD, pt continues to smoke        •  Quit smoking / using tobacco (pt-stated)       Barriers: compliance with cessation medications              RISK SCORE: 3           Elsa Fenton M.D. PGY 2  [facility]  [address] [place] [address]  Office: [phone]  This document has been electronically signed by Elsa Fenton MD on July  18, 2022 15:50 CDT

## 2022-07-19 NOTE — PROGRESS NOTES
Subjective:     Jessie Jordan is a 71 y.o. female who presents for   Chief Complaint   Patient presents with   • Follow-up     Pt on prolia for osteoporosis, taking calcitonin for pain, stable. Bp has been very well controlled. Would like to increase melatonin for help with insomnia.    For more detailed HPI, see resident note.      Past Medical Hx:  Past Medical History:   Diagnosis Date   • Anxiety and depression    • Coronary artery disease involving native coronary artery of native heart 12/06/2017    seen on Cardiac Cath - Left main 50-70% stenosis, 12/27/2017 repeat cath showed coronary spasm with no stenosis   • Hyperlipidemia    • Hypertension    • Kidney cysts    • Post-menopausal 1990   • Skin cancer    • Stroke (HCC) 2015   • Vascular disease        Past Surgical Hx:  Past Surgical History:   Procedure Laterality Date   • ARTERIOGRAM N/A 9/28/2018    Procedure: aortoiliac arteriogram possible angioplasty stent atherectomy thrombolysis bilateral iliac stents;  Surgeon: Luan Ruff MD;  Location: [facility] ANGIO INVASIVE LOCATION;  Service: Interventional Radiology   • CARDIAC CATHETERIZATION  12/06/2017    Done at [facility] - Left Main 50-70% Stenosis - no stenting done, recommendation was urgent CABG.  EF 50-60%   • CERVICAL FUSION  1985    C5-6   • COLONOSCOPY N/A 6/29/2018    Procedure: COLONOSCOPY;  Surgeon: Oz Way MD;  Location: [facility] ENDOSCOPY;  Service: Gastroenterology   • COLONOSCOPY N/A 7/6/2020    Procedure: COLONOSCOPY;  Surgeon: Oz Way MD;  Location: [facility] ENDOSCOPY;  Service: Gastroenterology;  Laterality: N/A;   • ENDOSCOPY N/A 7/6/2020    Procedure: ESOPHAGOGASTRODUODENOSCOPY ASAP;  Surgeon: Oz Way MD;  Location: [facility] ENDOSCOPY;  Service: Gastroenterology;  Laterality: N/A;   • ENDOSCOPY N/A 8/17/2021    Procedure: ESOPHAGOGASTRODUODENOSCOPY possible dilation;  Surgeon: Oz Way MD;  Location: [facility] ENDOSCOPY;   Service: Gastroenterology;  Laterality: N/A;   • FEMORAL POPLITEAL BYPASS Right 12/19/2018    Procedure: femoral to femoral artery bypass, RIGHT FEMORAL POPLITEAL BYPASS right lower extremity arteriogram          (C-ARM# AND C-ARM TABLE);  Surgeon: Luan Ruff MD;  Location: [facility];  Service: Vascular   • FINGER SURGERY Left     third finger   • ROTATOR CUFF REPAIR Left 06/30/2011    done in [place]       Health Maintenance:  Health Maintenance   Topic Date Due   • TDAP/TD VACCINES (1 - Tdap) Never done   • ZOSTER VACCINE (1 of 2) Never done   • LIPID PANEL  06/22/2021   • COVID-19 Vaccine (4 - Booster for Pfizer series) 04/22/2022   • INFLUENZA VACCINE  10/01/2022   • ANNUAL WELLNESS VISIT  05/19/2023   • MAMMOGRAM  06/10/2024   • DXA SCAN  06/10/2024   • COLORECTAL CANCER SCREENING  07/06/2025   • HEPATITIS C SCREENING  Completed   • Pneumococcal Vaccine 65+  Completed       Current Meds:    Current Outpatient Medications:   •  atorvastatin (LIPITOR) 10 MG tablet, TAKE 1 TABLET BY MOUTH EVERY DAY, Disp: 90 tablet, Rfl: 1  •  calcitonin, salmon, (Miacalcin) 200 UNIT/ACT nasal spray, 1 spray by Alternating Nares route Daily for 30 days., Disp: 2.7 mL, Rfl: 0  •  Calcium Carbonate-Vitamin D (calcium-vitamin D) 500-200 MG-UNIT tablet per tablet, Take 1 tablet by mouth Daily., Disp: 30 tablet, Rfl: 11  •  carbamide peroxide (Debrox) 6.5 % otic solution, Administer 5 drops into both ears 2 (Two) Times a Day., Disp: 15 mL, Rfl: 3  •  clopidogrel (PLAVIX) 75 MG tablet, Take 1 tablet by mouth Daily., Disp: 90 tablet, Rfl: 3  •  Diclofenac Sodium (VOLTAREN) 1 % gel gel, Apply 4 g topically to the appropriate area as directed 4 (Four) Times a Day As Needed (pain)., Disp: 100 g, Rfl: 3  •  ferrous sulfate 325 (65 FE) MG tablet, Take 1 tablet by mouth Daily With Breakfast., Disp: 90 tablet, Rfl: 1  •  FLUoxetine (PROzac) 20 MG capsule, Take 1 capsule by mouth Daily., Disp: 90 capsule, Rfl: 3  •  fluticasone (FLONASE)  50 MCG/ACT nasal spray, 2 sprays by Each Nare route Daily., Disp: 48 mL, Rfl: 3  •  folic acid (FOLVITE) 1 MG tablet, TAKE 1 TABLET BY MOUTH EVERY DAY, Disp: 90 tablet, Rfl: 1  •  gabapentin (NEURONTIN) 400 MG capsule, Take 1 capsule by mouth 3 (Three) Times a Day., Disp: 90 capsule, Rfl: 2  •  HYDROcodone-acetaminophen (NORCO)  MG per tablet, Take 1 tablet by mouth Every 6 (Six) Hours As Needed., Disp: , Rfl:   •  lisinopril (PRINIVIL,ZESTRIL) 10 MG tablet, Take 1 tablet by mouth Daily., Disp: 90 tablet, Rfl: 3  •  pantoprazole (PROTONIX) 40 MG EC tablet, Take 1 tablet by mouth Daily., Disp: 30 tablet, Rfl: 11  •  varenicline (Chantix) 1 MG tablet, Take 1 tablet by mouth 2 (Two) Times a Day., Disp: 56 tablet, Rfl: 2  •  vitamin D (ERGOCALCIFEROL) 1.25 MG (88696 UT) capsule capsule, TAKE 1 CAPSULE BY MOUTH 1 TIME PER WEEK., Disp: 12 capsule, Rfl: 1  •  Melatonin 5 MG capsule, Take 5 mg by mouth Every Night., Disp: 30 each, Rfl: 3    Allergies:  Patient has no known allergies.    Family Hx:  Family History   Problem Relation Age of Onset   • Lung cancer Mother    • Hypertension Mother    • Diabetes Maternal Grandmother    • Heart disease Maternal Grandmother    • Hypertension Maternal Grandfather    • Heart disease Maternal Grandfather    • Heart disease Other    • Hypertension Other    • Cancer Other         Social History:  Social History     Socioeconomic History   • Marital status:    • Number of children: 1   Tobacco Use   • Smoking status: Light Tobacco Smoker     Packs/day: 0.25     Years: 48.00     Pack years: 12.00     Types: Cigarettes     Last attempt to quit: 12/18/2018     Years since quitting: 3.5   • Smokeless tobacco: Never Used   • Tobacco comment: 1-2cig/day   Vaping Use   • Vaping Use: Never used   Substance and Sexual Activity   • Alcohol use: Yes     Comment: occasional beer    • Drug use: No   • Sexual activity: Defer     Comment: .       Review of Systems  For ROS see  "resident note    Objective:     /68   Pulse 56   Temp 97.3 °F (36.3 °C)   Ht 162.6 cm (64\")   Wt 55.7 kg (122 lb 11.2 oz)   SpO2 98%   BMI 21.06 kg/m²   For PE see resident note    Lab Review  Results for orders placed or performed in visit on 07/05/22   Comprehensive Metabolic Panel    Specimen: Blood   Result Value Ref Range    Glucose 168 (H) 65 - 99 mg/dL    BUN 9 8 - 23 mg/dL    Creatinine 0.77 0.57 - 1.00 mg/dL    Sodium 140 136 - 145 mmol/L    Potassium 4.0 3.5 - 5.2 mmol/L    Chloride 103 98 - 107 mmol/L    CO2 26.0 22.0 - 29.0 mmol/L    Calcium 9.2 8.6 - 10.5 mg/dL    Total Protein 6.7 6.0 - 8.5 g/dL    Albumin 4.10 3.50 - 5.20 g/dL    ALT (SGPT) 13 1 - 33 U/L    AST (SGOT) 26 1 - 32 U/L    Alkaline Phosphatase 82 39 - 117 U/L    Total Bilirubin 0.3 0.0 - 1.2 mg/dL    Globulin 2.6 gm/dL    A/G Ratio 1.6 g/dL    BUN/Creatinine Ratio 11.7 7.0 - 25.0    Anion Gap 11.0 5.0 - 15.0 mmol/L    eGFR 82.6 >60.0 mL/min/1.73   Magnesium    Specimen: Blood   Result Value Ref Range    Magnesium 1.9 1.6 - 2.4 mg/dL   Phosphorus    Specimen: Blood   Result Value Ref Range    Phosphorus 3.3 2.5 - 4.5 mg/dL            Assessment:     Diagnoses and all orders for this visit:    1. Osteoporosis without current pathological fracture, unspecified osteoporosis type (Primary)    2. Essential hypertension    3. Primary insomnia    Other orders  -     Melatonin 5 MG capsule; Take 5 mg by mouth Every Night.  Dispense: 30 each; Refill: 3        Plan:     I have seen and examined the patient.  I have reviewed the notes, assessments, and/or procedures performed by Elsa Fenton MD, I concur with her/his documentation and assessment and plan for Jessie Jordan.            This document has been electronically signed by Rafita Escobar MD on July 19, 2022 11:50 CDT      Answers for HPI/ROS submitted by the patient on 7/12/2022  What is the primary reason for your visit?: Physical      "

## 2022-08-19 ENCOUNTER — TELEPHONE (OUTPATIENT)
Dept: FAMILY MEDICINE CLINIC | Facility: CLINIC | Age: 72
End: 2022-08-19

## 2022-08-19 RX ORDER — FLUOXETINE HYDROCHLORIDE 20 MG/1
20 CAPSULE ORAL DAILY
Qty: 90 CAPSULE | Refills: 3 | Status: SHIPPED | OUTPATIENT
Start: 2022-08-19

## 2022-08-19 RX ORDER — FLUOXETINE HYDROCHLORIDE 20 MG/1
20 CAPSULE ORAL DAILY
Qty: 90 CAPSULE | Refills: 3 | Status: SHIPPED | OUTPATIENT
Start: 2022-08-19 | End: 2022-08-19 | Stop reason: SDUPTHER

## 2022-08-19 NOTE — TELEPHONE ENCOUNTER
Incoming Refill Request      Medication requested (name and dose): FLUoxetine (PROzac) 20 MG capsule    [facility] where [facility]    Additional details provided by patient:   Best call back number:[phone]    Does the patient have less than a 3 day supply:  [x] Yes  [] No    Greta Grajeda  08/19/22, 13:39 CDT

## 2022-08-26 ENCOUNTER — OFFICE VISIT (OUTPATIENT)
Dept: ONCOLOGY | Facility: CLINIC | Age: 72
End: 2022-08-26

## 2022-08-26 ENCOUNTER — TELEPHONE (OUTPATIENT)
Dept: ONCOLOGY | Facility: HOSPITAL | Age: 72
End: 2022-08-26

## 2022-08-26 ENCOUNTER — LAB (OUTPATIENT)
Dept: ONCOLOGY | Facility: HOSPITAL | Age: 72
End: 2022-08-26

## 2022-08-26 VITALS
TEMPERATURE: 97.2 F | WEIGHT: 126 LBS | OXYGEN SATURATION: 95 % | DIASTOLIC BLOOD PRESSURE: 67 MMHG | SYSTOLIC BLOOD PRESSURE: 161 MMHG | BODY MASS INDEX: 21.63 KG/M2 | RESPIRATION RATE: 18 BRPM | HEART RATE: 56 BPM

## 2022-08-26 DIAGNOSIS — D50.0 IRON DEFICIENCY ANEMIA DUE TO CHRONIC BLOOD LOSS: Primary | ICD-10-CM

## 2022-08-26 DIAGNOSIS — K90.9 IRON MALABSORPTION: ICD-10-CM

## 2022-08-26 DIAGNOSIS — D50.9 IRON DEFICIENCY ANEMIA, UNSPECIFIED IRON DEFICIENCY ANEMIA TYPE: ICD-10-CM

## 2022-08-26 LAB
DEPRECATED RDW RBC AUTO: 43.4 FL (ref 37–54)
ERYTHROCYTE [DISTWIDTH] IN BLOOD BY AUTOMATED COUNT: 13.5 % (ref 12.3–15.4)
FERRITIN SERPL-MCNC: 132.9 NG/ML (ref 13–150)
HCT VFR BLD AUTO: 35.4 % (ref 34–46.6)
HGB BLD-MCNC: 11.7 G/DL (ref 12–15.9)
HOLD SPECIMEN: NORMAL
IRON 24H UR-MRATE: 56 MCG/DL (ref 37–145)
IRON SATN MFR SERPL: 16 % (ref 20–50)
MCH RBC QN AUTO: 29.2 PG (ref 26.6–33)
MCHC RBC AUTO-ENTMCNC: 33.1 G/DL (ref 31.5–35.7)
MCV RBC AUTO: 88.3 FL (ref 79–97)
PLATELET # BLD AUTO: 350 10*3/MM3 (ref 140–450)
PMV BLD AUTO: 10.3 FL (ref 6–12)
RBC # BLD AUTO: 4.01 10*6/MM3 (ref 3.77–5.28)
TIBC SERPL-MCNC: 340 MCG/DL (ref 298–536)
TRANSFERRIN SERPL-MCNC: 228 MG/DL (ref 200–360)
WBC NRBC COR # BLD: 9.06 10*3/MM3 (ref 3.4–10.8)

## 2022-08-26 PROCEDURE — 83540 ASSAY OF IRON: CPT

## 2022-08-26 PROCEDURE — 82728 ASSAY OF FERRITIN: CPT

## 2022-08-26 PROCEDURE — G0463 HOSPITAL OUTPT CLINIC VISIT: HCPCS | Performed by: INTERNAL MEDICINE

## 2022-08-26 PROCEDURE — 85027 COMPLETE CBC AUTOMATED: CPT

## 2022-08-26 PROCEDURE — 84466 ASSAY OF TRANSFERRIN: CPT

## 2022-08-26 PROCEDURE — 99213 OFFICE O/P EST LOW 20 MIN: CPT | Performed by: INTERNAL MEDICINE

## 2022-08-26 NOTE — PROGRESS NOTES
"Chief Complaint  Follow-up -iron deficiency anemia.    Subjective        Jessie Jordan presents to [facility] HEMATOLOGY & ONCOLOGY  History of Present Illness     Jessie Jordan was seen in follow-up for iron deficiency anemia.  Continue report chronic fatigue.  No new medications.  No new hospitalizations.  No bleeding.    Objective   Vital Signs:  /67   Pulse 56   Temp 97.2 °F (36.2 °C)   Resp 18   Wt 57.2 kg (126 lb)   SpO2 95%   BMI 21.63 kg/m²   Estimated body mass index is 21.63 kg/m² as calculated from the following:    Height as of 7/13/22: 162.6 cm (64\").    Weight as of this encounter: 57.2 kg (126 lb).    BMI is within normal parameters. No other follow-up for BMI required.      Physical Exam  Vitals and nursing note reviewed.   Constitutional:       Appearance: Normal appearance.   Neurological:      General: No focal deficit present.      Mental Status: She is alert and oriented to person, place, and time. Mental status is at baseline.   Psychiatric:         Mood and Affect: Mood normal.         Behavior: Behavior normal.         Thought Content: Thought content normal.        Result Review :  The following data was reviewed by: Zaria Cummings MD on 08/26/2022:  Common labs    Common Labsle 4/29/22 7/5/22 8/26/22   Glucose  168 (A)    BUN  9    Creatinine  0.77    Sodium  140    Potassium  4.0    Chloride  103    Calcium  9.2    Albumin  4.10    Total Bilirubin  0.3    Alkaline Phosphatase  82    AST (SGOT)  26    ALT (SGPT)  13    WBC 10.12  9.06   Hemoglobin 11.8 (A)  11.7 (A)   Hematocrit 35.1  35.4   Platelets 325  350   (A) Abnormal value            CMP    CMP 7/5/22   Glucose 168 (A)   BUN 9   Creatinine 0.77   Sodium 140   Potassium 4.0   Chloride 103   Calcium 9.2   Albumin 4.10   Total Bilirubin 0.3   Alkaline Phosphatase 82   AST (SGOT) 26   ALT (SGPT) 13   (A) Abnormal value            CBC    CBC 1/28/22 4/29/22 8/26/22   WBC 9.43 10.12 " 9.06   RBC 4.29 4.02 4.01   Hemoglobin 12.6 11.8 (A) 11.7 (A)   Hematocrit 37.4 35.1 35.4   MCV 87.2 87.3 88.3   MCH 29.4 29.4 29.2   MCHC 33.7 33.6 33.1   RDW 14.5 13.2 13.5   Platelets 364 325 350   (A) Abnormal value            CBC w/diff    CBC w/Diff 1/28/22 4/29/22 8/26/22   WBC 9.43 10.12 9.06   RBC 4.29 4.02 4.01   Hemoglobin 12.6 11.8 (A) 11.7 (A)   Hematocrit 37.4 35.1 35.4   MCV 87.2 87.3 88.3   MCH 29.4 29.4 29.2   MCHC 33.7 33.6 33.1   RDW 14.5 13.2 13.5   Platelets 364 325 350   (A) Abnormal value              Anemia labs:      Lab 08/26/22  1013   IRON 56   IRON SATURATION 16*   TIBC 340   TRANSFERRIN 228   FERRITIN 132.90       Jessie Jordan reports a pain score of 9.  Given her pain assessment as noted, treatment options were discussed and the following options were decided upon as a follow-up plan to address the patient's pain: referral to Primary Care for assistance in pain treatment guidance.      Patient screened negative  for depression based on a PHQ-9 score of 0 on 8/26/2022.     Advance Care Planning   ACP discussion was declined by the patient. Patient does not have an advance directive, declines further assistance.         Assessment and Plan   Diagnoses and all orders for this visit:    1. Iron deficiency anemia due to chronic blood loss (Primary)  -     CBC (No Diff); Future  -     Ferritin; Future  -     Iron Profile; Future    Chronic, stable.  S/p IV iron treatment.  Iron Deficiency anemia stable  Iron level mildly low.  Recommend continue monitoring.  CBC, ferritin, iron profile in 3 months.         Follow Up   No follow-ups on file.  Patient was given instructions and counseling regarding her condition or for health maintenance advice. Please see specific information pulled into the AVS if appropriate.

## 2022-08-30 RX ORDER — FERROUS SULFATE 325(65) MG
1 TABLET ORAL
Qty: 90 TABLET | Refills: 1 | Status: SHIPPED | OUTPATIENT
Start: 2022-08-30 | End: 2022-10-10 | Stop reason: SDUPTHER

## 2022-09-19 DIAGNOSIS — F17.218 NICOTINE DEPENDENCE, CIGARETTES, WITH OTHER NICOTINE-INDUCED DISORDERS: ICD-10-CM

## 2022-09-19 RX ORDER — VARENICLINE TARTRATE 1 MG/1
TABLET, FILM COATED ORAL
Qty: 56 TABLET | Refills: 0 | Status: SHIPPED | OUTPATIENT
Start: 2022-09-19 | End: 2023-02-03 | Stop reason: SDUPTHER

## 2022-10-10 ENCOUNTER — OFFICE VISIT (OUTPATIENT)
Dept: FAMILY MEDICINE CLINIC | Facility: CLINIC | Age: 72
End: 2022-10-10

## 2022-10-10 VITALS
SYSTOLIC BLOOD PRESSURE: 120 MMHG | OXYGEN SATURATION: 98 % | DIASTOLIC BLOOD PRESSURE: 80 MMHG | WEIGHT: 125.4 LBS | TEMPERATURE: 96.2 F | HEART RATE: 62 BPM | HEIGHT: 64 IN | BODY MASS INDEX: 21.41 KG/M2

## 2022-10-10 DIAGNOSIS — D50.9 IRON DEFICIENCY ANEMIA, UNSPECIFIED IRON DEFICIENCY ANEMIA TYPE: Primary | ICD-10-CM

## 2022-10-10 DIAGNOSIS — M81.0 OSTEOPOROSIS, POST-MENOPAUSAL: ICD-10-CM

## 2022-10-10 DIAGNOSIS — G89.4 CHRONIC PAIN SYNDROME: ICD-10-CM

## 2022-10-10 DIAGNOSIS — Z13.220 SCREENING FOR HYPERLIPIDEMIA: ICD-10-CM

## 2022-10-10 DIAGNOSIS — F17.218 NICOTINE DEPENDENCE, CIGARETTES, WITH OTHER NICOTINE-INDUCED DISORDERS: ICD-10-CM

## 2022-10-10 PROCEDURE — 99213 OFFICE O/P EST LOW 20 MIN: CPT | Performed by: STUDENT IN AN ORGANIZED HEALTH CARE EDUCATION/TRAINING PROGRAM

## 2022-10-10 RX ORDER — ATORVASTATIN CALCIUM 10 MG/1
10 TABLET, FILM COATED ORAL DAILY
Qty: 90 TABLET | Refills: 1 | Status: SHIPPED | OUTPATIENT
Start: 2022-10-10

## 2022-10-10 RX ORDER — ERGOCALCIFEROL 1.25 MG/1
50000 CAPSULE ORAL
Qty: 12 CAPSULE | Refills: 1 | Status: SHIPPED | OUTPATIENT
Start: 2022-10-10

## 2022-10-10 RX ORDER — GABAPENTIN 300 MG/1
CAPSULE ORAL
COMMUNITY
Start: 2022-09-19

## 2022-10-10 RX ORDER — VARENICLINE TARTRATE 1 MG/1
1 TABLET, FILM COATED ORAL 2 TIMES DAILY
Qty: 56 TABLET | Refills: 0 | Status: CANCELLED | OUTPATIENT
Start: 2022-10-10

## 2022-10-10 RX ORDER — FOLIC ACID 1 MG/1
1000 TABLET ORAL DAILY
Qty: 90 TABLET | Refills: 1 | Status: SHIPPED | OUTPATIENT
Start: 2022-10-10

## 2022-10-10 RX ORDER — FERROUS SULFATE 325(65) MG
1 TABLET ORAL
Qty: 90 TABLET | Refills: 1 | Status: SHIPPED | OUTPATIENT
Start: 2022-10-10

## 2022-10-12 NOTE — PROGRESS NOTES
Family Medicine Residency  Elsa Fenton MD    Subjective:     Jessie Jordan is a 72 y.o. female who presents for routine follow up.  She had a DEXA scan done 05/2022 which showed osteoporosis, high fracture risk, diminished mean total hip bone density. She was started on Prolia on 7/12. Next infusion in 6 months around Janurary 2023.     Amlodipine discontinued at last visit, continued lisinopril 10mg for hypertension therapy. Denies chest pain, soa, palpations, headaches.     The following portions of the patient's history were reviewed and updated as appropriate: past family history, past medical history, past social history and past surgical history.    Past Medical Hx:  Past Medical History:   Diagnosis Date   • Anxiety and depression    • Coronary artery disease involving native coronary artery of native heart 12/06/2017    seen on Cardiac Cath - Left main 50-70% stenosis, 12/27/2017 repeat cath showed coronary spasm with no stenosis   • Hyperlipidemia    • Hypertension    • Kidney cysts    • Post-menopausal 1990   • Skin cancer    • Stroke (HCC) 2015   • Vascular disease        Past Surgical Hx:  Past Surgical History:   Procedure Laterality Date   • ARTERIOGRAM N/A 9/28/2018    Procedure: aortoiliac arteriogram possible angioplasty stent atherectomy thrombolysis bilateral iliac stents;  Surgeon: Luan Ruff MD;  Location: [facility] ANGIO INVASIVE LOCATION;  Service: Interventional Radiology   • CARDIAC CATHETERIZATION  12/06/2017    Done at [facility] - Left Main 50-70% Stenosis - no stenting done, recommendation was urgent CABG.  EF 50-60%   • CERVICAL FUSION  1985    C5-6   • COLONOSCOPY N/A 6/29/2018    Procedure: COLONOSCOPY;  Surgeon: Oz Way MD;  Location: [facility] ENDOSCOPY;  Service: Gastroenterology   • COLONOSCOPY N/A 7/6/2020    Procedure: COLONOSCOPY;  Surgeon: Oz Way MD;  Location: [facility] ENDOSCOPY;  Service: Gastroenterology;  Laterality: N/A;   •  ENDOSCOPY N/A 7/6/2020    Procedure: ESOPHAGOGASTRODUODENOSCOPY ASAP;  Surgeon: Oz Way MD;  Location: [facility] ENDOSCOPY;  Service: Gastroenterology;  Laterality: N/A;   • ENDOSCOPY N/A 8/17/2021    Procedure: ESOPHAGOGASTRODUODENOSCOPY possible dilation;  Surgeon: Oz Way MD;  Location: [facility] ENDOSCOPY;  Service: Gastroenterology;  Laterality: N/A;   • FEMORAL POPLITEAL BYPASS Right 12/19/2018    Procedure: femoral to femoral artery bypass, RIGHT FEMORAL POPLITEAL BYPASS right lower extremity arteriogram          (C-ARM# AND C-ARM TABLE);  Surgeon: Luan Ruff MD;  Location: [facility] OR;  Service: Vascular   • FINGER SURGERY Left     third finger   • ROTATOR CUFF REPAIR Left 06/30/2011    done in [place]       Current Meds:    Current Outpatient Medications:   •  atorvastatin (LIPITOR) 10 MG tablet, Take 1 tablet by mouth Daily., Disp: 90 tablet, Rfl: 1  •  Calcium Carbonate-Vitamin D (calcium-vitamin D) 500-200 MG-UNIT tablet per tablet, Take 1 tablet by mouth Daily., Disp: 30 tablet, Rfl: 11  •  carbamide peroxide (Debrox) 6.5 % otic solution, Administer 5 drops into both ears 2 (Two) Times a Day., Disp: 15 mL, Rfl: 3  •  clopidogrel (PLAVIX) 75 MG tablet, Take 1 tablet by mouth Daily., Disp: 90 tablet, Rfl: 3  •  Diclofenac Sodium (VOLTAREN) 1 % gel gel, Apply 4 g topically to the appropriate area as directed 4 (Four) Times a Day As Needed (pain)., Disp: 100 g, Rfl: 3  •  ferrous sulfate 325 (65 FE) MG tablet, Take 1 tablet by mouth Daily With Breakfast., Disp: 90 tablet, Rfl: 1  •  FLUoxetine (PROzac) 20 MG capsule, Take 1 capsule by mouth Daily., Disp: 90 capsule, Rfl: 3  •  fluticasone (FLONASE) 50 MCG/ACT nasal spray, 2 sprays by Each Nare route Daily., Disp: 48 mL, Rfl: 3  •  folic acid (FOLVITE) 1 MG tablet, Take 1 tablet by mouth Daily., Disp: 90 tablet, Rfl: 1  •  gabapentin (NEURONTIN) 400 MG capsule, Take 1 capsule by mouth 3 (Three) Times a Day. (Patient taking differently: Take  1 capsule by mouth 4 (Four) Times a Day.), Disp: 90 capsule, Rfl: 2  •  HYDROcodone-acetaminophen (NORCO)  MG per tablet, Take 1 tablet by mouth Every 6 (Six) Hours As Needed., Disp: , Rfl:   •  lisinopril (PRINIVIL,ZESTRIL) 10 MG tablet, Take 1 tablet by mouth Daily., Disp: 90 tablet, Rfl: 3  •  Melatonin 5 MG capsule, Take 5 mg by mouth Every Night., Disp: 30 each, Rfl: 3  •  pantoprazole (PROTONIX) 40 MG EC tablet, Take 1 tablet by mouth Daily., Disp: 30 tablet, Rfl: 11  •  varenicline (CHANTIX) 1 MG tablet, Take 1 tablet by mouth twice daily, Disp: 56 tablet, Rfl: 0  •  vitamin D (ERGOCALCIFEROL) 1.25 MG (41628 UT) capsule capsule, Take 1 capsule by mouth Every 7 (Seven) Days., Disp: 12 capsule, Rfl: 1  •  calcitonin, salmon, (Miacalcin) 200 UNIT/ACT nasal spray, 1 spray by Alternating Nares route Daily for 30 days., Disp: 2.7 mL, Rfl: 0  •  gabapentin (NEURONTIN) 300 MG capsule, , Disp: , Rfl:     Allergies:  No Known Allergies    Family Hx:  Family History   Problem Relation Age of Onset   • Lung cancer Mother    • Hypertension Mother    • Diabetes Maternal Grandmother    • Heart disease Maternal Grandmother    • Hypertension Maternal Grandfather    • Heart disease Maternal Grandfather    • Heart disease Other    • Hypertension Other    • Cancer Other         Social History:  Social History     Socioeconomic History   • Marital status:    • Number of children: 1   Tobacco Use   • Smoking status: Light Smoker     Packs/day: 0.25     Years: 48.00     Pack years: 12.00     Types: Cigarettes     Last attempt to quit: 12/18/2018     Years since quitting: 3.8   • Smokeless tobacco: Never   • Tobacco comments:     1-2cig/day   Vaping Use   • Vaping Use: Never used   Substance and Sexual Activity   • Alcohol use: Yes     Comment: occasional beer    • Drug use: No   • Sexual activity: Defer     Comment: .       Review of Systems  Review of Systems   Constitutional: Negative.    Respiratory:  "Negative.    Cardiovascular: Negative.    Gastrointestinal: Negative.    Endocrine: Negative.    Musculoskeletal: Negative for arthralgias and myalgias.   Skin: Negative.    Neurological: Negative.  Negative for headaches.   Psychiatric/Behavioral: Negative.  Negative for suicidal ideas.       Objective:     /80   Pulse 62   Temp 96.2 °F (35.7 °C)   Ht 162.6 cm (64\")   Wt 56.9 kg (125 lb 6.4 oz)   SpO2 98%   BMI 21.52 kg/m²   Physical Exam  Constitutional:       Appearance: Normal appearance. She is normal weight.   HENT:      Head: Normocephalic and atraumatic.      Nose: Nose normal.      Mouth/Throat:      Mouth: Mucous membranes are moist.   Cardiovascular:      Rate and Rhythm: Normal rate and regular rhythm.      Pulses: Normal pulses.      Heart sounds: Normal heart sounds.   Pulmonary:      Effort: Pulmonary effort is normal.      Breath sounds: Normal breath sounds.   Abdominal:      General: Bowel sounds are normal.      Palpations: Abdomen is soft.   Musculoskeletal:         General: Normal range of motion.      Cervical back: Normal range of motion.   Skin:     General: Skin is warm and dry.      Capillary Refill: Capillary refill takes less than 2 seconds.   Neurological:      General: No focal deficit present.      Mental Status: She is alert.   Psychiatric:         Mood and Affect: Mood normal.         Behavior: Behavior normal.          Assessment/Plan:     Diagnoses and all orders for this visit:    1. Iron deficiency anemia, unspecified iron deficiency anemia type (Primary)  -     ferrous sulfate 325 (65 FE) MG tablet; Take 1 tablet by mouth Daily With Breakfast.  Dispense: 90 tablet; Refill: 1  -     folic acid (FOLVITE) 1 MG tablet; Take 1 tablet by mouth Daily.  Dispense: 90 tablet; Refill: 1    2. Screening for hyperlipidemia  -     atorvastatin (LIPITOR) 10 MG tablet; Take 1 tablet by mouth Daily.  Dispense: 90 tablet; Refill: 1    3. Nicotine dependence, cigarettes, with other " nicotine-induced disorders    4. Osteoporosis, post-menopausal  -     vitamin D (ERGOCALCIFEROL) 1.25 MG (54351 UT) capsule capsule; Take 1 capsule by mouth Every 7 (Seven) Days.  Dispense: 12 capsule; Refill: 1    5. Chronic pain syndrome  -     Ambulatory Referral to Pain Management      -  DEXA scan done 05/2022 which showed osteoporosis, high fracture risk, diminished mean total hip bone density. She was started on Prolia on 7/12. Next infusion in 6 months. Continue to utilzie calcitonin salmon (nasal spray) as needed for pain.     - HTN: on lisinopril. Continue this. Her BP have been within acceptable range for her age.  Advised her to continue monitoring her blood pressure at home.     · Rx changes: see a/p  · Patient Education: monitor blood pressure and keep log   · Compliance at present is estimated to be good.   · Efforts to improve compliance (if necessary) will be directed at increased exercise.    Depression screening: Up to date; last screen 8/26/2022     Follow-up:     Return in about 6 months (around 4/10/2023).    Preventative:  Health Maintenance   Topic Date Due   • TDAP/TD VACCINES (1 - Tdap) Never done   • ZOSTER VACCINE (1 of 2) Never done   • LIPID PANEL  06/22/2021   • COVID-19 Vaccine (4 - Booster for Pfizer series) 02/16/2022   • INFLUENZA VACCINE  08/01/2022   • ANNUAL WELLNESS VISIT  05/19/2023   • MAMMOGRAM  06/10/2024   • DXA SCAN  06/10/2024   • COLORECTAL CANCER SCREENING  07/06/2025   • HEPATITIS C SCREENING  Completed   • Pneumococcal Vaccine 65+  Completed     Female Preventative: Exercises regularly      Weight  -Class: Normal: 18.5-24.9kg/m2  -BMI is within normal parameters. No other follow-up for BMI required.   increase water intake and increase physical activity    Alcohol use:  reports current alcohol use.  Nicotine status  reports that she has been smoking cigarettes. She has a 12.00 pack-year smoking history. She has never used smokeless tobacco.     Goals     •  Pain  control       Barriers: severe PAD, pt continues to smoke      •  Quit smoking / using tobacco (pt-stated)       Barriers: compliance with cessation medications            RISK SCORE: 3           Elsa Fenton M.D. PGY 2  [facility]  [address] [place] [address]  Office: [phone]  This document has been electronically signed by Elsa Fenton MD on October 12, 2022 11:28 CDT    Answers for HPI/ROS submitted by the patient on 10/3/2022  Please describe your symptoms.: Check up/meds refills  Have you had these symptoms before?: Yes  How long have you been having these symptoms?: 1-2 weeks  What is the primary reason for your visit?: Other

## 2022-10-17 NOTE — PROGRESS NOTES
I have reviewed the notes, assessments, and/or procedures performed by Elsa Fenton MD during office visit. I concur with her/his documentation and assessment and plan for Jessie Jordan.           This document has been electronically signed by Justice Bonilla MD on October 17, 2022 15:30 CDT

## 2022-10-19 ENCOUNTER — IMMUNIZATION (OUTPATIENT)
Dept: FAMILY MEDICINE CLINIC | Facility: CLINIC | Age: 72
End: 2022-10-19

## 2022-10-19 PROCEDURE — 91312 COVID-19 (PFIZER) BIVALENT BOOSTER 12+YRS: CPT | Performed by: SURGERY

## 2022-10-19 PROCEDURE — 0124A COVID-19 (PFIZER) BIVALENT BOOSTER 12+YRS: CPT | Performed by: SURGERY

## 2022-10-25 ENCOUNTER — OFFICE VISIT (OUTPATIENT)
Dept: CARDIAC SURGERY | Facility: CLINIC | Age: 72
End: 2022-10-25

## 2022-10-25 VITALS
BODY MASS INDEX: 21.34 KG/M2 | SYSTOLIC BLOOD PRESSURE: 147 MMHG | HEIGHT: 64 IN | OXYGEN SATURATION: 98 % | HEART RATE: 59 BPM | WEIGHT: 125 LBS | DIASTOLIC BLOOD PRESSURE: 66 MMHG

## 2022-10-25 DIAGNOSIS — E78.2 MIXED HYPERLIPIDEMIA: ICD-10-CM

## 2022-10-25 DIAGNOSIS — I65.23 CAROTID STENOSIS, ASYMPTOMATIC, BILATERAL: ICD-10-CM

## 2022-10-25 DIAGNOSIS — F17.200 SMOKER: ICD-10-CM

## 2022-10-25 DIAGNOSIS — I73.9 PVD (PERIPHERAL VASCULAR DISEASE): Primary | ICD-10-CM

## 2022-10-25 PROCEDURE — 99214 OFFICE O/P EST MOD 30 MIN: CPT | Performed by: NURSE PRACTITIONER

## 2022-10-25 NOTE — PATIENT INSTRUCTIONS
The results of your vascular studies of your right leg shows moderate disease with fair  circulation, in the left leg shows milddisease with good  circulation.      If signs and symptoms of ischemia should occur including but not limited to pale/blue discoloration of limb, increasing pain with ambulation or at rest, or a non-healing wound. Patient is to notify Heart and Vascular center for immediate evaluation.    Return 6 months [facility] and carotid ultrasound

## 2022-10-26 NOTE — PROGRESS NOTES
CVTS Office Progress Note     10/26/2022    Jessie Jordan  1950    Chief Complaint:    Chief Complaint   Patient presents with   • Peripheral Vascular Disease       HPI:      PCP:  Elsa Fenton MD  Cardiology:  Dr. Spence    72 y.o. female with HTN(stable, increased risk stroke, rupture), Hyperlipidemia(stable, increased risk cardiovascular events) and COPD(stable, increased risk pulmonary complications) PVD, MARY.  former smoker and quit 2018.  Right leg pain x2 years, underwent left-right fem-fem, fem-pop.  Follow up surveillance has remained stable.  Bilateral leg pain unrelated to activity or ambulation, chronic pain management.  Mild venous insuffiency.  Ambulation distance limited by breathing rather than claudication. Returns today follow up vascular imaging, claudication at long distances.   No other associated signs, symptoms or modifying factors.    5/2018 MONTY:  RIGHT .32 bi/monophasic.  LEFT .47 biphasic.  6/2018 Carotid Duplex:  JENNIFER 0-49% antegrade vert.  LICA 0-49% antegrade vert.  6/2018 CTA Aorta runoff:  RIGHT common iliac 90%, SFA small moderate diffuse disease, tibial diffuse disease.  LEFT  Common iliac 70%, external iliac 80%, SFA occluded reconstitutes distal via profunda collaterals, diffuse tibials.  9/28/18  Agram:  PTA/Luminex Stent LEFT iliac artery:  RCIA occluded, RIIA/REIA moderate diffuse disease, RSFA occluded, RPT diffuse distal disease  10/11/18  MONTY:  RIGHT 0.50 Fem/Pop Biphasic  PT/DP Monophasic    12/19/18 LEFT to RIGHT femoral to femoral artery bypass and RIGHT FEMORAL POPLITEAL BYPASS (PTFE)  05/21/2019 Carotid Duplex: JENNIFER 0-49% with mobile plaque mPSV 120c/s Ratio 2.4, LICA 50-69% mPSV 141c/s Ratio 2.0, Hypoechoic structure left submandibular 9.3mm  05/21/2019 MONTY: Right 0.89 triphasic femoral to distal.  Left 0.75 femoral triphasic PT biphasic  05/21/2019 Graft Survey: Left groin 335, left groin anastomosis 324, proximal graft 183, mid graft 89, distal graft  89, right groin anastomosis 158, right groin 33.  All with triphasic waveforms.    12/3/19 Right 0.82 fem biphasic triphasic-distally.  Left 0.62 biphasic.   12/3/19 Carotid Duplex: JENNIFER 50-69% mPSV 117c/s Ratio 2.8, LICA 50-69% mPSV 162c/s Ratio 1.8, Antegrade vertebrals  6/2020 Carotid Duplex: JENNIFER 0-49% mPSV 122c/s Ratio 2.0, LICA 50-69% mPSV 157c/s Ratio 1.6, Antegrade vertebrals  6/2020 MONTY: Right 0.9 triphasic.  Left 0.81 triphasic.   6/2020 fem-Fem US: Patent, mPSV 439cm/s area appears widely patent.    6/2020 Right Fem-pop US: Patent, mPSV 155cm/s triphasic throughout  3/2021 Carotid Duplex: JENNIFER 50-69% mPSV 126c/s Ratio 1.9, LICA 0-49% mPSV 117c/s Ratio 1.5, Antegrade vertebrals  3/2021 MONTY: Right 0.92 triphasic.  Left 0.81 triphasic.\  4/2022 MONTY: Right 0.84 triphasic.  Left 0.73 triphasic  4/2022 Carotid Duplex: JENNIFER 0-49% mPSV 98c/s Ratio 1.7, LICA 0-49% mPSV 104c/s Ratio 1.6, Antegrade vertebrals  10/2022 MONTY: Right 0.87 triphasic pop, biphasic distally.  Left 0.69 triphasic pop biphasic distal  10/2022  Jose Art US: fem-fem graft patent, mPSV 334cm/s native inflow       The following portions of the patient's history were reviewed and updated as appropriate: allergies, current medications, past family history, past medical history, past social history, past surgical history and problem list.  Recent images independently reviewed.  Available laboratory values reviewed.    PMH:  Past Medical History:   Diagnosis Date   • Anxiety and depression    • Coronary artery disease involving native coronary artery of native heart 12/06/2017    seen on Cardiac Cath - Left main 50-70% stenosis, 12/27/2017 repeat cath showed coronary spasm with no stenosis   • Hyperlipidemia    • Hypertension    • Kidney cysts    • Post-menopausal 1990   • Skin cancer    • Stroke (HCC) 2015   • Vascular disease      Past Surgical History:   Procedure Laterality Date   • ARTERIOGRAM N/A 9/28/2018    Procedure: aortoiliac arteriogram  possible angioplasty stent atherectomy thrombolysis bilateral iliac stents;  Surgeon: Luan Ruff MD;  Location: [facility] ANGIO INVASIVE LOCATION;  Service: Interventional Radiology   • CARDIAC CATHETERIZATION  12/06/2017    Done at [facility] - Left Main 50-70% Stenosis - no stenting done, recommendation was urgent CABG.  EF 50-60%   • CERVICAL FUSION  1985    C5-6   • COLONOSCOPY N/A 6/29/2018    Procedure: COLONOSCOPY;  Surgeon: Oz Way MD;  Location: [facility] ENDOSCOPY;  Service: Gastroenterology   • COLONOSCOPY N/A 7/6/2020    Procedure: COLONOSCOPY;  Surgeon: Oz Way MD;  Location: [facility] ENDOSCOPY;  Service: Gastroenterology;  Laterality: N/A;   • ENDOSCOPY N/A 7/6/2020    Procedure: ESOPHAGOGASTRODUODENOSCOPY ASAP;  Surgeon: Oz Way MD;  Location: [facility] ENDOSCOPY;  Service: Gastroenterology;  Laterality: N/A;   • ENDOSCOPY N/A 8/17/2021    Procedure: ESOPHAGOGASTRODUODENOSCOPY possible dilation;  Surgeon: Oz Way MD;  Location: [facility] ENDOSCOPY;  Service: Gastroenterology;  Laterality: N/A;   • FEMORAL POPLITEAL BYPASS Right 12/19/2018    Procedure: femoral to femoral artery bypass, RIGHT FEMORAL POPLITEAL BYPASS right lower extremity arteriogram          (C-ARM# AND C-ARM TABLE);  Surgeon: Luan Ruff MD;  Location: [facility] OR;  Service: Vascular   • FINGER SURGERY Left     third finger   • ROTATOR CUFF REPAIR Left 06/30/2011    done in [place]     Family History   Problem Relation Age of Onset   • Lung cancer Mother    • Hypertension Mother    • Diabetes Maternal Grandmother    • Heart disease Maternal Grandmother    • Hypertension Maternal Grandfather    • Heart disease Maternal Grandfather    • Heart disease Other    • Hypertension Other    • Cancer Other      Social History     Tobacco Use   • Smoking status: Light Smoker     Packs/day: 0.25     Years: 48.00     Pack years: 12.00     Types: Cigarettes     Last attempt to quit: 12/18/2018      Years since quitting: 3.8   • Smokeless tobacco: Never   • Tobacco comments:     1-2cig/day   Vaping Use   • Vaping Use: Never used   Substance Use Topics   • Alcohol use: Yes     Comment: occasional beer    • Drug use: No       ALLERGIES:  No Known Allergies      MEDICATIONS:    Current Outpatient Medications:   •  atorvastatin (LIPITOR) 10 MG tablet, Take 1 tablet by mouth Daily., Disp: 90 tablet, Rfl: 1  •  Calcium Carbonate-Vitamin D (calcium-vitamin D) 500-200 MG-UNIT tablet per tablet, Take 1 tablet by mouth Daily., Disp: 30 tablet, Rfl: 11  •  carbamide peroxide (Debrox) 6.5 % otic solution, Administer 5 drops into both ears 2 (Two) Times a Day., Disp: 15 mL, Rfl: 3  •  clopidogrel (PLAVIX) 75 MG tablet, Take 1 tablet by mouth Daily., Disp: 90 tablet, Rfl: 3  •  Diclofenac Sodium (VOLTAREN) 1 % gel gel, Apply 4 g topically to the appropriate area as directed 4 (Four) Times a Day As Needed (pain)., Disp: 100 g, Rfl: 3  •  ferrous sulfate 325 (65 FE) MG tablet, Take 1 tablet by mouth Daily With Breakfast., Disp: 90 tablet, Rfl: 1  •  FLUoxetine (PROzac) 20 MG capsule, Take 1 capsule by mouth Daily., Disp: 90 capsule, Rfl: 3  •  fluticasone (FLONASE) 50 MCG/ACT nasal spray, 2 sprays by Each Nare route Daily., Disp: 48 mL, Rfl: 3  •  folic acid (FOLVITE) 1 MG tablet, Take 1 tablet by mouth Daily., Disp: 90 tablet, Rfl: 1  •  gabapentin (NEURONTIN) 300 MG capsule, , Disp: , Rfl:   •  HYDROcodone-acetaminophen (NORCO)  MG per tablet, Take 1 tablet by mouth Every 6 (Six) Hours As Needed., Disp: , Rfl:   •  lisinopril (PRINIVIL,ZESTRIL) 10 MG tablet, Take 1 tablet by mouth Daily., Disp: 90 tablet, Rfl: 3  •  Melatonin 5 MG capsule, Take 5 mg by mouth Every Night., Disp: 30 each, Rfl: 3  •  pantoprazole (PROTONIX) 40 MG EC tablet, Take 1 tablet by mouth Daily., Disp: 30 tablet, Rfl: 11  •  varenicline (CHANTIX) 1 MG tablet, Take 1 tablet by mouth twice daily, Disp: 56 tablet, Rfl: 0  •  vitamin D  "(ERGOCALCIFEROL) 1.25 MG (08126 UT) capsule capsule, Take 1 capsule by mouth Every 7 (Seven) Days., Disp: 12 capsule, Rfl: 1  •  calcitonin, salmon, (Miacalcin) 200 UNIT/ACT nasal spray, 1 spray by Alternating Nares route Daily for 30 days., Disp: 2.7 mL, Rfl: 0      Review of Systems   Constitutional: Negative for chills, decreased appetite, fever and weight loss.   HENT: Negative for congestion, nosebleeds and sore throat.    Eyes: Negative for blurred vision, visual disturbance and visual halos.   Cardiovascular: Positive for claudication, dyspnea on exertion and leg swelling. Negative for chest pain.   Respiratory: Negative for cough, shortness of breath, sputum production and wheezing.    Endocrine: Negative for cold intolerance and polyuria.   Hematologic/Lymphatic: Negative for bleeding problem. Bruises/bleeds easily.        Does not report S/S of adverse bleeding event including nose bleeds, hematuria, melena, gingival bleeding, or hematemesis.   Skin: Positive for unusual hair distribution. Negative for flushing and nail changes.   Musculoskeletal: Positive for arthritis, back pain and joint pain.   Gastrointestinal: Negative for bloating, abdominal pain, hematemesis, melena, nausea and vomiting.   Genitourinary: Negative for flank pain and hematuria.   Neurological: Positive for weakness. Negative for brief paralysis, difficulty with concentration, focal weakness, light-headedness, loss of balance, numbness and paresthesias.        No amaurosis fugax, TIA, or CVA.     Psychiatric/Behavioral: Negative for altered mental status, depression, substance abuse and suicidal ideas.   Allergic/Immunologic: Negative for hives and persistent infections.         Vitals:    10/25/22 1426   BP: 147/66   BP Location: Left arm   Patient Position: Sitting   Cuff Size: Adult   Pulse: 59   SpO2: 98%   Weight: 56.7 kg (125 lb)   Height: 162.6 cm (64\")     Physical Exam   Constitutional: She is oriented to person, place, and " time. She appears well-developed.   HENT:   Head: Normocephalic and atraumatic.   Eyes: Pupils are equal, round, and reactive to light. Conjunctivae are normal.   Cardiovascular: Normal rate.   No murmur heard.  Pulses:       Dorsalis pedis pulses are 1+ on the right side and 1+ on the left side.        Posterior tibial pulses are 1+ on the right side and 1+ on the left side.   RLE +2  LLE +1  R carotid bruit   Pulmonary/Chest: Effort normal and breath sounds normal. No respiratory distress.   Abdominal: Soft. Bowel sounds are normal. She exhibits no distension.   Musculoskeletal: Normal range of motion. Tenderness (both legs) present.   Neurological: She is alert and oriented to person, place, and time.   Skin: Skin is warm and dry. Capillary refill takes less than 2 seconds.   There is no evidence of skin breakdown of the bilateral lower extremities.  BLE pink, no evidence of ischemia.  Feet are absent of dependent rubor.   Psychiatric: Judgment normal.   Nursing note and vitals reviewed.      Assessment & Plan     Independent Review of Studies  10/2022 MONTY: Right 0.87 triphasic pop, biphasic distally.  Left 0.69 triphasic pop biphasic distal  10/2022  Jose Art US: fem-fem graft patent, mPSV 334cm/s native inflow     1. PVD (peripheral vascular disease) with claudication (CMS/HCC)  Mild bilateral reduction in resting perfusion  Symptoms controlled.  Return 6 months with MONTY    Inflow velocity stable    Ambulatory distance limited by dyspnea    Medical management with statin, plavix, ACE.    - Doppler Ankle Brachial Index Single Level CAR; Future    2. Bilateral carotid artery stenosis  Known mild bilateral asymptomatic internal carotid disease.  Repeat duplex at next visit  No surgical intervention indicated at this time    3. Mixed hyperlipidemia  Lipid-lowering therapy has been proven beneficial in patients with cardio-vascular disease. Current guidelines recommend statin treatment for all patients with PAD,CAD  and carotid stenosis. Statins are beneficial in preventing cardiovascular events, increasing functional capacity and lower the risk of adverse limb loss in PAD. Statins decrease the progression of plaque formation and may improve peripheral vessel lining, and aid in reversing atherosclerosis.    Smoking cessation assistance options offered including behavioral counseling (Smoking Cessation Classes), Nicotine replacement therapy (patches or gum), pharmacologic therapy (Chantix, Wellbutrin). Understands tobacco increases risk of expanding AAA, MI, CVA, PAD, carcinoma. Discussion and question answer period 5-7 minutes.      Detailed discussion regarding risks, benefits, and treatment plan. Images independently reviewed. Patient understands, agrees, and wishes to proceed with plan.       This document has been electronically signed by Lino Bradley AGACNP-BC @  On October 26, 2022 11:05 [email or web address]

## 2022-11-23 ENCOUNTER — HOME HEALTH ADMISSION (OUTPATIENT)
Dept: HOME HEALTH SERVICES | Facility: HOME HEALTHCARE | Age: 72
End: 2022-11-23

## 2022-11-23 ENCOUNTER — LAB (OUTPATIENT)
Dept: ONCOLOGY | Facility: HOSPITAL | Age: 72
End: 2022-11-23

## 2022-11-23 ENCOUNTER — OFFICE VISIT (OUTPATIENT)
Dept: ONCOLOGY | Facility: CLINIC | Age: 72
End: 2022-11-23

## 2022-11-23 VITALS
RESPIRATION RATE: 18 BRPM | OXYGEN SATURATION: 95 % | SYSTOLIC BLOOD PRESSURE: 145 MMHG | WEIGHT: 124 LBS | BODY MASS INDEX: 21.28 KG/M2 | HEART RATE: 86 BPM | DIASTOLIC BLOOD PRESSURE: 62 MMHG

## 2022-11-23 DIAGNOSIS — R29.6 FALLS FREQUENTLY: ICD-10-CM

## 2022-11-23 DIAGNOSIS — D50.9 IRON DEFICIENCY ANEMIA, UNSPECIFIED IRON DEFICIENCY ANEMIA TYPE: Primary | ICD-10-CM

## 2022-11-23 DIAGNOSIS — I63.9 CEREBROVASCULAR ACCIDENT (CVA), UNSPECIFIED MECHANISM: ICD-10-CM

## 2022-11-23 DIAGNOSIS — D50.0 IRON DEFICIENCY ANEMIA DUE TO CHRONIC BLOOD LOSS: ICD-10-CM

## 2022-11-23 LAB
DEPRECATED RDW RBC AUTO: 43.4 FL (ref 37–54)
ERYTHROCYTE [DISTWIDTH] IN BLOOD BY AUTOMATED COUNT: 14.3 % (ref 12.3–15.4)
FERRITIN SERPL-MCNC: 106.9 NG/ML (ref 13–150)
HCT VFR BLD AUTO: 35.7 % (ref 34–46.6)
HGB BLD-MCNC: 11.7 G/DL (ref 12–15.9)
HOLD SPECIMEN: NORMAL
IRON 24H UR-MRATE: 59 MCG/DL (ref 37–145)
IRON SATN MFR SERPL: 18 % (ref 20–50)
MCH RBC QN AUTO: 27.2 PG (ref 26.6–33)
MCHC RBC AUTO-ENTMCNC: 32.8 G/DL (ref 31.5–35.7)
MCV RBC AUTO: 83 FL (ref 79–97)
PLATELET # BLD AUTO: 308 10*3/MM3 (ref 140–450)
PMV BLD AUTO: 10.5 FL (ref 6–12)
RBC # BLD AUTO: 4.3 10*6/MM3 (ref 3.77–5.28)
TIBC SERPL-MCNC: 337 MCG/DL (ref 298–536)
TRANSFERRIN SERPL-MCNC: 226 MG/DL (ref 200–360)
WBC NRBC COR # BLD: 9.62 10*3/MM3 (ref 3.4–10.8)

## 2022-11-23 PROCEDURE — 99214 OFFICE O/P EST MOD 30 MIN: CPT | Performed by: INTERNAL MEDICINE

## 2022-11-23 PROCEDURE — 83540 ASSAY OF IRON: CPT

## 2022-11-23 PROCEDURE — 84466 ASSAY OF TRANSFERRIN: CPT

## 2022-11-23 PROCEDURE — 85027 COMPLETE CBC AUTOMATED: CPT

## 2022-11-23 PROCEDURE — 82728 ASSAY OF FERRITIN: CPT

## 2022-11-23 PROCEDURE — G0463 HOSPITAL OUTPT CLINIC VISIT: HCPCS | Performed by: INTERNAL MEDICINE

## 2022-11-25 NOTE — PROGRESS NOTES
"Chief Complaint  Follow-up - anemia     Subjective        Jessie Jordan presents to [facility]  History of Present Illness     Jessie Jordan was seen in follow-up for anemia.  She is accompanied by her granddaughter.  She has been struggling with back pain which is a chronic issue for her.  She has numerous mechanical falls over the last few months.  Denies any bleeding.    Objective   Vital Signs:  /62   Pulse 86   Resp 18   Wt 56.2 kg (124 lb)   SpO2 95%   BMI 21.28 kg/m²   Estimated body mass index is 21.28 kg/m² as calculated from the following:    Height as of 10/25/22: 162.6 cm (64\").    Weight as of this encounter: 56.2 kg (124 lb).    BMI is within normal parameters. No other follow-up for BMI required.      Physical Exam  Vitals and nursing note reviewed.   Constitutional:       Appearance: Normal appearance.   Skin:     General: Skin is dry.      Findings: Bruising present.   Neurological:      General: No focal deficit present.      Mental Status: She is alert and oriented to person, place, and time. Mental status is at baseline.   Psychiatric:         Mood and Affect: Mood normal.         Behavior: Behavior normal.         Thought Content: Thought content normal.        Result Review :  The following data was reviewed by: Zaria Cummings MD on 11/23/2022:  Common labs    Common Labs 7/5/22 8/26/22 11/23/22   Glucose 168 (A)     BUN 9     Creatinine 0.77     Sodium 140     Potassium 4.0     Chloride 103     Calcium 9.2     Albumin 4.10     Total Bilirubin 0.3     Alkaline Phosphatase 82     AST (SGOT) 26     ALT (SGPT) 13     WBC  9.06 9.62   Hemoglobin  11.7 (A) 11.7 (A)   Hematocrit  35.4 35.7   Platelets  350 308   (A) Abnormal value            CMP    CMP 7/5/22   Glucose 168 (A)   BUN 9   Creatinine 0.77   Sodium 140   Potassium 4.0   Chloride 103   Calcium 9.2   Albumin 4.10   Total Bilirubin 0.3   Alkaline Phosphatase 82 "   AST (SGOT) 26   ALT (SGPT) 13   (A) Abnormal value            CBC    CBC 4/29/22 8/26/22 11/23/22   WBC 10.12 9.06 9.62   RBC 4.02 4.01 4.30   Hemoglobin 11.8 (A) 11.7 (A) 11.7 (A)   Hematocrit 35.1 35.4 35.7   MCV 87.3 88.3 83.0   MCH 29.4 29.2 27.2   MCHC 33.6 33.1 32.8   RDW 13.2 13.5 14.3   Platelets 325 350 308   (A) Abnormal value            CBC w/diff    CBC w/Diff 4/29/22 8/26/22 11/23/22   WBC 10.12 9.06 9.62   RBC 4.02 4.01 4.30   Hemoglobin 11.8 (A) 11.7 (A) 11.7 (A)   Hematocrit 35.1 35.4 35.7   MCV 87.3 88.3 83.0   MCH 29.4 29.2 27.2   MCHC 33.6 33.1 32.8   RDW 13.2 13.5 14.3   Platelets 325 350 308   (A) Abnormal value            Anemia labs:      Lab 11/23/22  1029   IRON 59   IRON SATURATION 18*   TIBC 337   TRANSFERRIN 226   FERRITIN 106.90       Jessie Jordan reports a pain score of 10.  Given her pain assessment as noted, treatment options were discussed and the following options were decided upon as a follow-up plan to address the patient's pain: referral to Primary Care for assistance in pain treatment guidance.      Patient screened negative for depression based on a PHQ-9 score of 0 on 11/23/2022. .    Advance Care Planning   ACP discussion was declined by the patient. Patient does not have an advance directive, declines further assistance.             Assessment and Plan   Diagnoses and all orders for this visit:    1. Iron deficiency anemia, unspecified iron deficiency anemia type (Primary)  -     CBC (No Diff); Future  -     Ferritin; Future  -     Iron Profile; Future     Chronic, stable.  S/p IV iron treatment.  Hemoglobin and iron studies have improved.  Recommend continue monitoring.  CBC, ferritin, iron profile in 3 months      2. Falls frequently 3. Cerebrovascular accident (CVA), unspecified mechanism (HCC)  -     Ambulatory Referral to Home Health  -     Ambulatory Referral to Home Health    New diagnosis for me.  Patient with frequent falls.  Likely multifactorial secondary  to osteoarthritis, CVA.  We will have physical therapy consulted to assist with gait training.  She is also requesting a prescription of shower chair which was sent to the pharmacy.    Other orders  -     Misc. Devices (Bath/Shower Seat) misc; 1 each Daily.  Dispense: 1 each; Refill: 0             Follow Up   No follow-ups on file.  Patient was given instructions and counseling regarding her condition or for health maintenance advice. Please see specific information pulled into the AVS if appropriate.

## 2022-11-28 ENCOUNTER — HOME CARE VISIT (OUTPATIENT)
Dept: HOME HEALTH SERVICES | Facility: HOME HEALTHCARE | Age: 72
End: 2022-11-28

## 2022-12-02 NOTE — CASE COMMUNICATION
"PT attempted to admit patient to home care services on 11/28/22. Patient reported that she was currently in the process of moving and did note feel that she had time for home care right now. States that she wants to \"wait a few weeks to get through her move then see how she feels.\" PT educated patient on benfits of home care but continues to decline admission to home care at this time. Per patient request, patient was not admitted to [facility] this date. "

## 2023-01-09 ENCOUNTER — LAB (OUTPATIENT)
Dept: LAB | Facility: HOSPITAL | Age: 73
End: 2023-01-09
Payer: MEDICARE

## 2023-01-09 ENCOUNTER — APPOINTMENT (OUTPATIENT)
Dept: LAB | Facility: HOSPITAL | Age: 73
End: 2023-01-09
Payer: MEDICARE

## 2023-01-09 DIAGNOSIS — D50.9 IRON DEFICIENCY ANEMIA, UNSPECIFIED IRON DEFICIENCY ANEMIA TYPE: ICD-10-CM

## 2023-01-09 DIAGNOSIS — M81.0 OSTEOPOROSIS, POST-MENOPAUSAL: ICD-10-CM

## 2023-01-09 DIAGNOSIS — K90.9 IRON MALABSORPTION: ICD-10-CM

## 2023-01-09 LAB
ALBUMIN SERPL-MCNC: 4.2 G/DL (ref 3.5–5.2)
ALBUMIN/GLOB SERPL: 1.5 G/DL
ALP SERPL-CCNC: 60 U/L (ref 39–117)
ALT SERPL W P-5'-P-CCNC: 9 U/L (ref 1–33)
ANION GAP SERPL CALCULATED.3IONS-SCNC: 7 MMOL/L (ref 5–15)
AST SERPL-CCNC: 16 U/L (ref 1–32)
BILIRUB SERPL-MCNC: 0.2 MG/DL (ref 0–1.2)
BUN SERPL-MCNC: 19 MG/DL (ref 8–23)
BUN/CREAT SERPL: 26.4 (ref 7–25)
CALCIUM SPEC-SCNC: 9.6 MG/DL (ref 8.6–10.5)
CHLORIDE SERPL-SCNC: 101 MMOL/L (ref 98–107)
CO2 SERPL-SCNC: 27 MMOL/L (ref 22–29)
CREAT SERPL-MCNC: 0.72 MG/DL (ref 0.57–1)
EGFRCR SERPLBLD CKD-EPI 2021: 89 ML/MIN/1.73
GLOBULIN UR ELPH-MCNC: 2.8 GM/DL
GLUCOSE SERPL-MCNC: 93 MG/DL (ref 65–99)
HOLD SPECIMEN: NORMAL
MAGNESIUM SERPL-MCNC: 1.9 MG/DL (ref 1.6–2.4)
PHOSPHATE SERPL-MCNC: 3.6 MG/DL (ref 2.5–4.5)
POTASSIUM SERPL-SCNC: 4.5 MMOL/L (ref 3.5–5.2)
PROT SERPL-MCNC: 7 G/DL (ref 6–8.5)
SODIUM SERPL-SCNC: 135 MMOL/L (ref 136–145)

## 2023-01-09 PROCEDURE — 36415 COLL VENOUS BLD VENIPUNCTURE: CPT

## 2023-01-09 PROCEDURE — 84100 ASSAY OF PHOSPHORUS: CPT

## 2023-01-09 PROCEDURE — 80053 COMPREHEN METABOLIC PANEL: CPT

## 2023-01-09 PROCEDURE — 83735 ASSAY OF MAGNESIUM: CPT

## 2023-01-16 ENCOUNTER — INFUSION (OUTPATIENT)
Dept: ONCOLOGY | Facility: HOSPITAL | Age: 73
End: 2023-01-16
Payer: MEDICARE

## 2023-01-16 VITALS
RESPIRATION RATE: 20 BRPM | DIASTOLIC BLOOD PRESSURE: 77 MMHG | OXYGEN SATURATION: 96 % | SYSTOLIC BLOOD PRESSURE: 180 MMHG | HEART RATE: 53 BPM | TEMPERATURE: 97.7 F

## 2023-01-16 DIAGNOSIS — M81.0 OSTEOPOROSIS, POST-MENOPAUSAL: Primary | ICD-10-CM

## 2023-01-16 DIAGNOSIS — D50.9 IRON DEFICIENCY ANEMIA, UNSPECIFIED IRON DEFICIENCY ANEMIA TYPE: ICD-10-CM

## 2023-01-16 DIAGNOSIS — K90.9 IRON MALABSORPTION: ICD-10-CM

## 2023-01-16 PROCEDURE — 96372 THER/PROPH/DIAG INJ SC/IM: CPT | Performed by: NURSE PRACTITIONER

## 2023-01-16 PROCEDURE — 25010000002 DENOSUMAB 60 MG/ML SOLUTION PREFILLED SYRINGE: Performed by: NURSE PRACTITIONER

## 2023-01-16 RX ADMIN — DENOSUMAB 60 MG: 60 INJECTION SUBCUTANEOUS at 13:33

## 2023-02-03 DIAGNOSIS — I10 ESSENTIAL HYPERTENSION: ICD-10-CM

## 2023-02-03 DIAGNOSIS — F17.218 NICOTINE DEPENDENCE, CIGARETTES, WITH OTHER NICOTINE-INDUCED DISORDERS: ICD-10-CM

## 2023-02-08 RX ORDER — LISINOPRIL 10 MG/1
TABLET ORAL
Qty: 90 TABLET | Refills: 0 | Status: SHIPPED | OUTPATIENT
Start: 2023-02-08

## 2023-02-08 RX ORDER — VARENICLINE TARTRATE 1 MG/1
1 TABLET, FILM COATED ORAL 2 TIMES DAILY
Qty: 56 TABLET | Refills: 0 | Status: SHIPPED | OUTPATIENT
Start: 2023-02-08 | End: 2023-03-20

## 2023-02-22 ENCOUNTER — LAB (OUTPATIENT)
Dept: ONCOLOGY | Facility: HOSPITAL | Age: 73
End: 2023-02-22
Payer: MEDICARE

## 2023-02-22 ENCOUNTER — OFFICE VISIT (OUTPATIENT)
Dept: ONCOLOGY | Facility: CLINIC | Age: 73
End: 2023-02-22
Payer: MEDICARE

## 2023-02-22 VITALS
DIASTOLIC BLOOD PRESSURE: 53 MMHG | WEIGHT: 126.8 LBS | OXYGEN SATURATION: 99 % | HEART RATE: 62 BPM | SYSTOLIC BLOOD PRESSURE: 132 MMHG | RESPIRATION RATE: 20 BRPM | BODY MASS INDEX: 21.77 KG/M2

## 2023-02-22 DIAGNOSIS — D50.0 IRON DEFICIENCY ANEMIA DUE TO CHRONIC BLOOD LOSS: Primary | ICD-10-CM

## 2023-02-22 DIAGNOSIS — D50.9 IRON DEFICIENCY ANEMIA, UNSPECIFIED IRON DEFICIENCY ANEMIA TYPE: ICD-10-CM

## 2023-02-22 DIAGNOSIS — K90.9 IRON MALABSORPTION: ICD-10-CM

## 2023-02-22 LAB
DEPRECATED RDW RBC AUTO: 43.7 FL (ref 37–54)
ERYTHROCYTE [DISTWIDTH] IN BLOOD BY AUTOMATED COUNT: 14.4 % (ref 12.3–15.4)
FERRITIN SERPL-MCNC: 64.93 NG/ML (ref 13–150)
HCT VFR BLD AUTO: 36.3 % (ref 34–46.6)
HGB BLD-MCNC: 11.8 G/DL (ref 12–15.9)
HOLD SPECIMEN: NORMAL
IRON 24H UR-MRATE: 54 MCG/DL (ref 37–145)
IRON SATN MFR SERPL: 16 % (ref 20–50)
MCH RBC QN AUTO: 27.3 PG (ref 26.6–33)
MCHC RBC AUTO-ENTMCNC: 32.5 G/DL (ref 31.5–35.7)
MCV RBC AUTO: 83.8 FL (ref 79–97)
PLATELET # BLD AUTO: 326 10*3/MM3 (ref 140–450)
PMV BLD AUTO: 10.3 FL (ref 6–12)
RBC # BLD AUTO: 4.33 10*6/MM3 (ref 3.77–5.28)
TIBC SERPL-MCNC: 347 MCG/DL (ref 298–536)
TRANSFERRIN SERPL-MCNC: 233 MG/DL (ref 200–360)
WBC NRBC COR # BLD: 7.83 10*3/MM3 (ref 3.4–10.8)

## 2023-02-22 PROCEDURE — 99213 OFFICE O/P EST LOW 20 MIN: CPT | Performed by: INTERNAL MEDICINE

## 2023-02-22 PROCEDURE — 85027 COMPLETE CBC AUTOMATED: CPT

## 2023-02-22 PROCEDURE — 82728 ASSAY OF FERRITIN: CPT

## 2023-02-22 PROCEDURE — 83540 ASSAY OF IRON: CPT

## 2023-02-22 PROCEDURE — G0463 HOSPITAL OUTPT CLINIC VISIT: HCPCS | Performed by: INTERNAL MEDICINE

## 2023-02-22 PROCEDURE — 84466 ASSAY OF TRANSFERRIN: CPT

## 2023-03-20 DIAGNOSIS — F17.218 NICOTINE DEPENDENCE, CIGARETTES, WITH OTHER NICOTINE-INDUCED DISORDERS: ICD-10-CM

## 2023-03-20 RX ORDER — VARENICLINE TARTRATE 1 MG/1
TABLET, FILM COATED ORAL
Qty: 56 TABLET | Refills: 0 | Status: SHIPPED | OUTPATIENT
Start: 2023-03-20

## 2023-04-12 ENCOUNTER — APPOINTMENT (OUTPATIENT)
Dept: GENERAL RADIOLOGY | Facility: HOSPITAL | Age: 73
End: 2023-04-12
Payer: MEDICARE

## 2023-04-12 ENCOUNTER — HOSPITAL ENCOUNTER (EMERGENCY)
Facility: HOSPITAL | Age: 73
Discharge: ANOTHER HOSPITAL | End: 2023-04-12
Attending: EMERGENCY MEDICINE | Admitting: FAMILY MEDICINE
Payer: MEDICARE

## 2023-04-12 ENCOUNTER — APPOINTMENT (OUTPATIENT)
Dept: CT IMAGING | Facility: HOSPITAL | Age: 73
End: 2023-04-12
Payer: MEDICARE

## 2023-04-12 VITALS
DIASTOLIC BLOOD PRESSURE: 96 MMHG | TEMPERATURE: 98 F | HEART RATE: 56 BPM | RESPIRATION RATE: 18 BRPM | OXYGEN SATURATION: 97 % | BODY MASS INDEX: 21.51 KG/M2 | WEIGHT: 126 LBS | HEIGHT: 64 IN | SYSTOLIC BLOOD PRESSURE: 197 MMHG

## 2023-04-12 DIAGNOSIS — S12.691A OTHER CLOSED NONDISPLACED FRACTURE OF SEVENTH CERVICAL VERTEBRA, INITIAL ENCOUNTER: ICD-10-CM

## 2023-04-12 DIAGNOSIS — Z98.1 HISTORY OF FUSION OF CERVICAL SPINE: ICD-10-CM

## 2023-04-12 DIAGNOSIS — R20.2 PARESTHESIA OF HAND, BILATERAL: ICD-10-CM
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
## 2023-04-12 DIAGNOSIS — S12.591A OTHER CLOSED NONDISPLACED FRACTURE OF SIXTH CERVICAL VERTEBRA, INITIAL ENCOUNTER: Primary | ICD-10-CM

## 2023-04-12 PROCEDURE — 72128 CT CHEST SPINE W/O DYE: CPT

## 2023-04-12 PROCEDURE — 96374 THER/PROPH/DIAG INJ IV PUSH: CPT

## 2023-04-12 PROCEDURE — 25010000002 HYDRALAZINE PER 20 MG

## 2023-04-12 PROCEDURE — 72125 CT NECK SPINE W/O DYE: CPT

## 2023-04-12 PROCEDURE — 73030 X-RAY EXAM OF SHOULDER: CPT

## 2023-04-12 PROCEDURE — 99284 EMERGENCY DEPT VISIT MOD MDM: CPT

## 2023-04-12 RX ORDER — HYDRALAZINE HYDROCHLORIDE 20 MG/ML
10 INJECTION INTRAMUSCULAR; INTRAVENOUS ONCE
Status: COMPLETED | OUTPATIENT
Start: 2023-04-12 | End: 2023-04-12

## 2023-04-12 RX ORDER — HYDROCODONE BITARTRATE AND ACETAMINOPHEN 10; 325 MG/1; MG/1
1 TABLET ORAL ONCE
Status: COMPLETED | OUTPATIENT
Start: 2023-04-12 | End: 2023-04-12

## 2023-04-12 RX ORDER — SODIUM CHLORIDE 0.9 % (FLUSH) 0.9 %
10 SYRINGE (ML) INJECTION AS NEEDED
Status: DISCONTINUED | OUTPATIENT
Start: 2023-04-12 | End: 2023-04-12 | Stop reason: HOSPADM

## 2023-04-12 RX ADMIN — HYDROCODONE BITARTRATE AND ACETAMINOPHEN 1 TABLET: 10; 325 TABLET ORAL at 14:50

## 2023-04-12 RX ADMIN — HYDRALAZINE HYDROCHLORIDE 10 MG: 20 INJECTION INTRAMUSCULAR; INTRAVENOUS at 16:01

## 2023-04-12 NOTE — ED TRIAGE NOTES
Patient sent here by [facility] for CT scan of neck. Pt reports she fell on Tuesday and pain is 10/10. Pt reports hx of neck fracture. Patient had x-rays at [facility] and was told to come here for further imagining.

## 2023-04-12 NOTE — ED PROVIDER NOTES
"Subjective   History of Present Illness  72 year old female patient presents to ER for complaint of pain in her neck, upper back, and right shoulder since falling last Tuesday. She reports that her knees \"went out\" causing her to fall into a doorframe and then hit her head on her dryer. No LOC. She is a pain management patient and has been taking her home Norco 10s and ran out yesterday morning. She is not due for refill until the 19th. She was seen at  today and was sent in for further evaluation. She has a past history of surgery on her cervical spine.         Review of Systems   Constitutional: Negative.    HENT: Negative.    Eyes: Negative.    Respiratory: Negative.    Cardiovascular: Negative.    Gastrointestinal: Negative.    Endocrine: Negative.    Genitourinary: Negative.    Musculoskeletal: Positive for back pain and neck pain.   Skin: Negative.    Allergic/Immunologic: Negative.    Neurological: Negative.    Hematological: Negative.    Psychiatric/Behavioral: Negative.        Past Medical History:   Diagnosis Date   • Anxiety and depression    • Coronary artery disease involving native coronary artery of native heart 12/06/2017    seen on Cardiac Cath - Left main 50-70% stenosis, 12/27/2017 repeat cath showed coronary spasm with no stenosis   • Hyperlipidemia    • Hypertension    • Kidney cysts    • Post-menopausal 1990   • Skin cancer    • Stroke 2015   • Vascular disease        No Known Allergies    Past Surgical History:   Procedure Laterality Date   • ARTERIOGRAM N/A 9/28/2018    Procedure: aortoiliac arteriogram possible angioplasty stent atherectomy thrombolysis bilateral iliac stents;  Surgeon: Luan Ruff MD;  Location: [facility] ANGIO INVASIVE LOCATION;  Service: Interventional Radiology   • CARDIAC CATHETERIZATION  12/06/2017    Done at [facility] - Left Main 50-70% Stenosis - no stenting done, recommendation was urgent CABG.  EF 50-60%   • CERVICAL FUSION  1985    C5-6 "   • COLONOSCOPY N/A 2018    Procedure: COLONOSCOPY;  Surgeon: Oz Way MD;  Location: [facility] ENDOSCOPY;  Service: Gastroenterology   • COLONOSCOPY N/A 2020    Procedure: COLONOSCOPY;  Surgeon: Oz Way MD;  Location: [facility] ENDOSCOPY;  Service: Gastroenterology;  Laterality: N/A;   • ENDOSCOPY N/A 2020    Procedure: ESOPHAGOGASTRODUODENOSCOPY ASAP;  Surgeon: Oz Way MD;  Location: [facility] ENDOSCOPY;  Service: Gastroenterology;  Laterality: N/A;   • ENDOSCOPY N/A 2021    Procedure: ESOPHAGOGASTRODUODENOSCOPY possible dilation;  Surgeon: Oz Way MD;  Location: [facility] ENDOSCOPY;  Service: Gastroenterology;  Laterality: N/A;   • FEMORAL POPLITEAL BYPASS Right 2018    Procedure: femoral to femoral artery bypass, RIGHT FEMORAL POPLITEAL BYPASS right lower extremity arteriogram          (C-ARM# AND C-ARM TABLE);  Surgeon: Luan Ruff MD;  Location: [facility] OR;  Service: Vascular   • FINGER SURGERY Left     third finger   • ROTATOR CUFF REPAIR Left 2011    done in [place]       Family History   Problem Relation Age of Onset   • Lung cancer Mother    • Hypertension Mother    • Diabetes Maternal Grandmother    • Heart disease Maternal Grandmother    • Hypertension Maternal Grandfather    • Heart disease Maternal Grandfather    • Heart disease Other    • Hypertension Other    • Cancer Other        Social History     Socioeconomic History   • Marital status:    • Number of children: 1   Tobacco Use   • Smoking status: Light Smoker     Packs/day: 0.25     Years: 48.00     Pack years: 12.00     Types: Cigarettes     Last attempt to quit: 2018     Years since quittin.3   • Smokeless tobacco: Never   • Tobacco comments:     1-2cig/day   Vaping Use   • Vaping Use: Never used   Substance and Sexual Activity   • Alcohol use: Yes     Comment: occasional beer    • Drug use: No   • Sexual activity: Defer     Comment: .           Objective    Physical Exam  Vitals and nursing note reviewed.   Constitutional:       General: She is not in acute distress.     Appearance: Normal appearance. She is not ill-appearing, toxic-appearing or diaphoretic.   HENT:      Head: Normocephalic.      Nose: Nose normal.      Mouth/Throat:      Mouth: Mucous membranes are moist.   Eyes:      Pupils: Pupils are equal, round, and reactive to light.   Cardiovascular:      Rate and Rhythm: Normal rate and regular rhythm.      Pulses: Normal pulses.      Heart sounds: Normal heart sounds.   Pulmonary:      Effort: Pulmonary effort is normal.      Breath sounds: Normal breath sounds.   Abdominal:      General: Bowel sounds are normal.      Palpations: Abdomen is soft.   Musculoskeletal:         General: Tenderness present. No swelling or deformity. Normal range of motion.      Cervical back: Normal range of motion.      Comments: Spinal tenderness appreciated in thoracic region. No swelling, deformity, or stepoffs appreciated in cervical or thoracic regions. Motor and pulses intact in upper extremities. paresthesias present in right upper extremity.   Skin:     General: Skin is warm and dry.      Capillary Refill: Capillary refill takes less than 2 seconds.   Neurological:      Mental Status: She is alert and oriented to person, place, and time.   Psychiatric:         Mood and Affect: Mood normal.         Behavior: Behavior normal.         Thought Content: Thought content normal.         Judgment: Judgment normal.         Procedures           ED Course  ED Course as of 04/12/23 1544   Wed Apr 12, 2023   1531 Spoke with patient about her results and plan for transfer. She reports that her cervical fusion was performed at [facility] by a  with neurosurgery. [HS]   1539 Spoke with Dr. Arriaza at [facility] who is agreeable with transfer [HS]      ED Course User Index  [HS] Chayo Noble, APRN          XR Shoulder 2+ View Right    Result Date:  4/12/2023  FINDINGS/    Three (3) views of the right shoulder show moderate degenerative change of the AC joint.  There is some mild degenerative irregularity of the glenoid as well. Otherwise negative.  No acute abnormality.    CT Cervical Spine Without Contrast    Result Date: 4/12/2023  COMPARISON: No comparison. HISTORY: Fall, pain, history of surgical repair. TECHNIQUE: Axial images from the base of the skull through the thoracic inlet were performed followed by 2-D multiplanar reformats. FINDINGS: There is diffuse loss of bone mineral density.  There is fusion of the posterior elements throughout much of the cervical spine, particularly from C4 through C7 or T1.  There are cerclage wires in the posterior elements throughout these levels.  However, there appear to be acute fractures through the posterior elements/facets and/or lamina on the right at the level of C6 and bilaterally at the level of C7 or C6-C7.  These are seen on series 13, images 52, 60, 66, and 72.  There is mild distraction of the fracture. There is anterolisthesis of C5 on C6 which is borderline grade I/grade II anterolisthesis.  This is of uncertain acuity, although may be chronic as there appears to be some degree of ankylosis or partial fusion of the vertebral bodies at this level.  However, a subtle corner endplate fracture is not excluded as there is bone mineral density loss, particularly along the posterior aspects of the vertebral bodies at these levels. There is mild height loss of the C7 vertebral body representing an age-indeterminate compression deformity. Scarring in the lung apices.  No gross soft tissue abnormality is identified. The prevertebral soft tissues are unremarkable.     1.  There are acute-appearing fractures of the posterior elements, likely involving the facets, lamina, or a combination thereof.  These are seen at the level of C6, C6-C7, and C7 and are described in further detail above.  There is some minimal  distraction of the fracture fragments.  There has been prior posterior fusion of the posterior elements with multilevel cerclage wires. 2.  Anterolisthesis of C5 on C6 which is of uncertain acuity.  This is described above. 3.  Age-indeterminate compression deformity of C7 vertebral body with mild height loss.     CT Thoracic Spine Without Contrast    Result Date: 4/12/2023  HISTORY: Back pain after fall. COMPARISON: None. TECHNIQUE: Axial images from the thoracic inlet through the level of the diaphragms were performed followed by 2D multiplanar reformats. FINDINGS: Curvature and alignment of the thoracic spine are normal. Vertebral body heights are maintained. There is no paravertebral soft tissue swelling. There is no fracture or traumatic subluxation.     No thoracic spine injury identified.                                      MDM    Final diagnoses:   Other closed nondisplaced fracture of sixth cervical vertebra, initial encounter   Other closed nondisplaced fracture of seventh cervical vertebra, initial encounter   History of fusion of cervical spine   Paresthesia of hand, bilateral   Fall, initial encounter       ED Disposition  ED Disposition     ED Disposition   Transfer to Another Facility     Condition   --    Comment   --             No follow-up provider specified.       Medication List      No changes were made to your prescriptions during this visit.          Chayo Noble, APRN  04/12/23 1544

## 2023-04-14 ENCOUNTER — TELEPHONE (OUTPATIENT)
Dept: FAMILY MEDICINE CLINIC | Facility: CLINIC | Age: 73
End: 2023-04-14
Payer: MEDICARE

## 2023-04-14 NOTE — TELEPHONE ENCOUNTER
Sangita from [facility] home health wants to know if Dr Fenton will follow this patient for home health.    Her#[phone]

## 2023-04-18 ENCOUNTER — OFFICE VISIT (OUTPATIENT)
Dept: FAMILY MEDICINE CLINIC | Facility: CLINIC | Age: 73
End: 2023-04-18
Payer: MEDICARE

## 2023-04-18 DIAGNOSIS — M81.0 OSTEOPOROSIS WITHOUT CURRENT PATHOLOGICAL FRACTURE, UNSPECIFIED OSTEOPOROSIS TYPE: ICD-10-CM

## 2023-04-18 DIAGNOSIS — F17.218 NICOTINE DEPENDENCE, CIGARETTES, WITH OTHER NICOTINE-INDUCED DISORDERS: ICD-10-CM

## 2023-04-18 DIAGNOSIS — I10 ESSENTIAL HYPERTENSION: ICD-10-CM

## 2023-04-18 DIAGNOSIS — Z13.220 SCREENING FOR HYPERLIPIDEMIA: ICD-10-CM

## 2023-04-18 DIAGNOSIS — M81.0 OSTEOPOROSIS, POST-MENOPAUSAL: ICD-10-CM

## 2023-04-18 DIAGNOSIS — M89.8X9 BONE PAIN: ICD-10-CM

## 2023-04-18 DIAGNOSIS — D50.9 IRON DEFICIENCY ANEMIA, UNSPECIFIED IRON DEFICIENCY ANEMIA TYPE: ICD-10-CM

## 2023-04-18 RX ORDER — METHOCARBAMOL 500 MG/1
500 TABLET, FILM COATED ORAL 3 TIMES DAILY
Qty: 90 TABLET | Refills: 0 | Status: SHIPPED | OUTPATIENT
Start: 2023-04-18 | End: 2023-05-18

## 2023-04-18 RX ORDER — PANTOPRAZOLE SODIUM 40 MG/1
40 TABLET, DELAYED RELEASE ORAL DAILY
Qty: 90 TABLET | Refills: 1 | Status: SHIPPED | OUTPATIENT
Start: 2023-04-18

## 2023-04-18 RX ORDER — FERROUS SULFATE 325(65) MG
1 TABLET ORAL
Qty: 90 TABLET | Refills: 1 | Status: SHIPPED | OUTPATIENT
Start: 2023-04-18

## 2023-04-18 RX ORDER — ERGOCALCIFEROL 1.25 MG/1
50000 CAPSULE ORAL
Qty: 12 CAPSULE | Refills: 1 | Status: SHIPPED | OUTPATIENT
Start: 2023-04-18

## 2023-04-18 RX ORDER — FLUOXETINE HYDROCHLORIDE 20 MG/1
20 CAPSULE ORAL DAILY
Qty: 90 CAPSULE | Refills: 3 | Status: SHIPPED | OUTPATIENT
Start: 2023-04-18

## 2023-04-18 RX ORDER — LISINOPRIL 10 MG/1
10 TABLET ORAL DAILY
Qty: 90 TABLET | Refills: 1 | Status: SHIPPED | OUTPATIENT
Start: 2023-04-18

## 2023-04-18 RX ORDER — ATORVASTATIN CALCIUM 10 MG/1
10 TABLET, FILM COATED ORAL DAILY
Qty: 90 TABLET | Refills: 1 | Status: SHIPPED | OUTPATIENT
Start: 2023-04-18

## 2023-04-18 RX ORDER — LIDOCAINE 50 MG/G
1 PATCH TOPICAL EVERY 24 HOURS
Qty: 6 EACH | Refills: 0 | Status: SHIPPED | OUTPATIENT
Start: 2023-04-18

## 2023-04-18 RX ORDER — METHOCARBAMOL 500 MG/1
TABLET, FILM COATED ORAL
Status: CANCELLED | OUTPATIENT
Start: 2023-04-18

## 2023-04-18 RX ORDER — METHOCARBAMOL 500 MG/1
TABLET, FILM COATED ORAL
COMMUNITY
Start: 2023-04-16 | End: 2023-04-18 | Stop reason: SDUPTHER

## 2023-04-18 RX ORDER — CLOPIDOGREL BISULFATE 75 MG/1
75 TABLET ORAL DAILY
Qty: 90 TABLET | Refills: 3 | Status: SHIPPED | OUTPATIENT
Start: 2023-04-18

## 2023-04-18 RX ORDER — VARENICLINE TARTRATE 1 MG/1
1 TABLET, FILM COATED ORAL 2 TIMES DAILY
Qty: 56 TABLET | Refills: 0 | Status: SHIPPED | OUTPATIENT
Start: 2023-04-18

## 2023-04-18 RX ORDER — CALCITONIN SALMON 200 [IU]/.09ML
1 SPRAY, METERED NASAL DAILY
Qty: 2.7 ML | Refills: 0 | Status: SHIPPED | OUTPATIENT
Start: 2023-04-18 | End: 2023-05-18

## 2023-04-18 RX ORDER — FOLIC ACID 1 MG/1
1000 TABLET ORAL DAILY
Qty: 90 TABLET | Refills: 1 | Status: SHIPPED | OUTPATIENT
Start: 2023-04-18

## 2023-04-18 NOTE — PROGRESS NOTES
Family Medicine Residency  Elsa Fenton MD    Subjective:         You have chosen to receive care through a telephone visit. Do you consent to use a telephone visit for your medical care today? Yes. She is at home in her recliner     Jessie Jordan is a 72 y.o. female who presents for  medication refills.     She would like a refill on the majority of her medications.  She is tolerating them without any negative side effects.  Medical conditions are stable on these medications.  She has no other acute concerns today.       The following portions of the patient's history were reviewed and updated as appropriate: past family history, past medical history, past social history and past surgical history.    Past Medical Hx:  Past Medical History:   Diagnosis Date   • Anxiety and depression    • Coronary artery disease involving native coronary artery of native heart 12/06/2017    seen on Cardiac Cath - Left main 50-70% stenosis, 12/27/2017 repeat cath showed coronary spasm with no stenosis   • Hyperlipidemia    • Hypertension    • Kidney cysts    • Post-menopausal 1990   • Skin cancer    • Stroke 2015   • Vascular disease        Past Surgical Hx:  Past Surgical History:   Procedure Laterality Date   • ARTERIOGRAM N/A 9/28/2018    Procedure: aortoiliac arteriogram possible angioplasty stent atherectomy thrombolysis bilateral iliac stents;  Surgeon: Luan Ruff MD;  Location: [facility] ANGIO INVASIVE LOCATION;  Service: Interventional Radiology   • CARDIAC CATHETERIZATION  12/06/2017    Done at [facility] - Left Main 50-70% Stenosis - no stenting done, recommendation was urgent CABG.  EF 50-60%   • CERVICAL FUSION  1985    C5-6   • COLONOSCOPY N/A 6/29/2018    Procedure: COLONOSCOPY;  Surgeon: Oz Way MD;  Location: [facility] ENDOSCOPY;  Service: Gastroenterology   • COLONOSCOPY N/A 7/6/2020    Procedure: COLONOSCOPY;  Surgeon: Oz Way MD;  Location: [facility] ENDOSCOPY;  Service:  Gastroenterology;  Laterality: N/A;   • ENDOSCOPY N/A 7/6/2020    Procedure: ESOPHAGOGASTRODUODENOSCOPY ASAP;  Surgeon: Oz Way MD;  Location: [facility] ENDOSCOPY;  Service: Gastroenterology;  Laterality: N/A;   • ENDOSCOPY N/A 8/17/2021    Procedure: ESOPHAGOGASTRODUODENOSCOPY possible dilation;  Surgeon: Oz Way MD;  Location: [facility] ENDOSCOPY;  Service: Gastroenterology;  Laterality: N/A;   • FEMORAL POPLITEAL BYPASS Right 12/19/2018    Procedure: femoral to femoral artery bypass, RIGHT FEMORAL POPLITEAL BYPASS right lower extremity arteriogram          (C-ARM# AND C-ARM TABLE);  Surgeon: Luan Ruff MD;  Location: [facility] OR;  Service: Vascular   • FINGER SURGERY Left     third finger   • ROTATOR CUFF REPAIR Left 06/30/2011    done in [place]       Current Meds:    Current Outpatient Medications:   •  atorvastatin (LIPITOR) 10 MG tablet, Take 1 tablet by mouth Daily., Disp: 90 tablet, Rfl: 1  •  calcitonin, salmon, (Miacalcin) 200 UNIT/ACT nasal spray, 1 spray by Alternating Nares route Daily for 30 days., Disp: 2.7 mL, Rfl: 0  •  Calcium Carbonate-Vitamin D (calcium-vitamin D) 500-200 MG-UNIT tablet per tablet, Take 1 tablet by mouth Daily., Disp: 30 tablet, Rfl: 11  •  carbamide peroxide (Debrox) 6.5 % otic solution, Administer 5 drops into both ears 2 (Two) Times a Day., Disp: 15 mL, Rfl: 3  •  clopidogrel (PLAVIX) 75 MG tablet, Take 1 tablet by mouth Daily., Disp: 90 tablet, Rfl: 3  •  Diclofenac Sodium (VOLTAREN) 1 % gel gel, Apply 4 g topically to the appropriate area as directed 4 (Four) Times a Day As Needed (pain)., Disp: 100 g, Rfl: 3  •  ferrous sulfate 325 (65 FE) MG tablet, Take 1 tablet by mouth Daily With Breakfast., Disp: 90 tablet, Rfl: 1  •  FLUoxetine (PROzac) 20 MG capsule, Take 1 capsule by mouth Daily., Disp: 90 capsule, Rfl: 3  •  fluticasone (FLONASE) 50 MCG/ACT nasal spray, 2 sprays by Each Nare route Daily., Disp: 48 mL, Rfl: 3  •  folic acid (FOLVITE) 1 MG tablet,  Take 1 tablet by mouth Daily., Disp: 90 tablet, Rfl: 1  •  gabapentin (NEURONTIN) 300 MG capsule, , Disp: , Rfl:   •  HYDROcodone-acetaminophen (NORCO)  MG per tablet, Take 1 tablet by mouth Every 6 (Six) Hours As Needed., Disp: , Rfl:   •  lisinopril (PRINIVIL,ZESTRIL) 10 MG tablet, Take 1 tablet by mouth Daily., Disp: 90 tablet, Rfl: 1  •  Melatonin 5 MG capsule, Take 5 mg by mouth Every Night., Disp: 30 each, Rfl: 3  •  methocarbamol (ROBAXIN) 500 MG tablet, Take 1 tablet by mouth 3 (Three) Times a Day for 30 days., Disp: 90 tablet, Rfl: 0  •  pantoprazole (PROTONIX) 40 MG EC tablet, Take 1 tablet by mouth Daily., Disp: 90 tablet, Rfl: 1  •  varenicline (CHANTIX) 1 MG tablet, Take 1 tablet by mouth 2 (Two) Times a Day., Disp: 56 tablet, Rfl: 0  •  vitamin D (ERGOCALCIFEROL) 1.25 MG (15671 UT) capsule capsule, Take 1 capsule by mouth Every 7 (Seven) Days., Disp: 12 capsule, Rfl: 1  •  lidocaine (LIDODERM) 5 %, Place 1 patch on the skin as directed by provider Daily. Remove & Discard patch within 12 hours or as directed by MD, Disp: 6 each, Rfl: 0    Allergies:  No Known Allergies    Family Hx:  Family History   Problem Relation Age of Onset   • Lung cancer Mother    • Hypertension Mother    • Diabetes Maternal Grandmother    • Heart disease Maternal Grandmother    • Hypertension Maternal Grandfather    • Heart disease Maternal Grandfather    • Heart disease Other    • Hypertension Other    • Cancer Other         Social History:  Social History     Socioeconomic History   • Marital status:    • Number of children: 1   Tobacco Use   • Smoking status: Light Smoker     Packs/day: 0.25     Years: 48.00     Pack years: 12.00     Types: Cigarettes     Last attempt to quit: 2018     Years since quittin.3   • Smokeless tobacco: Never   • Tobacco comments:     1-2cig/day   Vaping Use   • Vaping Use: Never used   Substance and Sexual Activity   • Alcohol use: Yes     Comment: occasional beer    • Drug  use: No   • Sexual activity: Defer     Comment: .       Review of Systems  Review of Systems   Constitutional: Negative.    Respiratory: Negative.    Cardiovascular: Negative.    Gastrointestinal: Negative.    Endocrine: Negative.    Musculoskeletal: Negative for arthralgias and myalgias.   Skin: Negative.    Neurological: Negative.  Negative for headaches.   Psychiatric/Behavioral: Negative.  Negative for suicidal ideas.       Objective:     There were no vitals taken for this visit.  Tele visit    Assessment/Plan:     Diagnoses and all orders for this visit:    1. Screening for hyperlipidemia  -     atorvastatin (LIPITOR) 10 MG tablet; Take 1 tablet by mouth Daily.  Dispense: 90 tablet; Refill: 1    2. Osteoporosis without current pathological fracture, unspecified osteoporosis type  -     calcitonin, salmon, (Miacalcin) 200 UNIT/ACT nasal spray; 1 spray by Alternating Nares route Daily for 30 days.  Dispense: 2.7 mL; Refill: 0    3. Bone pain  -     calcitonin, salmon, (Miacalcin) 200 UNIT/ACT nasal spray; 1 spray by Alternating Nares route Daily for 30 days.  Dispense: 2.7 mL; Refill: 0    4. Iron deficiency anemia, unspecified iron deficiency anemia type  -     ferrous sulfate 325 (65 FE) MG tablet; Take 1 tablet by mouth Daily With Breakfast.  Dispense: 90 tablet; Refill: 1  -     folic acid (FOLVITE) 1 MG tablet; Take 1 tablet by mouth Daily.  Dispense: 90 tablet; Refill: 1    5. Essential hypertension  -     lisinopril (PRINIVIL,ZESTRIL) 10 MG tablet; Take 1 tablet by mouth Daily.  Dispense: 90 tablet; Refill: 1    6. Osteoporosis, post-menopausal  -     vitamin D (ERGOCALCIFEROL) 1.25 MG (09454 UT) capsule capsule; Take 1 capsule by mouth Every 7 (Seven) Days.  Dispense: 12 capsule; Refill: 1    7. Nicotine dependence, cigarettes, with other nicotine-induced disorders  -     varenicline (CHANTIX) 1 MG tablet; Take 1 tablet by mouth 2 (Two) Times a Day.  Dispense: 56 tablet; Refill: 0    Other  orders  -     pantoprazole (PROTONIX) 40 MG EC tablet; Take 1 tablet by mouth Daily.  Dispense: 90 tablet; Refill: 1  -     FLUoxetine (PROzac) 20 MG capsule; Take 1 capsule by mouth Daily.  Dispense: 90 capsule; Refill: 3  -     clopidogrel (PLAVIX) 75 MG tablet; Take 1 tablet by mouth Daily.  Dispense: 90 tablet; Refill: 3  -     methocarbamol (ROBAXIN) 500 MG tablet; Take 1 tablet by mouth 3 (Three) Times a Day for 30 days.  Dispense: 90 tablet; Refill: 0  -     lidocaine (LIDODERM) 5 %; Place 1 patch on the skin as directed by provider Daily. Remove & Discard patch within 12 hours or as directed by MD  Dispense: 6 each; Refill: 0      Refilled all of her chronic medications.  No changes made to any of the medications as she is tolerating them well and her medical conditions are stable on these medications.  All questions were addressed.       · Rx changes: see a/p  · Patient Education: see a/p       Follow-up:     Return in about 6 months (around 10/18/2023).    Preventative:  Health Maintenance   Topic Date Due   • LIPID PANEL  06/22/2021   • ZOSTER VACCINE (2 of 2) 05/17/2023   • ANNUAL WELLNESS VISIT  05/19/2023   • INFLUENZA VACCINE  08/01/2023   • MAMMOGRAM  06/10/2024   • DXA SCAN  06/10/2024   • COLORECTAL CANCER SCREENING  07/06/2025   • TDAP/TD VACCINES (2 - Td or Tdap) 03/22/2033   • HEPATITIS C SCREENING  Completed   • COVID-19 Vaccine  Completed   • Pneumococcal Vaccine 65+  Completed         Alcohol use:  reports current alcohol use.  Nicotine status  reports that she has been smoking cigarettes. She has a 12.00 pack-year smoking history. She has never used smokeless tobacco.     Goals     •  Pain control       Barriers: severe PAD, pt continues to smoke      •  Quit smoking / using tobacco (pt-stated)       Barriers: compliance with cessation medications            RISK SCORE: 3           Elsa Fenton M.D. PGY 2  [facility] Family Medicine [facility]idency  [address] [place] [address]  Office: [phone]  This document has been electronically signed by Elsa Fenton MD on April 21, 2023 00:02 CDT  Total time: 25 minutes

## 2023-04-19 ENCOUNTER — TELEPHONE (OUTPATIENT)
Dept: FAMILY MEDICINE CLINIC | Facility: CLINIC | Age: 73
End: 2023-04-19
Payer: MEDICARE

## 2023-04-21 NOTE — PROGRESS NOTES
"Pt w/ stable VS on room air & no complaints of pain. Nrepi turned off @ 0200; maintain sbp >100, maps >65. Pt is intermittently confused at times and answers questions inappropriately. Easily re-oriented. Patient did attempt to hold his breath for as long as possible & when questioned said "I want to die." Explained that if these are his wishes he needs to express this clearly to his care team. The patient denied wanting to. This occurred againa t 0730 and Spo2 dropped to mid 60's. Pt stated again he was "trying to die." Care team to be notified during rounds. Pt claimed he did not want to try again. Right JOSE A site putting out copious amounts of serous-sanguinous fluid; dressing changed x4. Incisions still intact. Supplemental insulin given for hyperglycemia. No other events overnight; POC reviewed with mother and pt.   " I have reviewed the notes, assessments, and/or procedures performed by Dr. Elsa Fenton, I concur with her  documentation and assessment and plan for Jessie Jordan        This document has been electronically signed by Pancho Reyna MD on April 21, 2023 09:14 CDT

## 2023-04-26 DIAGNOSIS — R53.81 PHYSICAL DECONDITIONING: Primary | ICD-10-CM

## 2023-05-19 ENCOUNTER — OFFICE VISIT (OUTPATIENT)
Dept: FAMILY MEDICINE CLINIC | Facility: CLINIC | Age: 73
End: 2023-05-19
Payer: MEDICARE

## 2023-05-19 VITALS
BODY MASS INDEX: 20.59 KG/M2 | SYSTOLIC BLOOD PRESSURE: 140 MMHG | HEART RATE: 73 BPM | DIASTOLIC BLOOD PRESSURE: 90 MMHG | OXYGEN SATURATION: 98 % | HEIGHT: 64 IN | WEIGHT: 120.6 LBS | TEMPERATURE: 97.3 F

## 2023-05-19 DIAGNOSIS — S12.9XXD CLOSED FRACTURE OF MULTIPLE CERVICAL VERTEBRAE, SUBSEQUENT ENCOUNTER: Primary | ICD-10-CM

## 2023-05-19 NOTE — PROGRESS NOTES
Family Medicine Residency  Elsa Fenton MD    Subjective:     Jessie Jordan is a 72 y.o. female who presents for routine follow up.      She is established with neurosurgery in [facility]. She has been seeing them after she sustained a closed fracture of multiple cervical vertebrae after falling. She is currently wearing a neck brace. She followed up with them yesterday. She has no acute concerns today.     The following portions of the patient's history were reviewed and updated as appropriate: past family history, past medical history, past social history and past surgical history.    Past Medical Hx:  Past Medical History:   Diagnosis Date   • Anxiety and depression    • Coronary artery disease involving native coronary artery of native heart 12/06/2017    seen on Cardiac Cath - Left main 50-70% stenosis, 12/27/2017 repeat cath showed coronary spasm with no stenosis   • Hyperlipidemia    • Hypertension    • Kidney cysts    • Post-menopausal 1990   • Skin cancer    • Stroke 2015   • Vascular disease        Past Surgical Hx:  Past Surgical History:   Procedure Laterality Date   • ARTERIOGRAM N/A 9/28/2018    Procedure: aortoiliac arteriogram possible angioplasty stent atherectomy thrombolysis bilateral iliac stents;  Surgeon: Luan Ruff MD;  Location: [facility] ANGIO INVASIVE LOCATION;  Service: Interventional Radiology   • CARDIAC CATHETERIZATION  12/06/2017    Done at [facility] - Left Main 50-70% Stenosis - no stenting done, recommendation was urgent CABG.  EF 50-60%   • CERVICAL FUSION  1985    C5-6   • COLONOSCOPY N/A 6/29/2018    Procedure: COLONOSCOPY;  Surgeon: Oz Way MD;  Location: [facility] ENDOSCOPY;  Service: Gastroenterology   • COLONOSCOPY N/A 7/6/2020    Procedure: COLONOSCOPY;  Surgeon: Oz Way MD;  Location: [facility] ENDOSCOPY;  Service: Gastroenterology;  Laterality: N/A;   • ENDOSCOPY N/A 7/6/2020    Procedure: ESOPHAGOGASTRODUODENOSCOPY ASAP;   Surgeon: Oz Way MD;  Location: [facility] ENDOSCOPY;  Service: Gastroenterology;  Laterality: N/A;   • ENDOSCOPY N/A 8/17/2021    Procedure: ESOPHAGOGASTRODUODENOSCOPY possible dilation;  Surgeon: Oz Way MD;  Location: [facility] ENDOSCOPY;  Service: Gastroenterology;  Laterality: N/A;   • FEMORAL POPLITEAL BYPASS Right 12/19/2018    Procedure: femoral to femoral artery bypass, RIGHT FEMORAL POPLITEAL BYPASS right lower extremity arteriogram          (C-ARM# AND C-ARM TABLE);  Surgeon: Luan Ruff MD;  Location: [facility] OR;  Service: Vascular   • FINGER SURGERY Left     third finger   • ROTATOR CUFF REPAIR Left 06/30/2011    done in [place]       Current Meds:    Current Outpatient Medications:   •  atorvastatin (LIPITOR) 10 MG tablet, Take 1 tablet by mouth Daily., Disp: 90 tablet, Rfl: 1  •  Calcium Carbonate-Vitamin D (calcium-vitamin D) 500-200 MG-UNIT tablet per tablet, Take 1 tablet by mouth Daily., Disp: 30 tablet, Rfl: 11  •  carbamide peroxide (Debrox) 6.5 % otic solution, Administer 5 drops into both ears 2 (Two) Times a Day., Disp: 15 mL, Rfl: 3  •  clopidogrel (PLAVIX) 75 MG tablet, Take 1 tablet by mouth Daily., Disp: 90 tablet, Rfl: 3  •  Diclofenac Sodium (VOLTAREN) 1 % gel gel, Apply 4 g topically to the appropriate area as directed 4 (Four) Times a Day As Needed (pain)., Disp: 100 g, Rfl: 3  •  ferrous sulfate 325 (65 FE) MG tablet, Take 1 tablet by mouth Daily With Breakfast., Disp: 90 tablet, Rfl: 1  •  FLUoxetine (PROzac) 20 MG capsule, Take 1 capsule by mouth Daily., Disp: 90 capsule, Rfl: 3  •  fluticasone (FLONASE) 50 MCG/ACT nasal spray, 2 sprays by Each Nare route Daily., Disp: 48 mL, Rfl: 3  •  folic acid (FOLVITE) 1 MG tablet, Take 1 tablet by mouth Daily., Disp: 90 tablet, Rfl: 1  •  gabapentin (NEURONTIN) 300 MG capsule, , Disp: , Rfl:   •  HYDROcodone-acetaminophen (NORCO)  MG per tablet, Take 1 tablet by mouth Every 6 (Six) Hours As Needed., Disp: , Rfl:   •   lidocaine (LIDODERM) 5 %, Place 1 patch on the skin as directed by provider Daily. Remove & Discard patch within 12 hours or as directed by MD, Disp: 6 each, Rfl: 0  •  lisinopril (PRINIVIL,ZESTRIL) 10 MG tablet, Take 1 tablet by mouth Daily., Disp: 90 tablet, Rfl: 1  •  Melatonin 5 MG capsule, Take 5 mg by mouth Every Night., Disp: 30 each, Rfl: 3  •  pantoprazole (PROTONIX) 40 MG EC tablet, Take 1 tablet by mouth Daily., Disp: 90 tablet, Rfl: 1  •  varenicline (CHANTIX) 1 MG tablet, Take 1 tablet by mouth 2 (Two) Times a Day., Disp: 56 tablet, Rfl: 0  •  vitamin D (ERGOCALCIFEROL) 1.25 MG (72646 UT) capsule capsule, Take 1 capsule by mouth Every 7 (Seven) Days., Disp: 12 capsule, Rfl: 1  •  calcitonin, salmon, (Miacalcin) 200 UNIT/ACT nasal spray, 1 spray by Alternating Nares route Daily for 30 days., Disp: 2.7 mL, Rfl: 0    Allergies:  No Known Allergies    Family Hx:  Family History   Problem Relation Age of Onset   • Lung cancer Mother    • Hypertension Mother    • Diabetes Maternal Grandmother    • Heart disease Maternal Grandmother    • Hypertension Maternal Grandfather    • Heart disease Maternal Grandfather    • Heart disease Other    • Hypertension Other    • Cancer Other         Social History:  Social History     Socioeconomic History   • Marital status:    • Number of children: 1   Tobacco Use   • Smoking status: Light Smoker     Packs/day: 0.25     Years: 48.00     Pack years: 12.00     Types: Cigarettes     Last attempt to quit: 2018     Years since quittin.4   • Smokeless tobacco: Never   • Tobacco comments:     1-2cig/day   Vaping Use   • Vaping Use: Never used   Substance and Sexual Activity   • Alcohol use: Yes     Comment: occasional beer    • Drug use: No   • Sexual activity: Defer     Comment: .       Review of Systems  Review of Systems   Constitutional: Negative.    Respiratory: Negative.    Cardiovascular: Negative.    Gastrointestinal: Negative.    Endocrine:  "Negative.    Musculoskeletal: Negative for arthralgias and myalgias.   Skin: Negative.    Neurological: Negative.  Negative for headaches.   Psychiatric/Behavioral: Negative.  Negative for suicidal ideas.       Objective:     /90   Pulse 73   Temp 97.3 °F (36.3 °C)   Ht 162.6 cm (64\")   Wt 54.7 kg (120 lb 9.6 oz)   SpO2 98%   BMI 20.70 kg/m²   Physical Exam  Vitals reviewed.   Constitutional:       Appearance: Normal appearance.   HENT:      Head: Normocephalic and atraumatic.      Nose: Nose normal.      Mouth/Throat:      Mouth: Mucous membranes are moist.   Neck:      Comments: Neck brace in place  Cardiovascular:      Rate and Rhythm: Normal rate and regular rhythm.      Pulses: Normal pulses.      Heart sounds: Normal heart sounds.   Pulmonary:      Effort: Pulmonary effort is normal. No respiratory distress.      Breath sounds: Normal breath sounds.   Abdominal:      General: Abdomen is flat. Bowel sounds are normal. There is no distension.      Palpations: Abdomen is soft.      Tenderness: There is no abdominal tenderness.   Musculoskeletal:         General: Normal range of motion.      Cervical back: Normal range of motion.      Right lower leg: No edema.      Left lower leg: No edema.   Skin:     General: Skin is warm and dry.      Capillary Refill: Capillary refill takes less than 2 seconds.   Neurological:      General: No focal deficit present.      Mental Status: She is alert and oriented to person, place, and time.      Motor: Weakness present.   Psychiatric:         Mood and Affect: Mood normal.         Behavior: Behavior normal.         Assessment/Plan:     Diagnoses and all orders for this visit:    1. Closed fracture of multiple cervical vertebrae, subsequent encounter (Primary)    - Established with [facility] and followed up with them yesterday  - Wearing a neck brace as recommended by neursurgery  - Established with pain management  - All meds refilled at last visit  - " Follow up in 6 months or earlier if needed    · Rx changes: see a/p  · Patient Education: see a/p       Follow-up:     No follow-ups on file.    Preventative:  Health Maintenance   Topic Date Due   • LIPID PANEL  06/22/2021   • ZOSTER VACCINE (2 of 2) 05/17/2023   • ANNUAL WELLNESS VISIT  05/19/2023   • INFLUENZA VACCINE  08/01/2023   • MAMMOGRAM  06/10/2024   • DXA SCAN  06/10/2024   • COLORECTAL CANCER SCREENING  07/06/2025   • TDAP/TD VACCINES (2 - Td or Tdap) 03/22/2033   • HEPATITIS C SCREENING  Completed   • COVID-19 Vaccine  Completed   • Pneumococcal Vaccine 65+  Completed         Alcohol use:  reports current alcohol use.  Nicotine status  reports that she has been smoking cigarettes. She has a 12.00 pack-year smoking history. She has never used smokeless tobacco.     Goals     •  Pain control       Barriers: severe PAD, pt continues to smoke      •  Quit smoking / using tobacco (pt-stated)       Barriers: compliance with cessation medications            RISK SCORE: 3           Elsa Fenton M.D. PGY 2  [facility] Family Medicine Residency  [address] [place] [address]  Office: [phone]  This document has been electronically signed by Elsa Fenton MD on May 19, 2023 11:12 CDT  Total time: 25 minutes

## 2023-05-22 ENCOUNTER — TELEPHONE (OUTPATIENT)
Dept: FAMILY MEDICINE CLINIC | Facility: CLINIC | Age: 73
End: 2023-05-22
Payer: MEDICARE

## 2023-05-22 NOTE — TELEPHONE ENCOUNTER
Jailyn with [facility] has called asking for a return call regarding this patient.  Her # is [phone].    Leigh Toro

## 2023-05-23 NOTE — TELEPHONE ENCOUNTER
Patient has history of depression. She reported to physical therapy that she has been feeling more depressed than usual. I will ask the  to scheduled an appointment for patient for further evaluation.

## 2023-05-23 NOTE — TELEPHONE ENCOUNTER
Called patient to check on her. She denies any SI/HI. She agreed to a telephone visit for further evaluation. I contacted  to schedule appointment.

## 2023-05-26 ENCOUNTER — OFFICE VISIT (OUTPATIENT)
Dept: CARDIAC SURGERY | Facility: CLINIC | Age: 73
End: 2023-05-26
Payer: MEDICARE

## 2023-05-26 VITALS
BODY MASS INDEX: 20.73 KG/M2 | HEIGHT: 64 IN | SYSTOLIC BLOOD PRESSURE: 136 MMHG | TEMPERATURE: 97.8 F | OXYGEN SATURATION: 99 % | DIASTOLIC BLOOD PRESSURE: 80 MMHG | WEIGHT: 121.4 LBS

## 2023-05-26 DIAGNOSIS — I70.212 ATHEROSCLER OF NATIVE ARTERY OF LEFT LEG WITH INTERMIT CLAUDICATION: ICD-10-CM

## 2023-05-26 DIAGNOSIS — I73.9 PVD (PERIPHERAL VASCULAR DISEASE) WITH CLAUDICATION: Primary | ICD-10-CM

## 2023-05-26 DIAGNOSIS — I65.23 CAROTID STENOSIS, ASYMPTOMATIC, BILATERAL: ICD-10-CM

## 2023-05-26 DIAGNOSIS — E78.2 MIXED HYPERLIPIDEMIA: ICD-10-CM

## 2023-05-26 PROCEDURE — 1160F RVW MEDS BY RX/DR IN RCRD: CPT | Performed by: NURSE PRACTITIONER

## 2023-05-26 PROCEDURE — 1159F MED LIST DOCD IN RCRD: CPT | Performed by: NURSE PRACTITIONER

## 2023-05-26 PROCEDURE — 99214 OFFICE O/P EST MOD 30 MIN: CPT | Performed by: NURSE PRACTITIONER

## 2023-05-26 PROCEDURE — 3079F DIAST BP 80-89 MM HG: CPT | Performed by: NURSE PRACTITIONER

## 2023-05-26 PROCEDURE — 3075F SYST BP GE 130 - 139MM HG: CPT | Performed by: NURSE PRACTITIONER

## 2023-05-26 RX ORDER — NALOXONE HYDROCHLORIDE 4 MG/.1ML
SPRAY NASAL
COMMUNITY
Start: 2023-05-11

## 2023-05-26 RX ORDER — VARENICLINE TARTRATE 1 MG/1
1 TABLET, FILM COATED ORAL
COMMUNITY
End: 2023-05-26

## 2023-05-26 NOTE — PROGRESS NOTES
CVTS Office Progress Note     5/26/2023    Jessie Jordan  1950    Chief Complaint:    Chief Complaint   Patient presents with   • MONTY/Carotid     6 months follow up       HPI:      PCP:  Elsa Fenton MD  Cardiology:  Dr. Spence    72 y.o. female with HTN(stable, increased risk stroke, rupture), Hyperlipidemia(stable, increased risk cardiovascular events) and COPD(stable, increased risk pulmonary complications) PVD, MARY.  former smoker and quit 2018.  Right leg pain x2 years, underwent left-right fem-fem, fem-pop.  Follow up surveillance has remained stable.  Bilateral leg pain unrelated to activity or ambulation, chronic pain management.  Mild venous insuffiency.  Ambulation distance limited by breathing rather than claudication. Returns today follow up vascular imaging, claudication at long distances recent fall left leg gave out cervical fracture currently in c-collar follows up in 4 weeks with neurosurgery.  No surgery planned at this time. no other associated signs, symptoms or modifying factors.    5/2018 MONTY:  RIGHT .32 bi/monophasic.  LEFT .47 biphasic.  6/2018 Carotid Duplex:  JENNIFER 0-49% antegrade vert.  LICA 0-49% antegrade vert.  6/2018 CTA Aorta runoff:  RIGHT common iliac 90%, SFA small moderate diffuse disease, tibial diffuse disease.  LEFT  Common iliac 70%, external iliac 80%, SFA occluded reconstitutes distal via profunda collaterals, diffuse tibials.  9/28/18  Agram:  PTA/Luminex Stent LEFT iliac artery:  RCIA occluded, RIIA/REIA moderate diffuse disease, RSFA occluded, RPT diffuse distal disease  10/11/18  MONTY:  RIGHT 0.50 Fem/Pop Biphasic  PT/DP Monophasic    12/19/18 LEFT to RIGHT femoral to femoral artery bypass and RIGHT FEMORAL POPLITEAL BYPASS (PTFE)  05/21/2019 Carotid Duplex: JENNIFER 0-49% with mobile plaque mPSV 120c/s Ratio 2.4, LICA 50-69% mPSV 141c/s Ratio 2.0, Hypoechoic structure left submandibular 9.3mm  05/21/2019 MONTY: Right 0.89 triphasic femoral to distal.  Left 0.75  femoral triphasic PT biphasic  05/21/2019 Graft Survey: Left groin 335, left groin anastomosis 324, proximal graft 183, mid graft 89, distal graft 89, right groin anastomosis 158, right groin 33.  All with triphasic waveforms.    12/3/19 Right 0.82 fem biphasic triphasic-distally.  Left 0.62 biphasic.   12/3/19 Carotid Duplex: JENNIFER 50-69% mPSV 117c/s Ratio 2.8, LICA 50-69% mPSV 162c/s Ratio 1.8, Antegrade vertebrals  6/2020 Carotid Duplex: JENNIFER 0-49% mPSV 122c/s Ratio 2.0, LICA 50-69% mPSV 157c/s Ratio 1.6, Antegrade vertebrals  6/2020 MONTY: Right 0.9 triphasic.  Left 0.81 triphasic.   6/2020 fem-Fem US: Patent, mPSV 439cm/s area appears widely patent.    6/2020 Right Fem-pop US: Patent, mPSV 155cm/s triphasic throughout  3/2021 Carotid Duplex: JENNIFER 50-69% mPSV 126c/s Ratio 1.9, LICA 0-49% mPSV 117c/s Ratio 1.5, Antegrade vertebrals  3/2021 MONTY: Right 0.92 triphasic.  Left 0.81 triphasic.\  4/2022 MONTY: Right 0.84 triphasic.  Left 0.73 triphasic  4/2022 Carotid Duplex: JENNIFER 0-49% mPSV 98c/s Ratio 1.7, LICA 0-49% mPSV 104c/s Ratio 1.6, Antegrade vertebrals  10/2022 MONTY: Right 0.87 triphasic pop, biphasic distally.  Left 0.69 triphasic pop biphasic distal  10/2022  Jose Art US: fem-fem graft patent, mPSV 334cm/s native inflow   4/2023 CTA neck: Right 50% left 30%  5/2023 MONTY: Right 0.72 triphasic.  Left 0.56 biphasic      The following portions of the patient's history were reviewed and updated as appropriate: allergies, current medications, past family history, past medical history, past social history, past surgical history and problem list.  Recent images independently reviewed.  Available laboratory values reviewed.    PMH:  Past Medical History:   Diagnosis Date   • Anxiety and depression    • Coronary artery disease involving native coronary artery of native heart 12/06/2017    seen on Cardiac Cath - Left main 50-70% stenosis, 12/27/2017 repeat cath showed coronary spasm with no stenosis   • Hyperlipidemia    •  Hypertension    • Kidney cysts    • Post-menopausal 1990   • Skin cancer    • Stroke 2015   • Vascular disease      Past Surgical History:   Procedure Laterality Date   • ARTERIOGRAM N/A 9/28/2018    Procedure: aortoiliac arteriogram possible angioplasty stent atherectomy thrombolysis bilateral iliac stents;  Surgeon: Luan Ruff MD;  Location: [facility] ANGIO INVASIVE LOCATION;  Service: Interventional Radiology   • CARDIAC CATHETERIZATION  12/06/2017    Done at [facility] - Left Main 50-70% Stenosis - no stenting done, recommendation was urgent CABG.  EF 50-60%   • CERVICAL FUSION  1985    C5-6   • COLONOSCOPY N/A 6/29/2018    Procedure: COLONOSCOPY;  Surgeon: Oz Way MD;  Location: [facility] ENDOSCOPY;  Service: Gastroenterology   • COLONOSCOPY N/A 7/6/2020    Procedure: COLONOSCOPY;  Surgeon: Oz Way MD;  Location: [facility] ENDOSCOPY;  Service: Gastroenterology;  Laterality: N/A;   • ENDOSCOPY N/A 7/6/2020    Procedure: ESOPHAGOGASTRODUODENOSCOPY ASAP;  Surgeon: Oz Way MD;  Location: [facility] ENDOSCOPY;  Service: Gastroenterology;  Laterality: N/A;   • ENDOSCOPY N/A 8/17/2021    Procedure: ESOPHAGOGASTRODUODENOSCOPY possible dilation;  Surgeon: Oz Wya MD;  Location: [facility] ENDOSCOPY;  Service: Gastroenterology;  Laterality: N/A;   • FEMORAL POPLITEAL BYPASS Right 12/19/2018    Procedure: femoral to femoral artery bypass, RIGHT FEMORAL POPLITEAL BYPASS right lower extremity arteriogram          (C-ARM# AND C-ARM TABLE);  Surgeon: Luan Ruff MD;  Location: [facility] OR;  Service: Vascular   • FINGER SURGERY Left     third finger   • ROTATOR CUFF REPAIR Left 06/30/2011    done in [place]     Family History   Problem Relation Age of Onset   • Lung cancer Mother    • Hypertension Mother    • Diabetes Maternal Grandmother    • Heart disease Maternal Grandmother    • Hypertension Maternal Grandfather    • Heart disease Maternal Grandfather    • Heart disease  Other    • Hypertension Other    • Cancer Other      Social History     Tobacco Use   • Smoking status: Light Smoker     Packs/day: 0.25     Years: 48.00     Pack years: 12.00     Types: Cigarettes     Last attempt to quit: 2018     Years since quittin.4   • Smokeless tobacco: Never   • Tobacco comments:     1-2cig/day   Vaping Use   • Vaping Use: Never used   Substance Use Topics   • Alcohol use: Yes     Comment: occasional beer    • Drug use: No       ALLERGIES:  No Known Allergies      MEDICATIONS:    Current Outpatient Medications:   •  atorvastatin (LIPITOR) 10 MG tablet, Take 1 tablet by mouth Daily., Disp: 90 tablet, Rfl: 1  •  Calcium Carbonate-Vitamin D (calcium-vitamin D) 500-200 MG-UNIT tablet per tablet, Take 1 tablet by mouth Daily., Disp: 30 tablet, Rfl: 11  •  clopidogrel (PLAVIX) 75 MG tablet, Take 1 tablet by mouth Daily., Disp: 90 tablet, Rfl: 3  •  Diclofenac Sodium (VOLTAREN) 1 % gel gel, Apply 4 g topically to the appropriate area as directed 4 (Four) Times a Day As Needed (pain)., Disp: 100 g, Rfl: 3  •  ferrous sulfate 325 (65 FE) MG tablet, Take 1 tablet by mouth Daily With Breakfast., Disp: 90 tablet, Rfl: 1  •  FLUoxetine (PROzac) 20 MG capsule, Take 1 capsule by mouth Daily., Disp: 90 capsule, Rfl: 3  •  fluticasone (FLONASE) 50 MCG/ACT nasal spray, 2 sprays by Each Nare route Daily., Disp: 48 mL, Rfl: 3  •  folic acid (FOLVITE) 1 MG tablet, Take 1 tablet by mouth Daily., Disp: 90 tablet, Rfl: 1  •  gabapentin (NEURONTIN) 300 MG capsule, , Disp: , Rfl:   •  HYDROcodone-acetaminophen (NORCO)  MG per tablet, Take 1 tablet by mouth Every 6 (Six) Hours As Needed., Disp: , Rfl:   •  lidocaine (LIDODERM) 5 %, Place 1 patch on the skin as directed by provider Daily. Remove & Discard patch within 12 hours or as directed by MD, Disp: 6 each, Rfl: 0  •  lisinopril (PRINIVIL,ZESTRIL) 10 MG tablet, Take 1 tablet by mouth Daily., Disp: 90 tablet, Rfl: 1  •  Melatonin 5 MG capsule, Take  5 mg by mouth Every Night., Disp: 30 each, Rfl: 3  •  naloxone (NARCAN) 4 MG/0.1ML nasal spray, ADMINISTER A SINGLE SPRAY IN ONE NOSTRIL UPON SIGNS OF OPIOID OVERDOSE. CALL 911. REPEAT AFTER 3 MINUTES IF NO RESPONSE., Disp: , Rfl:   •  pantoprazole (PROTONIX) 40 MG EC tablet, Take 1 tablet by mouth Daily., Disp: 90 tablet, Rfl: 1  •  varenicline (CHANTIX) 1 MG tablet, Take 1 tablet by mouth 2 (Two) Times a Day., Disp: 56 tablet, Rfl: 0  •  vitamin D (ERGOCALCIFEROL) 1.25 MG (39333 UT) capsule capsule, Take 1 capsule by mouth Every 7 (Seven) Days., Disp: 12 capsule, Rfl: 1      Review of Systems   Constitutional: Negative for chills, decreased appetite, diaphoresis and fever.   HENT: Negative for congestion, nosebleeds and sore throat.    Eyes: Negative for blurred vision, visual disturbance and visual halos.   Cardiovascular: Positive for claudication, dyspnea on exertion and leg swelling. Negative for chest pain, near-syncope and palpitations.   Respiratory: Negative for cough, shortness of breath, sputum production and wheezing.    Endocrine: Negative for cold intolerance and polyuria.   Hematologic/Lymphatic: Negative for bleeding problem. Bruises/bleeds easily.        Does not report S/S of adverse bleeding event including nose bleeds, hematuria, melena, gingival bleeding, or hematemesis.   Skin: Positive for unusual hair distribution. Negative for flushing and nail changes.   Musculoskeletal: Positive for arthritis, back pain, joint pain and neck pain.   Gastrointestinal: Positive for bowel incontinence. Negative for bloating, abdominal pain, hematemesis, melena, nausea and vomiting.   Genitourinary: Negative for bladder incontinence, flank pain and hematuria.   Neurological: Positive for dizziness, headaches and weakness. Negative for brief paralysis, difficulty with concentration, focal weakness, light-headedness, loss of balance, numbness, paresthesias and vertigo.        No amaurosis fugax, TIA, or CVA.    "  Psychiatric/Behavioral: Negative for altered mental status, depression, memory loss, substance abuse and suicidal ideas.   Allergic/Immunologic: Negative for hives and persistent infections.         Vitals:    05/26/23 1032   BP: 136/80   BP Location: Left arm   Patient Position: Sitting   Cuff Size: Adult   Temp: 97.8 °F (36.6 °C)   SpO2: 99%   Weight: 55.1 kg (121 lb 6.4 oz)   Height: 162.6 cm (64\")     Physical Exam   Constitutional: She is oriented to person, place, and time. She appears well-developed.   HENT:   Head: Normocephalic and atraumatic.   Eyes: Pupils are equal, round, and reactive to light. Conjunctivae are normal.   Cardiovascular: Normal rate.   No murmur heard.  Pulses:       Dorsalis pedis pulses are 1+ on the right side and 1+ on the left side.        Posterior tibial pulses are 1+ on the right side and 1+ on the left side.   RLE +2  LLE +1  R carotid bruit   Pulmonary/Chest: Effort normal and breath sounds normal. No respiratory distress.   Abdominal: Soft. Bowel sounds are normal. She exhibits no distension.   Musculoskeletal: Normal range of motion. Tenderness (both legs) present.      Comments: In c-collar   Neurological: She is alert and oriented to person, place, and time.   Skin: Skin is warm and dry. Capillary refill takes less than 2 seconds.   There is no evidence of skin breakdown of the bilateral lower extremities.  BLE pink, no evidence of ischemia.  Feet are absent of dependent rubor.   Psychiatric: Judgment normal.   Nursing note and vitals reviewed.      Assessment & Plan     Independent Review of Studies    1. PVD (peripheral vascular disease) with claudication  Lifestyle limiting claudication, waveforms suspicious for aorta-iliac disease, further imaging required to determine diagnosis and potential revascularization options.  Obtain CTA run off, follow up with Dr. Ruff to review films and discuss options.     - CT angio abdominal aorta bilat iliofem runoff w wo " contrast; Future    2. Atheroscler of native artery of left leg with intermit claudication    - CT angio abdominal aorta bilat iliofem runoff w wo contrast; Future    3. Mixed hyperlipidemia  Treatment of hyperlipidemia prevents the progression of peripheral vascular disease and atherosclerosis in multiple beds.  Lipid-lowering therapy may improve claudication distance.  Statins are also considered mandatory in patients with PAD by virtue of reducing cardiovascular events as shown in the heart protection study.  Target LDL for PAD patients is less than 70 mg/dL.  Consideration of PCSK9 inhibitors and/or other adjuvant therapies and statin intolerant patients is recommended.  Continued follow up with PCP is recommended for patients on lipid lowering therapies.    Lab Results   Component Value Date    CHOL 149 06/22/2020    TRIG 94 06/22/2020    HDL 61 (H) 06/22/2020    LDL 69 06/22/2020       4. Carotid stenosis, asymptomatic, bilateral  CTA report from [place] reviewed.  Right side 50%, left 30%.    No surgical intervention indicated at this time follow-up with interval imaging repeat carotid duplex in approximately 6 months    Remains on Plavix, statin              Detailed discussion regarding risks, benefits, and treatment plan. Images independently reviewed. Patient understands, agrees, and wishes to proceed with plan.       This document has been electronically signed by ZACKERY McqueenP-BC @  On May 26, 2023 11:13 CDT            Answers for HPI/ROS submitted by the patient on 5/21/2023  What is the primary reason for your visit?: Neurological Problem  clumsiness: No  focal sensory loss: Yes  slurred speech: No  syncope: No  visual change: No  Chronicity: recurrent  Onset: more than 1 year ago  Onset quality: gradually  Progression since onset: waxing and waning  Focality: lower extremity, upper extremity  auditory change: No  aura: Yes  confusion: No  Treatments tried: medication, neck support, position  change  Improvement on treatment: moderate

## 2023-05-26 NOTE — PATIENT INSTRUCTIONS
Lifestyle limiting claudication, waveforms suspicious for aorta-iliac disease, further imaging required to determine diagnosis and potential revascularization options.  Obtain CTA run off, follow up with Dr. Ruff to review films and discuss options.

## 2023-05-30 ENCOUNTER — OFFICE VISIT (OUTPATIENT)
Dept: FAMILY MEDICINE CLINIC | Facility: CLINIC | Age: 73
End: 2023-05-30

## 2023-05-30 VITALS
WEIGHT: 119.8 LBS | DIASTOLIC BLOOD PRESSURE: 80 MMHG | TEMPERATURE: 96.3 F | SYSTOLIC BLOOD PRESSURE: 110 MMHG | HEIGHT: 64 IN | HEART RATE: 61 BPM | OXYGEN SATURATION: 95 % | BODY MASS INDEX: 20.45 KG/M2

## 2023-05-30 DIAGNOSIS — Z00.00 ENCOUNTER FOR SUBSEQUENT ANNUAL WELLNESS VISIT (AWV) IN MEDICARE PATIENT: Primary | ICD-10-CM

## 2023-05-30 RX ORDER — METHOCARBAMOL 500 MG/1
500 TABLET, FILM COATED ORAL 3 TIMES DAILY PRN
Qty: 90 TABLET | Refills: 3 | Status: SHIPPED | OUTPATIENT
Start: 2023-05-30

## 2023-05-30 NOTE — PROGRESS NOTES
The ABCs of the Annual Wellness Visit  Subsequent Medicare Wellness Visit    Subjective      Jessie Jordan is a 72 y.o. female who presents for a Subsequent Medicare Wellness Visit.    The following portions of the patient's history were reviewed and   updated as appropriate: past family history, past medical history, past social history and past surgical history.    Compared to one year ago, the patient feels her physical   health is the same.    Compared to one year ago, the patient feels her mental   health is the same.    Recent Hospitalizations:  She was admitted within the past 365 days at [facility].       Current Medical Providers:  Patient Care Team:  Elsa Fenton MD as PCP - General (Family Medicine)  Estela Mesa APRN as Nurse Practitioner (Gastroenterology)  Zaria Cummings MD as Consulting Physician (Hematology and Oncology)  Carmencita Holder APRN as Nurse Practitioner (Oncology)    Outpatient Medications Prior to Visit   Medication Sig Dispense Refill   • atorvastatin (LIPITOR) 10 MG tablet Take 1 tablet by mouth Daily. 90 tablet 1   • Calcium Carbonate-Vitamin D (calcium-vitamin D) 500-200 MG-UNIT tablet per tablet Take 1 tablet by mouth Daily. 30 tablet 11   • clopidogrel (PLAVIX) 75 MG tablet Take 1 tablet by mouth Daily. 90 tablet 3   • Diclofenac Sodium (VOLTAREN) 1 % gel gel Apply 4 g topically to the appropriate area as directed 4 (Four) Times a Day As Needed (pain). 100 g 3   • ferrous sulfate 325 (65 FE) MG tablet Take 1 tablet by mouth Daily With Breakfast. 90 tablet 1   • FLUoxetine (PROzac) 20 MG capsule Take 1 capsule by mouth Daily. 90 capsule 3   • fluticasone (FLONASE) 50 MCG/ACT nasal spray 2 sprays by Each Nare route Daily. 48 mL 3   • folic acid (FOLVITE) 1 MG tablet Take 1 tablet by mouth Daily. 90 tablet 1   • gabapentin (NEURONTIN) 300 MG capsule      • HYDROcodone-acetaminophen (NORCO)  MG per tablet Take 1 tablet by mouth Every 6 (Six) Hours  As Needed.     • lidocaine (LIDODERM) 5 % Place 1 patch on the skin as directed by provider Daily. Remove & Discard patch within 12 hours or as directed by MD 6 each 0   • lisinopril (PRINIVIL,ZESTRIL) 10 MG tablet Take 1 tablet by mouth Daily. 90 tablet 1   • Melatonin 5 MG capsule Take 5 mg by mouth Every Night. 30 each 3   • naloxone (NARCAN) 4 MG/0.1ML nasal spray ADMINISTER A SINGLE SPRAY IN ONE NOSTRIL UPON SIGNS OF OPIOID OVERDOSE. CALL 911. REPEAT AFTER 3 MINUTES IF NO RESPONSE.     • pantoprazole (PROTONIX) 40 MG EC tablet Take 1 tablet by mouth Daily. 90 tablet 1   • varenicline (CHANTIX) 1 MG tablet Take 1 tablet by mouth 2 (Two) Times a Day. 56 tablet 0   • vitamin D (ERGOCALCIFEROL) 1.25 MG (30614 UT) capsule capsule Take 1 capsule by mouth Every 7 (Seven) Days. 12 capsule 1     No facility-administered medications prior to visit.       Opioid medication/s are on active medication list.  and I have evaluated her active treatment plan and pain score trends (see table).  There were no vitals filed for this visit.  I have reviewed the chart for potential of high risk medication and harmful drug interactions in the elderly.            Aspirin is not on active medication list.      Patient Active Problem List   Diagnosis   • Essential hypertension   • Heavy tobacco smoker >10 cigarettes per day   • Chronic pain syndrome   • BMI less than 19,adult   • Coronary artery disease involving native coronary artery of native heart   • PVD (peripheral vascular disease)   • Decreased pulses in feet   • Abnormal finding on radiology exam   • Nicotine dependence, cigarettes, with other nicotine-induced disorders   • SOB (shortness of breath)   • Palpitations   • Carotid stenosis, asymptomatic, bilateral   • Follow-up surgery care   • Post-operative pain   • Mass of left side of neck   • Encounter for screening for lung cancer   • Mixed hyperlipidemia   • Iron deficiency anemia due to chronic blood loss   • Malaise and  "fatigue   • Gastroesophageal reflux disease   • Chronic gastric ulcer without hemorrhage and without perforation   • Anemia   • Iron deficiency anemia   • Iron malabsorption   • Falls frequently   • Chronic pain of right knee   • Cerebrovascular accident (CVA)   • Osteoporosis, post-menopausal     Advance Care Planning   Advance Care Planning     Advance Directive is not on file.  ACP discussion was held with the patient during this visit. Patient does not have an advance directive, information provided.     Objective    Vitals:    23 1328   BP: 110/80   Pulse: 61   Temp: 96.3 °F (35.7 °C)   SpO2: 95%   Weight: 54.3 kg (119 lb 12.8 oz)   Height: 162.6 cm (64\")     Estimated body mass index is 20.56 kg/m² as calculated from the following:    Height as of this encounter: 162.6 cm (64\").    Weight as of this encounter: 54.3 kg (119 lb 12.8 oz).    BMI is within normal parameters. No other follow-up for BMI required.      Does the patient have evidence of cognitive impairment?   No            HEALTH RISK ASSESSMENT    Smoking Status:  Social History     Tobacco Use   Smoking Status Light Smoker   • Packs/day: 0.25   • Years: 48.00   • Pack years: 12.00   • Types: Cigarettes   • Last attempt to quit: 2018   • Years since quittin.4   Smokeless Tobacco Never   Tobacco Comments    1-2cig/day     Alcohol Consumption:  Social History     Substance and Sexual Activity   Alcohol Use Yes    Comment: occasional beer      Fall Risk Screen:    MARIA DEL CARMENADI Fall Risk Assessment has not been completed.    Depression Screenin/30/2023     1:34 PM   PHQ-2/PHQ-9 Depression Screening   Little Interest or Pleasure in Doing Things 2-->more than half the days   Feeling Down, Depressed or Hopeless 1-->several days   Trouble Falling or Staying Asleep, or Sleeping Too Much 1-->several days   Feeling Tired or Having Little Energy 1-->several days   Poor Appetite or Overeating 2-->more than half the days   Feeling Bad about " Yourself - or that You are a Failure or Have Let Yourself or Your Family Down 0-->not at all   Trouble Concentrating on Things, Such as Reading the Newspaper or Watching Television 1-->several days   Moving or Speaking So Slowly that Other People Could Have Noticed? Or the Opposite - Being So Fidgety 0-->not at all   Thoughts that You Would be Better Off Dead or of Hurting Yourself in Some Way 0-->not at all   PHQ-9: Brief Depression Severity Measure Score 8   If You Checked Off Any Problems, How Difficult Have These Problems Made It For You to Do Your Work, Take Care of Things at Home, or Get Along with Other People? not difficult at all       Health Habits and Functional and Cognitive Screenin/30/2023     1:31 PM   Functional & Cognitive Status   Do you have difficulty preparing food and eating? Yes   Do you have difficulty bathing yourself, getting dressed or grooming yourself? Yes   Do you have difficulty using the toilet? No   Do you have difficulty moving around from place to place? Yes   Do you have trouble with steps or getting out of a bed or a chair? Yes   Current Diet Well Balanced Diet   Dental Exam Not up to date   Eye Exam Not up to date   Exercise (times per week) 2 times per week   Current Exercises Include Walking   Do you need help using the phone?  No   Are you deaf or do you have serious difficulty hearing?  No   Do you need help with transportation? Yes   Do you need help shopping? Yes   Do you need help preparing meals?  Yes   Do you need help with housework?  Yes   Do you need help with laundry? Yes   Do you need help taking your medications? No   Do you need help managing money? No   Do you ever drive or ride in a car without wearing a seat belt? No   Have you felt unusual stress, anger or loneliness in the last month? No   Who do you live with? Child   If you need help, do you have trouble finding someone available to you? No   Have you been bothered in the last four weeks by sexual  problems? No   Do you have difficulty concentrating, remembering or making decisions? No       Age-appropriate Screening Schedule:  Refer to the list below for future screening recommendations based on patient's age, sex and/or medical conditions. Orders for these recommended tests are listed in the plan section. The patient has been provided with a written plan.    Health Maintenance   Topic Date Due   • LIPID PANEL  06/22/2021   • ZOSTER VACCINE (2 of 2) 05/17/2023   • ANNUAL WELLNESS VISIT  05/19/2023   • INFLUENZA VACCINE  08/01/2023   • MAMMOGRAM  06/10/2024   • DXA SCAN  06/10/2024   • COLORECTAL CANCER SCREENING  07/06/2025   • TDAP/TD VACCINES (2 - Td or Tdap) 03/22/2033   • HEPATITIS C SCREENING  Completed   • COVID-19 Vaccine  Completed   • Pneumococcal Vaccine 65+  Completed                  CMS Preventative Services Quick Reference  Risk Factors Identified During Encounter:    Hearing Problem: Referral to audiogram offered, patient declined    The above risks/problems have been discussed with the patient.  Pertinent information has been shared with the patient in the After Visit Summary.    Diagnoses and all orders for this visit:    1. Encounter for subsequent annual wellness visit (AWV) in Medicare patient (Primary)    Other orders  -     methocarbamol (ROBAXIN) 500 MG tablet; Take 1 tablet by mouth 3 (Three) Times a Day As Needed for Muscle Spasms.  Dispense: 90 tablet; Refill: 3      Medicare wellness packet reviewed. ACP paperwork provided. Daughter present with patient. Both state they understand what patient would desire if the situation arises however would consider filling out ACP forms.     Audiogram offered based off of hearing screen however patient declines audiogram as she is not interested in wearing hearing aids. She also feels like she can hear well and does not need an audiogram.     Robaxin refilled.       Follow Up:   Next Medicare Wellness visit to be scheduled in 1 year.      An  After Visit Summary and PPPS were made available to the patient.             Elsa Fenton M.D. PGY 2  [facility]  [address] [place] [address]  Office: [phone]  This document has been electronically signed by Elsa Fenton MD on May 30, 2023 14:03 CDT

## 2023-06-01 NOTE — PROGRESS NOTES
I have reviewed the notes, assessments, and/or procedures performed by Dr. Elsa Fenton, I concur with her  documentation and assessment and plan for Jessie Jordan        This document has been electronically signed by Pancho Reyna MD on June 1, 2023 09:18 CDT

## 2023-06-04 ENCOUNTER — TRANSCRIBE ORDERS (OUTPATIENT)
Dept: CT IMAGING | Facility: HOSPITAL | Age: 73
End: 2023-06-04
Payer: MEDICARE

## 2023-06-04 DIAGNOSIS — I73.9 PVD (PERIPHERAL VASCULAR DISEASE): Primary | ICD-10-CM

## 2023-06-09 ENCOUNTER — LAB (OUTPATIENT)
Dept: LAB | Facility: HOSPITAL | Age: 73
End: 2023-06-09
Payer: MEDICARE

## 2023-06-09 ENCOUNTER — HOSPITAL ENCOUNTER (OUTPATIENT)
Dept: CT IMAGING | Facility: HOSPITAL | Age: 73
Discharge: HOME | End: 2023-06-09
Payer: MEDICARE

## 2023-06-09 DIAGNOSIS — Z13.220 SCREENING FOR HYPERLIPIDEMIA: ICD-10-CM

## 2023-06-09 DIAGNOSIS — I70.212 ATHEROSCLER OF NATIVE ARTERY OF LEFT LEG WITH INTERMIT CLAUDICATION: ICD-10-CM

## 2023-06-09 DIAGNOSIS — F17.218 NICOTINE DEPENDENCE, CIGARETTES, WITH OTHER NICOTINE-INDUCED DISORDERS: ICD-10-CM

## 2023-06-09 DIAGNOSIS — D50.0 IRON DEFICIENCY ANEMIA DUE TO CHRONIC BLOOD LOSS: ICD-10-CM

## 2023-06-09 DIAGNOSIS — I73.9 PVD (PERIPHERAL VASCULAR DISEASE): ICD-10-CM

## 2023-06-09 DIAGNOSIS — I73.9 PVD (PERIPHERAL VASCULAR DISEASE) WITH CLAUDICATION: ICD-10-CM

## 2023-06-09 LAB
BUN SERPL-MCNC: 11 MG/DL (ref 8–23)
CHOLEST SERPL-MCNC: 165 MG/DL (ref 0–200)
CREAT SERPL-MCNC: 0.71 MG/DL (ref 0.57–1)
DEPRECATED RDW RBC AUTO: 45.4 FL (ref 37–54)
EGFRCR SERPLBLD CKD-EPI 2021: 90.5 ML/MIN/1.73
ERYTHROCYTE [DISTWIDTH] IN BLOOD BY AUTOMATED COUNT: 14.6 % (ref 12.3–15.4)
FERRITIN SERPL-MCNC: 75.62 NG/ML (ref 13–150)
HCT VFR BLD AUTO: 37 % (ref 34–46.6)
HDLC SERPL-MCNC: 58 MG/DL (ref 40–60)
HGB BLD-MCNC: 12 G/DL (ref 12–15.9)
IRON 24H UR-MRATE: 29 MCG/DL (ref 37–145)
IRON SATN MFR SERPL: 8 % (ref 20–50)
LDLC SERPL CALC-MCNC: 91 MG/DL (ref 0–100)
LDLC/HDLC SERPL: 1.54 {RATIO}
MCH RBC QN AUTO: 27.6 PG (ref 26.6–33)
MCHC RBC AUTO-ENTMCNC: 32.4 G/DL (ref 31.5–35.7)
MCV RBC AUTO: 85.1 FL (ref 79–97)
PLATELET # BLD AUTO: 278 10*3/MM3 (ref 140–450)
PMV BLD AUTO: 11.9 FL (ref 6–12)
RBC # BLD AUTO: 4.35 10*6/MM3 (ref 3.77–5.28)
TIBC SERPL-MCNC: 341 MCG/DL (ref 298–536)
TRANSFERRIN SERPL-MCNC: 229 MG/DL (ref 200–360)
TRIGL SERPL-MCNC: 89 MG/DL (ref 0–150)
VLDLC SERPL-MCNC: 16 MG/DL (ref 5–40)
WBC NRBC COR # BLD: 9.05 10*3/MM3 (ref 3.4–10.8)

## 2023-06-09 PROCEDURE — 75635 CT ANGIO ABDOMINAL ARTERIES: CPT

## 2023-06-09 PROCEDURE — 25510000001 IOPAMIDOL PER 1 ML: Performed by: NURSE PRACTITIONER

## 2023-06-09 PROCEDURE — 82565 ASSAY OF CREATININE: CPT

## 2023-06-09 PROCEDURE — 83540 ASSAY OF IRON: CPT

## 2023-06-09 PROCEDURE — 84466 ASSAY OF TRANSFERRIN: CPT

## 2023-06-09 PROCEDURE — 80061 LIPID PANEL: CPT

## 2023-06-09 PROCEDURE — 36415 COLL VENOUS BLD VENIPUNCTURE: CPT

## 2023-06-09 PROCEDURE — 85027 COMPLETE CBC AUTOMATED: CPT

## 2023-06-09 PROCEDURE — 84520 ASSAY OF UREA NITROGEN: CPT

## 2023-06-09 PROCEDURE — 82728 ASSAY OF FERRITIN: CPT

## 2023-06-09 RX ADMIN — IOPAMIDOL 90 ML: 755 INJECTION, SOLUTION INTRAVENOUS at 11:34

## 2023-06-12 RX ORDER — VARENICLINE TARTRATE 1 MG/1
TABLET, FILM COATED ORAL
Qty: 56 TABLET | Refills: 0 | Status: SHIPPED | OUTPATIENT
Start: 2023-06-12

## 2023-06-19 ENCOUNTER — OFFICE VISIT (OUTPATIENT)
Dept: CARDIAC SURGERY | Facility: CLINIC | Age: 73
End: 2023-06-19
Payer: MEDICARE

## 2023-06-19 VITALS
BODY MASS INDEX: 20.32 KG/M2 | SYSTOLIC BLOOD PRESSURE: 120 MMHG | DIASTOLIC BLOOD PRESSURE: 80 MMHG | WEIGHT: 119 LBS | OXYGEN SATURATION: 98 % | HEART RATE: 81 BPM | HEIGHT: 64 IN

## 2023-06-19 DIAGNOSIS — I70.211 ATHEROSCLER OF NATIVE ARTERY OF RIGHT LEG WITH INTERMIT CLAUDICATION: ICD-10-CM

## 2023-06-19 DIAGNOSIS — E78.2 MIXED HYPERLIPIDEMIA: ICD-10-CM

## 2023-06-19 DIAGNOSIS — I65.23 CAROTID STENOSIS, ASYMPTOMATIC, BILATERAL: ICD-10-CM

## 2023-06-19 DIAGNOSIS — I73.9 PVD (PERIPHERAL VASCULAR DISEASE) WITH CLAUDICATION: Primary | ICD-10-CM

## 2023-06-19 PROCEDURE — 3079F DIAST BP 80-89 MM HG: CPT | Performed by: NURSE PRACTITIONER

## 2023-06-19 PROCEDURE — 3074F SYST BP LT 130 MM HG: CPT | Performed by: NURSE PRACTITIONER

## 2023-06-19 PROCEDURE — 99214 OFFICE O/P EST MOD 30 MIN: CPT | Performed by: NURSE PRACTITIONER

## 2023-06-19 RX ORDER — SODIUM CHLORIDE 9 MG/ML
100 INJECTION, SOLUTION INTRAVENOUS CONTINUOUS
OUTPATIENT
Start: 2023-06-19

## 2023-06-19 NOTE — PROGRESS NOTES
CVTS Office Progress Note     6/19/2023    Jessie Jordan  1950    Chief Complaint:    Chief Complaint   Patient presents with    Peripheral Vascular Disease       HPI:      PCP:  Elsa Fenton MD  Cardiology:  Dr. Spence    72 y.o. female with HTN(stable, increased risk stroke, rupture), Hyperlipidemia(stable, increased risk cardiovascular events) and COPD(stable, increased risk pulmonary complications) PVD, MARY.  former smoker and quit 2018 .  Right leg pain x2 years, underwent left-right fem-fem, fem-pop.  Follow up surveillance has remained stable.  Bilateral leg pain unrelated to activity or ambulation, chronic pain management.  Mild venous insuffiency.  Ambulation distance limited by breathing rather than claudication. Returns today follow up vascular imaging, claudication at long distances recent fall left leg gave out cervical fracture currently in c-collar follows up in 2 weeks with neurosurgery.  Returns today to discuss results from CTA.  No surgery planned at this time. no other associated signs, symptoms or modifying factors.    5/2018 MONTY:  RIGHT .32 bi/monophasic.  LEFT .47 biphasic.  6/2018 Carotid Duplex:  JENNIFER 0-49% antegrade vert.  LICA 0-49% antegrade vert.  6/2018 CTA Aorta runoff:  RIGHT common iliac 90%, SFA small moderate diffuse disease, tibial diffuse disease.  LEFT  Common iliac 70%, external iliac 80%, SFA occluded reconstitutes distal via profunda collaterals, diffuse tibials.  9/28/18  Agram:  PTA/Luminex Stent LEFT iliac artery:  RCIA occluded, RIIA/REIA moderate diffuse disease, RSFA occluded, RPT diffuse distal disease  10/11/18  MONTY:  RIGHT 0.50 Fem/Pop Biphasic  PT/DP Monophasic    12/19/18 LEFT to RIGHT femoral to femoral artery bypass and RIGHT FEMORAL POPLITEAL BYPASS (PTFE)  05/21/2019 Carotid Duplex: JENNIFER 0-49% with mobile plaque mPSV 120c/s Ratio 2.4, LICA 50-69% mPSV 141c/s Ratio 2.0, Hypoechoic structure left submandibular 9.3mm  05/21/2019 MONTY: Right 0.89  triphasic femoral to distal.  Left 0.75 femoral triphasic PT biphasic  05/21/2019 Graft Survey: Left groin 335, left groin anastomosis 324, proximal graft 183, mid graft 89, distal graft 89, right groin anastomosis 158, right groin 33.  All with triphasic waveforms.    12/3/19 Right 0.82 fem biphasic triphasic-distally.  Left 0.62 biphasic.   12/3/19 Carotid Duplex: JENNIFER 50-69% mPSV 117c/s Ratio 2.8, LICA 50-69% mPSV 162c/s Ratio 1.8, Antegrade vertebrals  6/2020 Carotid Duplex: JENNIFER 0-49% mPSV 122c/s Ratio 2.0, LICA 50-69% mPSV 157c/s Ratio 1.6, Antegrade vertebrals  6/2020 MONTY: Right 0.9 triphasic.  Left 0.81 triphasic.   6/2020 fem-Fem US: Patent, mPSV 439cm/s area appears widely patent.    6/2020 Right Fem-pop US: Patent, mPSV 155cm/s triphasic throughout  3/2021 Carotid Duplex: JENNIFER 50-69% mPSV 126c/s Ratio 1.9, LICA 0-49% mPSV 117c/s Ratio 1.5, Antegrade vertebrals  3/2021 MONTY: Right 0.92 triphasic.  Left 0.81 triphasic.\  4/2022 MONTY: Right 0.84 triphasic.  Left 0.73 triphasic  4/2022 Carotid Duplex: JENNIFER 0-49% mPSV 98c/s Ratio 1.7, LICA 0-49% mPSV 104c/s Ratio 1.6, Antegrade vertebrals  10/2022 MONTY: Right 0.87 triphasic pop, biphasic distally.  Left 0.69 triphasic pop biphasic distal  10/2022  Jose Art US: fem-fem graft patent, mPSV 334cm/s native inflow   4/2023 CTA neck: Right 50% left 30%  5/2023 MONTY: Right 0.72 triphasic.  Left 0.56 biphasic  6/2023 CTA Run off: Limited evaluation, poor contrast timing.       The following portions of the patient's history were reviewed and updated as appropriate: allergies, current medications, past family history, past medical history, past social history, past surgical history and problem list.  Recent images independently reviewed.  Available laboratory values reviewed.    PMH:  Past Medical History:   Diagnosis Date    Anxiety and depression     Coronary artery disease involving native coronary artery of native heart 12/06/2017    seen on Cardiac Cath - Left main  50-70% stenosis, 12/27/2017 repeat cath showed coronary spasm with no stenosis    Hyperlipidemia     Hypertension     Kidney cysts     Post-menopausal 1990    Skin cancer     Stroke 2015    Vascular disease      Past Surgical History:   Procedure Laterality Date    ARTERIOGRAM N/A 9/28/2018    Procedure: aortoiliac arteriogram possible angioplasty stent atherectomy thrombolysis bilateral iliac stents;  Surgeon: Luan Ruff MD;  Location: [facility] ANGIO INVASIVE LOCATION;  Service: Interventional Radiology    CARDIAC CATHETERIZATION  12/06/2017    Done at [facility] - Left Main 50-70% Stenosis - no stenting done, recommendation was urgent CABG.  EF 50-60%    CERVICAL FUSION  1985    C5-6    COLONOSCOPY N/A 6/29/2018    Procedure: COLONOSCOPY;  Surgeon: Oz Way MD;  Location: [facility] ENDOSCOPY;  Service: Gastroenterology    COLONOSCOPY N/A 7/6/2020    Procedure: COLONOSCOPY;  Surgeon: Oz Way MD;  Location: [facility] ENDOSCOPY;  Service: Gastroenterology;  Laterality: N/A;    ENDOSCOPY N/A 7/6/2020    Procedure: ESOPHAGOGASTRODUODENOSCOPY ASAP;  Surgeon: Oz Way MD;  Location: [facility] ENDOSCOPY;  Service: Gastroenterology;  Laterality: N/A;    ENDOSCOPY N/A 8/17/2021    Procedure: ESOPHAGOGASTRODUODENOSCOPY possible dilation;  Surgeon: Oz Way MD;  Location: [facility] ENDOSCOPY;  Service: Gastroenterology;  Laterality: N/A;    FEMORAL POPLITEAL BYPASS Right 12/19/2018    Procedure: femoral to femoral artery bypass, RIGHT FEMORAL POPLITEAL BYPASS right lower extremity arteriogram          (C-ARM# AND C-ARM TABLE);  Surgeon: Luan Ruff MD;  Location: [facility] OR;  Service: Vascular    FINGER SURGERY Left     third finger    ROTATOR CUFF REPAIR Left 06/30/2011    done in [place]     Family History   Problem Relation Age of Onset    Lung cancer Mother     Hypertension Mother     Diabetes Maternal Grandmother     Heart disease Maternal Grandmother     Hypertension  Maternal Grandfather     Heart disease Maternal Grandfather     Heart disease Other     Hypertension Other     Cancer Other      Social History     Tobacco Use    Smoking status: Light Smoker     Packs/day: 0.25     Years: 48.00     Pack years: 12.00     Types: Cigarettes     Last attempt to quit: 2018     Years since quittin.5    Smokeless tobacco: Never    Tobacco comments:     1-2cig/day   Vaping Use    Vaping Use: Never used   Substance Use Topics    Alcohol use: Yes     Comment: occasional beer     Drug use: No       ALLERGIES:  No Known Allergies      MEDICATIONS:    Current Outpatient Medications:     atorvastatin (LIPITOR) 10 MG tablet, Take 1 tablet by mouth Daily., Disp: 90 tablet, Rfl: 1    Calcium Carbonate-Vitamin D (calcium-vitamin D) 500-200 MG-UNIT tablet per tablet, Take 1 tablet by mouth Daily., Disp: 30 tablet, Rfl: 11    clopidogrel (PLAVIX) 75 MG tablet, Take 1 tablet by mouth Daily., Disp: 90 tablet, Rfl: 3    Diclofenac Sodium (VOLTAREN) 1 % gel gel, Apply 4 g topically to the appropriate area as directed 4 (Four) Times a Day As Needed (pain)., Disp: 100 g, Rfl: 3    ferrous sulfate 325 (65 FE) MG tablet, Take 1 tablet by mouth Daily With Breakfast., Disp: 90 tablet, Rfl: 1    FLUoxetine (PROzac) 20 MG capsule, Take 1 capsule by mouth Daily., Disp: 90 capsule, Rfl: 3    fluticasone (FLONASE) 50 MCG/ACT nasal spray, 2 sprays by Each Nare route Daily., Disp: 48 mL, Rfl: 3    folic acid (FOLVITE) 1 MG tablet, Take 1 tablet by mouth Daily., Disp: 90 tablet, Rfl: 1    gabapentin (NEURONTIN) 300 MG capsule, , Disp: , Rfl:     HYDROcodone-acetaminophen (NORCO)  MG per tablet, Take 1 tablet by mouth Every 6 (Six) Hours As Needed., Disp: , Rfl:     lidocaine (LIDODERM) 5 %, Place 1 patch on the skin as directed by provider Daily. Remove & Discard patch within 12 hours or as directed by MD, Disp: 6 each, Rfl: 0    lisinopril (PRINIVIL,ZESTRIL) 10 MG tablet, Take 1 tablet by mouth Daily.,  Disp: 90 tablet, Rfl: 1    Melatonin 5 MG capsule, Take 5 mg by mouth Every Night., Disp: 30 each, Rfl: 3    methocarbamol (ROBAXIN) 500 MG tablet, Take 1 tablet by mouth 3 (Three) Times a Day As Needed for Muscle Spasms., Disp: 90 tablet, Rfl: 3    naloxone (NARCAN) 4 MG/0.1ML nasal spray, ADMINISTER A SINGLE SPRAY IN ONE NOSTRIL UPON SIGNS OF OPIOID OVERDOSE. CALL 911. REPEAT AFTER 3 MINUTES IF NO RESPONSE., Disp: , Rfl:     pantoprazole (PROTONIX) 40 MG EC tablet, Take 1 tablet by mouth Daily., Disp: 90 tablet, Rfl: 1    varenicline (CHANTIX) 1 MG tablet, Take 1 tablet by mouth twice daily, Disp: 56 tablet, Rfl: 0    vitamin D (ERGOCALCIFEROL) 1.25 MG (92361 UT) capsule capsule, Take 1 capsule by mouth Every 7 (Seven) Days., Disp: 12 capsule, Rfl: 1      Review of Systems   Constitutional: Negative for decreased appetite.   HENT:  Negative for congestion, nosebleeds and sore throat.    Eyes:  Negative for blurred vision, visual disturbance and visual halos.   Cardiovascular:  Positive for claudication, dyspnea on exertion and leg swelling. Negative for chest pain, near-syncope and palpitations.   Respiratory:  Negative for cough, shortness of breath, sputum production and wheezing.    Endocrine: Negative for cold intolerance and polyuria.   Hematologic/Lymphatic: Negative for bleeding problem. Bruises/bleeds easily.        Does not report S/S of adverse bleeding event including nose bleeds, hematuria, melena, gingival bleeding, or hematemesis.   Skin:  Positive for unusual hair distribution. Negative for flushing and nail changes.   Musculoskeletal:  Positive for arthritis, back pain, joint pain and neck pain.   Gastrointestinal:  Positive for bowel incontinence. Negative for bloating, abdominal pain, hematemesis, melena, nausea and vomiting.   Genitourinary:  Negative for bladder incontinence, flank pain and hematuria.   Neurological:  Positive for dizziness, headaches and weakness. Negative for brief paralysis,  "difficulty with concentration, focal weakness, light-headedness, loss of balance, numbness, paresthesias and vertigo.        No amaurosis fugax, TIA, or CVA.     Psychiatric/Behavioral:  Negative for altered mental status, depression, memory loss, substance abuse and suicidal ideas.    Allergic/Immunologic: Negative for hives and persistent infections.       Vitals:    06/19/23 1427   BP: 120/80   BP Location: Left arm   Pulse: 81   SpO2: 98%   Weight: 54 kg (119 lb)   Height: 162.6 cm (64\")     Physical Exam   Constitutional: She is oriented to person, place, and time. She appears well-developed.   HENT:   Head: Normocephalic and atraumatic.   Eyes: Pupils are equal, round, and reactive to light. Conjunctivae are normal.   Cardiovascular: Normal rate.   No murmur heard.  Pulses:       Dorsalis pedis pulses are 1+ on the right side and 1+ on the left side.        Posterior tibial pulses are 1+ on the right side and 1+ on the left side.   RLE +2  LLE +1  R carotid bruit   Pulmonary/Chest: Effort normal and breath sounds normal. No respiratory distress.   Abdominal: Soft. Bowel sounds are normal. She exhibits no distension.   Musculoskeletal: Normal range of motion. Tenderness (both legs) present.      Comments: In c-collar   Neurological: She is alert and oriented to person, place, and time.   Skin: Skin is warm and dry. Capillary refill takes less than 2 seconds.   There is no evidence of skin breakdown of the bilateral lower extremities.  BLE pink, no evidence of ischemia.  Feet are absent of dependent rubor.   Psychiatric: Judgment normal.   Nursing note and vitals reviewed.    Assessment & Plan     Independent Review of Studies    1. PVD (peripheral vascular disease) with claudication  Limited evaluation CTA run off.     Detailed discussion with Jessie Jordan regarding situation, options and plans.  Severe lifestyle limiting claudication.  Noninvasive studies indicate severe peripheral vascular disease.  " Requires additional urgent evaluation with arteriography to evaluate options for improving blood flow to feet, healing wounds and avoiding limb loss.    Risks, including but not limited to:  pain, infection, bleeding, renal failure (dialysis), nerve or blood vessel injury, need for emergent open operation to restore blood flow.  Benefits: relief of symptoms, reduction in risk of limb loss, improved wound healing.  Options:  Medical therapy, alternative imaging discussed.  Understands and wishes to proceed with plan.    Abdominal Aortogram, bilateral lower extremity runoff, possible balloon angioplasty, thrombolysis, atherectomy or stent.  Local/IV sedation.  SDS.  7/20/23    2. Atheroscler of native artery of left leg with intermit claudication    - CT angio abdominal aorta bilat iliofem runoff w wo contrast; Future    3. Mixed hyperlipidemia  Treatment of hyperlipidemia prevents the progression of peripheral vascular disease and atherosclerosis in multiple beds.  Lipid-lowering therapy may improve claudication distance.  Statins are also considered mandatory in patients with PAD by virtue of reducing cardiovascular events as shown in the heart protection study.  Target LDL for PAD patients is less than 70 mg/dL.  Consideration of PCSK9 inhibitors and/or other adjuvant therapies and statin intolerant patients is recommended.  Continued follow up with PCP is recommended for patients on lipid lowering therapies.    Lab Results   Component Value Date    CHOL 165 06/09/2023    TRIG 89 06/09/2023    HDL 58 06/09/2023    LDL 91 06/09/2023       4. Carotid stenosis, asymptomatic, bilateral  CTA report from [facility] reviewed.  Right side 50%, left 30%.    No surgical intervention indicated at this time follow-up with interval imaging repeat carotid duplex in approximately 6 months    Remains on Plavix, statin              Detailed discussion regarding risks, benefits, and treatment plan. Images independently reviewed.  Patient understands, agrees, and wishes to proceed with plan.       This document has been electronically signed by Lino Bradley, AGACNP-BC @  On June 19, 2023 15:03 CDT            Answers for HPI/ROS submitted by the patient on 5/21/2023  What is the primary reason for your visit?: Neurological Problem  clumsiness: No  focal sensory loss: Yes  slurred speech: No  syncope: No  visual change: No  Chronicity: recurrent  Onset: more than 1 year ago  Onset quality: gradually  Progression since onset: waxing and waning  Focality: lower extremity, upper extremity  auditory change: No  aura: Yes  confusion: No  Treatments tried: medication, neck support, position change  Improvement on treatment: moderate    Answers submitted by the patient for this visit:  Other (Submitted on 6/18/2023)  Please describe your symptoms.: Test results?  Have you had these symptoms before?: Yes  How long have you been having these symptoms?: Greater than 2 weeks  Please list any medications you are currently taking for this condition.: On file  Please describe any probable cause for these symptoms. : Seeing Dr to find out what we need to do  Primary Reason for Visit (Submitted on 6/18/2023)  What is the primary reason for your visit?: Other

## 2023-07-20 ENCOUNTER — HOSPITAL ENCOUNTER (OUTPATIENT)
Facility: HOSPITAL | Age: 73
Setting detail: HOSPITAL OUTPATIENT SURGERY
Discharge: HOME | End: 2023-07-20
Attending: THORACIC SURGERY (CARDIOTHORACIC VASCULAR SURGERY) | Admitting: THORACIC SURGERY (CARDIOTHORACIC VASCULAR SURGERY)
Payer: MEDICARE

## 2023-07-20 ENCOUNTER — APPOINTMENT (OUTPATIENT)
Dept: ULTRASOUND IMAGING | Facility: HOSPITAL | Age: 73
End: 2023-07-20
Payer: MEDICARE

## 2023-07-20 VITALS
OXYGEN SATURATION: 97 % | RESPIRATION RATE: 18 BRPM | TEMPERATURE: 96.6 F | WEIGHT: 114.64 LBS | HEART RATE: 65 BPM | DIASTOLIC BLOOD PRESSURE: 67 MMHG | SYSTOLIC BLOOD PRESSURE: 147 MMHG | HEIGHT: 64 IN | BODY MASS INDEX: 19.57 KG/M2

## 2023-07-20 DIAGNOSIS — I70.211 ATHEROSCLER OF NATIVE ARTERY OF RIGHT LEG WITH INTERMIT CLAUDICATION: ICD-10-CM

## 2023-07-20 DIAGNOSIS — I73.9 PVD (PERIPHERAL VASCULAR DISEASE) WITH CLAUDICATION: ICD-10-CM

## 2023-07-20 PROCEDURE — C1887 CATHETER, GUIDING: HCPCS | Performed by: THORACIC SURGERY (CARDIOTHORACIC VASCULAR SURGERY)

## 2023-07-20 PROCEDURE — 25010000002 MIDAZOLAM PER 1 MG: Performed by: THORACIC SURGERY (CARDIOTHORACIC VASCULAR SURGERY)

## 2023-07-20 PROCEDURE — C1760 CLOSURE DEV, VASC: HCPCS | Performed by: THORACIC SURGERY (CARDIOTHORACIC VASCULAR SURGERY)

## 2023-07-20 PROCEDURE — 76937 US GUIDE VASCULAR ACCESS: CPT

## 2023-07-20 PROCEDURE — 75716 ARTERY X-RAYS ARMS/LEGS: CPT | Performed by: THORACIC SURGERY (CARDIOTHORACIC VASCULAR SURGERY)

## 2023-07-20 PROCEDURE — 36200 PLACE CATHETER IN AORTA: CPT | Performed by: THORACIC SURGERY (CARDIOTHORACIC VASCULAR SURGERY)

## 2023-07-20 PROCEDURE — 25510000001 IOPAMIDOL 61 % SOLUTION: Performed by: THORACIC SURGERY (CARDIOTHORACIC VASCULAR SURGERY)

## 2023-07-20 PROCEDURE — 25010000002 FENTANYL CITRATE (PF) 50 MCG/ML SOLUTION: Performed by: THORACIC SURGERY (CARDIOTHORACIC VASCULAR SURGERY)

## 2023-07-20 PROCEDURE — C1894 INTRO/SHEATH, NON-LASER: HCPCS | Performed by: THORACIC SURGERY (CARDIOTHORACIC VASCULAR SURGERY)

## 2023-07-20 RX ORDER — ACETAMINOPHEN 325 MG/1
650 TABLET ORAL EVERY 4 HOURS PRN
Status: DISCONTINUED | OUTPATIENT
Start: 2023-07-20 | End: 2023-07-20 | Stop reason: HOSPADM

## 2023-07-20 RX ORDER — MIDAZOLAM HYDROCHLORIDE 1 MG/ML
INJECTION INTRAMUSCULAR; INTRAVENOUS AS NEEDED
Status: DISCONTINUED | OUTPATIENT
Start: 2023-07-20 | End: 2023-07-20 | Stop reason: HOSPADM

## 2023-07-20 RX ORDER — SODIUM CHLORIDE 9 MG/ML
100 INJECTION, SOLUTION INTRAVENOUS CONTINUOUS
Status: DISCONTINUED | OUTPATIENT
Start: 2023-07-20 | End: 2023-07-20 | Stop reason: HOSPADM

## 2023-07-20 RX ORDER — FENTANYL CITRATE 50 UG/ML
INJECTION, SOLUTION INTRAMUSCULAR; INTRAVENOUS AS NEEDED
Status: DISCONTINUED | OUTPATIENT
Start: 2023-07-20 | End: 2023-07-20 | Stop reason: HOSPADM

## 2023-07-20 RX ORDER — LIDOCAINE HYDROCHLORIDE 20 MG/ML
INJECTION, SOLUTION EPIDURAL; INFILTRATION; INTRACAUDAL; PERINEURAL
Status: DISCONTINUED
Start: 2023-07-20 | End: 2023-07-20 | Stop reason: HOSPADM

## 2023-07-20 RX ORDER — FENTANYL CITRATE 50 UG/ML
INJECTION, SOLUTION INTRAMUSCULAR; INTRAVENOUS
Status: DISCONTINUED
Start: 2023-07-20 | End: 2023-07-20 | Stop reason: HOSPADM

## 2023-07-20 RX ORDER — HEPARIN SODIUM 1000 [USP'U]/ML
INJECTION, SOLUTION INTRAVENOUS; SUBCUTANEOUS
Status: DISCONTINUED
Start: 2023-07-20 | End: 2023-07-20 | Stop reason: WASHOUT

## 2023-07-20 RX ORDER — LIDOCAINE HYDROCHLORIDE 20 MG/ML
INJECTION, SOLUTION EPIDURAL; INFILTRATION; INTRACAUDAL; PERINEURAL AS NEEDED
Status: DISCONTINUED | OUTPATIENT
Start: 2023-07-20 | End: 2023-07-20 | Stop reason: HOSPADM

## 2023-07-20 RX ORDER — MIDAZOLAM HYDROCHLORIDE 1 MG/ML
INJECTION INTRAMUSCULAR; INTRAVENOUS
Status: DISCONTINUED
Start: 2023-07-20 | End: 2023-07-20 | Stop reason: HOSPADM

## 2023-07-20 RX ADMIN — SODIUM CHLORIDE 100 ML/HR: 9 INJECTION, SOLUTION INTRAVENOUS at 06:47

## 2023-07-20 NOTE — Clinical Note
Hemostasis started on the left femoral artery. StarClose SE was used in achieving hemostasis. Closure device deployed in the vessel. Hemostasis achieved successfully.

## 2023-07-20 NOTE — H&P
Jessie Jordan  1950     Chief Complaint:        Chief Complaint   Patient presents with    Peripheral Vascular Disease         HPI:       PCP:  Elsa Fenton MD  Cardiology:  Dr. Spence     72 y.o. female with HTN(stable, increased risk stroke, rupture), Hyperlipidemia(stable, increased risk cardiovascular events) and COPD(stable, increased risk pulmonary complications) PVD, MARY.  former smoker and quit 2018 .  Right leg pain x2 years, underwent left-right fem-fem, fem-pop.  Follow up surveillance has remained stable.  Bilateral leg pain unrelated to activity or ambulation, chronic pain management.  Mild venous insuffiency.  Ambulation distance limited by breathing rather than claudication. Returns today follow up vascular imaging, claudication at long distances recent fall left leg gave out cervical fracture currently in c-collar follows up in 2 weeks with neurosurgery.  Returns today to discuss results from CTA.  No surgery planned at this time. no other associated signs, symptoms or modifying factors.     5/2018 MONTY:  RIGHT .32 bi/monophasic.  LEFT .47 biphasic.  6/2018 Carotid Duplex:  JENNIFER 0-49% antegrade vert.  LICA 0-49% antegrade vert.  6/2018 CTA Aorta runoff:  RIGHT common iliac 90%, SFA small moderate diffuse disease, tibial diffuse disease.  LEFT  Common iliac 70%, external iliac 80%, SFA occluded reconstitutes distal via profunda collaterals, diffuse tibials.  9/28/18  Agram:  PTA/Luminex Stent LEFT iliac artery:  RCIA occluded, RIIA/REIA moderate diffuse disease, RSFA occluded, RPT diffuse distal disease  10/11/18  MONTY:  RIGHT 0.50 Fem/Pop Biphasic  PT/DP Monophasic    12/19/18 LEFT to RIGHT femoral to femoral artery bypass and RIGHT FEMORAL POPLITEAL BYPASS (PTFE)  05/21/2019 Carotid Duplex: JENNIFER 0-49% with mobile plaque mPSV 120c/s Ratio 2.4, LICA 50-69% mPSV 141c/s Ratio 2.0, Hypoechoic structure left submandibular 9.3mm  05/21/2019 MONTY: Right 0.89 triphasic femoral to distal.  Left  0.75 femoral triphasic PT biphasic  05/21/2019 Graft Survey: Left groin 335, left groin anastomosis 324, proximal graft 183, mid graft 89, distal graft 89, right groin anastomosis 158, right groin 33.  All with triphasic waveforms.    12/3/19 Right 0.82 fem biphasic triphasic-distally.  Left 0.62 biphasic.   12/3/19 Carotid Duplex: JENNIFER 50-69% mPSV 117c/s Ratio 2.8, LICA 50-69% mPSV 162c/s Ratio 1.8, Antegrade vertebrals  6/2020 Carotid Duplex: JENNIFER 0-49% mPSV 122c/s Ratio 2.0, LICA 50-69% mPSV 157c/s Ratio 1.6, Antegrade vertebrals  6/2020 MONTY: Right 0.9 triphasic.  Left 0.81 triphasic.   6/2020 fem-Fem US: Patent, mPSV 439cm/s area appears widely patent.    6/2020 Right Fem-pop US: Patent, mPSV 155cm/s triphasic throughout  3/2021 Carotid Duplex: JENNIFER 50-69% mPSV 126c/s Ratio 1.9, LICA 0-49% mPSV 117c/s Ratio 1.5, Antegrade vertebrals  3/2021 MONTY: Right 0.92 triphasic.  Left 0.81 triphasic.\  4/2022 MONTY: Right 0.84 triphasic.  Left 0.73 triphasic  4/2022 Carotid Duplex: JENNIFER 0-49% mPSV 98c/s Ratio 1.7, LICA 0-49% mPSV 104c/s Ratio 1.6, Antegrade vertebrals  10/2022 MONTY: Right 0.87 triphasic pop, biphasic distally.  Left 0.69 triphasic pop biphasic distal  10/2022  Jose Art US: fem-fem graft patent, mPSV 334cm/s native inflow   4/2023 CTA neck: Right 50% left 30%  5/2023 MONTY: Right 0.72 triphasic.  Left 0.56 biphasic  6/2023 CTA Run off: Limited evaluation, poor contrast timing.         The following portions of the patient's history were reviewed and updated as appropriate: allergies, current medications, past family history, past medical history, past social history, past surgical history and problem list.  Recent images independently reviewed.  Available laboratory values reviewed.     PMH:  Medical History        Past Medical History:   Diagnosis Date    Anxiety and depression      Coronary artery disease involving native coronary artery of native heart 12/06/2017     seen on Cardiac Cath - Left main 50-70%  stenosis, 12/27/2017 repeat cath showed coronary spasm with no stenosis    Hyperlipidemia      Hypertension      Kidney cysts      Post-menopausal 1990    Skin cancer      Stroke 2015    Vascular disease           Surgical History         Past Surgical History:   Procedure Laterality Date    ARTERIOGRAM N/A 9/28/2018     Procedure: aortoiliac arteriogram possible angioplasty stent atherectomy thrombolysis bilateral iliac stents;  Surgeon: Luan Ruff MD;  Location: [facility] ANGIO INVASIVE LOCATION;  Service: Interventional Radiology    CARDIAC CATHETERIZATION   12/06/2017     Done at [facility] - Left Main 50-70% Stenosis - no stenting done, recommendation was urgent CABG.  EF 50-60%    CERVICAL FUSION   1985     C5-6    COLONOSCOPY N/A 6/29/2018     Procedure: COLONOSCOPY;  Surgeon: Oz Way MD;  Location: [facility] ENDOSCOPY;  Service: Gastroenterology    COLONOSCOPY N/A 7/6/2020     Procedure: COLONOSCOPY;  Surgeon: Oz Way MD;  Location: [facility] ENDOSCOPY;  Service: Gastroenterology;  Laterality: N/A;    ENDOSCOPY N/A 7/6/2020     Procedure: ESOPHAGOGASTRODUODENOSCOPY ASAP;  Surgeon: Oz Way MD;  Location: [facility] ENDOSCOPY;  Service: Gastroenterology;  Laterality: N/A;    ENDOSCOPY N/A 8/17/2021     Procedure: ESOPHAGOGASTRODUODENOSCOPY possible dilation;  Surgeon: Oz Way MD;  Location: [facility] ENDOSCOPY;  Service: Gastroenterology;  Laterality: N/A;    FEMORAL POPLITEAL BYPASS Right 12/19/2018     Procedure: femoral to femoral artery bypass, RIGHT FEMORAL POPLITEAL BYPASS right lower extremity arteriogram          (C-ARM# AND C-ARM TABLE);  Surgeon: Luan Ruff MD;  Location: [facility] OR;  Service: Vascular    FINGER SURGERY Left       third finger    ROTATOR CUFF REPAIR Left 06/30/2011     done in [place]               Family History   Problem Relation Age of Onset    Lung cancer Mother      Hypertension Mother      Diabetes Maternal Grandmother       Heart disease Maternal Grandmother      Hypertension Maternal Grandfather      Heart disease Maternal Grandfather      Heart disease Other      Hypertension Other      Cancer Other        Social History            Tobacco Use    Smoking status: Light Smoker       Packs/day: 0.25       Years: 48.00       Pack years: 12.00       Types: Cigarettes       Last attempt to quit: 2018       Years since quittin.5    Smokeless tobacco: Never    Tobacco comments:       1-2cig/day   Vaping Use    Vaping Use: Never used   Substance Use Topics    Alcohol use: Yes       Comment: occasional beer     Drug use: No         ALLERGIES:  No Known Allergies        MEDICATIONS:     Current Outpatient Medications:     atorvastatin (LIPITOR) 10 MG tablet, Take 1 tablet by mouth Daily., Disp: 90 tablet, Rfl: 1    Calcium Carbonate-Vitamin D (calcium-vitamin D) 500-200 MG-UNIT tablet per tablet, Take 1 tablet by mouth Daily., Disp: 30 tablet, Rfl: 11    clopidogrel (PLAVIX) 75 MG tablet, Take 1 tablet by mouth Daily., Disp: 90 tablet, Rfl: 3    Diclofenac Sodium (VOLTAREN) 1 % gel gel, Apply 4 g topically to the appropriate area as directed 4 (Four) Times a Day As Needed (pain)., Disp: 100 g, Rfl: 3    ferrous sulfate 325 (65 FE) MG tablet, Take 1 tablet by mouth Daily With Breakfast., Disp: 90 tablet, Rfl: 1    FLUoxetine (PROzac) 20 MG capsule, Take 1 capsule by mouth Daily., Disp: 90 capsule, Rfl: 3    fluticasone (FLONASE) 50 MCG/ACT nasal spray, 2 sprays by Each Nare route Daily., Disp: 48 mL, Rfl: 3    folic acid (FOLVITE) 1 MG tablet, Take 1 tablet by mouth Daily., Disp: 90 tablet, Rfl: 1    gabapentin (NEURONTIN) 300 MG capsule, , Disp: , Rfl:     HYDROcodone-acetaminophen (NORCO)  MG per tablet, Take 1 tablet by mouth Every 6 (Six) Hours As Needed., Disp: , Rfl:     lidocaine (LIDODERM) 5 %, Place 1 patch on the skin as directed by provider Daily. Remove & Discard patch within 12 hours or as directed by MD, Disp: 6  each, Rfl: 0    lisinopril (PRINIVIL,ZESTRIL) 10 MG tablet, Take 1 tablet by mouth Daily., Disp: 90 tablet, Rfl: 1    Melatonin 5 MG capsule, Take 5 mg by mouth Every Night., Disp: 30 each, Rfl: 3    methocarbamol (ROBAXIN) 500 MG tablet, Take 1 tablet by mouth 3 (Three) Times a Day As Needed for Muscle Spasms., Disp: 90 tablet, Rfl: 3    naloxone (NARCAN) 4 MG/0.1ML nasal spray, ADMINISTER A SINGLE SPRAY IN ONE NOSTRIL UPON SIGNS OF OPIOID OVERDOSE. CALL 911. REPEAT AFTER 3 MINUTES IF NO RESPONSE., Disp: , Rfl:     pantoprazole (PROTONIX) 40 MG EC tablet, Take 1 tablet by mouth Daily., Disp: 90 tablet, Rfl: 1    varenicline (CHANTIX) 1 MG tablet, Take 1 tablet by mouth twice daily, Disp: 56 tablet, Rfl: 0    vitamin D (ERGOCALCIFEROL) 1.25 MG (72640 UT) capsule capsule, Take 1 capsule by mouth Every 7 (Seven) Days., Disp: 12 capsule, Rfl: 1        Review of Systems   Constitutional: Negative for decreased appetite.   HENT:  Negative for congestion, nosebleeds and sore throat.    Eyes:  Negative for blurred vision, visual disturbance and visual halos.   Cardiovascular:  Positive for claudication, dyspnea on exertion and leg swelling. Negative for chest pain, near-syncope and palpitations.   Respiratory:  Negative for cough, shortness of breath, sputum production and wheezing.    Endocrine: Negative for cold intolerance and polyuria.   Hematologic/Lymphatic: Negative for bleeding problem. Bruises/bleeds easily.        Does not report S/S of adverse bleeding event including nose bleeds, hematuria, melena, gingival bleeding, or hematemesis.   Skin:  Positive for unusual hair distribution. Negative for flushing and nail changes.   Musculoskeletal:  Positive for arthritis, back pain, joint pain and neck pain.   Gastrointestinal:  Positive for bowel incontinence. Negative for bloating, abdominal pain, hematemesis, melena, nausea and vomiting.   Genitourinary:  Negative for bladder incontinence, flank pain and hematuria.  "  Neurological:  Positive for dizziness, headaches and weakness. Negative for brief paralysis, difficulty with concentration, focal weakness, light-headedness, loss of balance, numbness, paresthesias and vertigo.        No amaurosis fugax, TIA, or CVA.     Psychiatric/Behavioral:  Negative for altered mental status, depression, memory loss, substance abuse and suicidal ideas.    Allergic/Immunologic: Negative for hives and persistent infections.         Vitals       Vitals:     06/19/23 1427   BP: 120/80   BP Location: Left arm   Pulse: 81   SpO2: 98%   Weight: 54 kg (119 lb)   Height: 162.6 cm (64\")         Physical Exam   Constitutional: She is oriented to person, place, and time. She appears well-developed.   HENT:   Head: Normocephalic and atraumatic.   Eyes: Pupils are equal, round, and reactive to light. Conjunctivae are normal.   Cardiovascular: Normal rate.   No murmur heard.  Pulses:       Dorsalis pedis pulses are 1+ on the right side and 1+ on the left side.        Posterior tibial pulses are 1+ on the right side and 1+ on the left side.   RLE +2  LLE +1  R carotid bruit   Pulmonary/Chest: Effort normal and breath sounds normal. No respiratory distress.   Abdominal: Soft. Bowel sounds are normal. She exhibits no distension.   Musculoskeletal: Normal range of motion. Tenderness (both legs) present.      Comments: In c-collar   Neurological: She is alert and oriented to person, place, and time.   Skin: Skin is warm and dry. Capillary refill takes less than 2 seconds.   There is no evidence of skin breakdown of the bilateral lower extremities.  BLE pink, no evidence of ischemia.  Feet are absent of dependent rubor.   Psychiatric: Judgment normal.   Nursing note and vitals reviewed.     Assessment & Plan      Independent Review of Studies     1. PVD (peripheral vascular disease) with claudication  Limited evaluation CTA run off.      Detailed discussion with Jessie Jordan regarding situation, options and " plans.  Severe lifestyle limiting claudication.  Noninvasive studies indicate severe peripheral vascular disease.  Requires additional urgent evaluation with arteriography to evaluate options for improving blood flow to feet, healing wounds and avoiding limb loss.     Risks, including but not limited to:  pain, infection, bleeding, renal failure (dialysis), nerve or blood vessel injury, need for emergent open operation to restore blood flow.  Benefits: relief of symptoms, reduction in risk of limb loss, improved wound healing.  Options:  Medical therapy, alternative imaging discussed.  Understands and wishes to proceed with plan.     Abdominal Aortogram, bilateral lower extremity runoff, possible balloon angioplasty, thrombolysis, atherectomy or stent.  Local/IV sedation.  SDS.  7/20/23     2. Atheroscler of native artery of left leg with intermit claudication     - CT angio abdominal aorta bilat iliofem runoff w wo contrast; Future     3. Mixed hyperlipidemia  Treatment of hyperlipidemia prevents the progression of peripheral vascular disease and atherosclerosis in multiple beds.  Lipid-lowering therapy may improve claudication distance.  Statins are also considered mandatory in patients with PAD by virtue of reducing cardiovascular events as shown in the heart protection study.  Target LDL for PAD patients is less than 70 mg/dL.  Consideration of PCSK9 inhibitors and/or other adjuvant therapies and statin intolerant patients is recommended.  Continued follow up with PCP is recommended for patients on lipid lowering therapies.           Lab Results   Component Value Date     CHOL 165 06/09/2023     TRIG 89 06/09/2023     HDL 58 06/09/2023     LDL 91 06/09/2023         4. Carotid stenosis, asymptomatic, bilateral  CTA report from [facility] reviewed.  Right side 50%, left 30%.     No surgical intervention indicated at this time follow-up with interval imaging repeat carotid duplex in approximately 6 months      Remains on Plavix, statin                    Detailed discussion regarding risks, benefits, and treatment plan. Images independently reviewed. Patient understands, agrees, and wishes to proceed with plan.           This document has been electronically signed by Luan Ruff MD on July 20, 2023 06:54 CDT

## 2023-07-20 NOTE — Clinical Note
Allergies reviewed.  H&P note has been confirmed for the patient. Procedural consent has been signed. Per AM she would like charge for A1C from today removed.

## 2023-08-07 ENCOUNTER — OFFICE VISIT (OUTPATIENT)
Dept: CARDIAC SURGERY | Facility: CLINIC | Age: 73
End: 2023-08-07
Payer: MEDICARE

## 2023-08-07 ENCOUNTER — LAB (OUTPATIENT)
Dept: LAB | Facility: HOSPITAL | Age: 73
End: 2023-08-07
Payer: MEDICARE

## 2023-08-07 VITALS
WEIGHT: 112.2 LBS | HEIGHT: 64 IN | DIASTOLIC BLOOD PRESSURE: 84 MMHG | SYSTOLIC BLOOD PRESSURE: 128 MMHG | TEMPERATURE: 98.4 F | OXYGEN SATURATION: 98 % | HEART RATE: 70 BPM | BODY MASS INDEX: 19.15 KG/M2

## 2023-08-07 DIAGNOSIS — M81.0 OSTEOPOROSIS, POST-MENOPAUSAL: ICD-10-CM

## 2023-08-07 DIAGNOSIS — I63.9 CEREBROVASCULAR ACCIDENT (CVA), UNSPECIFIED MECHANISM: ICD-10-CM

## 2023-08-07 DIAGNOSIS — I25.10 CORONARY ARTERY DISEASE INVOLVING NATIVE CORONARY ARTERY OF NATIVE HEART WITHOUT ANGINA PECTORIS: ICD-10-CM

## 2023-08-07 DIAGNOSIS — I73.9 PVD (PERIPHERAL VASCULAR DISEASE): ICD-10-CM

## 2023-08-07 DIAGNOSIS — G89.4 CHRONIC PAIN SYNDROME: Primary | ICD-10-CM

## 2023-08-07 DIAGNOSIS — I65.23 CAROTID STENOSIS, ASYMPTOMATIC, BILATERAL: ICD-10-CM

## 2023-08-07 DIAGNOSIS — E78.2 MIXED HYPERLIPIDEMIA: ICD-10-CM

## 2023-08-07 DIAGNOSIS — F17.218 NICOTINE DEPENDENCE, CIGARETTES, WITH OTHER NICOTINE-INDUCED DISORDERS: ICD-10-CM

## 2023-08-07 LAB
ALBUMIN SERPL-MCNC: 4.3 G/DL (ref 3.5–5.2)
ALBUMIN/GLOB SERPL: 1.2 G/DL
ALP SERPL-CCNC: 71 U/L (ref 39–117)
ALT SERPL W P-5'-P-CCNC: 5 U/L (ref 1–33)
ANION GAP SERPL CALCULATED.3IONS-SCNC: 14 MMOL/L (ref 5–15)
AST SERPL-CCNC: 23 U/L (ref 1–32)
BILIRUB SERPL-MCNC: 0.3 MG/DL (ref 0–1.2)
BUN SERPL-MCNC: 15 MG/DL (ref 8–23)
BUN/CREAT SERPL: 13 (ref 7–25)
CALCIUM SPEC-SCNC: 9.7 MG/DL (ref 8.6–10.5)
CHLORIDE SERPL-SCNC: 100 MMOL/L (ref 98–107)
CO2 SERPL-SCNC: 25 MMOL/L (ref 22–29)
CREAT SERPL-MCNC: 1.15 MG/DL (ref 0.57–1)
EGFRCR SERPLBLD CKD-EPI 2021: 50.7 ML/MIN/1.73
GLOBULIN UR ELPH-MCNC: 3.7 GM/DL
GLUCOSE SERPL-MCNC: 104 MG/DL (ref 65–99)
MAGNESIUM SERPL-MCNC: 2 MG/DL (ref 1.6–2.4)
PHOSPHATE SERPL-MCNC: 3.7 MG/DL (ref 2.5–4.5)
POTASSIUM SERPL-SCNC: 4.1 MMOL/L (ref 3.5–5.2)
PROT SERPL-MCNC: 8 G/DL (ref 6–8.5)
SODIUM SERPL-SCNC: 139 MMOL/L (ref 136–145)

## 2023-08-07 PROCEDURE — 3079F DIAST BP 80-89 MM HG: CPT | Performed by: THORACIC SURGERY (CARDIOTHORACIC VASCULAR SURGERY)

## 2023-08-07 PROCEDURE — 82306 VITAMIN D 25 HYDROXY: CPT

## 2023-08-07 PROCEDURE — 1160F RVW MEDS BY RX/DR IN RCRD: CPT | Performed by: THORACIC SURGERY (CARDIOTHORACIC VASCULAR SURGERY)

## 2023-08-07 PROCEDURE — 99214 OFFICE O/P EST MOD 30 MIN: CPT | Performed by: THORACIC SURGERY (CARDIOTHORACIC VASCULAR SURGERY)

## 2023-08-07 PROCEDURE — 80053 COMPREHEN METABOLIC PANEL: CPT

## 2023-08-07 PROCEDURE — 3074F SYST BP LT 130 MM HG: CPT | Performed by: THORACIC SURGERY (CARDIOTHORACIC VASCULAR SURGERY)

## 2023-08-07 PROCEDURE — 1159F MED LIST DOCD IN RCRD: CPT | Performed by: THORACIC SURGERY (CARDIOTHORACIC VASCULAR SURGERY)

## 2023-08-07 PROCEDURE — 83735 ASSAY OF MAGNESIUM: CPT

## 2023-08-07 PROCEDURE — 84100 ASSAY OF PHOSPHORUS: CPT

## 2023-08-07 RX ORDER — CILOSTAZOL 100 MG/1
100 TABLET ORAL
Qty: 60 TABLET | Refills: 11 | Status: SHIPPED | OUTPATIENT
Start: 2023-08-07 | End: 2024-08-06

## 2023-08-07 NOTE — PROGRESS NOTES
10/10/2023    Jessie Jordan  1950    Chief Complaint:    Chief Complaint   Patient presents with    Peripheral Vascular Disease       HPI:      PCP:  Elsa Fenton MD  Cardiology:  Dr. Spence    73y.o. female with HTN(stable, increased risk stroke, rupture), Hyperlipidemia(stable, increased risk cardiovascular events) and COPD(stable, increased risk pulmonary complications) PVD, MARY.  former smoker and quit 2018.  Right leg pain x2 years, underwent left-right fem-fem, fem-pop.  Follow up surveillance has remained stable.  Bilateral leg pain unrelated to activity or ambulation, chronic pain management.  Mild venous insuffiency.  Ambulation distance limited by breathing rather than claudication. Returns today follow up vascular imaging, claudication at long distances recent fall left leg gave out cervical fracture currently in c-collar follows up in 2 weeks with neurosurgery.  Returns today to discuss results from CTA.  No surgery planned at this time. no other associated signs, symptoms or modifying factors.    5/2018 MONTY:  RIGHT .32 bi/monophasic.  LEFT .47 biphasic.  6/2018 Carotid Duplex:  JENNIFER 0-49% antegrade vert.  LICA 0-49% antegrade vert.  6/2018 CTA Aorta runoff:  RIGHT common iliac 90%, SFA small moderate diffuse disease, tibial diffuse disease.  LEFT  Common iliac 70%, external iliac 80%, SFA occluded reconstitutes distal via profunda collaterals, diffuse tibials.  9/2018  Agram:  PTA/Luminex Stent LEFT iliac artery:  RCIA occluded, RIIA/REIA moderate diffuse disease, RSFA occluded, RPT diffuse distal disease  10/2018  MONTY:  RIGHT 0.50 Fem/Pop Biphasic  PT/DP Monophasic    12/2018 LEFT to RIGHT femoral to femoral artery bypass and RIGHT FEMORAL POPLITEAL BYPASS (PTFE)  5/2019 Carotid Duplex: JENNIFER 0-49% with mobile plaque mPSV 120c/s Ratio 2.4, LICA 50-69% mPSV 141c/s Ratio 2.0, Hypoechoic structure left submandibular 9.3mm  05/21/2019 MONTY: Right 0.89 triphasic femoral to distal.  Left 0.75  femoral triphasic PT biphasic  5/2019 Graft Survey: Left groin 335, left groin anastomosis 324, proximal graft 183, mid graft 89, distal graft 89, right groin anastomosis 158, right groin 33.  All with triphasic waveforms.    12/2019 Right 0.82 fem biphasic triphasic-distally.  Left 0.62 biphasic.   12/2019 Carotid Duplex: JENNIFER 50-69% mPSV 117c/s Ratio 2.8, LICA 50-69% mPSV 162c/s Ratio 1.8, Antegrade vertebrals  6/2020 Carotid Duplex: JENNIFER 0-49% mPSV 122c/s Ratio 2.0, LICA 50-69% mPSV 157c/s Ratio 1.6, Antegrade vertebrals  6/2020 MONTY: Right 0.9 triphasic.  Left 0.81 triphasic.   6/2020 fem-Fem US: Patent, mPSV 439cm/s area appears widely patent.    6/2020 Right Fem-pop US: Patent, mPSV 155cm/s triphasic throughout  3/2021 Carotid Duplex: JENNIFER 50-69% mPSV 126c/s Ratio 1.9, LICA 0-49% mPSV 117c/s Ratio 1.5, Antegrade vertebrals  3/2021 MONTY: Right 0.92 triphasic.  Left 0.81 triphasic.\  4/2022 MONTY: Right 0.84 triphasic.  Left 0.73 triphasic  4/2022 Carotid Duplex: EJNNIFER 0-49% mPSV 98c/s Ratio 1.7, LICA 0-49% mPSV 104c/s Ratio 1.6, Antegrade vertebrals  10/2022 MONTY: Right 0.87 triphasic pop, biphasic distally.  Left 0.69 triphasic pop biphasic distal  10/2022  Jose Art US: fem-fem graft patent, mPSV 334cm/s native inflow   4/2023 CTA neck: Right 50% left 30%  5/2023 MONTY: Right 0.72 triphasic.  Left 0.56 biphasic  6/2023 CTA Run off: Limited evaluation, poor contrast timing.   7/2023 Arteriogram:  RIGHT SFA occluded prox, fempop graft widely patent, tibials diffusely small.  LEFT SFA occluded prox.    The following portions of the patient's history were reviewed and updated as appropriate: allergies, current medications, past family history, past medical history, past social history, past surgical history and problem list.  Recent images independently reviewed.  Available laboratory values reviewed.    PMH:  Past Medical History:   Diagnosis Date    Anxiety and depression     Coronary artery disease involving native  coronary artery of native heart 12/06/2017    seen on Cardiac Cath - Left main 50-70% stenosis, 12/27/2017 repeat cath showed coronary spasm with no stenosis    Hyperlipidemia     Hypertension     Kidney cysts     Post-menopausal 1990    Skin cancer     Stroke 2015    Vascular disease      Past Surgical History:   Procedure Laterality Date    ARTERIOGRAM N/A 9/28/2018    Procedure: aortoiliac arteriogram possible angioplasty stent atherectomy thrombolysis bilateral iliac stents;  Surgeon: Luan Ruff MD;  Location: [facility] ANGIO INVASIVE LOCATION;  Service: Interventional Radiology    ARTERIOGRAM Right 7/20/2023    Procedure: Arteriogram;  Surgeon: Luan Ruff MD;  Location: [facility] ANGIO INVASIVE LOCATION;  Service: Interventional Radiology;  Laterality: Right;    CARDIAC CATHETERIZATION  12/06/2017    Done at [facility] - Left Main 50-70% Stenosis - no stenting done, recommendation was urgent CABG.  EF 50-60%    CERVICAL FUSION  1985    C5-6    COLONOSCOPY N/A 6/29/2018    Procedure: COLONOSCOPY;  Surgeon: Oz Way MD;  Location: [facility] ENDOSCOPY;  Service: Gastroenterology    COLONOSCOPY N/A 7/6/2020    Procedure: COLONOSCOPY;  Surgeon: Oz Way MD;  Location: [facility] ENDOSCOPY;  Service: Gastroenterology;  Laterality: N/A;    ENDOSCOPY N/A 7/6/2020    Procedure: ESOPHAGOGASTRODUODENOSCOPY ASAP;  Surgeon: Oz Way MD;  Location: [facility] ENDOSCOPY;  Service: Gastroenterology;  Laterality: N/A;    ENDOSCOPY N/A 8/17/2021    Procedure: ESOPHAGOGASTRODUODENOSCOPY possible dilation;  Surgeon: Oz Way MD;  Location: [facility] ENDOSCOPY;  Service: Gastroenterology;  Laterality: N/A;    FEMORAL POPLITEAL BYPASS Right 12/19/2018    Procedure: femoral to femoral artery bypass, RIGHT FEMORAL POPLITEAL BYPASS right lower extremity arteriogram          (C-ARM# AND C-ARM TABLE);  Surgeon: Luan Ruff MD;  Location: [facility] OR;  Service: Vascular     FINGER SURGERY Left     third finger    ROTATOR CUFF REPAIR Left 2011    done in [place]     Family History   Problem Relation Age of Onset    Lung cancer Mother     Hypertension Mother     Diabetes Maternal Grandmother     Heart disease Maternal Grandmother     Hypertension Maternal Grandfather     Heart disease Maternal Grandfather     Heart disease Other     Hypertension Other     Cancer Other      Social History     Tobacco Use    Smoking status: Light Smoker     Packs/day: 0.25     Years: 48.00     Additional pack years: 0.00     Total pack years: 12.00     Types: Cigarettes     Last attempt to quit: 2018     Years since quittin.8     Passive exposure: Current    Smokeless tobacco: Never    Tobacco comments:     1-2cig/day   Vaping Use    Vaping Use: Never used   Substance Use Topics    Alcohol use: Yes     Comment: occasional beer     Drug use: No       ALLERGIES:  No Known Allergies      MEDICATIONS:    Current Outpatient Medications:     atorvastatin (LIPITOR) 10 MG tablet, Take 1 tablet by mouth Daily., Disp: 90 tablet, Rfl: 1    Calcium Carbonate-Vitamin D (calcium-vitamin D) 500-200 MG-UNIT tablet per tablet, Take 1 tablet by mouth Daily., Disp: 30 tablet, Rfl: 11    clopidogrel (PLAVIX) 75 MG tablet, Take 1 tablet by mouth Daily., Disp: 90 tablet, Rfl: 3    Diclofenac Sodium (VOLTAREN) 1 % gel gel, Apply 4 g topically to the appropriate area as directed 4 (Four) Times a Day As Needed (pain)., Disp: 100 g, Rfl: 3    ferrous sulfate 325 (65 FE) MG tablet, Take 1 tablet by mouth Daily With Breakfast., Disp: 90 tablet, Rfl: 1    fluticasone (FLONASE) 50 MCG/ACT nasal spray, 2 sprays by Each Nare route Daily., Disp: 48 mL, Rfl: 3    folic acid (FOLVITE) 1 MG tablet, Take 1 tablet by mouth Daily., Disp: 90 tablet, Rfl: 1    gabapentin (NEURONTIN) 300 MG capsule, Take 2 capsules by mouth 2 (Two) Times a Day., Disp: , Rfl:     HYDROcodone-acetaminophen (NORCO)  MG per tablet, Take 1 tablet by  "mouth Every 6 (Six) Hours As Needed., Disp: , Rfl:     lidocaine (LIDODERM) 5 %, Place 1 patch on the skin as directed by provider Daily. Remove & Discard patch within 12 hours or as directed by MD, Disp: 6 each, Rfl: 0    lisinopril (PRINIVIL,ZESTRIL) 10 MG tablet, Take 1 tablet by mouth Daily., Disp: 90 tablet, Rfl: 1    Melatonin 5 MG capsule, Take 5 mg by mouth Every Night., Disp: 30 each, Rfl: 3    methocarbamol (ROBAXIN) 500 MG tablet, Take 1 tablet by mouth 3 (Three) Times a Day As Needed for Muscle Spasms., Disp: 90 tablet, Rfl: 3    naloxone (NARCAN) 4 MG/0.1ML nasal spray, ADMINISTER A SINGLE SPRAY IN ONE NOSTRIL UPON SIGNS OF OPIOID OVERDOSE. CALL 911. REPEAT AFTER 3 MINUTES IF NO RESPONSE., Disp: , Rfl:     pantoprazole (PROTONIX) 40 MG EC tablet, Take 1 tablet by mouth Daily., Disp: 90 tablet, Rfl: 1    cilostazol (PLETAL) 100 MG tablet, Take 1 tablet by mouth 2 (Two) Times a Day Before Meals., Disp: 60 tablet, Rfl: 11    FLUoxetine (PROzac) 20 MG capsule, Take 1 capsule by mouth once daily, Disp: 90 capsule, Rfl: 0    varenicline (CHANTIX) 1 MG tablet, Take 1 tablet by mouth twice daily, Disp: 56 tablet, Rfl: 0    vitamin D (ERGOCALCIFEROL) 1.25 MG (76123 UT) capsule capsule, Take 1 capsule by mouth once a week, Disp: 12 capsule, Rfl: 0    Review of Systems   Review of Systems   Constitutional: Positive for malaise/fatigue. Negative for weight loss.   Cardiovascular:  Negative for chest pain, claudication and dyspnea on exertion.   Respiratory:  Negative for cough and shortness of breath.    Skin:  Negative for color change and poor wound healing.   Musculoskeletal:  Positive for muscle cramps and myalgias.   Neurological:  Positive for weakness. Negative for dizziness and numbness.       Physical Exam   Vitals:    08/07/23 1335   BP: 128/84   BP Location: Left arm   Pulse: 70   Temp: 98.4 øF (36.9 øC)   TempSrc: Infrared   SpO2: 98%   Weight: 50.9 kg (112 lb 3.2 oz)   Height: 162.6 cm (64\")     Body " surface area is 1.53 meters squared.  Body mass index is 19.26 kg/mý.  Physical Exam  Constitutional:       General: She is not in acute distress.     Appearance: She is not ill-appearing.   HENT:      Right Ear: Hearing normal.      Left Ear: Hearing normal.      Nose: No nasal deformity.      Mouth/Throat:      Dentition: Normal dentition. Does not have dentures.   Cardiovascular:      Rate and Rhythm: Normal rate and regular rhythm.      Pulses:           Carotid pulses are 2+ on the right side and 2+ on the left side.       Radial pulses are 2+ on the right side and 2+ on the left side.        Dorsalis pedis pulses are detected w/ Doppler on the right side and detected w/ Doppler on the left side.        Posterior tibial pulses are detected w/ Doppler on the right side and detected w/ Doppler on the left side.      Heart sounds: No murmur heard.  Pulmonary:      Effort: Pulmonary effort is normal.      Breath sounds: Normal breath sounds.   Abdominal:      General: There is no distension.      Palpations: Abdomen is soft. There is no mass.      Tenderness: There is no abdominal tenderness.   Musculoskeletal:         General: No deformity.      Comments: Gait normal.    Skin:     General: Skin is warm and dry.      Coloration: Skin is not pale.      Findings: No erythema.      Comments: No venous staining   Neurological:      Mental Status: She is alert and oriented to person, place, and time.   Psychiatric:         Speech: Speech normal.         Behavior: Behavior is cooperative.         Thought Content: Thought content normal.         Judgment: Judgment normal.         BUN   Date Value Ref Range Status   08/07/2023 15 8 - 23 mg/dL Final     Creatinine   Date Value Ref Range Status   08/07/2023 1.15 (H) 0.57 - 1.00 mg/dL Final     eGFR Non  Amer   Date Value Ref Range Status   07/06/2021 71 >60 mL/min/1.73 Final       ASSESSMENT:  Diagnoses and all orders for this visit:    1. Chronic pain syndrome  (Primary)    2. Coronary artery disease involving native coronary artery of native heart without angina pectoris    3. PVD (peripheral vascular disease)    4. Nicotine dependence, cigarettes, with other nicotine-induced disorders    5. Carotid stenosis, asymptomatic, bilateral    6. Mixed hyperlipidemia    7. Cerebrovascular accident (CVA), unspecified mechanism    Other orders  -     cilostazol (PLETAL) 100 MG tablet; Take 1 tablet by mouth 2 (Two) Times a Day Before Meals.  Dispense: 60 tablet; Refill: 11    PLAN:  Detailed discussion with Jessie Jordan regarding situation and options.  Stable PVD, moderate claudication. She prefers medical therapy at this time. Multiple risk factors with severe comorbidities.  No intervention indicated at this time. Will follow with interval imaging.  Risks, benefits discussed.  Understands and wishes to proceed with plan.     Pletal 100mg BID  Return in 3 months with MONTY    Return after above studies complete  Smoking cessation advised and assistance options offered including behavioral counseling (Smoking Cessation Classes), Nicotine replacement therapy (patches or gum), pharmacologic therapy (Chantix, Wellbutrin). patient understands that continued use of tobacco products increases risk and progression of heart disease, peripheral vascular disease, stroke, cancer; counseling for 3-5min.    Recommended regular physical activity, progressive walking program.  Continue current medications as directed.  Advance Care Planning   ACP discussion was declined by the patient. Patient does not have an advance directive, declines further assistance.     Thank you for the opportunity to participate in this patient's care.    Copy to primary care provider.

## 2023-08-08 LAB — 25(OH)D3 SERPL-MCNC: 86.5 NG/ML (ref 30–100)

## 2023-08-16 ENCOUNTER — INFUSION (OUTPATIENT)
Dept: ONCOLOGY | Facility: HOSPITAL | Age: 73
End: 2023-08-16
Payer: MEDICARE

## 2023-08-16 VITALS
RESPIRATION RATE: 18 BRPM | SYSTOLIC BLOOD PRESSURE: 128 MMHG | HEART RATE: 80 BPM | TEMPERATURE: 96.9 F | DIASTOLIC BLOOD PRESSURE: 57 MMHG

## 2023-08-16 DIAGNOSIS — M81.0 OSTEOPOROSIS, POST-MENOPAUSAL: Primary | ICD-10-CM

## 2023-08-16 PROCEDURE — 25010000002 DENOSUMAB 60 MG/ML SOLUTION PREFILLED SYRINGE: Performed by: STUDENT IN AN ORGANIZED HEALTH CARE EDUCATION/TRAINING PROGRAM

## 2023-08-16 RX ADMIN — DENOSUMAB 60 MG: 60 INJECTION SUBCUTANEOUS at 13:17

## 2023-08-22 DIAGNOSIS — F17.218 NICOTINE DEPENDENCE, CIGARETTES, WITH OTHER NICOTINE-INDUCED DISORDERS: ICD-10-CM

## 2023-08-22 RX ORDER — VARENICLINE TARTRATE 1 MG/1
TABLET, FILM COATED ORAL
Qty: 56 TABLET | Refills: 0 | Status: SHIPPED | OUTPATIENT
Start: 2023-08-22

## 2023-09-06 DIAGNOSIS — I73.9 PVD (PERIPHERAL VASCULAR DISEASE): Primary | ICD-10-CM

## 2023-09-21 ENCOUNTER — LAB (OUTPATIENT)
Dept: ONCOLOGY | Facility: HOSPITAL | Age: 73
End: 2023-09-21
Payer: MEDICARE

## 2023-09-21 ENCOUNTER — OFFICE VISIT (OUTPATIENT)
Dept: ONCOLOGY | Facility: CLINIC | Age: 73
End: 2023-09-21
Payer: MEDICARE

## 2023-09-21 VITALS
WEIGHT: 113 LBS | HEART RATE: 64 BPM | RESPIRATION RATE: 18 BRPM | OXYGEN SATURATION: 96 % | DIASTOLIC BLOOD PRESSURE: 67 MMHG | BODY MASS INDEX: 19.4 KG/M2 | SYSTOLIC BLOOD PRESSURE: 138 MMHG

## 2023-09-21 DIAGNOSIS — K90.9 IRON MALABSORPTION: ICD-10-CM

## 2023-09-21 DIAGNOSIS — D50.9 IRON DEFICIENCY ANEMIA, UNSPECIFIED IRON DEFICIENCY ANEMIA TYPE: Primary | ICD-10-CM

## 2023-09-21 DIAGNOSIS — I73.9 PVD (PERIPHERAL VASCULAR DISEASE): ICD-10-CM

## 2023-09-21 DIAGNOSIS — D50.9 IRON DEFICIENCY ANEMIA, UNSPECIFIED IRON DEFICIENCY ANEMIA TYPE: ICD-10-CM

## 2023-09-21 LAB
DEPRECATED RDW RBC AUTO: 45.7 FL (ref 37–54)
ERYTHROCYTE [DISTWIDTH] IN BLOOD BY AUTOMATED COUNT: 15 % (ref 12.3–15.4)
FERRITIN SERPL-MCNC: 36.85 NG/ML (ref 13–150)
HCT VFR BLD AUTO: 29.4 % (ref 34–46.6)
HGB BLD-MCNC: 9.6 G/DL (ref 12–15.9)
HOLD SPECIMEN: NORMAL
IRON 24H UR-MRATE: 31 MCG/DL (ref 37–145)
IRON SATN MFR SERPL: 10 % (ref 20–50)
MCH RBC QN AUTO: 27.5 PG (ref 26.6–33)
MCHC RBC AUTO-ENTMCNC: 32.7 G/DL (ref 31.5–35.7)
MCV RBC AUTO: 84.2 FL (ref 79–97)
PLATELET # BLD AUTO: 316 10*3/MM3 (ref 140–450)
PMV BLD AUTO: 10.7 FL (ref 6–12)
RBC # BLD AUTO: 3.49 10*6/MM3 (ref 3.77–5.28)
TIBC SERPL-MCNC: 323 MCG/DL (ref 298–536)
TRANSFERRIN SERPL-MCNC: 217 MG/DL (ref 200–360)
WBC NRBC COR # BLD: 7.7 10*3/MM3 (ref 3.4–10.8)

## 2023-09-21 PROCEDURE — 85027 COMPLETE CBC AUTOMATED: CPT

## 2023-09-21 PROCEDURE — 84466 ASSAY OF TRANSFERRIN: CPT

## 2023-09-21 PROCEDURE — 36415 COLL VENOUS BLD VENIPUNCTURE: CPT

## 2023-09-21 PROCEDURE — G0463 HOSPITAL OUTPT CLINIC VISIT: HCPCS | Performed by: NURSE PRACTITIONER

## 2023-09-21 PROCEDURE — 82728 ASSAY OF FERRITIN: CPT

## 2023-09-21 PROCEDURE — 83540 ASSAY OF IRON: CPT

## 2023-09-21 RX ORDER — DIPHENHYDRAMINE HYDROCHLORIDE 50 MG/ML
50 INJECTION INTRAMUSCULAR; INTRAVENOUS AS NEEDED
OUTPATIENT
Start: 2023-09-28

## 2023-09-21 RX ORDER — SODIUM CHLORIDE 9 MG/ML
250 INJECTION, SOLUTION INTRAVENOUS ONCE
OUTPATIENT
Start: 2023-09-28

## 2023-09-21 RX ORDER — FAMOTIDINE 10 MG/ML
20 INJECTION, SOLUTION INTRAVENOUS AS NEEDED
OUTPATIENT
Start: 2023-09-28

## 2023-09-21 RX ORDER — FLUOXETINE HYDROCHLORIDE 20 MG/1
20 CAPSULE ORAL DAILY
Qty: 90 CAPSULE | Refills: 0 | Status: SHIPPED | OUTPATIENT
Start: 2023-09-21

## 2023-09-21 NOTE — PROGRESS NOTES
"Chief Complaint  Follow-up -iron deficiency anemia    Subjective        Jessie Jordan presents to [facility] HEMATOLOGY & ONCOLOGY  History of Present Illness    No new health issues since last visit.   No new medications.   No new hospitalization.   Chronic health issues overall stable.      Objective   Vital Signs:  /67   Pulse 64   Resp 18   Wt 51.3 kg (113 lb)   SpO2 96%   BMI 19.40 kg/m²   Estimated body mass index is 19.4 kg/m² as calculated from the following:    Height as of 8/7/23: 162.6 cm (64\").    Weight as of this encounter: 51.3 kg (113 lb).       BMI is within normal parameters. No other follow-up for BMI required.      Physical Exam  Vitals and nursing note reviewed.   Constitutional:       Appearance: Normal appearance.   Skin:     General: Skin is dry.      Coloration: Skin is pale.      Findings: Bruising present.   Neurological:      General: No focal deficit present.      Mental Status: She is alert and oriented to person, place, and time. Mental status is at baseline.   Psychiatric:         Mood and Affect: Mood normal.         Behavior: Behavior normal.         Thought Content: Thought content normal.      Result Review :  The following data was reviewed by: Zaria Cummings MD on 09/21/2023:  Common labs          6/9/2023    10:34 6/9/2023    10:49 6/9/2023    11:49 8/7/2023    13:16 9/21/2023    14:50   Common Labs   Glucose    104     BUN  11   15     Creatinine  0.71   1.15     Sodium    139     Potassium    4.1     Chloride    100     Calcium    9.7     Albumin    4.3     Total Bilirubin    0.3     Alkaline Phosphatase    71     AST (SGOT)    23     ALT (SGPT)    5     WBC 9.05     7.70    Hemoglobin 12.0     9.6    Hematocrit 37.0     29.4    Platelets 278     316    Total Cholesterol   165      Triglycerides   89      HDL Cholesterol   58      LDL Cholesterol    91        CMP          1/9/2023    13:24 6/9/2023    10:49 8/7/2023    13:16 "   CMP   Glucose 93   104    BUN 19  11  15    Creatinine 0.72  0.71  1.15    EGFR 89.0  90.5  50.7    Sodium 135   139    Potassium 4.5   4.1    Chloride 101   100    Calcium 9.6   9.7    Total Protein 7.0   8.0    Albumin 4.2   4.3    Globulin 2.8   3.7    Total Bilirubin 0.2   0.3    Alkaline Phosphatase 60   71    AST (SGOT) 16   23    ALT (SGPT) 9   5    Albumin/Globulin Ratio 1.5   1.2    BUN/Creatinine Ratio 26.4   13.0    Anion Gap 7.0   14.0      CBC          4/15/2023    18:49 6/9/2023    10:34 9/21/2023    14:50   CBC   WBC 12.6     9.05  7.70    RBC 3.72     4.35  3.49    Hemoglobin 10.2     12.0  9.6    Hematocrit 32.2     37.0  29.4    MCV 86.6     85.1  84.2    MCH 27.4     27.6  27.5    MCHC 31.7     32.4  32.7    RDW 14.4     14.6  15.0    Platelets 371     278  316       Details          This result is from an external source.             CBC w/diff          4/15/2023    18:49 6/9/2023    10:34 9/21/2023    14:50   CBC w/Diff   WBC 12.6     9.05  7.70    RBC 3.72     4.35  3.49    Hemoglobin 10.2     12.0  9.6    Hematocrit 32.2     37.0  29.4    MCV 86.6     85.1  84.2    MCH 27.4     27.6  27.5    MCHC 31.7     32.4  32.7    RDW 14.4     14.6  15.0    Platelets 371     278  316    Neutrophil Rel % 68.9         Immature Granulocyte Rel % 0.2         Lymphocyte Rel % 20.2         Monocyte Rel % 9.2         Eosinophil Rel % 1.1         Basophil Rel % 0.4            Details          This result is from an external source.               Anemia labs:      Lab 09/21/23  1450   IRON 31*   IRON SATURATION (TSAT) 10*   TIBC 323   TRANSFERRIN 217   FERRITIN 36.85       Jessie Jordan reports a pain score of 6.  Given her pain assessment as noted, treatment options were discussed and the following options were decided upon as a follow-up plan to address the patient's pain: referral to Primary Care for assistance in pain treatment guidance.    Patient screened negative for depression based on a PHQ-9 score  of 1 on 9/21/2023.     Advance Care Planning   ACP discussion was declined by the patient. Patient does not have an advance directive, declines further assistance.         Assessment and Plan   Diagnoses and all orders for this visit:    1. Iron deficiency anemia, unspecified iron deficiency anemia type (Primary)  2. Iron malabsorption  -     CBC (No Diff); Future  -     Ferritin; Future  -     Iron Profile; Future    Chronic issue with exacerbation.  Iron deficiency anemia is worse.  She is currently taking oral iron tablet and has persistent iron deficiency anemia.  Due to this reason I believe she will benefit from IV iron treatment.  Up-to-date on GI studies.    I had an extensive discussion with patient about diagnosis and treatment options. I recommend that we replace their iron with IV monoferric 1000 mg  single infusion.     I had an extensive discussion with the patient about risk versus benefits of IV iron treatment.    I discussed about various risks associated with IV iron such as allergic reaction, hypersensitivity reaction, headache, flushing, joint aches or pains, local IV infiltration and skin discoloration.  After our discussion the patient was in agreement in  proceeding with IV iron treatment for  anemia.      I will check CBC, ferritin, iron profile in 3 months to evaluate response to treatment.    3. PVD (peripheral vascular disease)    Chronic, stable.  Follows with vascular surgery.  Currently on Plavix and cilostazol.  She is also on Lipitor.  Counseled about smoking cessation.  Closely monitor for bleeding complication.    Other orders  -     sodium chloride 0.9 % infusion 250 mL  -     ferric derisomaltose (MONOFERRIC) 1,000 mg in sodium chloride 0.9 % 250 mL IVPB  -     Hydrocortisone Sod Suc (PF) (Solu-CORTEF) injection 100 mg  -     diphenhydrAMINE (BENADRYL) injection 50 mg  -     famotidine (PEPCID) injection 20 mg             Follow Up   No follow-ups on file.  Patient was given  instructions and counseling regarding her condition or for health maintenance advice. Please see specific information pulled into the AVS if appropriate.

## 2023-09-22 ENCOUNTER — TELEPHONE (OUTPATIENT)
Dept: ONCOLOGY | Facility: HOSPITAL | Age: 73
End: 2023-09-22

## 2023-09-22 NOTE — TELEPHONE ENCOUNTER
----- Message from Zaria Cummings MD sent at 9/21/2023  3:27 PM CDT -----  Iron is low. Arrange for single monoferric infusion.

## 2023-09-27 ENCOUNTER — INFUSION (OUTPATIENT)
Dept: ONCOLOGY | Facility: HOSPITAL | Age: 73
End: 2023-09-27
Payer: MEDICARE

## 2023-09-27 VITALS — DIASTOLIC BLOOD PRESSURE: 79 MMHG | TEMPERATURE: 97.1 F | SYSTOLIC BLOOD PRESSURE: 136 MMHG | HEART RATE: 73 BPM

## 2023-09-27 DIAGNOSIS — M81.0 OSTEOPOROSIS, POST-MENOPAUSAL: ICD-10-CM

## 2023-09-27 DIAGNOSIS — D50.9 IRON DEFICIENCY ANEMIA, UNSPECIFIED IRON DEFICIENCY ANEMIA TYPE: Primary | ICD-10-CM

## 2023-09-27 DIAGNOSIS — K90.9 IRON MALABSORPTION: ICD-10-CM

## 2023-09-27 PROCEDURE — 96365 THER/PROPH/DIAG IV INF INIT: CPT | Performed by: NURSE PRACTITIONER

## 2023-09-27 PROCEDURE — 25010000002 FERRIC DERISOMALTOSE 1000 MG/10ML SOLUTION 10 ML VIAL: Performed by: INTERNAL MEDICINE

## 2023-09-27 RX ORDER — SODIUM CHLORIDE 9 MG/ML
250 INJECTION, SOLUTION INTRAVENOUS ONCE
Status: CANCELLED | OUTPATIENT
Start: 2023-09-27

## 2023-09-27 RX ORDER — DIPHENHYDRAMINE HYDROCHLORIDE 50 MG/ML
50 INJECTION INTRAMUSCULAR; INTRAVENOUS AS NEEDED
OUTPATIENT
Start: 2023-09-27

## 2023-09-27 RX ORDER — FAMOTIDINE 10 MG/ML
20 INJECTION, SOLUTION INTRAVENOUS AS NEEDED
OUTPATIENT
Start: 2023-09-27

## 2023-09-27 RX ORDER — SODIUM CHLORIDE 9 MG/ML
250 INJECTION, SOLUTION INTRAVENOUS ONCE
Status: COMPLETED | OUTPATIENT
Start: 2023-09-27 | End: 2023-09-27

## 2023-09-27 RX ORDER — ERGOCALCIFEROL 1.25 MG/1
50000 CAPSULE ORAL WEEKLY
Qty: 12 CAPSULE | Refills: 0 | Status: SHIPPED | OUTPATIENT
Start: 2023-09-27

## 2023-09-27 RX ADMIN — FERRIC DERISOMALTOSE 1000 MG: 1000 SOLUTION INTRAVENOUS at 10:32

## 2023-09-27 RX ADMIN — SODIUM CHLORIDE 250 ML: 9 INJECTION, SOLUTION INTRAVENOUS at 10:32

## 2023-10-10 PROBLEM — R09.89 DECREASED PULSES IN FEET: Status: RESOLVED | Noted: 2018-06-06 | Resolved: 2023-10-10

## 2023-10-10 PROBLEM — F17.210 HEAVY TOBACCO SMOKER >10 CIGARETTES PER DAY: Status: RESOLVED | Noted: 2018-03-26 | Resolved: 2023-10-10

## 2023-10-10 PROBLEM — Z09 FOLLOW-UP SURGERY CARE: Status: RESOLVED | Noted: 2018-12-28 | Resolved: 2023-10-10

## (undated) DEVICE — ULTRAVERSE 035 PTA DILATATION CATHETER 6.0MM X 80MM BALLOON, 130CM SHAFT: Brand: ULTRAVERSE® 035 PTA DILATATION CATHETER

## (undated) DEVICE — STARCLOSE SE VASCULAR CLOSURE SYSTEM: Brand: STARCLOSE SE

## (undated) DEVICE — TOTAL TRAY, 16FR 10ML SIL FOLEY, URN: Brand: MEDLINE

## (undated) DEVICE — PK ANGIO LF 60

## (undated) DEVICE — CONTAINER,SPECIMEN,OR STERILE,4OZ: Brand: MEDLINE

## (undated) DEVICE — SUT VIC 3/0 TIES 18IN J110T

## (undated) DEVICE — DRAPE,SPLIT,BILATERAL,STERILE: Brand: MEDLINE

## (undated) DEVICE — COVER,MAYO STAND,STERILE: Brand: MEDLINE

## (undated) DEVICE — GLV SURG SENSICARE GREEN W/ALOE PF LF 6 STRL

## (undated) DEVICE — SUT VIC 3/0 SH 27IN J416H

## (undated) DEVICE — GLV SURG SENSICARE PI PF LF 7 GRN STRL

## (undated) DEVICE — LUER-LOK 360°: Brand: CONNECTA, LUER-LOK

## (undated) DEVICE — STERILE POLYISOPRENE POWDER-FREE SURGICAL GLOVES WITH EMOLLIENT COATING: Brand: PROTEXIS

## (undated) DEVICE — ULTRAVERSE 035 PTA DILATATION CATHETER 8.0MM X 20MM BALLOON, 75CM SHAFT: Brand: ULTRAVERSE® 035 PTA DILATATION CATHETER

## (undated) DEVICE — SHEET,DRAPE,53X77,STERILE: Brand: MEDLINE

## (undated) DEVICE — PINNACLE INTRODUCER SHEATH: Brand: PINNACLE

## (undated) DEVICE — PERCLOSE™ PROSTYLE™ SUTURE-MEDIATED CLOSURE AND REPAIR SYSTEM: Brand: PERCLOSE™ PROSTYLE™

## (undated) DEVICE — SINGLE-USE BIOPSY FORCEPS: Brand: RADIAL JAW 4

## (undated) DEVICE — SYR LUERLOK 20CC

## (undated) DEVICE — MAD PERIPHERAL VASCULAR-LF: Brand: MEDLINE INDUSTRIES, INC.

## (undated) DEVICE — SUT PROLENE CARDIO C1D 6/0 24IN 8726H

## (undated) DEVICE — GLV SURG SENSICARE POLYISPRN W/ALOE PF LF 6 GRN STRL

## (undated) DEVICE — 3M™ IOBAN™ 2 ANTIMICROBIAL INCISE DRAPE 6651EZ: Brand: IOBAN™ 2

## (undated) DEVICE — CATH GUIDE SOFTVU SELECT/V HT OMNI .038 5F 65CM

## (undated) DEVICE — SUT GORE TT9 CV6 30IN 6M02A

## (undated) DEVICE — STPLR SKIN VISISTAT WD 35CT

## (undated) DEVICE — SYS SKIN CLS DERMABOND PRINEO W/22CM MESH TP

## (undated) DEVICE — SUT PDS 3/0 SH 27IN DYED Z316H

## (undated) DEVICE — TOWEL,OR,DSP,ST,BLUE,DLX,8/PK,10PK/CS: Brand: MEDLINE

## (undated) DEVICE — GOWN,AURORA,NOREINF,RAGLAN,XL,STERILE: Brand: MEDLINE

## (undated) DEVICE — FOGARTY - HYDRAGRIP SURGICAL - CLAMP INSERTS: Brand: FOGARTY SOFTJAW

## (undated) DEVICE — I-KNIFE CUTTING INSTRUMENT 15 DEGREE: Brand: I-KNIFE

## (undated) DEVICE — SUT MONOCRYL 4/0 PS2 27IN Y426H ETY426H

## (undated) DEVICE — ELECTRD BLD EZ CLN MOD 2.5IN

## (undated) DEVICE — KT INTRO MINISTICK MAX W/GW PALLADIUM ECHO 4F 21G 7CM

## (undated) DEVICE — RADIFOCUS GLIDEWIRE: Brand: GLIDEWIRE

## (undated) DEVICE — GLV SURG NEOLON 2G PF LF 6.5 STRL

## (undated) DEVICE — NDL HYPO PRECISIONGLIDE/REG 18G 1IN PNK

## (undated) DEVICE — GAUZE,SPONGE,4"X4",16PLY,XRAY,STRL,LF: Brand: MEDLINE

## (undated) DEVICE — SUT PROLN CARDIO BV1 6/0 24IN 8805H

## (undated) DEVICE — MYNXGRIP 5F: Brand: MYNXGRIP

## (undated) DEVICE — PART NUMBER 108260, VTI 8 MHZ DISPOSABLE DOPPLER PROBE, STRAIGHT, BOX: Brand: VTI 8 MHZ DISPOSABLE DOPPLER PROBE, STRAIGHT, BOX

## (undated) DEVICE — GOWN ,SIRUS ,NONREINFORCED 4XL: Brand: MEDLINE

## (undated) DEVICE — PRESSURE MONITORING INJECTION LINE 6FT. M/F: Brand: PRESSURE MONITORING INJECTION LINE

## (undated) DEVICE — TBG HI PRESSURE 500PSI 20IN

## (undated) DEVICE — CANN SMPL SOFTECH BIFLO ETCO2 A/M 7FT

## (undated) DEVICE — PACK,UNIVERSAL,NO GOWNS: Brand: MEDLINE

## (undated) DEVICE — SUT SILK 2/0 FS BLK 18IN 685G

## (undated) DEVICE — INTENDED FOR TISSUE SEPARATION, AND OTHER PROCEDURES THAT REQUIRE A SHARP SURGICAL BLADE TO PUNCTURE OR CUT.: Brand: BARD-PARKER ® CARBON RIB-BACK BLADES

## (undated) DEVICE — Device

## (undated) DEVICE — SYR SLPTP 30CC

## (undated) DEVICE — MYNXGRIP 6F/7F: Brand: MYNXGRIP

## (undated) DEVICE — SUT PROLENE 7-0 BV175-7 24IN DB ETH8766H

## (undated) DEVICE — BITEBLOCK ENDO W/STRAP 60F A/ LF DISP

## (undated) DEVICE — GOWN,NON-REINFORCED,4XL: Brand: MEDLINE

## (undated) DEVICE — APPL CHLORAPREP W/TINT 26ML ORNG

## (undated) DEVICE — DRSNG TELFA PAD NONADH STR 1S 3X8IN

## (undated) DEVICE — SUT VICRYL 4/0 SUTUPAK 18 J109T

## (undated) DEVICE — SOL IRR NACL 0.9PCT BT 1000ML